# Patient Record
Sex: MALE | Race: WHITE | Employment: OTHER | URBAN - METROPOLITAN AREA
[De-identification: names, ages, dates, MRNs, and addresses within clinical notes are randomized per-mention and may not be internally consistent; named-entity substitution may affect disease eponyms.]

---

## 2017-03-20 RX ORDER — METFORMIN HYDROCHLORIDE 500 MG/1
TABLET, EXTENDED RELEASE ORAL
Qty: 360 TAB | Refills: 0 | Status: SHIPPED | OUTPATIENT
Start: 2017-03-20 | End: 2017-03-24 | Stop reason: SDUPTHER

## 2017-03-22 RX ORDER — ATORVASTATIN CALCIUM 20 MG/1
TABLET, FILM COATED ORAL
Qty: 90 TAB | Refills: 0 | Status: SHIPPED | OUTPATIENT
Start: 2017-03-22 | End: 2017-06-29 | Stop reason: SDUPTHER

## 2017-03-23 ENCOUNTER — TELEPHONE (OUTPATIENT)
Dept: CARDIOLOGY CLINIC | Age: 77
End: 2017-03-23

## 2017-03-23 NOTE — TELEPHONE ENCOUNTER
Pt called this morning and stated he has not been feeling well and easily fatigued. He requested a call back to [phone]. Thanks!

## 2017-03-24 ENCOUNTER — HOSPITAL ENCOUNTER (OUTPATIENT)
Dept: LAB | Age: 77
Discharge: HOME | End: 2017-03-24
Payer: MEDICARE

## 2017-03-24 ENCOUNTER — OFFICE VISIT (OUTPATIENT)
Dept: CARDIOLOGY CLINIC | Age: 77
End: 2017-03-24

## 2017-03-24 VITALS
OXYGEN SATURATION: 97 % | SYSTOLIC BLOOD PRESSURE: 148 MMHG | HEIGHT: 76 IN | BODY MASS INDEX: 29.01 KG/M2 | HEART RATE: 66 BPM | RESPIRATION RATE: 16 BRPM | DIASTOLIC BLOOD PRESSURE: 76 MMHG | WEIGHT: 238.2 LBS

## 2017-03-24 DIAGNOSIS — R07.89 CHEST TIGHTNESS: ICD-10-CM

## 2017-03-24 DIAGNOSIS — R06.09 DOE (DYSPNEA ON EXERTION): Primary | ICD-10-CM

## 2017-03-24 DIAGNOSIS — G47.33 OSA (OBSTRUCTIVE SLEEP APNEA): ICD-10-CM

## 2017-03-24 DIAGNOSIS — R53.83 FATIGUE, UNSPECIFIED TYPE: ICD-10-CM

## 2017-03-24 DIAGNOSIS — I48.91 ATRIAL FIBRILLATION, UNSPECIFIED TYPE (HCC): ICD-10-CM

## 2017-03-24 DIAGNOSIS — I25.10 CORONARY ARTERY DISEASE INVOLVING NATIVE CORONARY ARTERY OF NATIVE HEART WITHOUT ANGINA PECTORIS: ICD-10-CM

## 2017-03-24 DIAGNOSIS — I10 ESSENTIAL HYPERTENSION, BENIGN: ICD-10-CM

## 2017-03-24 DIAGNOSIS — E11.9 TYPE 2 DIABETES MELLITUS WITHOUT COMPLICATION, WITHOUT LONG-TERM CURRENT USE OF INSULIN (HCC): ICD-10-CM

## 2017-03-24 PROCEDURE — 80048 BASIC METABOLIC PNL TOTAL CA: CPT

## 2017-03-24 PROCEDURE — 80299 QUANTITATIVE ASSAY DRUG: CPT

## 2017-03-24 PROCEDURE — 36415 COLL VENOUS BLD VENIPUNCTURE: CPT

## 2017-03-24 PROCEDURE — 84443 ASSAY THYROID STIM HORMONE: CPT

## 2017-03-24 PROCEDURE — 85025 COMPLETE CBC W/AUTO DIFF WBC: CPT

## 2017-03-24 NOTE — PROGRESS NOTES
Subjective:     Problem List  Date Reviewed: 3/24/2017          Codes Class Noted    ACP (advance care planning) ICD-10-CM: Z71.89  ICD-9-CM: V65.49  9/30/2016    Overview Signed 9/30/2016  6:42 AM by Vielka Gordon MD     I reviewed advanced directive information and written information given to patient; patient to make follow up appointment with a NN to review choices for health care, agent, and anatomical gifts. 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  DCCV (10/7/13). Romy Kitchen (12/18/14): On amio. Rosacea ICD-10-CM: L71.9  ICD-9-CM: 695.3  11/13/2012        Dyslipidemia ICD-10-CM: E78.5  ICD-9-CM: 272.4  12/20/2010    Overview Addendum 6/22/2016  9:41 AM by MD MARII Alva. FLP (12/14/10): Tot 168, , HDL 49, LDL 97 (Zocor 20mg qd). B.  FLP (1/6/14): Tot 152, TG 81, HDL 57, LDL 79 (Zocor 40). C.  FLP (5/11/15): Tot 144, TG 66, HDL 57, LDL 74 (Lipitor 20). D.  FLP (12/1/15): Tot 143, TG 63, HDL 55, LDL 75 (Lipitor 20). E.  FLP (4/12/16): Tot 146, TG 84, HDL 51, LDL 58 (Lipitor 20).              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 Cath (8/27/8): LAD p90, m70. OM1 70, OM2 90 (small). RCA m40. EF 55%. No AVG/MR. Patent L SC.  b. PCI (11/13/8): mLAD 2.75x18 Xience, pLAD 2.75x23 Xience. OM1 3.0x15 Xience. c. Tono Yaness Cardiolite (8/11/9): Mild fixed inf defect. No reversible defects. EF 51% with mild inf HK. D.  Cath (8/29/13):  LAD patent prox and mid stents. LCx m40; patent OM1 stent. RCA p80. EF 55%. No AVG/MR. E.  PCI ost/proxRCA (8/29/13):  3.0x18 Xience. F.  Echo (8/22/13):  EF 55%. Mod dil LA. Mild dil RA. Mild MR/TR/GA. PASP 24. Sxs if CAD: Fatigue, chest tightness    Mr. Saray Ray is a 77 y.o. man with the above past medical history, last seen 6/16 by Dr. Cheo Gardner, who presents today reporting significant fatigue and HURD. He is doing well from a cardiac standpoint. His main complaint is that of easy fatigability. His legs get very tired doing his activities. He denies any chest pain, chest discomfort, shortness of breath, dyspnea on exertion, orthopnea, paroxysmal nocturnal dyspnea, lower extremity swelling, palpitations, syncope or near syncope. He is still coaching girls softball and does this without too much in the way of cardiac symptoms. Hx of GI bleed on Xarelto. History   Smoking Status    Never Smoker   Smokeless Tobacco    Former User    Types: Leeroy Sandhu Quit date: 10/30/1993     Comment: patient reports quitting over 20 yeaers ago.         Current Outpatient Prescriptions   Medication Sig Dispense Refill    atorvastatin (LIPITOR) 20 mg tablet TAKE ONE TABLET BY MOUTH ONCE DAILY 90 Tab 0    lisinopril (PRINIVIL, ZESTRIL) 40 mg tablet TAKE ONE TABLET BY MOUTH ONCE DAILY 90 Tab 0    amiodarone (CORDARONE) 200 mg tablet TAKE ONE TABLET BY MOUTH ONCE DAILY 90 Tab 2    metFORMIN ER (GLUCOPHAGE XR) 500 mg tablet TAKE FOUR TABLETS BY MOUTH ONCE DAILY WITH BREAKFAST 360 tablet 0    amLODIPine (NORVASC) 5 mg tablet Take 1 tablet by mouth daily. 90 tablet 1    ferrous sulfate 134 mg (27 mg iron) tab Take 3 Tabs by mouth.  aspirin delayed-release 81 mg tablet Take 81 mg by mouth daily. Indications: MYOCARDIAL REINFARCTION PREVENTION         Objective:     Visit Vitals    /76 (BP 1 Location: Left arm, BP Patient Position: Sitting)    Pulse 66    Resp 16    Ht 6' 4\" (1.93 m)    Wt 238 lb 3.2 oz (108 kg)    SpO2 97%    BMI 28.99 kg/m2       HEENT Exam:     Normocephalic, atraumatic. EOMI. Oropharynx negative. Neck supple. No lymphadenopathy. Lung Exam:     The patient is not dyspneic. There is no cough. The lungs are clear to percussion. Breath sounds are heard equally in all lung fields. There are no wheezes, rales, rhonchi, or rubs heard on auscultation. Heart Exam:     The rhythm is regular. The PMI is in the 5th intercostal space of the MCL. Apical impulse is normal. S1 is regular. S2 is physiologic. There is no S3, S4 gallop, murmur, click, or rub. Abdomen Exam:     Bowel sounds are normoactive. Abdomen benign. Extremities Exam:     The extremities are atraumatic appearing. There is no clubbing, cyanosis, edema, ulcers, varicose veins, rash, swelling, erythemia noted in the extremities. Right-sided weakness. Vascular Exam:     The radial, brachial, dorsalis pedis, posterior tibial, are equal and strong bilaterally The carotids are equal bilaterally without bruits.           EKG:  Sinus at 67 bpm, normal    Assessment/Plan:     Encounter Diagnoses   Name Primary?  Chest tightness     Fatigue, unspecified type     HURD (dyspnea on exertion) Yes    Coronary artery disease involving native coronary artery of native heart without angina pectoris     Essential hypertension, benign     Atrial fibrillation, unspecified type ([facility] [address]     Type 2 diabetes mellitus without complication, without long-term current use of insulin (HCC)     TOPHER (obstructive sleep apnea)        Mr. Genie Franklin is experiencing exertional chest tightness, fatigue and HURD. These sxs are consistent with his previous experiences prior to his PCI. He has CAD, afib controlled with amiodarone, and DM. I believe it is prudent to obtain a Lexiscan cardiolite to evaluate for progression of ischemia. However, as he has previously had negative stress results and then found to have obstructive disease with cath, will review results with Dr. Vicente Dennis. In the meantime, since he remains on amiodarone and I see he is overdue for amiodarone surveillance labs, will obtain thyroid panel and amiodarone and metabolite. Will also obtain BMP and CBC in anticipation of possible cardiac catheterization. The patient was encouraged to continue current medications and call with any new complaints or concerns. Oral and written instructions provided; patient verbalized understanding. Regroup after testing complete.     Bret Dixon, ANP

## 2017-03-24 NOTE — MR AVS SNAPSHOT
Visit Information Date & Time Provider Department Dept. Phone Encounter #  
 3/24/2017  9:00 AM Pito Felipe NP [facility] [phone] [id number] Your Appointments 7/5/2017  8:40 AM  
ESTABLISHED PATIENT with Celso Damian MD  
[facility] (GAIL SCHEDULING) Appt Note: ANNUAL  
 [address] [facility] [address] [address] [facility]  
[address] [place] [address] [address] Upcoming Health Maintenance Date Due  
 EYE EXAM RETINAL OR DILATED Q1 5/12/2016 GLAUCOMA SCREENING Q2Y 1/19/2017 HEMOGLOBIN A1C Q6M 3/29/2017 FOOT EXAM Q1 4/12/2017 MICROALBUMIN Q1 9/28/2017 MEDICARE YEARLY EXAM 9/29/2017 LIPID PANEL Q1 9/29/2017 COLONOSCOPY 4/12/2026 DTaP/Tdap/Td series (2 - Td) 9/28/2026 Allergies as of 3/24/2017  Review Complete On: 3/24/2017 By: Pito Felipe NP Severity Noted Reaction Type Reactions Adhesive Tape  02/25/2010    Rash Baycol  02/25/2010    Nausea and Vomiting Pravachol [Pravastatin]  02/25/2010    Nausea and Vomiting Current Immunizations  Reviewed on 11/1/2013 Name Date Influenza High Dose Vaccine PF 9/28/2016, 12/1/2015 Influenza Vaccine 11/1/2013 Influenza Vaccine Split 10/15/2012 Pneumococcal Vaccine (Unspecified Type) 11/25/2008 dT Vaccine 12/20/1999 Not reviewed this visit You Were Diagnosed With   
  
 Codes Comments HURD (dyspnea on exertion)    -  Primary ICD-10-CM: R06.09 
ICD-9-CM: 786.09 Chest tightness     ICD-10-CM: R07.89 ICD-9-CM: 786.59 Fatigue, unspecified type     ICD-10-CM: R53.83 ICD-9-CM: 780.79 Coronary artery disease involving native coronary artery of native heart without angina pectoris     ICD-10-CM: I25.10 ICD-9-CM: 414.01 Essential hypertension, benign     ICD-10-CM: I10 
ICD-9-CM: 401.1 [facility], unspecified [facility]     ICD-10-CM: I48.91 
ICD-9-CM: 427.31 Type 2 diabetes mellitus without complication, without long-term current use of insulin (HCC)     ICD-10-CM: E11.9 ICD-9-CM: 250.00 Vitals BP Pulse Resp Height(growth percentile) Weight(growth percentile) SpO2  
 148/76 (BP 1 Location: Left arm, BP Patient Position: Sitting) 66 16 6' 4\" (1.93 m) 238 lb 3.2 oz (108 kg) 97% BMI Smoking Status 28.99 kg/m2 Never Smoker Vitals History BMI and BSA Data Body Mass Index Body Surface Area  
 28.99 kg/m 2 2.41 m 2 Preferred Pharmacy Pharmacy Name [address] Your Updated Medication List  
  
   
This list is accurate as of: 3/24/17 10:24 AM.  Always use your most recent med list.  
  
  
  
  
 amiodarone 200 mg tablet Commonly known as:  CORDARONE  
TAKE ONE TABLET BY MOUTH ONCE DAILY  
  
 amLODIPine 5 mg tablet Commonly known as:  Elyn Coffer Take 1 tablet by mouth daily. aspirin delayed-release 81 mg tablet Take 81 mg by mouth daily. Indications: MYOCARDIAL REINFARCTION PREVENTION  
  
 atorvastatin 20 mg tablet Commonly known as:  LIPITOR  
TAKE ONE TABLET BY MOUTH ONCE DAILY ferrous sulfate 134 mg (27 mg iron) Tab Take 3 Tabs by mouth.  
  
 lisinopril 40 mg tablet Commonly known as:  PRINIVIL, ZESTRIL  
TAKE ONE TABLET BY MOUTH ONCE DAILY  
  
 metFORMIN  mg tablet Commonly known as:  GLUCOPHAGE XR  
TAKE FOUR TABLETS BY MOUTH ONCE DAILY WITH BREAKFAST We Performed the Following AMB POC EKG ROUTINE W/ 12 LEADS, INTER & REP [91247 CPT(R)] AMIODARONE & METABOLITE [id number] CPT(R)] CBC WITH AUTOMATED DIFF [65648 CPT(R)] METABOLIC PANEL, BASIC [11327 CPT(R)] THYROID PANEL W/TSH [54371 CPT(R)] To-Do List   
 03/27/2017   ECG:  STRESS TEST LEXISCAN/CARDIOLITE   
  
  
 Please provide this summary of care documentation to your next provider. Your primary care clinician is listed as [address] [address], [facility]/s . If you have any questions after today's visit, please call [phone].

## 2017-03-28 LAB
AMIODARONE SERPL-MCNC: 1.2 UG/ML (ref 1–2.5)
BASOPHILS # BLD AUTO: 0 X10E3/UL (ref 0–0.2)
BASOPHILS NFR BLD AUTO: 1 %
BUN SERPL-MCNC: 30 MG/DL (ref 8–27)
BUN/CREAT SERPL: 22 (ref 10–22)
CALCIUM SERPL-MCNC: 9.9 MG/DL (ref 8.6–10.2)
CHLORIDE SERPL-SCNC: 103 MMOL/L (ref 96–106)
CO2 SERPL-SCNC: 27 MMOL/L (ref 18–29)
CREAT SERPL-MCNC: 1.35 MG/DL (ref 0.76–1.27)
DESETHYLAMIODARONE SERPL-MCNC: 0.9 UG/ML (ref 1–2.5)
EOSINOPHIL # BLD AUTO: 0.1 X10E3/UL (ref 0–0.4)
EOSINOPHIL NFR BLD AUTO: 1 %
ERYTHROCYTE [DISTWIDTH] IN BLOOD BY AUTOMATED COUNT: 15.4 % (ref 12.3–15.4)
FT4I SERPL CALC-MCNC: 3.5 (ref 1.2–4.9)
GLUCOSE SERPL-MCNC: 127 MG/DL (ref 65–99)
HCT VFR BLD AUTO: 39.6 % (ref 37.5–51)
HGB BLD-MCNC: 13 G/DL (ref 12.6–17.7)
IMM GRANULOCYTES # BLD: 0.1 X10E3/UL (ref 0–0.1)
IMM GRANULOCYTES NFR BLD: 1 %
INTERPRETATION: NORMAL
LYMPHOCYTES # BLD AUTO: 1.6 X10E3/UL (ref 0.7–3.1)
LYMPHOCYTES NFR BLD AUTO: 19 %
MCH RBC QN AUTO: 30.5 PG (ref 26.6–33)
MCHC RBC AUTO-ENTMCNC: 32.8 G/DL (ref 31.5–35.7)
MCV RBC AUTO: 93 FL (ref 79–97)
MONOCYTES # BLD AUTO: 0.6 X10E3/UL (ref 0.1–0.9)
MONOCYTES NFR BLD AUTO: 7 %
NEUTROPHILS # BLD AUTO: 6.2 X10E3/UL (ref 1.4–7)
NEUTROPHILS NFR BLD AUTO: 71 %
PLATELET # BLD AUTO: 230 X10E3/UL (ref 150–379)
POTASSIUM SERPL-SCNC: 4.5 MMOL/L (ref 3.5–5.2)
RBC # BLD AUTO: 4.26 X10E6/UL (ref 4.14–5.8)
SODIUM SERPL-SCNC: 145 MMOL/L (ref 134–144)
T3RU NFR SERPL: 31 % (ref 24–39)
T4 SERPL-MCNC: 11.3 UG/DL (ref 4.5–12)
TSH SERPL DL<=0.005 MIU/L-ACNC: 1.86 UIU/ML (ref 0.45–4.5)
WBC # BLD AUTO: 8.5 X10E3/UL (ref 3.4–10.8)

## 2017-03-28 NOTE — PROGRESS NOTES
Thyroid and amiodarone labs show good control. CBC and BMP done in anticipation of possible cardiac cath.

## 2017-04-02 RX ORDER — LISINOPRIL 40 MG/1
TABLET ORAL
Qty: 90 TAB | Refills: 0 | Status: SHIPPED | OUTPATIENT
Start: 2017-04-02 | End: 2017-08-16 | Stop reason: SDUPTHER

## 2017-04-04 ENCOUNTER — CLINICAL SUPPORT (OUTPATIENT)
Dept: CARDIOLOGY CLINIC | Age: 77
End: 2017-04-04

## 2017-04-04 DIAGNOSIS — Z98.61 HISTORY OF PTCA: ICD-10-CM

## 2017-04-04 DIAGNOSIS — R53.83 FATIGUE, UNSPECIFIED TYPE: ICD-10-CM

## 2017-04-04 DIAGNOSIS — E11.9 TYPE 2 DIABETES MELLITUS WITHOUT COMPLICATION, WITHOUT LONG-TERM CURRENT USE OF INSULIN (HCC): ICD-10-CM

## 2017-04-04 DIAGNOSIS — I25.10 CORONARY ARTERY DISEASE INVOLVING NATIVE CORONARY ARTERY OF NATIVE HEART WITHOUT ANGINA PECTORIS: ICD-10-CM

## 2017-04-04 DIAGNOSIS — R07.9 CHEST PAIN, UNSPECIFIED TYPE: ICD-10-CM

## 2017-04-04 DIAGNOSIS — R06.02 SHORTNESS OF BREATH: ICD-10-CM

## 2017-04-04 DIAGNOSIS — I48.91 ATRIAL FIBRILLATION, UNSPECIFIED TYPE (HCC): ICD-10-CM

## 2017-04-04 DIAGNOSIS — R07.89 CHEST TIGHTNESS: ICD-10-CM

## 2017-04-04 DIAGNOSIS — R06.09 DOE (DYSPNEA ON EXERTION): ICD-10-CM

## 2017-04-11 DIAGNOSIS — I10 ESSENTIAL HYPERTENSION, BENIGN: Primary | ICD-10-CM

## 2017-04-11 DIAGNOSIS — Z01.810 PRE-OPERATIVE CARDIOVASCULAR EXAMINATION: ICD-10-CM

## 2017-04-11 DIAGNOSIS — I25.10 CORONARY ARTERY DISEASE INVOLVING NATIVE CORONARY ARTERY OF NATIVE HEART WITHOUT ANGINA PECTORIS: ICD-10-CM

## 2017-04-14 ENCOUNTER — DOCUMENTATION ONLY (OUTPATIENT)
Dept: CARDIOLOGY CLINIC | Age: 77
End: 2017-04-14

## 2017-04-14 NOTE — PROGRESS NOTES
Cardiac cath Friday April 28 10:00am Arrive at 8am 2nd floor registration Npo after midnight,hold metformin 24 hr prior to and 48 hr after. Labs pending.

## 2017-04-27 DIAGNOSIS — I25.10 CORONARY ARTERY DISEASE DUE TO LIPID RICH PLAQUE: Primary | ICD-10-CM

## 2017-04-27 DIAGNOSIS — I25.83 CORONARY ARTERY DISEASE DUE TO LIPID RICH PLAQUE: Primary | ICD-10-CM

## 2017-04-27 RX ORDER — SODIUM CHLORIDE 0.9 % (FLUSH) 0.9 %
5-10 SYRINGE (ML) INJECTION EVERY 8 HOURS
Status: CANCELLED | OUTPATIENT
Start: 2017-04-28

## 2017-04-27 RX ORDER — SODIUM CHLORIDE 0.9 % (FLUSH) 0.9 %
5-10 SYRINGE (ML) INJECTION AS NEEDED
Status: CANCELLED | OUTPATIENT
Start: 2017-04-28

## 2017-04-28 ENCOUNTER — HOSPITAL ENCOUNTER (OUTPATIENT)
Dept: CARDIAC CATH/INVASIVE PROCEDURES | Age: 77
Discharge: HOME | End: 2017-04-28
Attending: SPECIALIST | Admitting: SPECIALIST
Payer: MEDICARE

## 2017-04-28 VITALS
OXYGEN SATURATION: 95 % | WEIGHT: 236.55 LBS | RESPIRATION RATE: 20 BRPM | TEMPERATURE: 97.9 F | HEIGHT: 75 IN | BODY MASS INDEX: 29.41 KG/M2 | DIASTOLIC BLOOD PRESSURE: 70 MMHG | HEART RATE: 64 BPM | SYSTOLIC BLOOD PRESSURE: 141 MMHG

## 2017-04-28 DIAGNOSIS — I25.10 CORONARY ARTERY DISEASE DUE TO LIPID RICH PLAQUE: ICD-10-CM

## 2017-04-28 DIAGNOSIS — I25.83 CORONARY ARTERY DISEASE DUE TO LIPID RICH PLAQUE: ICD-10-CM

## 2017-04-28 LAB
ANION GAP BLD CALC-SCNC: 7 MMOL/L (ref 5–15)
ATRIAL RATE: 57 BPM
BUN SERPL-MCNC: 23 MG/DL (ref 6–20)
BUN/CREAT SERPL: 18 (ref 12–20)
CALCIUM SERPL-MCNC: 8.9 MG/DL (ref 8.5–10.1)
CALCULATED P AXIS, ECG09: 51 DEGREES
CALCULATED R AXIS, ECG10: -26 DEGREES
CALCULATED T AXIS, ECG11: -2 DEGREES
CHLORIDE SERPL-SCNC: 105 MMOL/L (ref 97–108)
CO2 SERPL-SCNC: 31 MMOL/L (ref 21–32)
CREAT SERPL-MCNC: 1.28 MG/DL (ref 0.7–1.3)
DIAGNOSIS, 93000: NORMAL
ERYTHROCYTE [DISTWIDTH] IN BLOOD BY AUTOMATED COUNT: 14.8 % (ref 11.5–14.5)
GLUCOSE SERPL-MCNC: 125 MG/DL (ref 65–100)
HCT VFR BLD AUTO: 42.4 % (ref 36.6–50.3)
HGB BLD-MCNC: 13.6 G/DL (ref 12.1–17)
MCH RBC QN AUTO: 30.4 PG (ref 26–34)
MCHC RBC AUTO-ENTMCNC: 32.1 G/DL (ref 30–36.5)
MCV RBC AUTO: 94.9 FL (ref 80–99)
P-R INTERVAL, ECG05: 192 MS
PLATELET # BLD AUTO: 209 K/UL (ref 150–400)
POTASSIUM SERPL-SCNC: 3.6 MMOL/L (ref 3.5–5.1)
Q-T INTERVAL, ECG07: 500 MS
QRS DURATION, ECG06: 110 MS
QTC CALCULATION (BEZET), ECG08: 486 MS
RBC # BLD AUTO: 4.47 M/UL (ref 4.1–5.7)
SODIUM SERPL-SCNC: 143 MMOL/L (ref 136–145)
VENTRICULAR RATE, ECG03: 57 BPM
WBC # BLD AUTO: 6.7 K/UL (ref 4.1–11.1)

## 2017-04-28 PROCEDURE — 74011250637 HC RX REV CODE- 250/637: Performed by: SPECIALIST

## 2017-04-28 PROCEDURE — 74011636320 HC RX REV CODE- 636/320: Performed by: SPECIALIST

## 2017-04-28 PROCEDURE — 77030004532 HC CATH ANGI DX IMP BSC -A

## 2017-04-28 PROCEDURE — 77030008543 HC TBNG MON PRSS MRTM -A

## 2017-04-28 PROCEDURE — C1894 INTRO/SHEATH, NON-LASER: HCPCS

## 2017-04-28 PROCEDURE — 77030013521 HC DEV INFL BLN BSC -B

## 2017-04-28 PROCEDURE — 77030018836 HC SOL IRR NACL ICUM -A

## 2017-04-28 PROCEDURE — 85027 COMPLETE CBC AUTOMATED: CPT | Performed by: SPECIALIST

## 2017-04-28 PROCEDURE — C1769 GUIDE WIRE: HCPCS

## 2017-04-28 PROCEDURE — C1725 CATH, TRANSLUMIN NON-LASER: HCPCS

## 2017-04-28 PROCEDURE — 74011000250 HC RX REV CODE- 250: Performed by: SPECIALIST

## 2017-04-28 PROCEDURE — 99153 MOD SED SAME PHYS/QHP EA: CPT

## 2017-04-28 PROCEDURE — 93458 L HRT ARTERY/VENTRICLE ANGIO: CPT

## 2017-04-28 PROCEDURE — 77030029065 HC DRSG HEMO QCLOT ZMED -B

## 2017-04-28 PROCEDURE — 80048 BASIC METABOLIC PNL TOTAL CA: CPT | Performed by: SPECIALIST

## 2017-04-28 PROCEDURE — 93005 ELECTROCARDIOGRAM TRACING: CPT

## 2017-04-28 PROCEDURE — C1887 CATHETER, GUIDING: HCPCS

## 2017-04-28 PROCEDURE — C1874 STENT, COATED/COV W/DEL SYS: HCPCS

## 2017-04-28 PROCEDURE — 36415 COLL VENOUS BLD VENIPUNCTURE: CPT | Performed by: SPECIALIST

## 2017-04-28 PROCEDURE — 74011250636 HC RX REV CODE- 250/636

## 2017-04-28 PROCEDURE — 74011000258 HC RX REV CODE- 258: Performed by: SPECIALIST

## 2017-04-28 PROCEDURE — 74011250636 HC RX REV CODE- 250/636: Performed by: SPECIALIST

## 2017-04-28 PROCEDURE — 77030028837 HC SYR ANGI PWR INJ COEU -A

## 2017-04-28 PROCEDURE — 99152 MOD SED SAME PHYS/QHP 5/>YRS: CPT

## 2017-04-28 RX ORDER — SODIUM CHLORIDE 0.9 % (FLUSH) 0.9 %
5-10 SYRINGE (ML) INJECTION AS NEEDED
Status: DISCONTINUED | OUTPATIENT
Start: 2017-04-28 | End: 2017-04-28 | Stop reason: HOSPADM

## 2017-04-28 RX ORDER — NITROGLYCERIN 0.4 MG/1
0.4 TABLET SUBLINGUAL
Status: DISCONTINUED | OUTPATIENT
Start: 2017-04-28 | End: 2017-04-28 | Stop reason: HOSPADM

## 2017-04-28 RX ORDER — MIDAZOLAM HYDROCHLORIDE 1 MG/ML
.5-1 INJECTION, SOLUTION INTRAMUSCULAR; INTRAVENOUS
Status: DISCONTINUED | OUTPATIENT
Start: 2017-04-28 | End: 2017-04-28 | Stop reason: HOSPADM

## 2017-04-28 RX ORDER — HYDRALAZINE HYDROCHLORIDE 20 MG/ML
20 INJECTION INTRAMUSCULAR; INTRAVENOUS ONCE
Status: COMPLETED | OUTPATIENT
Start: 2017-04-28 | End: 2017-04-28

## 2017-04-28 RX ORDER — SODIUM CHLORIDE 0.9 % (FLUSH) 0.9 %
5-10 SYRINGE (ML) INJECTION EVERY 8 HOURS
Status: DISCONTINUED | OUTPATIENT
Start: 2017-04-28 | End: 2017-04-28 | Stop reason: HOSPADM

## 2017-04-28 RX ORDER — HYDRALAZINE HYDROCHLORIDE 20 MG/ML
INJECTION INTRAMUSCULAR; INTRAVENOUS
Status: COMPLETED
Start: 2017-04-28 | End: 2017-04-28

## 2017-04-28 RX ORDER — CLOPIDOGREL BISULFATE 75 MG/1
600 TABLET ORAL
Status: COMPLETED | OUTPATIENT
Start: 2017-04-28 | End: 2017-04-28

## 2017-04-28 RX ORDER — ACETAMINOPHEN 325 MG/1
650 TABLET ORAL
Status: DISCONTINUED | OUTPATIENT
Start: 2017-04-28 | End: 2017-04-28 | Stop reason: HOSPADM

## 2017-04-28 RX ORDER — FENTANYL CITRATE 50 UG/ML
25-200 INJECTION, SOLUTION INTRAMUSCULAR; INTRAVENOUS
Status: DISCONTINUED | OUTPATIENT
Start: 2017-04-28 | End: 2017-04-28 | Stop reason: HOSPADM

## 2017-04-28 RX ORDER — HYDROCORTISONE SODIUM SUCCINATE 100 MG/2ML
100 INJECTION, POWDER, FOR SOLUTION INTRAMUSCULAR; INTRAVENOUS
Status: DISCONTINUED | OUTPATIENT
Start: 2017-04-28 | End: 2017-04-28 | Stop reason: HOSPADM

## 2017-04-28 RX ORDER — HEPARIN SODIUM 200 [USP'U]/100ML
500 INJECTION, SOLUTION INTRAVENOUS ONCE
Status: COMPLETED | OUTPATIENT
Start: 2017-04-28 | End: 2017-04-28

## 2017-04-28 RX ORDER — LIDOCAINE HYDROCHLORIDE 10 MG/ML
20 INJECTION INFILTRATION; PERINEURAL
Status: DISCONTINUED | OUTPATIENT
Start: 2017-04-28 | End: 2017-04-28 | Stop reason: HOSPADM

## 2017-04-28 RX ORDER — SODIUM CHLORIDE 9 MG/ML
100 INJECTION, SOLUTION INTRAVENOUS CONTINUOUS
Status: DISPENSED | OUTPATIENT
Start: 2017-04-28 | End: 2017-04-28

## 2017-04-28 RX ORDER — HYDRALAZINE HYDROCHLORIDE 20 MG/ML
20 INJECTION INTRAMUSCULAR; INTRAVENOUS ONCE
Status: DISCONTINUED | OUTPATIENT
Start: 2017-04-28 | End: 2017-04-28

## 2017-04-28 RX ORDER — CLOPIDOGREL BISULFATE 75 MG/1
75 TABLET ORAL DAILY
Qty: 30 TAB | Refills: 5 | Status: SHIPPED | OUTPATIENT
Start: 2017-04-28 | End: 2017-09-26 | Stop reason: SDUPTHER

## 2017-04-28 RX ORDER — HYDRALAZINE HYDROCHLORIDE 20 MG/ML
20 INJECTION INTRAMUSCULAR; INTRAVENOUS
Status: COMPLETED | OUTPATIENT
Start: 2017-04-28 | End: 2017-04-28

## 2017-04-28 RX ORDER — HEPARIN SODIUM 200 [USP'U]/100ML
500 INJECTION, SOLUTION INTRAVENOUS
Status: COMPLETED | OUTPATIENT
Start: 2017-04-28 | End: 2017-04-28

## 2017-04-28 RX ADMIN — HYDRALAZINE HYDROCHLORIDE 20 MG: 20 INJECTION INTRAMUSCULAR; INTRAVENOUS at 15:00

## 2017-04-28 RX ADMIN — MIDAZOLAM HYDROCHLORIDE 2 MG: 1 INJECTION, SOLUTION INTRAMUSCULAR; INTRAVENOUS at 10:15

## 2017-04-28 RX ADMIN — FENTANYL CITRATE 50 MCG: 50 INJECTION, SOLUTION INTRAMUSCULAR; INTRAVENOUS at 13:50

## 2017-04-28 RX ADMIN — HEPARIN SODIUM IN SODIUM CHLORIDE 1000 UNITS: 200 INJECTION INTRAVENOUS at 10:52

## 2017-04-28 RX ADMIN — BIVALIRUDIN 1.75 MG/KG/HR: 250 INJECTION, POWDER, LYOPHILIZED, FOR SOLUTION INTRAVENOUS at 10:34

## 2017-04-28 RX ADMIN — Medication 10 ML: at 12:56

## 2017-04-28 RX ADMIN — IOPAMIDOL 325 ML: 755 INJECTION, SOLUTION INTRAVENOUS at 11:24

## 2017-04-28 RX ADMIN — BIVALIRUDIN 1.75 MG/KG/HR: 250 INJECTION, POWDER, LYOPHILIZED, FOR SOLUTION INTRAVENOUS at 11:02

## 2017-04-28 RX ADMIN — FENTANYL CITRATE 25 MCG: 50 INJECTION, SOLUTION INTRAMUSCULAR; INTRAVENOUS at 10:39

## 2017-04-28 RX ADMIN — CLOPIDOGREL 600 MG: 75 TABLET, FILM COATED ORAL at 11:16

## 2017-04-28 RX ADMIN — ACETAMINOPHEN 650 MG: 325 TABLET ORAL at 16:49

## 2017-04-28 RX ADMIN — LIDOCAINE HYDROCHLORIDE 20 ML: 10 INJECTION, SOLUTION INFILTRATION; PERINEURAL at 10:15

## 2017-04-28 RX ADMIN — FENTANYL CITRATE 25 MCG: 50 INJECTION, SOLUTION INTRAMUSCULAR; INTRAVENOUS at 10:18

## 2017-04-28 RX ADMIN — HEPARIN SODIUM IN SODIUM CHLORIDE 1000 UNITS: 200 INJECTION INTRAVENOUS at 10:05

## 2017-04-28 RX ADMIN — Medication 10 ML: at 08:45

## 2017-04-28 RX ADMIN — IOPAMIDOL 50 ML: 755 INJECTION, SOLUTION INTRAVENOUS at 10:37

## 2017-04-28 RX ADMIN — FENTANYL CITRATE 50 MCG: 50 INJECTION, SOLUTION INTRAMUSCULAR; INTRAVENOUS at 10:15

## 2017-04-28 RX ADMIN — MIDAZOLAM HYDROCHLORIDE 1 MG: 1 INJECTION, SOLUTION INTRAMUSCULAR; INTRAVENOUS at 10:45

## 2017-04-28 RX ADMIN — FENTANYL CITRATE 50 MCG: 50 INJECTION, SOLUTION INTRAMUSCULAR; INTRAVENOUS at 14:48

## 2017-04-28 RX ADMIN — FENTANYL CITRATE 50 MCG: 50 INJECTION, SOLUTION INTRAMUSCULAR; INTRAVENOUS at 11:16

## 2017-04-28 RX ADMIN — Medication 10 ML: at 15:01

## 2017-04-28 RX ADMIN — MIDAZOLAM HYDROCHLORIDE 1 MG: 1 INJECTION, SOLUTION INTRAMUSCULAR; INTRAVENOUS at 11:07

## 2017-04-28 RX ADMIN — HYDRALAZINE HYDROCHLORIDE 20 MG: 20 INJECTION INTRAMUSCULAR; INTRAVENOUS at 10:19

## 2017-04-28 RX ADMIN — SODIUM CHLORIDE 100 ML/HR: 900 INJECTION, SOLUTION INTRAVENOUS at 12:57

## 2017-04-28 NOTE — IP AVS SNAPSHOT
Juliann Huang 
 
 
 [address] [facility] [address] [address] [facility] 
[phone] Patient: Yaa Negron MRN: [id number] NDW:[id number] You are allergic to the following Allergen Reactions Adhesive Tape Rash Baycol Nausea and Vomiting Pravachol (Pravastatin) Nausea and Vomiting Recent Documentation Height Weight BMI Smoking Status 1.905 m 107.3 kg 29.57 kg/m2 Never Smoker Emergency Contacts Name Discharge Info Relation Home Work Mobile Martita Polanco DISCHARGE CAREGIVER [3] Spouse [3] [phone] About your hospitalization You were admitted on:  April 28, 2017 You last received care in the:  [facility] PACU You were discharged on:  April 28, 2017 Unit phone number:  [phone] Why you were hospitalized Your primary diagnosis was:  Not on File Providers Seen During Your Hospitalizations Provider Role Specialty Primary office phone Madisyn Canas MD Attending Provider Cardiology [phone] Your Primary Care Physician (PCP) Primary Care Physician Office Phone Office Fax CAIN Tiwari [phone] ** None ** Follow-up Information Follow up With Details Comments Contact Info Noble Barrera MD   [address] [place] [address] 
[phone] Madisyn Canas MD On 5/30/2017 1:20pm [address] [place] [phone] 100[address] [facility] 
[phone] Your Appointments Tuesday May 30, 2017  1:20 PM EDT HOSPITAL DISCHARGE with Madisyn Canas MD  
[facility] ([facility] SCH[facility]UL[facility]) [address] [facility] [place] [address] [facility]  
[phone] Current Discharge Medication List  
  
START taking these medications Dose & Instructions Dispensing Information Comments Morning Noon Evening Bedtime  
 clopidogrel 75 mg Tab Commonly known as:  PLAVIX Your last dose was: Your next dose is:    
   
   
 Dose:  75 mg Take 1 Tab by mouth daily. Quantity:  30 Tab Refills:  5 CONTINUE these medications which have NOT CHANGED Dose & Instructions Dispensing Information Comments Morning Noon Evening Bedtime  
 amiodarone 200 mg tablet Commonly known as:  CORDARONE Your last dose was: Your next dose is: TAKE ONE TABLET BY MOUTH ONCE DAILY Quantity:  90 Tab Refills:  2  
     
   
   
   
  
 amLODIPine 5 mg tablet Commonly known as:  Jason Thurman Your last dose was: Your next dose is:    
   
   
 Dose:  5 mg Take 1 tablet by mouth daily. Quantity:  90 tablet Refills:  1  
     
   
   
   
  
 aspirin delayed-release 81 mg tablet Your last dose was: Your next dose is:    
   
   
 Dose:  81 mg Take 81 mg by mouth daily. Indications: MYOCARDIAL REINFARCTION PREVENTION Refills:  0  
     
   
   
   
  
 atorvastatin 20 mg tablet Commonly known as:  LIPITOR Your last dose was: Your next dose is: TAKE ONE TABLET BY MOUTH ONCE DAILY Quantity:  90 Tab Refills:  0 Due fasting office visit  
    
   
   
   
  
 ferrous sulfate 134 mg (27 mg iron) Tab Your last dose was: Your next dose is:    
   
   
 Dose:  3 Tab Take 3 Tabs by mouth. Refills:  0 Taking unknown Iron OTC w/ goal of BID( diarrhea with too much)  
    
   
   
   
  
 lisinopril 40 mg tablet Commonly known as:  Barbvanessa Plunk Your last dose was: Your next dose is: TAKE ONE TABLET BY MOUTH ONCE DAILY Quantity:  90 Tab Refills:  0 Due fasting office visit  
    
   
   
   
  
 metFORMIN  mg tablet Commonly known as:  GLUCOPHAGE XR Your last dose was: Your next dose is: TAKE FOUR TABLETS BY MOUTH ONCE DAILY WITH BREAKFAST Quantity:  360 tablet Refills:  0 Where to Get Your Medications Information on where to get these meds will be given to you by the nurse or doctor. ! Ask your nurse or doctor about these medications  
  clopidogrel 75 mg Tab Discharge Instructions Discharge Instructions:  
 
Medications:  
 
Do NOT restart metformin (glucophage) until Sunday, April 30th, 2017. Activity: As tolerated except do not lift over 10 pounds for 5 days. Diet:  
 
American Heart Association. Follow-up:  
 
Follow up with Merlin Alba, MD on May 30th, 2017 at 1:20pm. 
[address] [place] [address] [address] 
[phone] Reminder: 
Do NOT restart metformin (glucophage) until Sunday, April 30th, 2017. If you smoke, STOP! Cardiac Catheterization/Angiography Discharge Instructions *Check the puncture site frequently for swelling or bleeding. If you see any bleeding, lie down and apply pressure over the area with a clean town or washcloth. Notify your doctor for any redness, swelling, drainage or oozing from the puncture site. Notify your doctor for any fever or chills. *If the leg or arm with the puncture becomes cold, numb or painful, call Dr. Amish Head at [phone]. *Activity should be limited for the next 48 hours. Climb stairs as little as possible and avoid any stooping, bending or strenuous activity for 5 days. No heavy lifting (anything over 10 pounds) for 5 days, this includes gabage bags, grocery bags, and laundry hampers/baskets. No vacuuming either. *Do not drive for 48 hours. *You may resume your usual diet. Drink more fluids than usual to assist with flushing the contrast out of your system. *Have a responsible person drive you home and stay with you for at least 24 hours after your heart catheterization/angiography. *You may remove the bandage from your GROIN in 24 hours.  You may shower in 24 hours after the bandage is removed, until then sponge bathe only. No tub baths, hot tubs or swimming for one week. Do not place any lotions, creams, powders, ointments over the puncture site for one week. You may place a clean band-aid over the puncture site each day for 5 days. Change this daily, and remember NO lotions, creams, powders, ointments, or creams for one week. Percutaneous Coronary Intervention: What to Expect at [facility] Your Recovery Percutaneous coronary intervention (PCI) is the name for procedures that are used to open a narrowed or blocked coronary artery. The two most common PCI procedures are coronary angioplasty and coronary stent placement. Your groin may have a bruise and feel sore for a day or two after a percutaneous coronary intervention (PCI). You can do light activities around the house, but nothing strenuous for several days. This care sheet gives you a general idea about how long it will take for you to recover. But each person recovers at a different pace. Follow the steps below to get better as quickly as possible. How can you care for yourself at home? Activity · Do not do strenuous exercise and do not lift, pull, or push anything heavy as directed above. You can walk around the house and do light activity, such as cooking. · You may shower in 24 hours after the procedure and remove your bandage. Pat the incision dry. Do not take a bath, swim, or hot tub for 1 week as directed above. · The catheter was placed in your groin, in the Femoral Artery. Do not try not to walk up stairs for the first couple of days unless it is necessary. Go up and down using your left leg to step up or down first, then bring your right leg next. This allows the left leg to bare the majority of the weight/work load. · If your doctor recommends it, get more exercise. Walking is a good choice. Bit by bit, increase the amount you walk every day.  Try for at least 30 minutes on most days of the week. Diet · Drink plenty of fluids to help your body flush out the dye. · Keep eating a heart-healthy diet that has lots of fruits, vegetables, and whole grains. If you have not been eating this way, talk to your doctor. You also may want to talk to a dietitian. This expert can help you to learn about healthy foods and plan meals. Medicines · Your doctor will tell you if and when you can restart your medicines. He or she will also give you instructions about taking any new medicines. · Dr. Shira Lei has reviewed your medications, and as noted above do not resume the Metformin until Sunday April 30th. · Your doctor has prescribed a blood-thinning medicine. You will be taking aspirin plus an antiplatelet, Clopidogrel (Plavix). It is very important that you take these medicines exactly as directed. · These medicines help keep the coronary artery open and reduce your risk of a heart attack. Should you go out of town and forget to take your Plavix with you, call Dr. Arthur Sharif office and they will call a prescription into a pharmacy close to you. · Call your doctor if you think you are having a problem with your medicine. Care of the catheter site · For 24 hours, keep a bandage over the spot where the catheter was inserted. · Put ice or a cold pack on the area for 10 to 20 minutes at a time to help with soreness or swelling. Put a thin cloth between the ice and your skin. Follow-up care is a key part of your treatment and safety. Be sure to make and go to all appointments, and call your doctor, Dr. Shira Lei at [phone] if you are having problems. It's also a good idea to know your test results and keep a list of the medicines you take. When should you call for help? Call [phone] anytime you think you may need emergency care. For example, call if: 
· You passed out (lost consciousness). · You have severe trouble breathing. · You have sudden chest pain and shortness of breath, or you cough up blood. · You have symptoms of a heart attack, such as: ¨ Chest pain or pressure. ¨ Sweating. ¨ Shortness of breath. ¨ Nausea or vomiting. ¨ Pain that spreads from the chest to the neck, jaw, or one or both shoulders or arms. ¨ Dizziness or lightheadedness. ¨ A fast or uneven pulse. After calling 911, chew 1 adult-strength aspirin. Wait for an ambulance. Do not try to drive yourself. · You have been diagnosed with angina, and you have angina symptoms that do not go away with rest or are not getting better within 5 minutes after you take one dose of nitroglycerin. · You are bleeding from the area where the catheter was put in your artery. · You have a fast-growing, painful lump at the catheter site. Call your doctor now or seek immediate medical care if: 
· You have signs of infection, such as: 
¨ Increased pain, swelling, warmth, or redness. ¨ Red streaks leading from the catheter site. ¨ Pus draining from the catheter site. ¨ A fever. · Your leg or arm looks blue or feels cold, numb, or tingly. Watch closely for changes in your health, and be sure to contact your doctor if you have any problems. Where can you learn more? Go to http://ruth-ellyn.info/. Enter S985 in the search box to learn more about \"Percutaneous Coronary Intervention: What to Expect at Home. \" Current as of: January 27, 2016 Content Version: 11.2 © 2247-7686 [employer]. Care instructions adapted under license by [employer] (which disclaims liability or warranty for this information). If you have questions about a medical condition or this instruction, always ask your healthcare professional. [place] [address] any warranty or liability for your use of this information. Cardiac Rehabilitation: Care Instructions Your Care Instructions Cardiac rehabilitation is a program for people who have a heart problem, such as a heart attack, heart failure, or a heart valve disease. The program includes exercise, lifestyle changes, education, and emotional support. Cardiac rehab can help you improve the quality of your life through better overall health. It can help you lose weight and feel better about yourself. On your cardiac rehab team, you may have your doctor, a nurse specialist, a dietitian, and a physical therapist. They will design your cardiac rehab program specifically for you. You will learn how to reduce your risk for heart problems, how to manage stress, and how to eat a heart-healthy diet. By the end of the program, you will be ready to maintain a healthier lifestyle on your own. Follow-up care is a key part of your treatment and safety. Be sure to make and go to all appointments, and call your doctor if you are having problems. It's also a good idea to know your test results and keep a list of the medicines you take. How can you care for yourself at home? · Take your medicines exactly as prescribed. Call your doctor if you think you are having a problem with your medicine. You will get more details on the specific medicines your doctor prescribes. · Weigh yourself every day if your doctor tells you to. Watch for sudden weight gain. Weigh yourself on the same scale with the same amount of clothing at the same time of day. · Plan your meals so that you are eating heart-healthy foods. ¨ Eat a variety of foods daily. Fresh fruits and vegetables and whole-grains are good choices. ¨ Limit your fat intake, especially saturated and trans fat. ¨ Limit salt (sodium). ¨ Increase fiber in your diet. ¨ Limit alcohol. · Learn how to take your pulse so that you can track your heart rate during exercise.  
· Always check with your doctor before you begin a new exercise program. 
· Warm up before you exercise and cool down afterward for at least 15 minutes each. This will help your heart gradually prepare for and recover from exercise and avoid pushing your heart too hard. · Stop exercising if you have any unusual discomfort, such as chest pain. · Do not smoke. Smoking can make heart problems worse. If you need help quitting, talk to your doctor about stop-smoking programs and medicines. These can increase your chances of quitting for good. When should you call for help? Call 911 anytime you think you may need emergency care. For example, call if: 
· You have severe trouble breathing. · You cough up pink, foamy mucus and you have trouble breathing. · You have symptoms of a heart attack. These may include: ¨ Chest pain or pressure, or a strange feeling in the chest. 
¨ Sweating. ¨ Shortness of breath. ¨ Nausea or vomiting. ¨ Pain, pressure, or a strange feeling in the back, neck, jaw, or upper belly or in one or both shoulders or arms. ¨ Lightheadedness or sudden weakness. ¨ A fast or irregular heartbeat. After you call 911, the  may tell you to chew 1 adult-strength or 2 to 4 low-dose aspirin. Wait for an ambulance. Do not try to drive yourself. · You have angina symptoms (such as chest pain or pressure) that do not go away with rest or are not getting better within 5 minutes after you take a dose of nitroglycerin. · You have symptoms of a stroke. These may include: 
¨ Sudden numbness, tingling, weakness, or loss of movement in your face, arm, or leg, especially on only one side of your body. ¨ Sudden vision changes. ¨ Sudden trouble speaking. ¨ Sudden confusion or trouble understanding simple statements. ¨ Sudden problems with walking or balance. ¨ A sudden, severe headache that is different from past headaches. · You passed out (lost consciousness). Call your doctor now or seek immediate medical care if: 
· You have new or increased shortness of breath. · You are dizzy or lightheaded, or you feel like you may faint. · You gain weight suddenly, such as 3 pounds or more in 2 to 3 days. (Your doctor may suggest a different range of weight gain.) · You have increased swelling in your legs, ankles, or feet. Watch closely for changes in your health, and be sure to contact your doctor if you have any problems. Where can you learn more? Go to http://ruth-ellyn.info/. Enter S157 in the search box to learn more about \"Cardiac Rehabilitation: Care Instructions. \" Current as of: May 5, 2016 Content Version: 11.2 © 5818-8656 [employer]. Care instructions adapted under license by [facility] (which disclaims liability or warranty for this information). If you have questions about a medical condition or this instruction, always ask your healthcare professional. [place] [address] any warranty or liability for your use of this information. Discharge Orders None ACO Transitions of Care Introducing [employer] [address] offers a voluntary care coordination program to provide high quality service and care to [facility] fee-for-service beneficiaries. Joel Ivan was designed to help you enhance your health and well-being through the following services: ? Transitions of Care  support for individuals who are transitioning from one care setting to another (example: Hospital to home). ? Chronic and Complex Care Coordination  support for individuals and caregivers of those with serious or chronic illnesses or with more than one chronic (ongoing) condition and those who take a number of different medications. If you meet specific medical criteria, a [facility] [address] may call you directly to coordinate your care with your primary care physician and your other care providers. For questions about the [facility] programs, please, contact your physicians office. For general questions or additional information about Accountable Care Organizations: 
Please visit www.medicare.gov/acos. html or call 1-800-MEDICARE (0-874.834.4861) TTY users should call 1-228.960.7004. General Information Please provide this summary of care documentation to your next provider. Patient Signature:  ____________________________________________________________ Date:  ____________________________________________________________  
  
Harpreet Search Provider Signature:  ____________________________________________________________ Date:  ____________________________________________________________

## 2017-04-28 NOTE — DISCHARGE INSTRUCTIONS
Discharge Instructions:     Medications:     Do NOT restart metformin (glucophage) until Sunday, April 30th, 2017. Activity:     As tolerated except do not lift over 10 pounds for 5 days. Diet:     American Heart Association. Follow-up:     Follow up with Clare Vang MD on May 30th, 2017 at 1:20pm.  [address] [facility]  [place] [address]  [phone]    Reminder:  Do NOT restart metformin (glucophage) until Sunday, April 30th, 2017. If you smoke, STOP! Cardiac Catheterization/Angiography Discharge Instructions    *Check the puncture site frequently for swelling or bleeding. If you see any bleeding, lie down and apply pressure over the area with a clean town or washcloth. Notify your doctor for any redness, swelling, drainage or oozing from the puncture site. Notify your doctor for any fever or chills. *If the leg or arm with the puncture becomes cold, numb or painful, call Dr. Martha Nicholson at [phone]. *Activity should be limited for the next 48 hours. Climb stairs as little as possible and avoid any stooping, bending or strenuous activity for 5 days. No heavy lifting (anything over 10 pounds) for 5 days, this includes gabage bags, grocery bags, and laundry hampers/baskets. No vacuuming either. *Do not drive for 48 hours. *You may resume your usual diet. Drink more fluids than usual to assist with flushing the contrast out of your system. *Have a responsible person drive you home and stay with you for at least 24 hours after your heart catheterization/angiography. *You may remove the bandage from your GROIN in 24 hours. You may shower in 24 hours after the bandage is removed, until then sponge bathe only. No tub baths, hot tubs or swimming for one week. Do not place any lotions, creams, powders, ointments over the puncture site for one week. You may place a clean band-aid over the puncture site each day for 5 days.  Change this daily, and remember NO lotions, creams, powders, ointments, or creams for one week. Percutaneous Coronary Intervention: What to Expect at [facility]    Percutaneous coronary intervention (PCI) is the name for procedures that are used to open a narrowed or blocked coronary artery. The two most common PCI procedures are coronary angioplasty and coronary stent placement. Your groin may have a bruise and feel sore for a day or two after a percutaneous coronary intervention (PCI). You can do light activities around the house, but nothing strenuous for several days. This care sheet gives you a general idea about how long it will take for you to recover. But each person recovers at a different pace. Follow the steps below to get better as quickly as possible. How can you care for yourself at home? Activity  · Do not do strenuous exercise and do not lift, pull, or push anything heavy as directed above. You can walk around the house and do light activity, such as cooking. · You may shower in 24 hours after the procedure and remove your bandage. Pat the incision dry. Do not take a bath, swim, or hot tub for 1 week as directed above. · The catheter was placed in your groin, in the Femoral Artery. Do not try not to walk up stairs for the first couple of days unless it is necessary. Go up and down using your left leg to step up or down first, then bring your right leg next. This allows the left leg to bare the majority of the weight/work load. · If your doctor recommends it, get more exercise. Walking is a good choice. Bit by bit, increase the amount you walk every day. Try for at least 30 minutes on most days of the week. Diet  · Drink plenty of fluids to help your body flush out the dye. · Keep eating a heart-healthy diet that has lots of fruits, vegetables, and whole grains. If you have not been eating this way, talk to your doctor. You also may want to talk to a dietitian.  This expert can help you to learn about healthy foods and plan meals. Medicines  · Your doctor will tell you if and when you can restart your medicines. He or she will also give you instructions about taking any new medicines. · Dr. Maru Diaz has reviewed your medications, and as noted above do not resume the Metformin until Sunday April 30th. · Your doctor has prescribed a blood-thinning medicine. You will be taking aspirin plus an antiplatelet, Clopidogrel (Plavix). It is very important that you take these medicines exactly as directed. · These medicines help keep the coronary artery open and reduce your risk of a heart attack. Should you go out of town and forget to take your Plavix with you, call Dr. Brenda Smiley office and they will call a prescription into a pharmacy close to you. · Call your doctor if you think you are having a problem with your medicine. Care of the catheter site  · For 24 hours, keep a bandage over the spot where the catheter was inserted. · Put ice or a cold pack on the area for 10 to 20 minutes at a time to help with soreness or swelling. Put a thin cloth between the ice and your skin. Follow-up care is a key part of your treatment and safety. Be sure to make and go to all appointments, and call your doctor, Dr. Maru Diaz at [phone] if you are having problems. It's also a good idea to know your test results and keep a list of the medicines you take. When should you call for help? Call 911 anytime you think you may need emergency care. For example, call if:  · You passed out (lost consciousness). · You have severe trouble breathing. · You have sudden chest pain and shortness of breath, or you cough up blood. · You have symptoms of a heart attack, such as:  ¨ Chest pain or pressure. ¨ Sweating. ¨ Shortness of breath. ¨ Nausea or vomiting. ¨ Pain that spreads from the chest to the neck, jaw, or one or both shoulders or arms. ¨ Dizziness or lightheadedness. ¨ A fast or uneven pulse.   After calling 911, chew 1 adult-strength aspirin. Wait for an ambulance. Do not try to drive yourself. · You have been diagnosed with angina, and you have angina symptoms that do not go away with rest or are not getting better within 5 minutes after you take one dose of nitroglycerin. · You are bleeding from the area where the catheter was put in your artery. · You have a fast-growing, painful lump at the catheter site. Call your doctor now or seek immediate medical care if:  · You have signs of infection, such as:  ¨ Increased pain, swelling, warmth, or redness. ¨ Red streaks leading from the catheter site. ¨ Pus draining from the catheter site. ¨ A fever. · Your leg or arm looks blue or feels cold, numb, or tingly. Watch closely for changes in your health, and be sure to contact your doctor if you have any problems. Where can you learn more? Go to http://ruth-ellyn.info/. Enter Q843 in the search box to learn more about \"Percutaneous Coronary Intervention: What to Expect at Home. \"  Current as of: January 27, 2016  Content Version: 11.2  © 0750-5942 [employer]. Care instructions adapted under license by [employer] (which disclaims liability or warranty for this information). If you have questions about a medical condition or this instruction, always ask your healthcare professional. [address] any warranty or liability for your use of this information. Cardiac Rehabilitation: Care Instructions  Your Care Instructions    Cardiac rehabilitation is a program for people who have a heart problem, such as a heart attack, heart failure, or a heart valve disease. The program includes exercise, lifestyle changes, education, and emotional support. Cardiac rehab can help you improve the quality of your life through better overall health. It can help you lose weight and feel better about yourself.   On your cardiac rehab team, you may have your doctor, a nurse specialist, a dietitian, and a physical therapist. They will design your cardiac rehab program specifically for you. You will learn how to reduce your risk for heart problems, how to manage stress, and how to eat a heart-healthy diet. By the end of the program, you will be ready to maintain a healthier lifestyle on your own. Follow-up care is a key part of your treatment and safety. Be sure to make and go to all appointments, and call your doctor if you are having problems. It's also a good idea to know your test results and keep a list of the medicines you take. How can you care for yourself at home? · Take your medicines exactly as prescribed. Call your doctor if you think you are having a problem with your medicine. You will get more details on the specific medicines your doctor prescribes. · Weigh yourself every day if your doctor tells you to. Watch for sudden weight gain. Weigh yourself on the same scale with the same amount of clothing at the same time of day. · Plan your meals so that you are eating heart-healthy foods. ¨ Eat a variety of foods daily. Fresh fruits and vegetables and whole-grains are good choices. ¨ Limit your fat intake, especially saturated and trans fat. ¨ Limit salt (sodium). ¨ Increase fiber in your diet. ¨ Limit alcohol. · Learn how to take your pulse so that you can track your heart rate during exercise. · Always check with your doctor before you begin a new exercise program.  · Warm up before you exercise and cool down afterward for at least 15 minutes each. This will help your heart gradually prepare for and recover from exercise and avoid pushing your heart too hard. · Stop exercising if you have any unusual discomfort, such as chest pain. · Do not smoke. Smoking can make heart problems worse. If you need help quitting, talk to your doctor about stop-smoking programs and medicines. These can increase your chances of quitting for good.   When should you call for help? Call 911 anytime you think you may need emergency care. For example, call if:  · You have severe trouble breathing. · You cough up pink, foamy mucus and you have trouble breathing. · You have symptoms of a heart attack. These may include:  ¨ Chest pain or pressure, or a strange feeling in the chest.  ¨ Sweating. ¨ Shortness of breath. ¨ Nausea or vomiting. ¨ Pain, pressure, or a strange feeling in the back, neck, jaw, or upper belly or in one or both shoulders or arms. ¨ Lightheadedness or sudden weakness. ¨ A fast or irregular heartbeat. After you call 911, the  may tell you to chew 1 adult-strength or 2 to 4 low-dose aspirin. Wait for an ambulance. Do not try to drive yourself. · You have angina symptoms (such as chest pain or pressure) that do not go away with rest or are not getting better within 5 minutes after you take a dose of nitroglycerin. · You have symptoms of a stroke. These may include:  ¨ Sudden numbness, tingling, weakness, or loss of movement in your face, arm, or leg, especially on only one side of your body. ¨ Sudden vision changes. ¨ Sudden trouble speaking. ¨ Sudden confusion or trouble understanding simple statements. ¨ Sudden problems with walking or balance. ¨ A sudden, severe headache that is different from past headaches. · You passed out (lost consciousness). Call your doctor now or seek immediate medical care if:  · You have new or increased shortness of breath. · You are dizzy or lightheaded, or you feel like you may faint. · You gain weight suddenly, such as 3 pounds or more in 2 to 3 days. (Your doctor may suggest a different range of weight gain.)  · You have increased swelling in your legs, ankles, or feet. Watch closely for changes in your health, and be sure to contact your doctor if you have any problems. Where can you learn more? Go to http://lidia.info/.   Enter K441 in the search box to learn more about \"Cardiac Rehabilitation: Care Instructions. \"  Current as of: May 5, 2016  Content Version: 11.2  © 2227-6381 [employer]. Care instructions adapted under license by [employer] (which disclaims liability or warranty for this information). If you have questions about a medical condition or this instruction, always ask your healthcare professional. [address] any warranty or liability for your use of this information.

## 2017-04-28 NOTE — ROUTINE PROCESS
TRANSFER - IN REPORT:    Verbal report received from topher paulino on [facility]  [facility]      Report consisted of patients Situation, Background, Assessment and   Recommendations(SBAR). Information from the following report(s) sbar, kardex, i &o, meds was reviewed with the receiving nurse. Opportunity for questions and clarification was provided. Assessment completed upon patients arrival to unit and care assumed.

## 2017-04-28 NOTE — H&P
Date of Surgery Update:  Linda Moran was seen and examined. History and physical has been reviewed. The patient has been examined. There have been no significant clinical changes since the completion of the originally dated History and Physical.    Signed By: Amelia Kong MD     April 28, 2017 8:01 AM       Subjective:      Problem List  Date Reviewed: 3/24/2017    Obey Wilson    Codes Class Noted     ACP (advance care planning) ICD-10-CM: Z71.89  ICD-9-CM: V65.49   9/30/2016     Overview Signed 9/30/2016 6:42 AM by Cooper Leonardo MD       I reviewed advanced directive information and written information given to patient; patient to make follow up appointment with a NN to review choices for health care, agent, and anatomical gifts.                  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 9   8/21/2014           Atrial fibrillation ([facility] ICD-10-CM: I48.91  ICD-9-CM: 427.31   8/16/2013     Overview Addendum 12/18/2014 12:50 PM by MD SAUD Downing [facility] (10/7/13). Kathleen Mcburney (12/18/14): On amio.               Rosacea ICD-10-CM: L71.9  ICD-9-CM: 515. 3   11/13/2012           Dyslipidemia ICD-10-CM: A01.5  ICD-9-CM: 272.4   12/20/2010     Overview Addendum 6/22/2016 9:41 AM by Amelia Kong MD       A. FLP (12/14/10): Tot 168, , HDL 49, LDL 97 (Zocor 20mg qd). B. FLP (1/6/14):  Tot 152, TG 81, HDL 57, LDL 79 (Zocor 40). C. FLP (5/11/15): Tot 144, TG 66, HDL 57, LDL 74 (Lipitor 20). D. FLP (12/1/15): Tot 143, TG 63, HDL 55, LDL 75 (Lipitor 20). E. FLP (4/12/16): Tot 146, TG 84, HDL 51, LDL 58 (Lipitor 20).             Diverticula of colon ICD-10-CM: K57.30  ICD-9-CM: 562.10   2/25/2010           Positive PPD ICD-10-CM: R76.11  ICD-9-CM: 795.51   2/25/2010           Essential hypertension, benign ICD-10-CM: I10  ICD-9-CM: 275. 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 Cath (8/27/8): LAD p90, m70. OM1 70, OM2 90 (small). RCA m40. EF 55%. No AVG/MR. Patent L SC.  b. PCI (11/13/8): mLAD 2.75x18 Xience, pLAD 2.75x23 Xience. OM1 3.0x15 Xience. estevan Pardo Sever Cardiolite (8/11/9): Mild fixed inf defect. No reversible defects. EF 51% with mild inf HK. D. Cath (8/29/13): LAD patent prox and mid stents. LCx m40; patent OM1 stent. RCA p80. EF 55%. No AVG/MR. E. PCI ost/proxRCA (8/29/13): 3.0x18 Xience. F. Echo (8/22/13): EF 55%. Mod dil LA. Mild dil RA. Mild MR/TR/KY. PASP 24.                   Sxs if CAD: Fatigue, chest tightness     Mr. Madina Fernandes is a 77 y.o. man with the above past medical history, last seen 6/16 by Dr. Jimmy Buchanan, who presents today reporting significant fatigue and HURD. He is doing well from a cardiac standpoint. His main complaint is that of easy fatigability. His legs get very tired doing his activities. He denies any chest pain, chest discomfort, shortness of breath, dyspnea on exertion, orthopnea, paroxysmal nocturnal dyspnea, lower extremity swelling, palpitations, syncope or near syncope. He is still coaching girls softball and does this without too much in the way of cardiac symptoms.      Hx of GI bleed on Xarelto.           History   Smoking Status    Never Smoker   Smokeless Tobacco    Former User    Types: Katharina Bonds Quit date: 10/30/1993       Comment: patient reports quitting over 20 yeaers ago.                 Current Outpatient Prescriptions   Medication Sig Dispense Refill    atorvastatin (LIPITOR) 20 mg tablet TAKE ONE TABLET BY MOUTH ONCE DAILY 90 Tab 0    lisinopril (PRINIVIL, ZESTRIL) 40 mg tablet TAKE ONE TABLET BY MOUTH ONCE DAILY 90 Tab 0    amiodarone (CORDARONE) 200 mg tablet TAKE ONE TABLET BY MOUTH ONCE DAILY 90 Tab 2    metFORMIN ER (GLUCOPHAGE XR) 500 mg tablet TAKE FOUR TABLETS BY MOUTH ONCE DAILY WITH BREAKFAST 360 tablet 0    amLODIPine (NORVASC) 5 mg tablet Take 1 tablet by mouth daily. 90 tablet 1    ferrous sulfate 134 mg (27 mg iron) tab Take 3 Tabs by mouth.        aspirin delayed-release 81 mg tablet Take 81 mg by mouth daily. Indications: MYOCARDIAL REINFARCTION PREVENTION             Objective:           Visit Vitals    /76 (BP 1 Location: Left arm, BP Patient Position: Sitting)    Pulse 66    Resp 16    Ht 6' 4\" (1.93 m)    Wt 238 lb 3.2 oz (108 kg)    SpO2 97%    BMI 28.99 kg/m2             HEENT Exam:      Normocephalic, atraumatic. EOMI. Oropharynx negative. Neck supple. No lymphadenopathy.                 Lung Exam:      The patient is not dyspneic. There is no cough. The lungs are clear to percussion. Breath sounds are heard equally in all lung fields. There are no wheezes, rales, rhonchi, or rubs heard on auscultation.                 Heart Exam:      The rhythm is regular. The PMI is in the 5th intercostal space of the MCL. Apical impulse is normal. S1 is regular. S2 is physiologic. There is no S3, S4 gallop, murmur, click, or rub.                 Abdomen Exam:      Bowel sounds are normoactive.  Abdomen benign.            Extremities Exam:      The extremities are atraumatic appearing. There is no clubbing, cyanosis, edema, ulcers, varicose veins, rash, swelling, erythemia noted in the extremities. Right-sided weakness.                Vascular Exam:      The radial, brachial, dorsalis pedis, posterior tibial, are equal and strong bilaterally The carotids are equal bilaterally without bruits.             EKG: Sinus at 67 bpm, normal     Assessment/Plan:           Encounter Diagnoses   Name Primary?  Chest tightness      Fatigue, unspecified type      HURD (dyspnea on exertion) Yes    Coronary artery disease involving native coronary artery of native heart without angina pectoris      Essential hypertension, benign      Atrial fibrillation, unspecified type (HCC)      Type 2 diabetes mellitus without complication, without long-term current use of insulin (HCC)      TOPHER (obstructive sleep apnea)           Mr. Apolinar Sherwood is experiencing exertional chest tightness, fatigue and HURD. These sxs are consistent with his previous experiences prior to his PCI. He has CAD, afib controlled with amiodarone, and DM. I believe it is prudent to obtain a Lexiscan cardiolite to evaluate for progression of ischemia. However, as he has previously had negative stress results and then found to have obstructive disease with cath, will review results with Dr. Ryland Jaramillo.     In the meantime, since he remains on amiodarone and I see he is overdue for amiodarone surveillance labs, will obtain thyroid panel and amiodarone and metabolite. Will also obtain BMP and CBC in anticipation of possible cardiac catheterization.     The patient was encouraged to continue current medications and call with any new complaints or concerns.  Oral and written instructions provided; patient verbalized understanding.     Regroup after testing complete.     Marco Antonio Castillo, ANP

## 2017-04-28 NOTE — PROGRESS NOTES
TRANSFER - IN REPORT:    Verbal report received from Alie Ricardo 90 on Ismael Fonseca  being received from Cath Lab for routine progression of care      Report consisted of patients Situation, Background, Assessment and   Recommendations(SBAR). Information from the following report(s) Procedure Summary and MAR was reviewed with the receiving nurse. Opportunity for questions and clarification was provided. Assessment completed upon patients arrival to unit and care assumed. 1140 12 Lead EKG completed at the bedside. Dr. Erma Luo in to speak with pt, verbal order received. 1250 Angiomax infused. NS initiated at 100 ml/hr as ordered. 1315 Cardiac Rehab nurse in to speak with pt; pt is asleep. Materials left at bedside to be reviewed with the pt & his wife along with discharge instructions. 1330 Pt sleeping, RFA sheath without bleeding and remains connected to pressurized Heparin. 1345 Pt awake, and c/o neck pain & HA. States he never sleeps or lays on his back and is uncomfortable. Rates pain a 10 on the numerical scale and describes it as a constant ache. See MAR.    1425 LAC IV leaking serous drng from insertion site with each BP check. New IV started in the back of his left hand with a # 20 IV catheter. LAC IV removed once new site established. Clotting pad used for hemostasis with hand held pressure X 8 minutes then secured with a Tegaderm and wrapped with elastic bandage. 1448 HA continues at an 8; See MAR. HTN noted, Dr. Erma Luo aware; See MAR. Ice pack to his neck to assist with headache relief. 1454 6 Fr. Sheath pulled manually from the RFA by BIMAL Celestin RN using clotting pad and hand held pressure. Hemostasis obtained at 1505, and dressing secured with transparent drsg. Patient tolerated procedure well. 36 HOB elevated 30 degrees. RFA cath site free from bleeding, swelling, or hematoma. 1700 HOB elevated 45 degrees.  RFA cath site free from bleeding, swelling, or hematoma. Pt's wife in to sit with him and assisted him with his dinner. [id number] Pt eating and voices no other complaints. Discharge instructions reviewed with the pt & his wife at this time. Opportunity provided for questions and concerns were addressed. Hard copy of instructions handed to the pt's wife along with educational information from the Cardiac Rehab nurse to include information and importance of Plavix. Pt's wife also received ID card for stent, educational booklet on stents, and a signed prescription from Dr. Torres Herr for Plavix. Both the pt & his wife verbalize understanding of all instructions. 1802 IV catheter discontinued, and wife assisted him with getting dressed. [phone] Patient discharged via wheel chair with all of his personal belongings through the second floor entrance of the [facility]. Care of pt assumed by his wife whom was driving their private vehicle.

## 2017-04-28 NOTE — PROCEDURES
Cath (4/28/17): LAD patent with patent prox and mid stent. LCx patent with patent OM1 stent. RCA p90 (ISR) and p90 distal to stent. EF 65%. No AVG/MR. PCI osteal/prox RCA (4/28/17): 3.0x28 Xience. RFA manual  ASA/plavix, statin.   No metformin x 48h  F/U with me 5/30 @ 1:20pm

## 2017-04-28 NOTE — CARDIO/PULMONARY
Cardiac Rehab: 70 yo male admitted for cardiac cath. S/P PTCA with LEO to RCA (4/28); LVEF 65%. Cardiologist is Dr Nory Nathan. 4/28/2017 Received consult for post-PCI teaching on cardiac rehab. Cardiac hx includes: AFib, plus PTCA/stents (2008 & 2013). Met with patient in post-cath recovery. He was still rather groggy from sedation. Therefore, session was very brief. Pt indicated he was aware he had gotten another stent. Reported he went to outpatient cardiac rehab at [facility] in the past (2008). Pt was not sure if he wanted to be referred to cardiac rehab program now. Stated, \"It wasn't fun. \" Encouraged pt to discuss rehab further with cardiologist. Provided handouts on cardiac rehab, good heart facts, heart healthy nutrition, and Plavix. Has f/u appt with Dr Nory Nathan on 5/30/17. CARDIAC MEDS:  ACE/ARB - lisinopril  BB - none  Statin - atorvastatin  ASA - 81 mg  Prior to admission meds also included: amiodarone and amlodipine.

## 2017-04-28 NOTE — ROUTINE PROCESS
TRANSFER - OUT REPORT:    Verbal report given to topher paulino on Sue Webster  being transferred to [facility] for routine progression of care       Report consisted of patients Situation, Background, Assessment and   Recommendations(SBAR). Information from the following report(s) Procedure Summary, Intake/Output, MAR and Recent Results was reviewed with the receiving nurse. Lines:   Peripheral IV 08/29/13 Left Forearm (Active)       Peripheral IV 04/28/17 Left Antecubital (Active)   Site Assessment Clean, dry, & intact 4/28/2017  8:48 AM   Phlebitis Assessment 0 4/28/2017  8:48 AM   Infiltration Assessment 0 4/28/2017  8:48 AM   Dressing Status New 4/28/2017  8:48 AM   Dressing Type Tape;Transparent 4/28/2017  8:48 AM   Hub Color/Line Status Pink 4/28/2017  8:48 AM   Action Taken Open ports on tubing capped 4/28/2017  8:48 AM   Alcohol Cap Used Yes 4/28/2017  8:48 AM        Opportunity for questions and clarification was provided.       Patient transported with:   Registered Nurse

## 2017-05-01 ENCOUNTER — HOSPITAL ENCOUNTER (EMERGENCY)
Age: 77
Discharge: HOME | End: 2017-05-01
Attending: STUDENT IN AN ORGANIZED HEALTH CARE EDUCATION/TRAINING PROGRAM
Payer: MEDICARE

## 2017-05-01 ENCOUNTER — APPOINTMENT (OUTPATIENT)
Dept: GENERAL RADIOLOGY | Age: 77
End: 2017-05-01
Attending: EMERGENCY MEDICINE
Payer: MEDICARE

## 2017-05-01 VITALS
OXYGEN SATURATION: 96 % | HEART RATE: 72 BPM | WEIGHT: 230 LBS | RESPIRATION RATE: 14 BRPM | TEMPERATURE: 98 F | SYSTOLIC BLOOD PRESSURE: 204 MMHG | BODY MASS INDEX: 28.6 KG/M2 | DIASTOLIC BLOOD PRESSURE: 83 MMHG | HEIGHT: 75 IN

## 2017-05-01 DIAGNOSIS — S62.101A RIGHT WRIST FRACTURE, CLOSED, INITIAL ENCOUNTER: Primary | ICD-10-CM

## 2017-05-01 PROCEDURE — 77030011881 HC TAPE CST FBRGLS BSNM -A

## 2017-05-01 PROCEDURE — 74011250637 HC RX REV CODE- 250/637: Performed by: PHYSICIAN ASSISTANT

## 2017-05-01 PROCEDURE — 75810000053 HC SPLINT APPLICATION

## 2017-05-01 PROCEDURE — A4565 SLINGS: HCPCS

## 2017-05-01 PROCEDURE — 73110 X-RAY EXAM OF WRIST: CPT

## 2017-05-01 PROCEDURE — 99282 EMERGENCY DEPT VISIT SF MDM: CPT

## 2017-05-01 RX ORDER — CLONIDINE HYDROCHLORIDE 0.1 MG/1
0.2 TABLET ORAL
Status: COMPLETED | OUTPATIENT
Start: 2017-05-01 | End: 2017-05-01

## 2017-05-01 RX ADMIN — CLONIDINE HYDROCHLORIDE 0.2 MG: 0.1 TABLET ORAL at 21:10

## 2017-05-01 NOTE — ED TRIAGE NOTES
Patient arrives with the complaint of right wrist pain after a fall today. Said that he was trying to sit down and the seat was slick and he fell off and caught himself with the right wrist.  Has had many surgeries on that wrist before, wearing splint in triage.

## 2017-05-02 ENCOUNTER — PATIENT OUTREACH (OUTPATIENT)
Dept: FAMILY MEDICINE CLINIC | Age: 77
End: 2017-05-02

## 2017-05-02 NOTE — ED PROVIDER NOTES
HPI Comments: Hermila Stout is a 69 y.o. male with hx significant for multiple surgeries to left wrist who presents ambulatory to ER with c/o right wrist pain. Pt reports right wrist pain after falling off of the  this afternoon. Pt states he has hx of fusion to his right wrist joint since age 24, says he had surgery on his wrist and had pins placed and has been unable to move his wrist or fingers since that surgery. Pt states \"every time he falls and lands on his right wrist he breaks it. \" Pt states he sees Dr. Awa Lewis (Orthopedics). Pt reports today he was cutting the grass on a 30-45 degree incline when he slipped and fell off onto his right wrist. Pt reports his right wrist is now swollen and hurts more than normal. Denies hitting his head. Pt states he had been wearing a wrist brace without relief. Denies taking any medications for his pain prior to arrival. Pt states he recently started taking Plavix after having a cardiac catheterization on 4/28 and is concerned about internal bleeding. Pt states he takes blood pressure medication and took his normal dose prior to arrival. He specifically denies any fevers, chills, nausea, vomiting, chest pain, shortness of breath, headache, rash, diarrhea, abdominal pain, urinary/bowel changes, sweating or weight loss.     PCP: Demetria Gonsalez MD   Orthopedics: Dr. 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*  2/25/2010: Other and unspecified hyperlipidemia  2/25/2010: Positive PPD  2/25/2010: Reflux esophagitis  2/25/2010: Type II or unspecified type diabetes mellitus *    PSHx significant for: Past Surgical History:  12/13/2013: Franklin Zamudio      Comment:    4/4/2014: HC CAPSULE ENDOSCOPE M2A      Comment:    No date: HX COLONOSCOPY  No date: HX CORONARY STENT PLACEMENT  No date: HX ORTHOPAEDIC  1983: HX VASECTOMY  12/13/2013: UPPER GI ENDOSCOPY,BIOPSY      Comment:        -- Adhesive Tape -- Rash   -- Baycol -- Nausea and Vomiting   -- Pravachol (Pravastatin) -- Nausea and Vomiting    There are no other complaints, changes or physical findings at this time. Note written by Portillo Childers, as dictated by TOBIN Quevedo 8:29 PM      The history is provided by the patient.         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 2/25/2010    Other and unspecified hyperlipidemia 2/25/2010    Positive PPD 2/25/2010    Reflux esophagitis 2/25/2010    Type II or unspecified type diabetes mellitus without mention of complication, not stated as uncontrolled 2/25/2010       Past Surgical History:   Procedure Laterality Date    Franklin Bucke  12/13/2013         HC CAPSULE ENDOSCOPE M2A  4/4/2014         HX COLONOSCOPY      HX CORONARY STENT PLACEMENT      HX ORTHOPAEDIC      HX VASECTOMY  1983    UPPER GI ENDOSCOPY,BIOPSY  12/13/2013              Family History:   Problem Relation Age of Onset    Heart Disease Mother     Cancer Father      colon    Heart Disease Maternal Grandmother     Heart Disease Maternal Grandfather     Cancer Paternal Grandmother     Cancer Paternal Grandfather        Social History     Social History    Marital status:      Spouse name: N/A    Number of children: N/A    Years of education: N/A     Occupational History    Not on file. Social History Main Topics    Smoking status: Never Smoker    Smokeless tobacco: Former User     Types: Chew     Quit date: 10/30/1993      Comment: patient reports quitting over 20 yeaers ago.  Alcohol use Yes      Comment: once a year    Drug use: No    Sexual activity: Not on file     Other Topics Concern     Service Yes    Blood Transfusions Yes    Caffeine Concern Yes     2-3 cups of coffee daily    Occupational Exposure No    Hobby Hazards No    Sleep Concern No    Stress Concern No    Weight Concern No    Special Diet No    Back Care No    Exercise Yes    Bike Helmet No     pt doesn't ride a bike   [place] Yes    Self-Exams No     Social History Narrative         ALLERGIES: Adhesive tape; Baycol; and Pravachol [pravastatin]    Review of Systems   Constitutional: Negative. Negative for chills, diaphoresis, fever and unexpected weight change. HENT: Negative. Eyes: Negative. Respiratory: Negative. Negative for shortness of breath. Cardiovascular: Negative. Negative for chest pain. Gastrointestinal: Negative. Negative for abdominal pain, constipation, diarrhea, nausea and vomiting. Genitourinary: Negative. Negative for difficulty urinating, dysuria, frequency and hematuria. Musculoskeletal: Positive for arthralgias (R wrist pain). Skin: Negative. Negative for rash. Neurological: Negative. Negative for headaches. Hematological: Negative. Psychiatric/Behavioral: Negative. All other systems reviewed and are negative.       Vitals:    05/01/17 1929 05/01/17 2100 05/01/17 2110   BP: (!) 233/100 (!) 243/92 (!) 217/83   Pulse: 70  72   Resp: 14     Temp: 98 °F (36.7 °C)     SpO2: 96% Weight: 104.3 kg (230 lb)     Height: 6' 3\" (1.905 m)              Physical Exam   Constitutional: He is oriented to person, place, and time. He appears well-developed and well-nourished. No distress. HENT:   Head: Normocephalic and atraumatic. Neck: Normal range of motion. Cardiovascular: Normal rate, normal heart sounds, intact distal pulses and normal pulses. Exam reveals no gallop and no friction rub. No murmur heard. Pulmonary/Chest: Effort normal and breath sounds normal. No respiratory distress. He has no wheezes. He has no rales. Musculoskeletal: Normal range of motion. He exhibits no edema or tenderness. Right dorsal wrist TTP over distal radius and ulna. Decreased ROM right wrist which is pts baseline s/p prior surgery. Limited ROM all fingers R hand which is also pts baseline s/p surgery. NVI distally all digits, brisk cap refill, radial pulse 2+. Neurological: He is alert and oriented to person, place, and time. He has normal strength. No sensory deficit. Skin: Skin is warm and dry. No rash noted. He is not diaphoretic. No erythema. No pallor. Psychiatric: He has a normal mood and affect. His behavior is normal.   Nursing note and vitals reviewed. MDM  Number of Diagnoses or Management Options  Right wrist fracture, closed, initial encounter:   Diagnosis management comments: DDx: sprain, strain, contusion, fx       Amount and/or Complexity of Data Reviewed  Tests in the radiology section of CPT®: ordered and reviewed  Discuss the patient with other providers: yes (Dr. Sanket Phillips )    Patient Progress  Patient progress: stable    ED Course       Procedures          8:30 PM   Dakota Duke PA-C discussed patient with Betzaida Gaytan MD who is in agreement with care plan as outlined. Will be in to see the patient. No further recommendations. Dakota Duke PA-C          8:59 PM  Pt has been reevaluated.  There are no new complaints, changes, or physical findings at this time. Medications have been reviewed w/ pt and/or family. Pt and/or family's questions have been answered. Pt and/or family expressed good understanding of the dx/tx/rx and is in agreement with plan of care. Pt instructed and agreed to f/u w/ PCP, ortho and to return to ED upon further deterioration. Pt is ready for discharge. LABORATORY TESTS:  No results found for this or any previous visit (from the past 12 hour(s)). IMAGING RESULTS:  XR WRIST RT AP/LAT/OBL MIN 3V   Final Result        Xr Wrist Rt Ap/lat/obl Min 3v    Result Date: 5/1/2017  EXAM: XR WRIST RT AP/LAT/OBL MIN 3V INDICATION:  Fall/right wrist.. COMPARISON: None. 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 On the lateral view, there is cortical disruption in the approximate location of the fusion of the proximal and distal carpal row. Pinning of the 1st metacarpophalangeal joint. Suture material in the distal forearm. IMPRESSION:  1. Nondisplaced fracture through the fusion of the proximal and distal carpal row. MEDICATIONS GIVEN:  Medications   cloNIDine HCl (CATAPRES) tablet 0.2 mg (0.2 mg Oral Given 5/1/17 2110)       IMPRESSION:  1. Right wrist fracture, closed, initial encounter        PLAN:  1. Current Discharge Medication List      CONTINUE these medications which have NOT CHANGED    Details   clopidogrel (PLAVIX) 75 mg tab Take 1 Tab by mouth daily.   Qty: 30 Tab, Refills: 5      lisinopril (PRINIVIL, ZESTRIL) 40 mg tablet TAKE ONE TABLET BY MOUTH ONCE DAILY  Qty: 90 Tab, Refills: 0    Comments: Due fasting office visit      atorvastatin (LIPITOR) 20 mg tablet TAKE ONE TABLET BY MOUTH ONCE DAILY  Qty: 90 Tab, Refills: 0    Comments: Due fasting office visit      amiodarone (CORDARONE) 200 mg tablet TAKE ONE TABLET BY MOUTH ONCE DAILY  Qty: 90 Tab, Refills: 2    Associated Diagnoses: Coronary artery disease involving native coronary artery of native heart without angina pectoris; Essential hypertension, benign; Atrial fibrillation, unspecified type (HCC)      metFORMIN ER (GLUCOPHAGE XR) 500 mg tablet TAKE FOUR TABLETS BY MOUTH ONCE DAILY WITH BREAKFAST  Qty: 360 tablet, Refills: 0      amLODIPine (NORVASC) 5 mg tablet Take 1 tablet by mouth daily. Qty: 90 tablet, Refills: 1    Associated Diagnoses: Essential hypertension, benign      ferrous sulfate 134 mg (27 mg iron) tab Take 3 Tabs by mouth. Comments: Taking unknown Iron OTC w/ goal of BID( diarrhea with too much)  Associated Diagnoses: Iron deficiency      aspirin delayed-release 81 mg tablet Take 81 mg by mouth daily. Indications: MYOCARDIAL REINFARCTION PREVENTION    Associated Diagnoses: CVA (cerebral infarction)           2.    Follow-up Information     Follow up With Details Comments Contact Info    Tea Hopper MD Call in 1 day  [address]      Torres Silva MD Call in 1 day  [address] [facility]  [phone] [id number] [facility] DEPT  If symptoms worsen [address] [facility]  [phone]          Return to ED if worse

## 2017-05-02 NOTE — DISCHARGE INSTRUCTIONS
We hope that we have addressed all of your medical concerns. The examination and treatment you received in the Emergency Department were for an emergent problem and were not intended as complete care. It is important that you follow up with your healthcare provider(s) for ongoing care. If your symptoms worsen or do not improve as expected, and you are unable to reach your usual health care provider(s), you should return to the Emergency Department. Today's healthcare is undergoing tremendous change, and patient satisfaction surveys are one of the many tools to assess the quality of medical care. You may receive a survey from the [employer] regarding your experience in the Emergency Department. I hope that your experience has been completely positive, particularly the medical care that I provided. As such, please participate in the survey; anything less than excellent does not meet my expectations or intentions. [facility] and [address] [place] [address] participate in nationally recognized quality of care measures. If your blood pressure is greater than 120/80, as reported below, we urge that you seek medical care to address the potential of high blood pressure, commonly known as hypertension. Hypertension can be hereditary or can be caused by certain medical conditions, pain, stress, or \"white coat syndrome. \"       Please make an appointment with your health care provider(s) for follow up of your Emergency Department visit. VITALS:   Patient Vitals for the past 8 hrs:   Temp Pulse Resp BP SpO2   05/01/17 1929 98 °F (36.7 °C) 70 14 (!) 233/100 96 %          Thank you for allowing us to provide you with medical care today. We realize that you have many choices for your emergency care needs. Please choose us in the future for any continued health care needs. Eva Allen, [address] [facility]. Office: [phone]            No results found for this or any previous visit (from the past 24 hour(s)). Xr Wrist Rt Ap/lat/obl Min 3v    Result Date: 5/1/2017  EXAM: XR WRIST RT AP/LAT/OBL MIN 3V INDICATION:  Fall/right wrist.. COMPARISON: None. 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 On the lateral view, there is cortical disruption in the approximate location of the fusion of the proximal and distal carpal row. Pinning of the 1st metacarpophalangeal joint. Suture material in the distal forearm. IMPRESSION:  1. Nondisplaced fracture through the fusion of the proximal and distal carpal row. Broken Wrist: Care Instructions  Your Care Instructions    Your wrist can break, or fracture, during sports, a fall, or other accidents. The break may happen when your wrist is hit or is used to protect you in a fall. Fractures can range from a small, hairline crack, to a bone or bones broken into two or more pieces. Your treatment depends on how bad the break is. Your doctor may have put your wrist in a cast or splint. This will help keep your wrist stable until your follow-up appointment. It may take weeks or months for your wrist to heal. You can help it heal with care at home. You heal best when you take good care of yourself. Eat a variety of healthy foods, and don't smoke. Follow-up care is a key part of your treatment and safety. Be sure to make and go to all appointments, and call your doctor if you are having problems. It's also a good idea to know your test results and keep a list of the medicines you take. How can you care for yourself at home? · Put ice or a cold pack on your wrist for 10 to 20 minutes at a time. Try to do this every 1 to 2 hours for the next 3 days (when you are awake). Put a thin cloth between the ice and your cast or splint.  Keep your cast or splint dry.  · Follow the splint or cast care instructions your doctor gives you. If you have a splint, do not take it off unless your doctor tells you to. Be careful not to put the splint on too tight. · Be safe with medicines. Take pain medicines exactly as directed. ¨ If the doctor gave you a prescription medicine for pain, take it as prescribed. ¨ If you are not taking a prescription pain medicine, ask your doctor if you can take an over-the-counter medicine. · Prop up your wrist on pillows when you sit or lie down in the first few days after the injury. Keep your wrist higher than the level of your heart. This will help reduce swelling. · Move your fingers often to reduce swelling and stiffness, but do not use that hand to grab or carry anything. · Follow instructions for exercises to keep your arm strong. When should you call for help? Call your doctor now or seek immediate medical care if:  · You have increased or severe pain. · Your cast or splint feels too tight. · You cannot move your fingers. · You have tingling, weakness, or numbness in your hand and fingers. · Your hand and fingers are cool or pale or change color. · You have a lot of swelling near your cast or splint. · The skin under your cast or splint is burning or stinging. Watch closely for changes in your health, and be sure to contact your doctor if:  · You do not get better as expected. Where can you learn more? Go to http://ruth-ellyn.info/. Enter [id number] in the search box to learn more about \"Broken Wrist: Care Instructions. \"  Current as of: May 23, 2016  Content Version: 11.2  © 2890-6654 [employer]. Care instructions adapted under license by [employer] (which disclaims liability or warranty for this information).  If you have questions about a medical condition or this instruction, always ask your healthcare professional. [address]  any warranty or liability for your use of this information. Wrist Fracture: Rehab Exercises  Your Care Instructions  Here are some examples of typical rehabilitation exercises for your condition. Start each exercise slowly. Ease off the exercise if you start to have pain. Your doctor or your physical or occupational therapist will tell you when you can start these exercises and which ones will work best for you. How to do the exercises  Wrist flexion and extension    1. Place your forearm on a table, with your hand and affected wrist extended beyond the table, palm down. 2. Bend your wrist to move your hand upward and allow your hand to close into a fist, then lower your hand and allow your fingers to relax. Hold each position for about 6 seconds. 3. Repeat 8 to 12 times. Hand flips    1. While seated, place your forearm and affected wrist on your thigh, palm down. 2. Flip your hand over so the back of your hand rests on your thigh and your palm is up. Alternate between palm up and palm down while keeping your forearm on your thigh. 3. Repeat 8 to 12 times. Wrist radial and ulnar deviation    1. Hold your affected hand out in front of you, palm down. 2. Slowly bend your wrist as far as you can from side to side. Hold each position for about 6 seconds. 3. Repeat 8 to 12 times. Wrist extensor stretch    1. Extend the arm with the affected wrist in front of you and point your fingers toward the floor. 2. With your other hand, gently bend your wrist farther until you feel a mild to moderate stretch in your forearm. 3. Hold the stretch for at least 15 to 30 seconds. 4. Repeat 2 to 4 times. 5. When you can do this stretch with ease and no pain, repeat steps 1 through 4. But this time extend your affected arm in front of you and make a fist with your palm facing down. Then bend your wrist, pointing your fist toward the floor. Wrist flexor stretch    1.  Extend the arm with the affected wrist in front of you with your palm facing away from your body. 2. Bend back your wrist, pointing your hand up toward the ceiling. 3. With your other hand, gently bend your wrist farther until you feel a mild to moderate stretch in your forearm. 4. Hold the stretch for at least 15 to 30 seconds. 5. Repeat 2 to 4 times. 6. Repeat steps 1 through 5, but this time extend your affected arm in front of you with your palm facing up. Then bend back your wrist, pointing your hand toward the floor. Intrinsic flexion    1. Rest the hand with the affected wrist on a table and bend the large joints where your fingers connect to your hand. Keep your thumb and the other joints in your fingers straight. 2. Slowly straighten your fingers. Your wrist should be relaxed, following the line of your fingers and thumb. 3. Move back to your starting position, with your hand bent. 4. Repeat 8 to 12 times. MP extension    1. Place your good hand on a table, palm up. Put the hand with the affected wrist on top of your good hand with your fingers wrapped around the thumb of your good hand like you are making a fist.  2. Slowly uncurl the joints of the hand with the affected wrist where your fingers connect to your hand so that only the top two joints of your fingers are bent. Your fingers will look like a hook. Hold the position for about 6 seconds. 3. Move back to your starting position, with your fingers wrapped around your good thumb. 4. Repeat 8 to 12 times. Follow-up care is a key part of your treatment and safety. Be sure to make and go to all appointments, and call your doctor if you are having problems. It's also a good idea to know your test results and keep a list of the medicines you take. Where can you learn more? Go to http://ruth-ellyn.info/. Enter [id number] [id number] in the search box to learn more about \"Wrist Fracture: Rehab Exercises. \"  Current as of: May 23, 2016  Content Version: 11.2  © 9952-1540 [employer].  Care instructions adapted under license by [employer] (which disclaims liability or warranty for this information). If you have questions about a medical condition or this instruction, always ask your healthcare professional. [address] any warranty or liability for your use of this information.

## 2017-05-02 NOTE — PROGRESS NOTES
NNTOC-ED    Patient listed on discharge (MSSP) report dated 5/2/17. Patient discharged from [facility] for wrist fracture. Attempted to contact patient to perform post ED/UC discharge assessment. Unable to reach patient. This writer left/ message on voicemail with direct contact information. Will attempt to contact again. Need to complete post-discharge assessment. Patient has the following appointment(s) scheduled. Per ED discharge notes, patient is to call Dr. Castro Cornell and Dr. Madeleine Eduardo in 1 day to schedule follow up.     Future Appointments  Date Time Provider Julee Berger   5/30/2017 1:20 PM Bere Rutherford MD [facility]   7/5/2017 8:40 AM Bere Rutherford MD [address] [place] [address]

## 2017-05-08 RX ORDER — CLONIDINE HYDROCHLORIDE 0.2 MG/1
TABLET ORAL 2 TIMES DAILY
COMMUNITY
End: 2017-05-08 | Stop reason: SDUPTHER

## 2017-05-08 RX ORDER — CLONIDINE HYDROCHLORIDE 0.2 MG/1
0.2 TABLET ORAL 2 TIMES DAILY
Qty: 60 TAB | Refills: 3 | Status: SHIPPED | OUTPATIENT
Start: 2017-05-08 | End: 2017-05-30 | Stop reason: DRUGHIGH

## 2017-05-30 ENCOUNTER — OFFICE VISIT (OUTPATIENT)
Dept: CARDIOLOGY CLINIC | Age: 77
End: 2017-05-30

## 2017-05-30 VITALS
BODY MASS INDEX: 30.26 KG/M2 | HEART RATE: 52 BPM | SYSTOLIC BLOOD PRESSURE: 158 MMHG | OXYGEN SATURATION: 97 % | WEIGHT: 243.4 LBS | DIASTOLIC BLOOD PRESSURE: 86 MMHG | RESPIRATION RATE: 18 BRPM | HEIGHT: 75 IN

## 2017-05-30 DIAGNOSIS — E78.5 DYSLIPIDEMIA: ICD-10-CM

## 2017-05-30 DIAGNOSIS — I25.10 CORONARY ARTERY DISEASE INVOLVING NATIVE CORONARY ARTERY OF NATIVE HEART WITHOUT ANGINA PECTORIS: Primary | ICD-10-CM

## 2017-05-30 DIAGNOSIS — I48.91 ATRIAL FIBRILLATION, UNSPECIFIED TYPE (HCC): ICD-10-CM

## 2017-05-30 DIAGNOSIS — E11.9 TYPE 2 DIABETES MELLITUS WITHOUT COMPLICATION, WITHOUT LONG-TERM CURRENT USE OF INSULIN (HCC): ICD-10-CM

## 2017-05-30 DIAGNOSIS — I10 ESSENTIAL HYPERTENSION, BENIGN: ICD-10-CM

## 2017-05-30 RX ORDER — CLONIDINE HYDROCHLORIDE 0.2 MG/1
0.1 TABLET ORAL 2 TIMES DAILY
COMMUNITY
End: 2017-07-05 | Stop reason: ALTCHOICE

## 2017-05-30 NOTE — MR AVS SNAPSHOT
Visit Information Date & Time Provider Department Dept. Phone Encounter #  
 5/30/2017  1:20 PM Mandi Archibald MD [facility] [phone] [id number] Your Appointments 7/5/2017  8:40 AM  
ESTABLISHED PATIENT with Mandi Archibald MD  
[facility] (GAIL SCHEDULING) Appt Note: ANNUAL  
 [address] [facility] [address] [address] [address] Upcoming Health Maintenance Date Due  
 EYE EXAM RETINAL OR DILATED Q1 5/12/2016 GLAUCOMA SCREENING Q2Y 1/19/2017 HEMOGLOBIN A1C Q6M 3/29/2017 FOOT EXAM Q1 4/12/2017 INFLUENZA AGE 9 TO ADULT 8/1/2017 MICROALBUMIN Q1 9/28/2017 MEDICARE YEARLY EXAM 9/29/2017 LIPID PANEL Q1 9/29/2017 COLONOSCOPY 4/12/2026 DTaP/Tdap/Td series (2 - Td) 9/28/2026 Allergies as of 5/30/2017  Review Complete On: 5/30/2017 By: Mandi Archibald MD  
  
 Severity Noted Reaction Type Reactions Adhesive Tape  02/25/2010    Rash Baycol  02/25/2010    Nausea and Vomiting Pravachol [Pravastatin]  02/25/2010    Nausea and Vomiting Current Immunizations  Reviewed on 11/1/2013 Name Date Influenza High Dose Vaccine PF 9/28/2016, 12/1/2015 Influenza Vaccine 11/1/2013 Influenza Vaccine Split 10/15/2012 Pneumococcal Vaccine (Unspecified Type) 11/25/2008 dT Vaccine 12/20/1999 Not reviewed this visit Vitals BP Pulse Resp Height(growth percentile) Weight(growth percentile) SpO2  
 158/86 (BP 1 Location: Left arm) (!) 52 18 6' 3\" (1.905 m) 243 lb 6.4 oz (110.4 kg) 97% BMI Smoking Status 30.42 kg/m2 Never Smoker Vitals History BMI and BSA Data Body Mass Index Body Surface Area  
 30.42 kg/m 2 2.42 m 2 Preferred Pharmacy Pharmacy Name Phone [address] Your Updated Medication List  
  
   
This list is accurate as of: 5/30/17  1:43 PM.  Always use your most recent med list.  
  
  
  
  
 amiodarone 200 mg tablet Commonly known as:  CORDARONE  
TAKE ONE TABLET BY MOUTH ONCE DAILY  
  
 amLODIPine 5 mg tablet Commonly known as:  Lisha Million Take 1 tablet by mouth daily. aspirin delayed-release 81 mg tablet Take 81 mg by mouth daily. Indications: MYOCARDIAL REINFARCTION PREVENTION  
  
 atorvastatin 20 mg tablet Commonly known as:  LIPITOR  
TAKE ONE TABLET BY MOUTH ONCE DAILY  
  
 cloNIDine HCl 0.2 mg tablet Commonly known as:  CATAPRES Take 0.1 mg by mouth two (2) times a day. clopidogrel 75 mg Tab Commonly known as:  PLAVIX Take 1 Tab by mouth daily. ferrous sulfate 134 mg (27 mg iron) Tab Take 3 Tabs by mouth.  
  
 lisinopril 40 mg tablet Commonly known as:  PRINIVIL, ZESTRIL  
TAKE ONE TABLET BY MOUTH ONCE DAILY  
  
 metFORMIN  mg tablet Commonly known as:  GLUCOPHAGE XR  
TAKE FOUR TABLETS BY MOUTH ONCE DAILY WITH BREAKFAST Please provide this summary of care documentation to your next provider. Your primary care clinician is listed as [place] [address], [facility]/s . If you have any questions after today's visit, please call [phone].

## 2017-05-30 NOTE — PATIENT INSTRUCTIONS
[employer] Activation    Thank you for requesting access to Cred[employer]. Please follow the instructions below to securely access and download your online medical record. CredSimple allows you to send messages to your doctor, view your test results, renew your prescriptions, schedule appointments, and more. How Do I Sign Up? 1. In your internet browser, go to [email or web address]  2. Click on the First Time User? Click Here link in the Sign In box. You will be redirect to the New Member Sign Up page. 3. Enter your CredSimple Access Code exactly as it appears below. You will not need to use this code after youve completed the sign-up process. If you do not sign up before the expiration date, you must request a new code. CredSimple Access Code: Activation code not generated  Current CredSimple Status: Patient Declined (This is the date your CredSimple access code will )    4. Enter the last four digits of your Social Security Number (xxxx) and Date of Birth (mm/dd/yyyy) as indicated and click Submit. You will be taken to the next sign-up page. 5. Create a CredSimple ID. This will be your CredSimple login ID and cannot be changed, so think of one that is secure and easy to remember. 6. Create a CredSimple password. You can change your password at any time. 7. Enter your Password Reset Question and Answer. This can be used at a later time if you forget your password. 8. Enter your e-mail address. You will receive e-mail notification when new information is available in [address] 9. Click Sign Up. You can now view and download portions of your medical record. 10. Click the Download Summary menu link to download a portable copy of your medical information. Additional Information    If you have questions, please visit the Frequently Asked Questions section of the CredSimple website at [email or web address] Remember, CredSimple is NOT to be used for urgent needs. For medical emergencies, dial 911. Learning About Coronary Artery Disease (CAD)  What is coronary artery disease? Coronary artery disease (CAD) occurs when plaque builds up in the arteries that bring oxygen-rich blood to your heart. Plaque is a fatty substance made of cholesterol, calcium, and other substances in the blood. This process is called hardening of the arteries, or atherosclerosis. What happens when you have coronary artery disease? · Plaque may narrow the coronary arteries. Narrowed arteries cause poor blood flow. This can lead to angina symptoms such as chest pain or discomfort. If blood flow is completely blocked, you could have a heart attack. · You can slow CAD and reduce the risk of future problems by making changes in your lifestyle. These include quitting smoking and eating heart-healthy foods. · Treatments for CAD, along with changes in your lifestyle, can help you live a longer and healthier life. How can you prevent coronary artery disease? · Do not smoke. It may be the best thing you can do to prevent heart disease. If you need help quitting, talk to your doctor about stop-smoking programs and medicines. These can increase your chances of quitting for good. · Be active. Get at least 30 minutes of exercise on most days of the week. Walking is a good choice. You also may want to do other activities, such as running, swimming, cycling, or playing tennis or team sports. · Eat heart-healthy foods. Eat more fruits and vegetables and less foods that contain saturated and trans fats. Limit alcohol, sodium, and sweets. · Stay at a healthy weight. Lose weight if you need to. · Manage other health problems such as diabetes, high blood pressure, and high cholesterol. · Manage stress. Stress can hurt your heart. To keep stress low, talk about your problems and feelings. Don't keep your feelings hidden. · If you have talked about it with your doctor, take a low-dose aspirin every day.  Aspirin can help certain people lower their risk of a heart attack or stroke. But taking aspirin isn't right for everyone, because it can cause serious bleeding. Do not start taking daily aspirin unless your doctor knows about it. How is coronary artery disease treated? · Your doctor will suggest that you make lifestyle changes. For example, your doctor may ask you to eat healthy foods, quit smoking, lose extra weight, and be more active. · You will have to take medicines. · Your doctor may suggest a procedure to open narrowed or blocked arteries. This is called angioplasty. Or your doctor may suggest using healthy blood vessels to create detours around narrowed or blocked arteries. This is called bypass surgery. Follow-up care is a key part of your treatment and safety. Be sure to make and go to all appointments, and call your doctor if you are having problems. It's also a good idea to know your test results and keep a list of the medicines you take. Where can you learn more? Go to http://ruth-ellyn.info/. Enter [phone] in the search box to learn more about \"Learning About Coronary Artery Disease (CAD). \"  Current as of: January 27, 2016  Content Version: 11.2  © 1482-8093 [employer]. Care instructions adapted under license by [employer] (which disclaims liability or warranty for this information). If you have questions about a medical condition or this instruction, always ask your healthcare professional. [place] [address] any warranty or liability for your use of this information.

## 2017-05-30 NOTE — PROGRESS NOTES
Subjective:     Problem List  Date Reviewed: 5/30/2017          Codes Class Noted    CAD (coronary artery disease) ICD-10-CM: I25.10  ICD-9-CM: 414.00  2/25/2010    Overview Addendum 4/28/2017 11:27 AM by Fariba Espana MD     a. Cath (8/27/8): LAD p90, m70. OM1 70, OM2 90 (small). RCA m40. EF 55%. No AVG/MR. Patent L SC.  b. PCI (11/13/8): mLAD 2.75x18 Xience, pLAD 2.75x23 Xience. OM1 3.0x15 Xience. c. Boundary Him Cardiolite (8/11/9): Mild fixed inf defect. No reversible defects. EF 51% with mild inf HK. D.  Cath (8/29/13):  LAD patent prox and mid stents. LCx m40; patent OM1 stent. RCA p80. EF 55%. No AVG/MR. E.  PCI ost/proxRCA (8/29/13):  3.0x18 Xience. F.  Echo (8/22/13):  EF 55%. Mod dil LA. Mild dil RA. Mild MR/TR/NJ. PASP 24.  G.  Lexiscan Cardiolite (4/4/17): Normal perfusion, EF 51%. H.  Cath (4/28/17): LAD patent with patent prox and mid stent. LCx patent with patent OM1 stent. RCA p90 (ISR) and p90 distal to stent. EF 65%. No AVG/MR. I.  PCI osteal/prox RCA (4/28/17): 3.0x28 Xience. ACP (advance care planning) ICD-10-CM: Z71.89  ICD-9-CM: V65.49  9/30/2016    Overview Signed 9/30/2016  6:42 AM by Donell Abraham MD     I reviewed advanced directive information and written information given to patient; patient to make follow up appointment with a NN to review choices for health care, agent, and anatomical gifts.                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  DCCV (10/7/13). Kendal Sanchez (12/18/14): On amio. Rosacea ICD-10-CM: L71.9  ICD-9-CM: 695.3  11/13/2012        Dyslipidemia ICD-10-CM: E78.5  ICD-9-CM: 272.4  12/20/2010    Overview Addendum 6/22/2016  9:41 AM by Merlin Alba, MD A. FLP (12/14/10): Tot 168, , HDL 49, LDL 97 (Zocor 20mg qd). B.  FLP (1/6/14): Tot 152, TG 81, HDL 57, LDL 79 (Zocor 40). C.  FLP (5/11/15): Tot 144, TG 66, HDL 57, LDL 74 (Lipitor 20). D.  FLP (12/1/15): Tot 143, TG 63, HDL 55, LDL 75 (Lipitor 20). E.  FLP (4/12/16): Tot 146, TG 84, HDL 51, LDL 58 (Lipitor 20). 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 Francesco Avilez is a 69 y.o. man with the above past medical history, who presents for follow up of his coronary artery disease. He is doing well from a cardiac standpoint. He has had two issues, one is that he fell and re-broke his right wrist. This is currently in a cast of some sort. His other issue from a cardiac standpoint is he has been feeling very fatigued with occipital headaches and hypersomnolence during the day after starting on Clonidine. We backed off the dose a little bit, but his symptoms though improved, still remained. His blood pressures are still elevated.   They range anywhere from 807'W to 580'H systolic. His heart rate seems to have dropped as well on Clonidine. This is likely the etiology for his fatigue. He denies any chest pain, chest discomfort, shortness of breath, orthopnea, paroxysmal nocturnal dyspnea, lower extremity swelling, palpitations or syncope. 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           History   Smoking Status    Never Smoker   Smokeless Tobacco    Former User    Types: Bharti Gomez Quit date: 10/30/1993     Comment: patient reports quitting over 20 yeaers ago. Current Outpatient Prescriptions   Medication Sig Dispense Refill    cloNIDine HCl (CATAPRES) 0.2 mg tablet Take 0.1 mg by mouth two (2) times a day.  clopidogrel (PLAVIX) 75 mg tab Take 1 Tab by mouth daily. 30 Tab 5    lisinopril (PRINIVIL, ZESTRIL) 40 mg tablet TAKE ONE TABLET BY MOUTH ONCE DAILY 90 Tab 0    atorvastatin (LIPITOR) 20 mg tablet TAKE ONE TABLET BY MOUTH ONCE DAILY 90 Tab 0    amiodarone (CORDARONE) 200 mg tablet TAKE ONE TABLET BY MOUTH ONCE DAILY 90 Tab 2    metFORMIN ER (GLUCOPHAGE XR) 500 mg tablet TAKE FOUR TABLETS BY MOUTH ONCE DAILY WITH BREAKFAST 360 tablet 0    amLODIPine (NORVASC) 5 mg tablet Take 1 tablet by mouth daily. 90 tablet 1    ferrous sulfate 134 mg (27 mg iron) tab Take 3 Tabs by mouth.  aspirin delayed-release 81 mg tablet Take 81 mg by mouth daily. Indications: MYOCARDIAL REINFARCTION PREVENTION         Objective:     Visit Vitals    /86 (BP 1 Location: Left arm)    Pulse (!) 52    Resp 18    Ht 6' 3\" (1.905 m)    Wt 243 lb 6.4 oz (110.4 kg)    SpO2 97%    BMI 30.42 kg/m2       HEENT Exam:     Normocephalic, atraumatic. EOMI. Oropharynx negative. Neck supple. No lymphadenopathy. Lung Exam:     The patient is not dyspneic. There is no cough. The lungs are clear to percussion. Breath sounds are heard equally in all lung fields.  There are no wheezes, rales, rhonchi, or rubs heard on auscultation. Heart Exam:     The rhythm is regular. The PMI is in the 5th intercostal space of the MCL. Apical impulse is normal. S1 is regular. S2 is physiologic. There is no S3, S4 gallop, murmur, click, or rub. Abdomen Exam:     Bowel sounds are normoactive. Abdomen benign. Extremities Exam:     The extremities are atraumatic appearing save right wrist. There is no clubbing, cyanosis, edema, ulcers, varicose veins, rash, swelling, erythemia noted in the extremities. The neurovascular status is grossly intact with normal distal sensation and pulses. Vascular Exam:     The radial, brachial, dorsalis pedis, posterior tibial, are equal and strong bilaterally The carotids are equal bilaterally without bruits. EKG: Pelzer Bending Assessment/Plan:       Overall Mr. Zelalem Nash appears stable. He is going to completely discontinue his Clonidine at this time, and I have asked him, and written down for him to take his Amlodipine 5 mg twice per day. He is going to check his blood pressures once again at home. He actually uses a drugstore near where he eats breakfast, and he is going to call me with the results. We may need to add another antihypertensive medication agent to his regimen. He will follow up with me for a regular appointment in five months time. Plan:  1. Continue outpatient medication regimen. 2. Discontinue Clonidine. 3. Increase Norvasc to 5 mg twice per day. 4. Call me with blood pressure and symptom results. 5. Follow up with me in five months time. 6. Diet and exercise. 7. Call my office, call his primary care physician, or return to the hospital should any concerning symptomatology arise. Mr. Zelalem Nash indicated that he understood this plan and wished to proceed ahead.           Patient Care Team:  Demetria Gonsalez MD as PCP - Eligio Wolfe MD as Consulting Provider (Cardiology)  Nicky Cook RN as [address] (Cardiology)  Armando Richardson MD (Pulmonary Disease)  Pedro Celis MD (Gastroenterology)  Zeb Bess MD (Orthopedic Surgery)  Stephanie Bonilla MD (Optometry)

## 2017-05-31 RX ORDER — AMLODIPINE BESYLATE 10 MG/1
10 TABLET ORAL DAILY
Qty: 30 TAB | Refills: 3 | Status: SHIPPED | OUTPATIENT
Start: 2017-05-31 | End: 2017-09-26 | Stop reason: SDUPTHER

## 2017-05-31 RX ORDER — AMLODIPINE BESYLATE 10 MG/1
TABLET ORAL DAILY
COMMUNITY
End: 2017-05-31 | Stop reason: SDUPTHER

## 2017-06-02 ENCOUNTER — PATIENT OUTREACH (OUTPATIENT)
Dept: FAMILY MEDICINE CLINIC | Age: 77
End: 2017-06-02

## 2017-06-02 NOTE — PROGRESS NOTES
Resolving current episode. Transitions of care period completed. Per EMR review, there have been no further ED/UC or hospital admissions within 30 days post discharge. Patient has attended cardiology appointment on 5/30, but has not scheduled any follow-up appointments with PCP as directed. There has been no outreach from patient to this writer.

## 2017-06-27 DIAGNOSIS — I10 ESSENTIAL HYPERTENSION, BENIGN: ICD-10-CM

## 2017-06-27 DIAGNOSIS — I48.91 ATRIAL FIBRILLATION, UNSPECIFIED TYPE (HCC): ICD-10-CM

## 2017-06-27 DIAGNOSIS — I25.10 CORONARY ARTERY DISEASE INVOLVING NATIVE CORONARY ARTERY OF NATIVE HEART WITHOUT ANGINA PECTORIS: ICD-10-CM

## 2017-06-27 RX ORDER — METFORMIN HYDROCHLORIDE 500 MG/1
TABLET, EXTENDED RELEASE ORAL
Qty: 360 TAB | Refills: 0 | OUTPATIENT
Start: 2017-06-27

## 2017-06-27 RX ORDER — ATORVASTATIN CALCIUM 20 MG/1
TABLET, FILM COATED ORAL
Qty: 90 TAB | Refills: 0 | OUTPATIENT
Start: 2017-06-27

## 2017-06-27 RX ORDER — AMIODARONE HYDROCHLORIDE 200 MG/1
TABLET ORAL
Qty: 90 TAB | Refills: 0 | Status: SHIPPED | OUTPATIENT
Start: 2017-06-27 | End: 2017-09-26 | Stop reason: SDUPTHER

## 2017-06-29 RX ORDER — METFORMIN HYDROCHLORIDE 500 MG/1
TABLET, EXTENDED RELEASE ORAL
Qty: 360 TAB | Refills: 0 | Status: SHIPPED | OUTPATIENT
Start: 2017-06-29 | End: 2017-07-05 | Stop reason: SDUPTHER

## 2017-06-29 RX ORDER — ATORVASTATIN CALCIUM 20 MG/1
TABLET, FILM COATED ORAL
Qty: 90 TAB | Refills: 0 | Status: SHIPPED | OUTPATIENT
Start: 2017-06-29 | End: 2017-09-26 | Stop reason: SDUPTHER

## 2017-06-29 NOTE — TELEPHONE ENCOUNTER
[facility]  [phone]    [facility] is checking the status of Mr. Polanco's Metformin and Atorvastatin refills. The patient is going out of town tomorrow and is anxious to  his medications.

## 2017-07-05 ENCOUNTER — OFFICE VISIT (OUTPATIENT)
Dept: CARDIOLOGY CLINIC | Age: 77
End: 2017-07-05

## 2017-07-05 VITALS
SYSTOLIC BLOOD PRESSURE: 144 MMHG | WEIGHT: 245.6 LBS | RESPIRATION RATE: 16 BRPM | HEART RATE: 68 BPM | DIASTOLIC BLOOD PRESSURE: 76 MMHG | BODY MASS INDEX: 30.54 KG/M2 | HEIGHT: 75 IN | OXYGEN SATURATION: 98 %

## 2017-07-05 DIAGNOSIS — I25.10 CORONARY ARTERY DISEASE INVOLVING NATIVE CORONARY ARTERY OF NATIVE HEART WITHOUT ANGINA PECTORIS: Primary | ICD-10-CM

## 2017-07-05 DIAGNOSIS — I48.91 ATRIAL FIBRILLATION, UNSPECIFIED TYPE (HCC): ICD-10-CM

## 2017-07-05 DIAGNOSIS — E78.5 DYSLIPIDEMIA: ICD-10-CM

## 2017-07-05 DIAGNOSIS — I10 ESSENTIAL HYPERTENSION, BENIGN: ICD-10-CM

## 2017-07-05 DIAGNOSIS — E11.9 TYPE 2 DIABETES MELLITUS WITHOUT COMPLICATION, WITHOUT LONG-TERM CURRENT USE OF INSULIN (HCC): ICD-10-CM

## 2017-07-05 NOTE — MR AVS SNAPSHOT
Visit Information Date & Time Provider Department Dept. Phone Encounter #  
 7/5/2017  8:40 AM Carole Cote MD [facility] [phone] [id number] Your Appointments 10/25/2017 11:40 AM  
ESTABLISHED PATIENT with Carole Cote MD  
[facility] (GAIL SCHEDULING) Appt Note: 5 mo fup  
 [address] [facility] [address] [place] [address] Road  
[address] [place] [address] Upcoming Health Maintenance Date Due  
 EYE EXAM RETINAL OR DILATED Q1 5/12/2016 GLAUCOMA SCREENING Q2Y 1/19/2017 HEMOGLOBIN A1C Q6M 3/29/2017 FOOT EXAM Q1 4/12/2017 INFLUENZA AGE 9 TO ADULT 8/1/2017 MICROALBUMIN Q1 9/28/2017 MEDICARE YEARLY EXAM 9/29/2017 LIPID PANEL Q1 9/29/2017 COLONOSCOPY 4/12/2026 DTaP/Tdap/Td series (2 - Td) 9/28/2026 Allergies as of 7/5/2017  Review Complete On: 7/5/2017 By: Carole Cote MD  
  
 Severity Noted Reaction Type Reactions Adhesive Tape  02/25/2010    Rash Baycol  02/25/2010    Nausea and Vomiting Pravachol [Pravastatin]  02/25/2010    Nausea and Vomiting Current Immunizations  Reviewed on 11/1/2013 Name Date Influenza High Dose Vaccine PF 9/28/2016, 12/1/2015 Influenza Vaccine 11/1/2013 Influenza Vaccine Split 10/15/2012 Pneumococcal Vaccine (Unspecified Type) 11/25/2008 dT Vaccine 12/20/1999 Not reviewed this visit Vitals BP Pulse Resp Height(growth percentile) Weight(growth percentile) SpO2  
 144/76 (BP 1 Location: Left arm) 68 16 6' 3\" (1.905 m) 245 lb 9.6 oz (111.4 kg) 98% BMI Smoking Status 30.7 kg/m2 Never Smoker Vitals History BMI and BSA Data Body Mass Index Body Surface Area 30.7 kg/m 2 2.43 m 2 Preferred Pharmacy Pharmacy Name Phone [address] Your Updated Medication List  
  
   
This list is accurate as of: 7/5/17  9:06 AM.  Always use your most recent med list.  
  
  
  
  
 amiodarone 200 mg tablet Commonly known as:  CORDARONE  
TAKE ONE TABLET BY MOUTH ONCE DAILY  
  
 amLODIPine 10 mg tablet Commonly known as:  [place] Take 1 Tab by mouth daily. aspirin delayed-release 81 mg tablet Take 81 mg by mouth daily. Indications: MYOCARDIAL REINFARCTION PREVENTION  
  
 atorvastatin 20 mg tablet Commonly known as:  LIPITOR  
TAKE ONE TABLET BY MOUTH ONCE DAILY *PLEASE  SCHEDULE  OFFICE  VISIT*  
  
 clopidogrel 75 mg Tab Commonly known as:  PLAVIX Take 1 Tab by mouth daily. ferrous sulfate 134 mg (27 mg iron) Tab Take 3 Tabs by mouth daily. lisinopril 40 mg tablet Commonly known as:  PRINIVIL, ZESTRIL  
TAKE ONE TABLET BY MOUTH ONCE DAILY  
  
 metFORMIN  mg tablet Commonly known as:  GLUCOPHAGE XR  
TAKE FOUR TABLETS BY MOUTH ONCE DAILY WITH BREAKFAST Please provide this summary of care documentation to your next provider. Your primary care clinician is listed as [address] [place] [address], [facility]/s . If you have any questions after today's visit, please call [phone].

## 2017-07-05 NOTE — PROGRESS NOTES
Subjective:     Problem List  Date Reviewed: 7/5/2017          Codes Class Noted    CAD (coronary artery disease) ICD-10-CM: I25.10  ICD-9-CM: 414.00  2/25/2010    Overview Addendum 4/28/2017 11:27 AM by Souleymane Foster MD     a. Cath (8/27/8): LAD p90, m70. OM1 70, OM2 90 (small). RCA m40. EF 55%. No AVG/MR. Patent L SC.  b. PCI (11/13/8): mLAD 2.75x18 Xience, pLAD 2.75x23 Xience. OM1 3.0x15 Xience. c. Harjinder Hint Cardiolite (8/11/9): Mild fixed inf defect. No reversible defects. EF 51% with mild inf HK. D.  Cath (8/29/13):  LAD patent prox and mid stents. LCx m40; patent OM1 stent. RCA p80. EF 55%. No AVG/MR. E.  PCI ost/proxRCA (8/29/13):  3.0x18 Xience. F.  Echo (8/22/13):  EF 55%. Mod dil LA. Mild dil RA. Mild MR/TR/GA. PASP 24.  G.  Lexiscan Cardiolite (4/4/17): Normal perfusion, EF 51%. H.  Cath (4/28/17): LAD patent with patent prox and mid stent. LCx patent with patent OM1 stent. RCA p90 (ISR) and p90 distal to stent. EF 65%. No AVG/MR. I.  PCI osteal/prox RCA (4/28/17): 3.0x28 Xience. ACP (advance care planning) ICD-10-CM: Z71.89  ICD-9-CM: V65.49  9/30/2016    Overview Signed 9/30/2016  6:42 AM by Janis Dougherty MD     I reviewed advanced directive information and written information given to patient; patient to make follow up appointment with a NN to review choices for health care, agent, and anatomical gifts.                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  DCCV (10/7/13). Fermín Brandon (12/18/14): On amio. Rosacea ICD-10-CM: L71.9  ICD-9-CM: 695.3  11/13/2012        Dyslipidemia ICD-10-CM: E78.5  ICD-9-CM: 272.4  12/20/2010    Overview Addendum 6/22/2016  9:41 AM by Mesha iAken MD     A. FLP (12/14/10): Tot 168, , HDL 49, LDL 97 (Zocor 20mg qd). B.  FLP (1/6/14): Tot 152, TG 81, HDL 57, LDL 79 (Zocor 40). C.  FLP (5/11/15): Tot 144, TG 66, HDL 57, LDL 74 (Lipitor 20). D.  FLP (12/1/15): Tot 143, TG 63, HDL 55, LDL 75 (Lipitor 20). E.  FLP (4/12/16): Tot 146, TG 84, HDL 51, LDL 58 (Lipitor 20). 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 Brayden Rashid is a 69 y.o. man with the above past medical history, who presents for follow up of his coronary artery disease. He is doing reasonably well. He still feels somewhat fatigued, though not as fatigued as when he was on Clonidine. His blood pressure is better controlled on Norvasc, though he does have some mild ankle edema, which is likely due to the Norvasc, as well as dietary indiscretions of sodium. He denies any chest pain, chest discomfort, shortness of breath, dyspnea on exertion, orthopnea, paroxysmal nocturnal dyspnea, palpitations, syncope or near syncope.            History   Smoking Status    Never Smoker   Smokeless Tobacco    Former User    Types: Aziza Pradhan Quit date: 10/30/1993     Comment: patient reports quitting over 20 yeaers ago. Current Outpatient Prescriptions   Medication Sig Dispense Refill    atorvastatin (LIPITOR) 20 mg tablet TAKE ONE TABLET BY MOUTH ONCE DAILY *PLEASE  SCHEDULE  OFFICE  VISIT* 90 Tab 0    amiodarone (CORDARONE) 200 mg tablet TAKE ONE TABLET BY MOUTH ONCE DAILY 90 Tab 0    amLODIPine (NORVASC) 10 mg tablet Take 1 Tab by mouth daily. 30 Tab 3    clopidogrel (PLAVIX) 75 mg tab Take 1 Tab by mouth daily. 30 Tab 5    lisinopril (PRINIVIL, ZESTRIL) 40 mg tablet TAKE ONE TABLET BY MOUTH ONCE DAILY 90 Tab 0    metFORMIN ER (GLUCOPHAGE XR) 500 mg tablet TAKE FOUR TABLETS BY MOUTH ONCE DAILY WITH BREAKFAST 360 tablet 0    ferrous sulfate 134 mg (27 mg iron) tab Take 3 Tabs by mouth daily.  aspirin delayed-release 81 mg tablet Take 81 mg by mouth daily. Indications: MYOCARDIAL REINFARCTION PREVENTION         Objective:     Visit Vitals    /76 (BP 1 Location: Left arm)    Pulse 68    Resp 16    Ht 6' 3\" (1.905 m)    Wt 245 lb 9.6 oz (111.4 kg)    SpO2 98%    BMI 30.7 kg/m2       HEENT Exam:     Normocephalic, atraumatic. EOMI. Oropharynx negative. Neck supple. No lymphadenopathy. Lung Exam:     The patient is not dyspneic. There is no cough. The lungs are clear to percussion. Breath sounds are heard equally in all lung fields. There are no wheezes, rales, rhonchi, or rubs heard on auscultation. Heart Exam:     The rhythm is regular. The PMI is in the 5th intercostal space of the MCL. Apical impulse is normal. S1 is regular. S2 is physiologic. There is no S3, S4 gallop, murmur, click, or rub. Abdomen Exam:     Bowel sounds are normoactive. Abdomen benign. Extremities Exam:     The extremities are atraumatic appearing.  There is no clubbing, cyanosis, ulcers, varicose veins, rash, erythemia noted in the extremities. Mild ankle edema. Vascular Exam:     The radial, brachial, dorsalis pedis, posterior tibial, are equal and strong bilaterally The carotids are equal bilaterally without bruits. EKG: Cristina Daily Assessment/Plan:     Mr. Liam Zapata appears stable from a cardiac standpoint. We discussed either dropping his dose of Norvasc or adding a diuretic to his regimen, however, for now we are just going to stay the course, as his blood pressure is fairly decent, and he does not have much in the way of lower extremity swelling. He is going to follow up with me in November, which places him at approximately six months status post drug-eluting stent placement. We discussed diet and exercise. Plan:  1. Continue outpatient medication regimen. 2. Follow up with me in November. 3. Diet and exercise. Including sodium restricted diet. 4. Call my office, call his primary care physician, or return to the hospital should any concerning symptomatology arise. Mr. Liam Zapata indicated that he understood this plan and wished to proceed ahead.             Patient Care Team:  Cleo Ramírez MD as PCP - Kiarra Orourke MD as Consulting Provider (Cardiology)  Amada Rosales RN as [address] (Cardiology)  Tami Bashir MD (Pulmonary Disease)  Josselyn Zhou MD (Gastroenterology)  Binu Ramos MD (Orthopedic Surgery)  Torey Thomas MD (Optometry)

## 2017-07-05 NOTE — PATIENT INSTRUCTIONS
[employer] [facility]    Thank you for requesting access to [facility]. Please follow the instructions below to securely access and download your online medical record. [facility] allows you to send messages to your doctor, view your test results, renew your prescriptions, schedule appointments, and more. How Do I Sign Up? 1. In your internet browser, go to [email or web address]  2. Click on the First Time User? Click Here link in the Sign In box. You will be redirect to the New Member Sign Up page. 3. Enter your [employer] [facility] Access Code exactly as it appears below. You will not need to use this code after youve completed the sign-up process. If you do not sign up before the expiration date, you must request a new code. [facility] Access Code: Activation code not generated  Current [facility] Status: Patient Declined (This is the date your [employer] [facility] access code will )    4. Enter the last four digits of your Social Security Number (xxxx) and Date of Birth (mm/dd/yyyy) as indicated and click Submit. You will be taken to the next sign-up page. 5. Create a [facility] ID. This will be your [facility] login ID and cannot be changed, so think of one that is secure and easy to remember. 6. Create a [facility] password. You can change your password at any time. 7. Enter your Password Reset Question and Answer. This can be used at a later time if you forget your password. 8. Enter your e-mail address. You will receive e-mail notification when new information is available in [address] 9. Click Sign Up. You can now view and download portions of your medical record. 10. Click the Download Summary menu link to download a portable copy of your medical information. Additional Information    If you have questions, please visit the Frequently Asked Questions section of the [employer] website at [email or web address] Remember, [employer] [facility] is NOT to be used for urgent needs. For medical emergencies, dial 911. Learning About Coronary Artery Disease (CAD)  What is coronary artery disease? Coronary artery disease (CAD) occurs when plaque builds up in the arteries that bring oxygen-rich blood to your heart. Plaque is a fatty substance made of cholesterol, calcium, and other substances in the blood. This process is called hardening of the arteries, or atherosclerosis. What happens when you have coronary artery disease? · Plaque may narrow the coronary arteries. Narrowed arteries cause poor blood flow. This can lead to angina symptoms such as chest pain or discomfort. If blood flow is completely blocked, you could have a heart attack. · You can slow CAD and reduce the risk of future problems by making changes in your lifestyle. These include quitting smoking and eating heart-healthy foods. · Treatments for CAD, along with changes in your lifestyle, can help you live a longer and healthier life. How can you prevent coronary artery disease? · Do not smoke. It may be the best thing you can do to prevent heart disease. If you need help quitting, talk to your doctor about stop-smoking programs and medicines. These can increase your chances of quitting for good. · Be active. Get at least 30 minutes of exercise on most days of the week. Walking is a good choice. You also may want to do other activities, such as running, swimming, cycling, or playing tennis or team sports. · Eat heart-healthy foods. Eat more fruits and vegetables and less foods that contain saturated and trans fats. Limit alcohol, sodium, and sweets. · Stay at a healthy weight. Lose weight if you need to. · Manage other health problems such as diabetes, high blood pressure, and high cholesterol. · Manage stress. Stress can hurt your heart. To keep stress low, talk about your problems and feelings. Don't keep your feelings hidden. · If you have talked about it with your doctor, take a low-dose aspirin every day.  Aspirin can help certain people lower their risk of a heart attack or stroke. But taking aspirin isn't right for everyone, because it can cause serious bleeding. Do not start taking daily aspirin unless your doctor knows about it. How is coronary artery disease treated? · Your doctor will suggest that you make lifestyle changes. For example, your doctor may ask you to eat healthy foods, quit smoking, lose extra weight, and be more active. · You will have to take medicines. · Your doctor may suggest a procedure to open narrowed or blocked arteries. This is called angioplasty. Or your doctor may suggest using healthy blood vessels to create detours around narrowed or blocked arteries. This is called bypass surgery. Follow-up care is a key part of your treatment and safety. Be sure to make and go to all appointments, and call your doctor if you are having problems. It's also a good idea to know your test results and keep a list of the medicines you take. Where can you learn more? Go to http://ruth-ellyn.info/. Enter [phone] in the search box to learn more about \"Learning About Coronary Artery Disease (CAD). \"  Current as of: December 28, 2016  Content Version: 11.3  © 0097-3153 [employer]. Care instructions adapted under license by [employer] (which disclaims liability or warranty for this information). If you have questions about a medical condition or this instruction, always ask your healthcare professional. [place] [address] any warranty or liability for your use of this information.

## 2017-08-16 RX ORDER — LISINOPRIL 40 MG/1
40 TABLET ORAL DAILY
Qty: 90 TAB | Refills: 0 | Status: SHIPPED | OUTPATIENT
Start: 2017-08-16 | End: 2017-09-26 | Stop reason: SDUPTHER

## 2017-09-26 ENCOUNTER — OFFICE VISIT (OUTPATIENT)
Dept: FAMILY MEDICINE CLINIC | Age: 77
End: 2017-09-26

## 2017-09-26 ENCOUNTER — HOSPITAL ENCOUNTER (OUTPATIENT)
Dept: LAB | Age: 77
Discharge: HOME | End: 2017-09-26
Payer: MEDICARE

## 2017-09-26 DIAGNOSIS — I48.91 ATRIAL FIBRILLATION, UNSPECIFIED TYPE (HCC): ICD-10-CM

## 2017-09-26 DIAGNOSIS — D50.8 IRON DEFICIENCY ANEMIA SECONDARY TO INADEQUATE DIETARY IRON INTAKE: ICD-10-CM

## 2017-09-26 DIAGNOSIS — Z71.89 ACP (ADVANCE CARE PLANNING): ICD-10-CM

## 2017-09-26 DIAGNOSIS — E11.9 TYPE 2 DIABETES MELLITUS WITHOUT COMPLICATION, UNSPECIFIED LONG TERM INSULIN USE STATUS: Primary | ICD-10-CM

## 2017-09-26 DIAGNOSIS — E78.5 DYSLIPIDEMIA: ICD-10-CM

## 2017-09-26 DIAGNOSIS — I10 ESSENTIAL HYPERTENSION, BENIGN: ICD-10-CM

## 2017-09-26 DIAGNOSIS — Z00.00 MEDICARE ANNUAL WELLNESS VISIT, SUBSEQUENT: ICD-10-CM

## 2017-09-26 DIAGNOSIS — I25.10 CORONARY ARTERY DISEASE INVOLVING NATIVE CORONARY ARTERY OF NATIVE HEART WITHOUT ANGINA PECTORIS: ICD-10-CM

## 2017-09-26 DIAGNOSIS — Z23 ENCOUNTER FOR IMMUNIZATION: ICD-10-CM

## 2017-09-26 PROCEDURE — 80061 LIPID PANEL: CPT

## 2017-09-26 PROCEDURE — 80048 BASIC METABOLIC PNL TOTAL CA: CPT

## 2017-09-26 PROCEDURE — 83036 HEMOGLOBIN GLYCOSYLATED A1C: CPT

## 2017-09-26 PROCEDURE — 82043 UR ALBUMIN QUANTITATIVE: CPT

## 2017-09-26 PROCEDURE — 85027 COMPLETE CBC AUTOMATED: CPT

## 2017-09-26 RX ORDER — ATORVASTATIN CALCIUM 20 MG/1
TABLET, FILM COATED ORAL
Qty: 90 TAB | Refills: 1 | Status: SHIPPED | OUTPATIENT
Start: 2017-09-26 | End: 2018-04-09 | Stop reason: SDUPTHER

## 2017-09-26 RX ORDER — AMLODIPINE BESYLATE 10 MG/1
10 TABLET ORAL DAILY
Qty: 90 TAB | Refills: 1 | Status: SHIPPED | OUTPATIENT
Start: 2017-09-26 | End: 2018-04-17 | Stop reason: SDUPTHER

## 2017-09-26 RX ORDER — CLOPIDOGREL BISULFATE 75 MG/1
75 TABLET ORAL DAILY
Qty: 90 TAB | Refills: 1 | Status: SHIPPED | OUTPATIENT
Start: 2017-09-26 | End: 2018-04-18 | Stop reason: SDUPTHER

## 2017-09-26 RX ORDER — LISINOPRIL 40 MG/1
40 TABLET ORAL DAILY
Qty: 90 TAB | Refills: 1 | Status: SHIPPED | OUTPATIENT
Start: 2017-09-26 | End: 2018-03-14 | Stop reason: SDUPTHER

## 2017-09-26 RX ORDER — METFORMIN HYDROCHLORIDE 500 MG/1
TABLET, EXTENDED RELEASE ORAL
Qty: 360 TAB | Refills: 1 | Status: SHIPPED | OUTPATIENT
Start: 2017-09-26 | End: 2018-04-05 | Stop reason: SDUPTHER

## 2017-09-26 RX ORDER — AMIODARONE HYDROCHLORIDE 200 MG/1
TABLET ORAL
Qty: 90 TAB | Refills: 1 | Status: SHIPPED | OUTPATIENT
Start: 2017-09-26 | End: 2018-05-03 | Stop reason: SDUPTHER

## 2017-09-26 NOTE — PROGRESS NOTES
HISTORY OF PRESENT ILLNESS  HPI  Daren Sorensen is a 69 y.o. Male with history of HTN, A-fib, CAD, DM-II, anemia, and hyperlipidemia who presents to office today for follow-up. Pt has not been checking his BP out of office recently. He notes that he was switched from clonidine to Norvasc 10mg daily sometime in Spring 2017 by his cardiologist, Dr. Yuridia Kaplan. Pt had a stent placed on 4/28/17 with Dr. Yuridia Kaplan. He states that he presented with fatigue and SOB; a stress test was negative, and the blockage was discovered via cardiac catheterization. Pt went to the ER on 5/1/17 after falling; he had broken his right wrist. He notes a history of falls. He denies any other recent ER visits or hospitalizations. Pt notes right knee discomfort since falling recently. Pt complains of runny nose, fatigue, and infrequent cough since 9/23/17. He has been taking Nyquil intermittently.       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 2/25/2010    Other and unspecified hyperlipidemia 2/25/2010    Positive PPD 2/25/2010    Reflux esophagitis 2/25/2010    Type II or unspecified type diabetes mellitus without mention of complication, not stated as uncontrolled 2/25/2010     Past Surgical History:   Procedure Laterality Date    Dot Hickman  12/13/2013         HC CAPSULE ENDOSCOPE M2A  4/4/2014         HX COLONOSCOPY      HX CORONARY STENT PLACEMENT      HX ORTHOPAEDIC      HX VASECTOMY  1983    UPPER GI ENDOSCOPY,BIOPSY  12/13/2013          Current Outpatient Prescriptions on File Prior to Visit   Medication Sig Dispense Refill    ferrous sulfate 134 mg (27 mg iron) tab Take 3 Tabs by mouth daily.  aspirin delayed-release 81 mg tablet Take 81 mg by mouth daily. Indications: MYOCARDIAL REINFARCTION PREVENTION       No current facility-administered medications on file prior to visit. Allergies   Allergen Reactions    Adhesive Tape Rash    Baycol Nausea and Vomiting    Clonidine Other (comments)     Headache and sleepiness      Pravachol [Pravastatin] Nausea and Vomiting     Family History   Problem Relation Age of Onset    Heart Disease Mother     Cancer Father      colon    Heart Disease Maternal Grandmother     Heart Disease Maternal Grandfather     Cancer Paternal Grandmother     Cancer Paternal Grandfather      Social History     Social History    Marital status:      Spouse name: N/A    Number of children: N/A    Years of education: N/A     Social History Main Topics    Smoking status: Never Smoker    Smokeless tobacco: Former User     Types: Chew     Quit date: 10/30/1993      Comment: patient reports quitting over 20 yeaers ago.  Alcohol use Yes      Comment: once a year    Drug use: No    Sexual activity: Not Asked     Other Topics Concern     Service Yes    Blood Transfusions Yes    Caffeine Concern Yes     2-3 cups of coffee daily    Occupational Exposure No    Hobby Hazards No    Sleep Concern No    Stress Concern No    Weight Concern No    Special Diet No    Back Care No    Exercise Yes    Bike Helmet No     pt doesn't ride a bike   [place] Yes    Self-Exams No     Social History Narrative               Review of Systems   Constitutional: Positive for malaise/fatigue. Negative for chills, diaphoresis, fever and weight loss. Eyes: Negative for blurred vision, double vision and pain. Respiratory: Positive for cough. Negative for shortness of breath and wheezing.     Cardiovascular: Negative for chest pain, palpitations, orthopnea, claudication, leg swelling and PND. Genitourinary: Negative for frequency. Musculoskeletal: Positive for falls and joint pain (right knee). Skin: Negative for itching and rash. Neurological: Negative for dizziness, tingling, tremors, sensory change, speech change, focal weakness, seizures, loss of consciousness, weakness and headaches. Endo/Heme/Allergies: Negative for environmental allergies and polydipsia. Does not bruise/bleed easily.      Results for orders placed or performed in visit on 09/26/17   HEMOGLOBIN A1C WITH EAG   Result Value Ref Range    Hemoglobin A1c 6.4 (H) 4.8 - 5.6 %    Estimated average glucose 137 mg/dL   LIPID PANEL   Result Value Ref Range    Cholesterol, total 157 100 - 199 mg/dL    Triglyceride 91 0 - 149 mg/dL    HDL Cholesterol 63 >39 mg/dL    VLDL, calculated 18 5 - 40 mg/dL    LDL, calculated 76 0 - 99 mg/dL   METABOLIC PANEL, BASIC   Result Value Ref Range    Glucose 127 (H) 65 - 99 mg/dL    BUN 16 8 - 27 mg/dL    Creatinine 1.14 0.76 - 1.27 mg/dL    GFR est non-AA 62 >59 mL/min/1.73    GFR est AA 72 >59 mL/min/1.73    BUN/Creatinine ratio 14 10 - 24    Sodium 142 134 - 144 mmol/L    Potassium 5.1 3.5 - 5.2 mmol/L    Chloride 100 96 - 106 mmol/L    CO2 27 18 - 29 mmol/L    Calcium 9.7 8.6 - 10.2 mg/dL   MICROALBUMIN, UR, RAND W/ MICROALBUMIN/CREA RATIO   Result Value Ref Range    Creatinine, urine 95.1 Not Estab. mg/dL    Microalbumin, urine 76.6 Not Estab. ug/mL    Microalb/Creat ratio (ug/mg creat.) 80.5 (H) 0.0 - 30.0 mg/g creat   CBC W/O DIFF   Result Value Ref Range    WBC 6.5 3.4 - 10.8 x10E3/uL    RBC 4.08 (L) 4.14 - 5.80 x10E6/uL    HGB 12.9 12.6 - 17.7 g/dL    HCT 39.3 37.5 - 51.0 %    MCV 96 79 - 97 fL    MCH 31.6 26.6 - 33.0 pg    MCHC 32.8 31.5 - 35.7 g/dL    RDW 15.0 12.3 - 15.4 %    PLATELET 658 027 - 732 x10E3/uL   CVD REPORT   Result Value Ref Range    INTERPRETATION Note              Physical Exam  Visit Vitals    BP 137/84    Temp 98.5 °F (36.9 °C) (Oral)    Resp 14    Ht 6' 3\" (1.905 m)    Wt 249 lb 12.8 oz (113.3 kg)    SpO2 95%    BMI 31.22 kg/m2     Head: Normocephalic, without obvious abnormality, atraumatic  Eyes: conjunctivae/corneas clear. PERRL, EOM's intact. Neck: supple, symmetrical, trachea midline, no adenopathy, thyroid: not enlarged, symmetric, no tenderness/mass/nodules, no carotid bruit and no JVD  Lungs: clear to auscultation bilaterally  Heart: regular rate and rhythm, S1, S2 normal, no murmur, click, rub or gallop  Extremities: extremities normal, atraumatic, no cyanosis or edema. Discomfort in the right knee medially with standing and putting stress on the knee laterally, palpation and ROM of the knee when he's sitting is painless, no erythema, no warmth  Pulses: 2+ and symmetric  Lymph nodes: Cervical, supraclavicular, and axillary nodes normal.  Neurologic: Grossly normal, he has baseline weakness in his right arm more than his right leg, no change in that          ASSESSMENT and PLAN    ICD-10-CM ICD-9-CM    1. Type 2 diabetes mellitus without complication, unspecified long term insulin use status ([facility]) E11.9 250.00 HEMOGLOBIN A1C WITH EAG      metFORMIN ER (GLUCOPHAGE XR) 500 mg tablet      MICROALBUMIN, UR, RAND W/ MICROALBUMIN/CREA RATIO       DIABETES FOOT EXAM   2. Essential hypertension, benign G62 818.1 METABOLIC PANEL, BASIC      lisinopril (PRINIVIL, ZESTRIL) 40 mg tablet      amiodarone (CORDARONE) 200 mg tablet   3. Dyslipidemia E78.5 272.4 LIPID PANEL      atorvastatin (LIPITOR) 20 mg tablet   4. Coronary artery disease involving native coronary artery of native heart without angina pectoris I25.10 414.01 amiodarone (CORDARONE) 200 mg tablet      amLODIPine (NORVASC) 10 mg tablet      clopidogrel (PLAVIX) 75 mg tab   5. Atrial fibrillation, unspecified type ([facility]) I48.91 427.31 amiodarone (CORDARONE) 200 mg tablet      clopidogrel (PLAVIX) 75 mg tab   6.  Iron deficiency anemia secondary to inadequate dietary iron intake D50.8 280.1 CBC W/O DIFF   7. Encounter for immunization Z23 V03.89 INFLUENZA VIRUS VACCINE, HIGH DOSE SEASONAL, PRESERVATIVE FREE      CANCELED: INFLUENZA VIRUS VAC QUAD,SPLIT,PRESV FREE SYRINGE IM   8. Medicare annual wellness visit, subsequent Z00.00 V70.0      Diagnoses and all orders for this visit:    1. Type 2 diabetes mellitus without complication, unspecified long term insulin use status (HCC)  -     HEMOGLOBIN A1C WITH EAG  -     metFORMIN ER (GLUCOPHAGE XR) 500 mg tablet; TAKE FOUR TABLETS BY MOUTH ONCE DAILY WITH BREAKFAST  -     MICROALBUMIN, UR, RAND W/ MICROALBUMIN/CREA RATIO  -      DIABETES FOOT EXAM    2. Essential hypertension, benign  -     METABOLIC PANEL, BASIC  -     lisinopril (PRINIVIL, ZESTRIL) 40 mg tablet; Take 1 Tab by mouth daily. -     amiodarone (CORDARONE) 200 mg tablet; TAKE ONE TABLET BY MOUTH ONCE DAILY    3. Dyslipidemia  -     LIPID PANEL  -     atorvastatin (LIPITOR) 20 mg tablet; TAKE ONE TABLET BY MOUTH ONCE DAILY *PLEASE  SCHEDULE  OFFICE  VISIT*    4. Coronary artery disease involving native coronary artery of native heart without angina pectoris  -     amiodarone (CORDARONE) 200 mg tablet; TAKE ONE TABLET BY MOUTH ONCE DAILY  -     amLODIPine (NORVASC) 10 mg tablet; Take 1 Tab by mouth daily. -     clopidogrel (PLAVIX) 75 mg tab; Take 1 Tab by mouth daily. 5. Atrial fibrillation, unspecified type (HCC)  -     amiodarone (CORDARONE) 200 mg tablet; TAKE ONE TABLET BY MOUTH ONCE DAILY  -     clopidogrel (PLAVIX) 75 mg tab; Take 1 Tab by mouth daily. 6. Iron deficiency anemia secondary to inadequate dietary iron intake  -     CBC W/O DIFF    7. Encounter for immunization  -     Influenza virus vaccine ([address]) 72 years and older    8.  Medicare annual wellness visit, subsequent    Other orders  -     CVD REPORT      Follow-up Disposition:  Return in about 3 months (around 12/26/2017), or BP or glucose OOC, for F/U HTN,CHOL,DM, f/u CAD. lab results and schedule of future lab studies reviewed with patient  reviewed diet, exercise and weight control  cardiovascular risk and specific lipid/LDL goals reviewed  reviewed medications and side effects in detail  Please call my office if there are any questions- [phone]. I will arrange for follow up after the lab tests done from today return  Recommended a weekly \"heart check. \" I went into detail how to do this. Call for refills on medications as needed. Discussed expected course/resolution/complications of diagnosis in detail with patient. Medication risks/benefits/costs/interactions/alternatives discussed with patient. Pt was given an after visit summary which includes diagnoses, current medications & vitals. Pt expressed understanding with the diagnosis and plan. Total 55 minutes,60 % counseling re:   Reviewed symptoms, or lack thereof, of hypertension and elevated cholesterol. Reviewed in detail the proper technique of checking the blood pressure- check it on an average day only, not on a stressful day, sitting, no exercise for at least 1 hour and not experiencing any new pain( chronic pain is OK). Patient encouraged to check BP sitting and standing at least once a month and to report these readings to me if > 140/ 90 on average , or if the standing BP is >  15 points lower than the sitting. Reviewed BP, cholesterol and diabetic goals. LDL goal<100,HDL goal>45, Triglyceride goal<150, A1C< 7.0, BP<140/90. Because pt is complaining of balance issues, I told him we'd get him into some PT. With the weakness he's had in his body since the age of 24, he will have to have some type of specialized training. In the meantime, I recommended he walk in safe situations where the ground is flat whenever possible. I suggested he try walking regularly at the grocery store with the grocery cart in front of him, which will act as a stabilizer.   Also, discussed symptoms of concern that were noted today in the note above, treatment options( including doing nothing), when to follow up before recommended time frame. Also, answered all questions. Of note, pt was on clonidine in the spring and this caused a lot of side effects, so I placed that on his allergy drug list even though it's a side effect. This document was written by Ariana Lambert, as dictated by Jesus Joy MD.  I have reviewed and agree with the above note and have made corrections where appropriate Shimon Sweeney M.D.

## 2017-09-26 NOTE — LETTER
1/23/2018 10:07 AM 
 
Mr. Marcela [address] [facility] [id number] Dear Marcela Shetty: Please find your most recent results below. Resulted Orders HEMOGLOBIN A1C WITH EAG Result Value Ref Range Hemoglobin A1c 6.4 (H) 4.8 - 5.6 % Comment:  
            Pre-diabetes: 5.7 - 6.4 Diabetes: >6.4 Glycemic control for adults with diabetes: <7.0 Estimated average glucose 137 mg/dL Narrative Performed at:  [address]  [id number] : Cristi Verde MD, Phone:  [phone] LIPID PANEL Result Value Ref Range Cholesterol, total 157 100 - 199 mg/dL Triglyceride 91 0 - 149 mg/dL HDL Cholesterol 63 >39 mg/dL VLDL, calculated 18 5 - 40 mg/dL LDL, calculated 76 0 - 99 mg/dL Narrative Performed at:  [address]  [id number] : Cristi Verde MD, Phone:  [phone] METABOLIC PANEL, BASIC Result Value Ref Range Glucose 127 (H) 65 - 99 mg/dL BUN 16 8 - 27 mg/dL Creatinine 1.14 0.76 - 1.27 mg/dL GFR est non-AA 62 >59 mL/min/1.73 GFR est AA 72 >59 mL/min/1.73  
 BUN/Creatinine ratio 14 10 - 24 Sodium 142 134 - 144 mmol/L Potassium 5.1 3.5 - 5.2 mmol/L Chloride 100 96 - 106 mmol/L  
 CO2 27 18 - 29 mmol/L Calcium 9.7 8.6 - 10.2 mg/dL Narrative Performed at:  [address]  [id number] : Cristi Verde MD, Phone:  [phone] MICROALBUMIN, UR, RAND W/ MICROALBUMIN/CREA RATIO Result Value Ref Range Creatinine, urine 95.1 Not Estab. mg/dL Microalbumin, urine 76.6 Not Estab. ug/mL Microalb/Creat ratio (ug/mg creat.) 80.5 (H) 0.0 - 30.0 mg/g creat Narrative Performed at:  [address], [address] [place] [address]  [id number] : Cristi Verde MD, Phone:  [phone] [facility] W/O DIFF  
 Result Value Ref Range WBC 6.5 3.4 - 10.8 x10E3/uL  
 RBC 4.08 (L) 4.14 - 5.80 x10E6/uL HGB 12.9 12.6 - 17.7 g/dL HCT 39.3 37.5 - 51.0 % MCV 96 79 - 97 fL  
 MCH 31.6 26.6 - 33.0 pg  
 MCHC 32.8 31.5 - 35.7 g/dL  
 RDW 15.0 12.3 - 15.4 % PLATELET 364 641 - 383 x10E3/uL Narrative Performed at:  [address]  [id number] : Umu Cast MD, Phone:  [phone] CVD REPORT Result Value Ref Range INTERPRETATION Note Comment:  
   Supplement report is available. Narrative Performed at:  [facility] [place] [address] [place] [address]  [id number] : Annie Drew PhD, Phone:  [phone] RECOMMENDATIONS: 
GREAT NEWS!! All of your September lab tests are normal or at goal. A copy is enclosed. I would continue everything the same and schedule your next fasting office visit in March. In addition, a wellness visit is recommended every year after the age of 72. Please call me if you have any questions: [phone] Sincerely, 
 
 
Elizabeth Altman MD

## 2017-09-26 NOTE — PROGRESS NOTES
Pt presents to office today for a Cholesterol problem, Diabetes, Thyroid and Fasting labs . Pt states that he has been having sinus congestion for a a couple of days and took some some Nyquil for this . Still not feeling better. Chief Complaint   Patient presents with    Cholesterol Problem    Diabetes    Labs     1. Have you been to the ER, urgent care clinic since your last visit? Hospitalized since your last visit? No    2. Have you seen or consulted any other health care providers outside of the [address] [place] [address] since your last visit? Include any pap smears or colon screening.  No

## 2017-09-26 NOTE — MR AVS SNAPSHOT
Visit Information Date & Time Provider Department Dept. Phone Encounter #  
 9/26/2017 10:00 AM Vivian Obando  [facility] [address] [phone] [id number] Your Appointments 10/25/2017 11:40 AM  
ESTABLISHED PATIENT with Ceferino Walton MD  
[facility] ([facility] SCHEDULING) Appt Note: 5 mo fup  
 [address] [facility] [phone] [facility]  
[address] [facility] [place] [address] [address] Upcoming Health Maintenance Date Due  
 EYE EXAM RETINAL OR DILATED Q1 5/12/2016 GLAUCOMA SCREENING Q2Y 1/19/2017 HEMOGLOBIN A1C Q6M 3/29/2017 FOOT EXAM Q1 4/12/2017 INFLUENZA AGE 9 TO ADULT 8/1/2017 MICROALBUMIN Q1 9/28/2017 LIPID PANEL Q1 9/29/2017 MEDICARE YEARLY EXAM 9/29/2017 COLONOSCOPY 4/12/2026 DTaP/Tdap/Td series (2 - Td) 9/28/2026 Allergies as of 9/26/2017  Review Complete On: 9/26/2017 By: Reggie Jimenez LPN Severity Noted Reaction Type Reactions Adhesive Tape  02/25/2010    Rash Baycol  02/25/2010    Nausea and Vomiting Clonidine  09/26/2017    Other (comments) Headache and sleepiness Pravachol [Pravastatin]  02/25/2010    Nausea and Vomiting Current Immunizations  Reviewed on 11/1/2013 Name Date Influenza High Dose Vaccine PF 9/28/2016, 12/1/2015 Influenza Vaccine 11/1/2013 Influenza Vaccine Split 10/15/2012 ZZZ-RETIRED (DO NOT USE) Pneumococcal Vaccine (Unspecified Type) 11/25/2008 dT Vaccine 12/20/1999 Not reviewed this visit You Were Diagnosed With   
  
 Codes Comments Type 2 diabetes mellitus without complication, unspecified long term insulin use status (HCC)    -  Primary ICD-10-CM: E11.9 ICD-9-CM: 250.00 Essential hypertension, benign     ICD-10-CM: I10 
ICD-9-CM: 401.1 Dyslipidemia     ICD-10-CM: E78.5 ICD-9-CM: 272.4 Coronary artery disease involving native coronary artery of native heart without angina pectoris     ICD-10-CM: I25.10 ICD-9-CM: 414.01 [facility]rial fibrillation, unspecified [facility]     ICD-10-CM: I48.91 
ICD-9-CM: 427.31 Iron deficiency anemia secondary to inadequate dietary iron intake     ICD-10-CM: D50.8 ICD-9-CM: 280.1 Vitals BP Temp Resp Height(growth percentile) Weight(growth percentile) SpO2  
 156/88 98.5 °F (36.9 °C) (Oral) 14 6' 3\" (1.905 m) 249 lb 12.8 oz (113.3 kg) 95% BMI Smoking Status 31.22 kg/m2 Never Smoker Vitals History BMI and BSA Data Body Mass Index Body Surface Area  
 31.22 kg/m 2 2.45 m 2 Preferred Pharmacy Pharmacy Name [address] Your Updated Medication List  
  
   
This list is accurate as of: 9/26/17 11:30 AM.  Always use your most recent med list.  
  
  
  
  
 amiodarone 200 mg tablet Commonly known as:  CORDARONE  
TAKE ONE TABLET BY MOUTH ONCE DAILY  
  
 amLODIPine 10 mg tablet Commonly known as:  Mil Ankur Take 1 Tab by mouth daily. aspirin delayed-release 81 mg tablet Take 81 mg by mouth daily. Indications: MYOCARDIAL REINFARCTION PREVENTION  
  
 atorvastatin 20 mg tablet Commonly known as:  LIPITOR  
TAKE ONE TABLET BY MOUTH ONCE DAILY *PLEASE  SCHEDULE  OFFICE  VISIT*  
  
 clopidogrel 75 mg Tab Commonly known as:  PLAVIX Take 1 Tab by mouth daily. ferrous sulfate 134 mg (27 mg iron) Tab Take 3 Tabs by mouth daily. lisinopril 40 mg tablet Commonly known as:  Rondarryl Carmichaeling Take 1 Tab by mouth daily. metFORMIN  mg tablet Commonly known as:  GLUCOPHAGE XR  
TAKE FOUR TABLETS BY MOUTH ONCE DAILY WITH BREAKFAST Prescriptions Sent to Pharmacy  Refills  
 atorvastatin (LIPITOR) 20 mg tablet 1  
 Sig: TAKE ONE TABLET BY MOUTH ONCE DAILY *PLEASE  SCHEDULE  OFFICE  VISIT* Class: Normal  
 Pharmacy: [address] Ph #: [phone]  
 lisinopril (PRINIVIL, ZESTRIL) 40 mg tablet 1 Sig: Take 1 Tab by mouth daily. Class: Normal  
 Pharmacy: [address] Ph #: [phone] Route: Oral  
 amiodarone (CORDARONE) 200 mg tablet 1 Sig: TAKE ONE TABLET BY MOUTH ONCE DAILY Class: Normal  
 Pharmacy: [address] Ph #: [phone]  
 amLODIPine (NORVASC) 10 mg tablet 1 Sig: Take 1 Tab by mouth daily. Class: Normal  
 Pharmacy: [address] Ph #: [phone] Route: Oral  
 metFORMIN ER (GLUCOPHAGE XR) 500 mg tablet 1 Sig: TAKE FOUR TABLETS BY MOUTH ONCE DAILY WITH BREAKFAST Class: Normal  
 Pharmacy: [address] Ph #: [phone]  
 clopidogrel (PLAVIX) 75 mg tab 1 Sig: Take 1 Tab by mouth daily. Class: Normal  
 Pharmacy: [address] Ph #: [phone] Route: Oral  
  
We Performed the Following CBC W/O DIFF [77632 CPT(R)] HEMOGLOBIN A1C WITH EAG [81891 CPT(R)] LIPID PANEL [28788 CPT(R)] METABOLIC PANEL, BASIC [29715 CPT(R)] MICROALBUMIN, UR, RAND W/ MICROALBUMIN/CREA RATIO [id number] CPT(R)] Please provide this summary of care documentation to your next provider. Your primary care clinician is listed as [address] [address], [facility] . If you have any questions after today's visit, please call [phone].

## 2017-09-27 LAB
ALBUMIN/CREAT UR: 80.5 MG/G CREAT (ref 0–30)
BUN SERPL-MCNC: 16 MG/DL (ref 8–27)
BUN/CREAT SERPL: 14 (ref 10–24)
CALCIUM SERPL-MCNC: 9.7 MG/DL (ref 8.6–10.2)
CHLORIDE SERPL-SCNC: 100 MMOL/L (ref 96–106)
CHOLEST SERPL-MCNC: 157 MG/DL (ref 100–199)
CO2 SERPL-SCNC: 27 MMOL/L (ref 18–29)
CREAT SERPL-MCNC: 1.14 MG/DL (ref 0.76–1.27)
CREAT UR-MCNC: 95.1 MG/DL
ERYTHROCYTE [DISTWIDTH] IN BLOOD BY AUTOMATED COUNT: 15 % (ref 12.3–15.4)
EST. AVERAGE GLUCOSE BLD GHB EST-MCNC: 137 MG/DL
GLUCOSE SERPL-MCNC: 127 MG/DL (ref 65–99)
HBA1C MFR BLD: 6.4 % (ref 4.8–5.6)
HCT VFR BLD AUTO: 39.3 % (ref 37.5–51)
HDLC SERPL-MCNC: 63 MG/DL
HGB BLD-MCNC: 12.9 G/DL (ref 12.6–17.7)
INTERPRETATION, 910389: NORMAL
LDLC SERPL CALC-MCNC: 76 MG/DL (ref 0–99)
MCH RBC QN AUTO: 31.6 PG (ref 26.6–33)
MCHC RBC AUTO-ENTMCNC: 32.8 G/DL (ref 31.5–35.7)
MCV RBC AUTO: 96 FL (ref 79–97)
MICROALBUMIN UR-MCNC: 76.6 UG/ML
PLATELET # BLD AUTO: 228 X10E3/UL (ref 150–379)
POTASSIUM SERPL-SCNC: 5.1 MMOL/L (ref 3.5–5.2)
RBC # BLD AUTO: 4.08 X10E6/UL (ref 4.14–5.8)
SODIUM SERPL-SCNC: 142 MMOL/L (ref 134–144)
TRIGL SERPL-MCNC: 91 MG/DL (ref 0–149)
VLDLC SERPL CALC-MCNC: 18 MG/DL (ref 5–40)
WBC # BLD AUTO: 6.5 X10E3/UL (ref 3.4–10.8)

## 2017-10-01 VITALS
OXYGEN SATURATION: 95 % | RESPIRATION RATE: 14 BRPM | TEMPERATURE: 98.5 F | BODY MASS INDEX: 31.06 KG/M2 | WEIGHT: 249.8 LBS | HEIGHT: 75 IN | SYSTOLIC BLOOD PRESSURE: 137 MMHG | DIASTOLIC BLOOD PRESSURE: 84 MMHG

## 2017-10-01 NOTE — PROGRESS NOTES
Tatyana Palomo is a 69 y.o. male and presents for annual Medicare Wellness Visit. Problem List: Reviewed with patient and discussed risk factors. 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    Basal cell carcinoma of anterior chest C44.519    Reflux esophagitis K21.0    CAD (coronary artery disease) I25.10    Dyslipidemia E78.5    Rosacea L71.9    Atrial fibrillation (HCC) I48.91    Iron deficiency anemia- GI blood loss- no source identified-2009 D50.9    TOPHER (obstructive sleep apnea) G47.33    Type 2 diabetes mellitus without complication (HCC) I01.8    Closed fracture of shaft of left fibula with routine healing S82.402D    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 CAPSULE ENDOSCOPE M2A  4/4/2014         HX COLONOSCOPY      HX CORONARY STENT PLACEMENT      HX ORTHOPAEDIC      HX VASECTOMY  1983    UPPER GI ENDOSCOPY,BIOPSY  12/13/2013             SH: Reviewed with patient  Social History   Substance Use Topics    Smoking status: Never Smoker    Smokeless tobacco: Former User     Types: Chew     Quit date: 10/30/1993      Comment: patient reports quitting over 20 yeaers ago.      Alcohol use Yes      Comment: once a year       FH: Reviewed with patient  Family History   Problem Relation Age of Onset    Heart Disease Mother     Cancer Father      colon    Heart Disease Maternal Grandmother     Heart Disease Maternal Grandfather     Cancer Paternal Grandmother     Cancer Paternal Grandfather        Medications/Allergies: Reviewed with patient  Current Outpatient Prescriptions on File Prior to Visit   Medication Sig Dispense Refill    ferrous sulfate 134 mg (27 mg iron) tab Take 3 Tabs by mouth daily.  aspirin delayed-release 81 mg tablet Take 81 mg by mouth daily. Indications: MYOCARDIAL REINFARCTION PREVENTION       No current facility-administered medications on file prior to visit. Allergies   Allergen Reactions    Adhesive Tape Rash    Baycol Nausea and Vomiting    Clonidine Other (comments)     Headache and sleepiness      Pravachol [Pravastatin] Nausea and Vomiting       Objective:  Visit Vitals    /84    Temp 98.5 °F (36.9 °C) (Oral)    Resp 14    Ht 6' 3\" (1.905 m)    Wt 249 lb 12.8 oz (113.3 kg)    SpO2 95%    BMI 31.22 kg/m2    Body mass index is 31.22 kg/(m^2). Assessment of cognitive impairment: Alert and oriented x 3    Depression Screen:   PHQ over the last two weeks 9/26/2017   Little interest or pleasure in doing things Not at all   Feeling down, depressed or hopeless Not at all   Total Score PHQ 2 0       Fall Risk Assessment:    Fall Risk Assessment, last 12 mths 9/26/2017   Able to walk? Yes   Fall in past 12 months? No   Fall with injury? -   Number of falls in past 12 months -   Fall Risk Score -       Functional Ability:   Does the patient exhibit a steady gait? yes   How long did it take the patient to get up and walk from a sitting position? 9-10 seconds( lifelong right paraplegia   Is the patient self reliant?  (ie can do own laundry, meals, household chores)  yes     Does the patient handle his/her own medications? yes     Does the patient handle his/her own money? yes     Is the patients home safe (ie good lighting, handrails on stairs and bath, etc.)? yes     Did you notice or did patient express any hearing difficulties? no     Did you notice or did patient express any vision difficulties?   no     Were distance and reading eye charts used? Yes. Patient's  full eye exam by an ophthalmologist every 2 years is unremarkable: no glaucoma or macular degeneration. Advance Care Planning:   Patient was offered the opportunity to discuss advance care planning:  yes     Does patient have an Advance Directive:  Yes. Advanced directive already completed and in the chart. No changes desired. If no, did you provide information on Caring Connections? yes       Plan:    Advance Care Planning (ACP) Provider Note - Comprehensive     Date of ACP Conversation: 10/01/17  Persons included in Conversation:  patient  Length of ACP Conversation in minutes:  <16 minutes (Non-Billable)    Authorized Decision Maker (if patient is incapable of making informed decisions):    This person is:  Healthcare Agent/Medical Power of  under Advance Directive          General ACP for ALL Patients with Decision Making Capacity:   Importance of advance care planning, including choosing a healthcare agent to communicate patient's healthcare decisions if patient lost the ability to make decisions, such as after a sudden illness or accident    Review of Existing Advance Directive:  desires no changes    For Serious or Chronic Illness:  No known illness    Interventions Provided:  Recommended communicating the plan and making copies for the healthcare agent, personal physician, and others as appropriate (e.g., health system)  Recommended review of completed ACP document annually or upon change in health status    Orders Placed This Encounter    Influenza virus vaccine ([address]) 72 years and older    HEMOGLOBIN A1C WITH EAG    LIPID PANEL    METABOLIC PANEL, BASIC    MICROALBUMIN, UR, RAND W/ MICROALBUMIN/CREA RATIO    CBC W/O DIFF    CVD REPORT    HM DIABETES FOOT EXAM    atorvastatin (LIPITOR) 20 mg tablet    lisinopril (PRINIVIL, ZESTRIL) 40 mg tablet    amiodarone (CORDARONE) 200 mg tablet    amLODIPine (NORVASC) 10 mg tablet    metFORMIN ER (GLUCOPHAGE XR) 500 mg tablet    clopidogrel (PLAVIX) 75 mg tab       Health Maintenance   Topic Date Due    GLAUCOMA SCREENING Q2Y  01/19/2017    MEDICARE YEARLY EXAM  09/29/2017    EYE EXAM RETINAL OR DILATED Q1  11/01/2017 (Originally 5/12/2016)    HEMOGLOBIN A1C Q6M  03/26/2018    MICROALBUMIN Q1  09/26/2018    LIPID PANEL Q1  09/26/2018    FOOT EXAM Q1  10/01/2018    COLONOSCOPY  04/12/2026    DTaP/Tdap/Td series (2 - Td) 09/28/2026    ZOSTER VACCINE AGE 60>  Completed    Pneumococcal 65+ Low/Medium Risk  Addressed    INFLUENZA AGE 9 TO ADULT  Completed       *Patient verbalized understanding and agreement with the plan. A copy of the After Visit Summary with personalized health plan was given to the patient today.

## 2017-10-25 ENCOUNTER — OFFICE VISIT (OUTPATIENT)
Dept: CARDIOLOGY CLINIC | Age: 77
End: 2017-10-25

## 2017-10-25 VITALS
RESPIRATION RATE: 20 BRPM | SYSTOLIC BLOOD PRESSURE: 130 MMHG | DIASTOLIC BLOOD PRESSURE: 80 MMHG | WEIGHT: 252 LBS | HEART RATE: 84 BPM | HEIGHT: 75 IN | BODY MASS INDEX: 31.33 KG/M2 | OXYGEN SATURATION: 98 %

## 2017-10-25 DIAGNOSIS — I48.91 ATRIAL FIBRILLATION, UNSPECIFIED TYPE (HCC): ICD-10-CM

## 2017-10-25 DIAGNOSIS — E11.9 TYPE 2 DIABETES MELLITUS WITHOUT COMPLICATION, UNSPECIFIED LONG TERM INSULIN USE STATUS: ICD-10-CM

## 2017-10-25 DIAGNOSIS — E78.5 DYSLIPIDEMIA: ICD-10-CM

## 2017-10-25 DIAGNOSIS — I10 ESSENTIAL HYPERTENSION, BENIGN: ICD-10-CM

## 2017-10-25 DIAGNOSIS — I25.10 CORONARY ARTERY DISEASE INVOLVING NATIVE CORONARY ARTERY OF NATIVE HEART WITHOUT ANGINA PECTORIS: Primary | ICD-10-CM

## 2017-10-25 NOTE — PROGRESS NOTES
Subjective:     Problem List  Date Reviewed: 10/25/2017          Codes Class Noted    CAD (coronary artery disease) ICD-10-CM: I25.10  ICD-9-CM: 414.00  2/25/2010    Overview Addendum 4/28/2017 11:27 AM by Peng Rodriguez MD     a. Cath (8/27/8): LAD p90, m70. OM1 70, OM2 90 (small). RCA m40. EF 55%. No AVG/MR. Patent L SC.  b. PCI (11/13/8): mLAD 2.75x18 Xience, pLAD 2.75x23 Xience. OM1 3.0x15 Xience. c. Marolyn Cluster Cardiolite (8/11/9): Mild fixed inf defect. No reversible defects. EF 51% with mild inf HK. D.  Cath (8/29/13):  LAD patent prox and mid stents. LCx m40; patent OM1 stent. RCA p80. EF 55%. No AVG/MR. E.  PCI ost/proxRCA (8/29/13):  3.0x18 Xience. F.  Echo (8/22/13):  EF 55%. Mod dil LA. Mild dil RA. Mild MR/TR/DE. PASP 24.  G.  Lexiscan Cardiolite (4/4/17): Normal perfusion, EF 51%. H.  Cath (4/28/17): LAD patent with patent prox and mid stent. LCx patent with patent OM1 stent. RCA p90 (ISR) and p90 distal to stent. EF 65%. No AVG/MR. I.  PCI osteal/prox RCA (4/28/17): 3.0x28 Xience. ACP (advance care planning) ICD-10-CM: Z71.89  ICD-9-CM: V65.49  9/30/2016    Overview Addendum 10/1/2017  4:21 AM by Aaliyah Pacheco MD     I reviewed advanced directive information and written information given to patient; patient to make follow up appointment with a NN to review choices for health care, agent, and anatomical gifts. Advanced directive already completed and in the chart. No changes desired.                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 ([facility]- @ 25 yo-weak on right arm>leg (Chronic) ICD-10-CM: I63.40  ICD-9-CM: 434.11  4/12/2016        TOPHER (obstructive sleep apnea) ICD-10-CM: Y78.12  ICD-9-CM: 327.23  1/23/2015    Overview Signed 1/23/2015  2:59 PM by Emily Edouard NP     Refuses CPAP             Iron deficiency anemia- GI blood loss- no source identified-2009 (Chronic) ICD-10-CM: D50.9  ICD-9-CM: 280.9  8/21/2014        Atrial fibrillation ([facility] [address] ICD-10-CM: I48.91  ICD-9-CM: 427.31  8/16/2013    Overview Addendum 12/18/2014 12:50 PM by Yolis Vega MD     A.  DCCV (10/7/13). Patrice Olszewski (12/18/14): On amio. Rosacea ICD-10-CM: L71.9  ICD-9-CM: 695.3  11/13/2012        Dyslipidemia ICD-10-CM: E78.5  ICD-9-CM: 272.4  12/20/2010    Overview Addendum 6/22/2016  9:41 AM by Yolis Vega MD     A. FLP (12/14/10): Tot 168, , HDL 49, LDL 97 (Zocor 20mg qd). B.  FLP (1/6/14): Tot 152, TG 81, HDL 57, LDL 79 (Zocor 40). C.  FLP (5/11/15): Tot 144, TG 66, HDL 57, LDL 74 (Lipitor 20). D.  FLP (12/1/15): Tot 143, TG 63, HDL 55, LDL 75 (Lipitor 20). E.  FLP (4/12/16): Tot 146, TG 84, HDL 51, LDL 58 (Lipitor 20). 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 Tee Christianson is a 69 y.o. man with the above past medical history, who presents for follow up of his coronary artery disease. He is doing well from a cardiac standpoint. He continues to report easy fatigability and some mild lower extremity swelling. This does not appear to have changed too much since the last time I saw him. He denies any chest pain, chest discomfort, shortness of breath, orthopnea, paroxysmal nocturnal dyspnea, palpitations, syncope or near syncope.            History   Smoking Status    Never Smoker   Smokeless Tobacco    Former User    Types: Harjeet American Quit date: 10/30/1993     Comment: patient reports quitting over 20 yeaers ago. Current Outpatient Prescriptions   Medication Sig Dispense Refill    atorvastatin (LIPITOR) 20 mg tablet TAKE ONE TABLET BY MOUTH ONCE DAILY *PLEASE  SCHEDULE  OFFICE  VISIT* 90 Tab 1    lisinopril (PRINIVIL, ZESTRIL) 40 mg tablet Take 1 Tab by mouth daily. 90 Tab 1    amiodarone (CORDARONE) 200 mg tablet TAKE ONE TABLET BY MOUTH ONCE DAILY 90 Tab 1    amLODIPine (NORVASC) 10 mg tablet Take 1 Tab by mouth daily. 90 Tab 1    metFORMIN ER (GLUCOPHAGE XR) 500 mg tablet TAKE FOUR TABLETS BY MOUTH ONCE DAILY WITH BREAKFAST 360 Tab 1    clopidogrel (PLAVIX) 75 mg tab Take 1 Tab by mouth daily. 90 Tab 1    ferrous sulfate 134 mg (27 mg iron) tab Take 3 Tabs by mouth daily.  aspirin delayed-release 81 mg tablet Take 81 mg by mouth daily. Indications: MYOCARDIAL REINFARCTION PREVENTION         Objective:     Visit Vitals    /80 (BP 1 Location: Left arm, BP Patient Position: Sitting)    Pulse 84    Resp 20    Ht 6' 3\" (1.905 m)    Wt 252 lb (114.3 kg)    SpO2 98%    BMI 31.5 kg/m2       HEENT Exam:     Normocephalic, atraumatic. EOMI. Oropharynx negative. Neck supple. No lymphadenopathy. Lung Exam:     The patient is not dyspneic. There is no cough. The lungs are clear to percussion. Breath sounds are heard equally in all lung fields. There are no wheezes, rales, rhonchi, or rubs heard on auscultation. Heart Exam:     The rhythm is regular. The PMI is in the 5th intercostal space of the MCL. Apical impulse is normal. S1 is regular. S2 is physiologic. There is no S3, S4 gallop, murmur, click, or rub. Abdomen Exam:     Bowel sounds are normoactive. Abdomen benign. Extremities Exam:     The extremities are atraumatic appearing.  There is no clubbing, cyanosis, ulcers, varicose veins, rash, erythemia noted in the extremities. The neurovascular status is grossly intact with normal distal sensation and pulses. Mild ankle edema. Vascular Exam:     The radial, brachial, dorsalis pedis, posterior tibial, are equal and strong bilaterally The carotids are equal bilaterally without bruits. EKG: Emily Asencio Assessment/Plan:     Mr. Amos Taylor appears stable from a cardiac standpoint. We are going to stay the course with his current medication regimen, and he is going to follow up in six months time. We discussed diet and exercise including a sodium restricted diet, although he is not fond of following this. Plan:  1. Continue outpatient medication regimen. 2. Follow up in six months time. 3. Diet and exercise. 4. Call my office, call his primary care physician, or return to the hospital should any concerning symptomatology arise. Mr. Amos Taylor indicated that he understood this plan and wished to proceed ahead.             Patient Care Team:  Abilio Pugh MD as PCP - Fady Branham MD as Consulting Provider (Cardiology)  John Garg MD (Pulmonary Disease)  Tracey Rodriguez MD (Gastroenterology)  Khushboo Gutierrez MD (Orthopedic Surgery)  Davey Mas MD (Optometry)

## 2017-10-25 NOTE — PATIENT INSTRUCTIONS
Imsys Activation    Thank you for requesting access to Imsys. Please follow the instructions below to securely access and download your online medical record. Imsys allows you to send messages to your doctor, view your test results, renew your prescriptions, schedule appointments, and more. How Do I Sign Up? 1. In your internet browser, go to [email or web address]  2. Click on the First Time User? Click Here link in the Sign In box. You will be redirect to the New Member Sign Up page. 3. Enter your Imsys Access Code exactly as it appears below. You will not need to use this code after youve completed the sign-up process. If you do not sign up before the expiration date, you must request a new code. Imsys Access Code: [email or web address]  Expires: 2018 12:07 PM (This is the date your Imsys access code will )    4. Enter the last four digits of your Social Security Number (xxxx) and Date of Birth (mm/dd/yyyy) as indicated and click Submit. You will be taken to the next sign-up page. 5. Create a Imsys ID. This will be your Imsys login ID and cannot be changed, so think of one that is secure and easy to remember. 6. Create a Imsys password. You can change your password at any time. 7. Enter your Password Reset Question and Answer. This can be used at a later time if you forget your password. 8. Enter your e-mail address. You will receive e-mail notification when new information is available in [address]. 9. Click Sign Up. You can now view and download portions of your medical record. 10. Click the Download Summary menu link to download a portable copy of your medical information. Additional Information    If you have questions, please visit the Frequently Asked Questions section of the Imsys website at [email or web address] [email or web address] Remember, Imsys is NOT to be used for urgent needs. For medical emergencies, dial 911.            Learning About Coronary Artery Disease (CAD)  What is coronary artery disease? Coronary artery disease (CAD) occurs when plaque builds up in the arteries that bring oxygen-rich blood to your heart. Plaque is a fatty substance made of cholesterol, calcium, and other substances in the blood. This process is called hardening of the arteries, or atherosclerosis. What happens when you have coronary artery disease? · Plaque may narrow the coronary arteries. Narrowed arteries cause poor blood flow. This can lead to angina symptoms such as chest pain or discomfort. If blood flow is completely blocked, you could have a heart attack. · You can slow CAD and reduce the risk of future problems by making changes in your lifestyle. These include quitting smoking and eating heart-healthy foods. · Treatments for CAD, along with changes in your lifestyle, can help you live a longer and healthier life. How can you prevent coronary artery disease? · Do not smoke. It may be the best thing you can do to prevent heart disease. If you need help quitting, talk to your doctor about stop-smoking programs and medicines. These can increase your chances of quitting for good. · Be active. Get at least 30 minutes of exercise on most days of the week. Walking is a good choice. You also may want to do other activities, such as running, swimming, cycling, or playing tennis or team sports. · Eat heart-healthy foods. Eat more fruits and vegetables and less foods that contain saturated and trans fats. Limit alcohol, sodium, and sweets. · Stay at a healthy weight. Lose weight if you need to. · Manage other health problems such as diabetes, high blood pressure, and high cholesterol. · Manage stress. Stress can hurt your heart. To keep stress low, talk about your problems and feelings. Don't keep your feelings hidden. · If you have talked about it with your doctor, take a low-dose aspirin every day.  Aspirin can help certain people lower their risk of a heart attack or stroke. But taking aspirin isn't right for everyone, because it can cause serious bleeding. Do not start taking daily aspirin unless your doctor knows about it. How is coronary artery disease treated? · Your doctor will suggest that you make lifestyle changes. For example, your doctor may ask you to eat healthy foods, quit smoking, lose extra weight, and be more active. · You will have to take medicines. · Your doctor may suggest a procedure to open narrowed or blocked arteries. This is called angioplasty. Or your doctor may suggest using healthy blood vessels to create detours around narrowed or blocked arteries. This is called bypass surgery. Follow-up care is a key part of your treatment and safety. Be sure to make and go to all appointments, and call your doctor if you are having problems. It's also a good idea to know your test results and keep a list of the medicines you take. Where can you learn more? Go to http://ruth-ellyn.info/. Enter ([phone] 7156 8302 in the search box to learn more about \"Learning About Coronary Artery Disease (CAD). \"  Current as of: December 28, 2016  Content Version: 11.3  © 1013-1844 [employer]. Care instructions adapted under license by [employer] (which disclaims liability or warranty for this information). If you have questions about a medical condition or this instruction, always ask your healthcare professional. [address] any warranty or liability for your use of this information.

## 2017-10-25 NOTE — MR AVS SNAPSHOT
Visit Information Date & Time Provider Department Dept. Phone Encounter #  
 10/25/2017 11:40 AM Gabrielle Archibald MD [facility] Yanci Garcia [phone] [id number] Upcoming Health Maintenance Date Due  
 GLAUCOMA SCREENING Q2Y 1/19/2017 EYE EXAM RETINAL OR DILATED Q1 11/1/2017* HEMOGLOBIN A1C Q6M 3/26/2018 MICROALBUMIN Q1 9/26/2018 LIPID PANEL Q1 9/26/2018 MEDICARE YEARLY EXAM 9/27/2018 FOOT EXAM Q1 10/1/2018 COLONOSCOPY 4/12/2026 DTaP/Tdap/Td series (2 - Td) 9/28/2026 *Topic was postponed. The date shown is not the original due date. Allergies as of 10/25/2017  Review Complete On: 10/25/2017 By: Gabrielle Archibald MD  
  
 Severity Noted Reaction Type Reactions Adhesive Tape  02/25/2010    Rash Baycol  02/25/2010    Nausea and Vomiting Clonidine  09/26/2017    Other (comments) Headache and sleepiness Pravachol [Pravastatin]  02/25/2010    Nausea and Vomiting Current Immunizations  Reviewed on 9/26/2017 Name Date Influenza High Dose Vaccine PF 9/26/2017, 9/28/2016, 12/1/2015 Influenza Vaccine 11/1/2013 Influenza Vaccine Split 10/15/2012 ZZZ-RETIRED (DO NOT USE) Pneumococcal Vaccine (Unspecified Type) 11/25/2008 dT Vaccine 12/20/1999 Not reviewed this visit Vitals BP Pulse Resp Height(growth percentile) Weight(growth percentile) SpO2  
 130/80 (BP 1 Location: Left arm, BP Patient Position: Sitting) 84 20 6' 3\" (1.905 m) 252 lb (114.3 kg) 98% BMI Smoking Status 31.5 kg/m2 Never Smoker Vitals History BMI and BSA Data Body Mass Index Body Surface Area 31.5 kg/m 2 2.46 m 2 Preferred Pharmacy Pharmacy Name Phone [address] Your Updated Medication List  
  
   
This list is accurate as of: 10/25/17 12:06 PM.  Always use your most recent med list.  
  
  
  
  
 amiodarone 200 mg tablet Commonly known as:  CORDARONE  
TAKE ONE TABLET BY MOUTH ONCE DAILY  
  
 amLODIPine 10 mg tablet Commonly known as:  Waqar Ellis Take 1 Tab by mouth daily. aspirin delayed-release 81 mg tablet Take 81 mg by mouth daily. Indications: MYOCARDIAL REINFARCTION PREVENTION  
  
 atorvastatin 20 mg tablet Commonly known as:  LIPITOR  
TAKE ONE TABLET BY MOUTH ONCE DAILY *PLEASE  SCHEDULE  OFFICE  VISIT*  
  
 clopidogrel 75 mg Tab Commonly known as:  PLAVIX Take 1 Tab by mouth daily. ferrous sulfate 134 mg (27 mg iron) Tab Take 3 Tabs by mouth daily. lisinopril 40 mg tablet Commonly known as:  René Hu Take 1 Tab by mouth daily. metFORMIN  mg tablet Commonly known as:  GLUCOPHAGE XR  
TAKE FOUR TABLETS BY MOUTH ONCE DAILY WITH BREAKFAST Introducing [facility] SERVICES! Dear Bethanie Farley: Thank you for requesting a [employer] account. Our records indicate that you have previously registered for a [employer] account but its currently inactive. Please call our [employer] support line at [phone]. Additional Information If you have questions, please visit the Frequently Asked Questions section of the [employer] website at [email or web address] Remember, [employer] is NOT to be used for urgent needs. For medical emergencies, dial 911. Now available from your iPhone and Android! Please provide this summary of care documentation to your next provider. Your primary care clinician is listed as [address] [address], [facility]/s . If you have any questions after today's visit, please call [phone].

## 2018-01-23 NOTE — PROGRESS NOTES
GREAT NEWS!! All of your September lab tests are normal or at goal. A copy is enclosed. I would continue everything the same and schedule your next fasting office visit in March. In addition, a physical is recommended every year after the age of 61.

## 2018-03-14 DIAGNOSIS — I10 ESSENTIAL HYPERTENSION, BENIGN: ICD-10-CM

## 2018-03-14 RX ORDER — LISINOPRIL 40 MG/1
40 TABLET ORAL DAILY
Qty: 90 TAB | Refills: 0 | Status: SHIPPED | OUTPATIENT
Start: 2018-03-14 | End: 2018-09-26 | Stop reason: SDUPTHER

## 2018-03-14 NOTE — TELEPHONE ENCOUNTER
Pharmacy requesting refill    Requested Prescriptions     Pending Prescriptions Disp Refills    lisinopril (PRINIVIL, ZESTRIL) 40 mg tablet 90 Tab 1     Sig: Take 1 Tab by mouth daily.      Pharmacy on file verified

## 2018-04-05 DIAGNOSIS — E11.9 TYPE 2 DIABETES MELLITUS WITHOUT COMPLICATION (HCC): ICD-10-CM

## 2018-04-06 RX ORDER — METFORMIN HYDROCHLORIDE 500 MG/1
TABLET, EXTENDED RELEASE ORAL
Qty: 360 TAB | Refills: 1 | Status: SHIPPED | OUTPATIENT
Start: 2018-04-06 | End: 2018-10-16 | Stop reason: SDUPTHER

## 2018-04-17 DIAGNOSIS — I25.10 CORONARY ARTERY DISEASE INVOLVING NATIVE CORONARY ARTERY OF NATIVE HEART WITHOUT ANGINA PECTORIS: ICD-10-CM

## 2018-04-18 ENCOUNTER — OFFICE VISIT (OUTPATIENT)
Dept: CARDIOLOGY CLINIC | Age: 78
End: 2018-04-18

## 2018-04-18 VITALS
BODY MASS INDEX: 33 KG/M2 | WEIGHT: 265.4 LBS | RESPIRATION RATE: 18 BRPM | HEIGHT: 75 IN | DIASTOLIC BLOOD PRESSURE: 80 MMHG | SYSTOLIC BLOOD PRESSURE: 150 MMHG | HEART RATE: 76 BPM | OXYGEN SATURATION: 95 %

## 2018-04-18 DIAGNOSIS — G47.33 OSA (OBSTRUCTIVE SLEEP APNEA): ICD-10-CM

## 2018-04-18 DIAGNOSIS — I48.20 CHRONIC ATRIAL FIBRILLATION (HCC): ICD-10-CM

## 2018-04-18 DIAGNOSIS — D50.9 IRON DEFICIENCY ANEMIA, UNSPECIFIED IRON DEFICIENCY ANEMIA TYPE: Chronic | ICD-10-CM

## 2018-04-18 DIAGNOSIS — R53.82 CHRONIC FATIGUE: ICD-10-CM

## 2018-04-18 DIAGNOSIS — E11.21 TYPE 2 DIABETES WITH NEPHROPATHY (HCC): ICD-10-CM

## 2018-04-18 DIAGNOSIS — I25.10 CORONARY ARTERY DISEASE INVOLVING NATIVE CORONARY ARTERY OF NATIVE HEART WITHOUT ANGINA PECTORIS: Primary | ICD-10-CM

## 2018-04-18 DIAGNOSIS — K57.30 DIVERTICULA OF COLON: ICD-10-CM

## 2018-04-18 DIAGNOSIS — I10 ESSENTIAL HYPERTENSION, BENIGN: ICD-10-CM

## 2018-04-18 DIAGNOSIS — K21.00 REFLUX ESOPHAGITIS: ICD-10-CM

## 2018-04-18 DIAGNOSIS — E78.5 DYSLIPIDEMIA: ICD-10-CM

## 2018-04-18 RX ORDER — CLOPIDOGREL BISULFATE 75 MG/1
75 TABLET ORAL DAILY
Qty: 90 TAB | Refills: 1 | Status: SHIPPED | OUTPATIENT
Start: 2018-04-18 | End: 2018-06-18

## 2018-04-18 RX ORDER — AMLODIPINE BESYLATE 10 MG/1
TABLET ORAL
Qty: 90 TAB | Refills: 0 | Status: SHIPPED | OUTPATIENT
Start: 2018-04-18 | End: 2018-07-16 | Stop reason: SDUPTHER

## 2018-04-18 NOTE — PROGRESS NOTES
[id number] Raj Bennett [facility], [address] [facility]    Office [phone]  Fax [phone]       Steven Sheth is a 69 y.o. male last seen by Dr. Bienvenido Harrington 6 months ago. Here today for routine follow up. Assessment  Encounter Diagnoses   Name Primary?  Essential hypertension, benign     Coronary artery disease involving native coronary artery of native heart without angina pectoris Yes    Type 2 diabetes with nephropathy (HCC)     TOPHER (obstructive sleep apnea)     Dyslipidemia     Diverticula of colon     Reflux esophagitis     Iron deficiency anemia, unspecified iron deficiency anemia type     Chronic atrial fibrillation (HCC)     Chronic fatigue        Recommendations:  Mr. Migel Walker has a history of CAD s/p multiple PCI's, most recently 1 year ago, in the setting of DM, HTN, Dyslipidemia and a sedentary lifestyle. He now reports progressive fatigue, similar to his previous anginal equivalent, which is concerning for CAD progression. Note, that all perfusion imaging prior to subsequent cath/PCI's have returned as normal.  Will move forward with repeat LHC +/- PCI in the near future. Continue ASA, statin and Plavix. Repeat BMP and CBC pre-procedure. Hx of AF s/p DccV x 2. Again, in AF by exam and EKG today. HR 70's, currently on Amiodarone 200 mg daily. Not currently anticoagulated due to hx of GI bleeding. Update TSH and liver function in the near future. Will further investigate his hx of GI blood loss/anemia. 30 day loop monitor - if persistent AF, then would replace amiodarone with beta blocker. He may benefit from [facility] if chronic anticoagulation not an option    Lower extremity edema is likely due to venous insufficiency, which may be exacerbated by AF and untreated TOPHER. Discussed the importance of a low sodium diet. Advised leg elevation and compression stockings.       The patient was encouraged to continue current medications, and call with any new complaints or concerns. Cath, near future  Right radial approach  ASA 2  Airway 2      Subjective:  Mr. Cherly Felty continues to lead a mostly sedentary lifestyle. Routine activities around his home are his highest level of exertion. He reports progression of fatigue. \"I can't even do the little amount I was able to do a few months ago\". Reports that he wakes feeling queasy and this increases with exertion. He reports that anginal equivalent prior to previous PCI\"s (2008, 2013 and most recently 2017) was fatigue. He reports mild to moderate HURD. Denies any exertional chest pain. His appetite is intact. He follows a high salt, high fat diet. Weight has increased by ~ 10-15 pounds in the past 6 months. He reports easy bruising on ASA and Plavix. No bleeding concerns. Reports hx of GI bleeding on anticoagulation in the past.  Negative GI work up but no longer on AC. He has been diagnosed with TOPHER, but refuses to wear CPAP. He experiences lower extremity edema, which develops towards the end of the day. Improves with leg elevation. No orthopnea or PND. DM and Lipids followed by Dr. Marianna Mayfield. Scheduled for follow up early next week. Cardiac risk factors   HTN yes  DM  yes  Smoking no    Cardiac testing  Cath (8/27/8): LAD p90, m70. OM1 70, OM2 90 (small). RCA m40. EF 55%. No AVG/MR. Patent L SC.  PCI (11/13/8): mLAD 2.75x18 Xience, pLAD 2.75x23 Xience. OM1 3.0x15 Xience. Cath 4/28/17:Cath (4/28/17): LAD patent with patent prox and mid stent. LCx patent with patent OM1 stent. RCA p90 (ISR) and p90 distal to stent. EF 65%. No AVG/MR. PCI osteal/prox RCA (4/28/17): 3.0x28 Xience. Lexiscan Cardiolite (8/11/9): Mild fixed inf defect. No reversible defects. EF 51% with mild inf HK. Cath (8/29/13): LAD patent prox and mid stents. LCx m40; patent OM1 stent. RCA p80. EF 55%. No AVG/MR. PCI ost/proxRCA (8/29/13): 3.0x18 Xience. Echo (8/22/13): EF 55%. Mod dil LA. Mild dil RA.  Mild MR/TR/DC. PASP 24. 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 90 Tab 0    metFORMIN ER (GLUCOPHAGE XR) 500 mg tablet TAKE FOUR TABLETS BY MOUTH ONCE DAILY WITH BREAKFAST 360 Tab 1    lisinopril (PRINIVIL, ZESTRIL) 40 mg tablet Take 1 Tab by mouth daily. 90 Tab 0    amiodarone (CORDARONE) 200 mg tablet TAKE ONE TABLET BY MOUTH ONCE DAILY 90 Tab 1    clopidogrel (PLAVIX) 75 mg tab Take 1 Tab by mouth daily. 90 Tab 1    ferrous sulfate 134 mg (27 mg iron) tab Take 2 Tabs by mouth daily.  aspirin delayed-release 81 mg tablet Take 81 mg by mouth daily. Indications: MYOCARDIAL REINFARCTION PREVENTION      amLODIPine (NORVASC) 10 mg tablet TAKE ONE TABLET BY MOUTH ONCE DAILY 90 Tab 0       Allergies   Allergen Reactions    Adhesive Tape Rash    Baycol Nausea and Vomiting    Clonidine Other (comments)     Headache and sleepiness      Pravachol [Pravastatin] Nausea and Vomiting      Review of Systems  Constitutional: Negative for fever, chills, and diaphoresis. Positive for fatigue.    Respiratory: Negative for cough, hemoptysis, sputum production, and wheezing. Positive for shortness of breath/ HURD. Cardiovascular: Negative for chest pain, palpitations, orthopnea, claudication, and PND. Positive for leg swelling; end of the day. Gastrointestinal: Negative for heartburn, nausea, vomiting, blood in stool and melena. Genitourinary: Negative for dysuria and flank pain. Musculoskeletal: Negative for joint pain and back pain. Skin: Negative for rash. Neurological: Negative for focal weakness, seizures, loss of consciousness, weakness and headaches. Endo/Heme/Allergies: Does not bruise/bleed easily. Psychiatric/Behavioral: Negative for memory loss. The patient does not have insomnia. Physical Exam  Visit Vitals    /80 (BP 1 Location: Left arm)    Pulse 76    Resp 18    Ht 6' 3\" (1.905 m)    Wt 265 lb 6.4 oz (120.4 kg)    SpO2 95%    BMI 33.17 kg/m2     Wt Readings from Last 3 Encounters:   04/18/18 265 lb 6.4 oz (120.4 kg)   10/25/17 252 lb (114.3 kg)   09/26/17 249 lb 12.8 oz (113.3 kg)      General - well developed well nourished  Neck - JVP normal, thyroid nl  Cardiac - Irregular, no murmurs, rubs or gallops. No clicks  Vascular - carotids without bruits, radials, femorals and pedal pulses equal bilateral  Lungs - clear to auscultation bilaterals, no rales ,wheezing or rhonchi  Abd - rounded/obese, soft nontender  Extremities - trace ankle edema.    Skin - no rash  Neuro - nonfocal. Right sided weakness  Psych - normal mood and affect    Cardiographics:  EKG 4/17/18 - Afib    BRIAN Watkins MD

## 2018-04-18 NOTE — MR AVS SNAPSHOT
[address] [facility] [address] [address]7 [facility] 
[phone] Patient: Dusty Alonzo MRN: [id number] SUX:4/8/1927 Visit Information Date & Time Provider Department Dept. Phone Encounter #  
 4/18/2018 10:40 AM Debbi Almendarez MD [facility] Leanna Sood [phone] [id number] Your Appointments 4/23/2018  8:15 AM  
ROUTINE CARE with Jayesh Jarvis MD  
[facility]) Appt Note: BP CHECK  
 [address] [address]  
[phone]  
  
   
 [address] [address] Upcoming Health Maintenance Date Due  
 EYE EXAM RETINAL OR DILATED Q1 5/12/2016 GLAUCOMA SCREENING Q2Y 1/19/2017 HEMOGLOBIN A1C Q6M 3/26/2018 MICROALBUMIN Q1 9/26/2018 LIPID PANEL Q1 9/26/2018 MEDICARE YEARLY EXAM 9/27/2018 FOOT EXAM Q1 10/1/2018 COLONOSCOPY 4/12/2026 DTaP/Tdap/Td series (2 - Td) 9/28/2026 Allergies as of 4/18/2018  Review Complete On: 4/18/2018 By: Chrystal Traore NP Severity Noted Reaction Type Reactions Adhesive Tape  02/25/2010    Rash Baycol  02/25/2010    Nausea and Vomiting Clonidine  09/26/2017    Other (comments) Headache and sleepiness Pravachol [Pravastatin]  02/25/2010    Nausea and Vomiting Current Immunizations  Reviewed on 9/26/2017 Name Date Influenza High Dose Vaccine PF 9/26/2017, 9/28/2016, 12/1/2015 Influenza Vaccine 11/1/2013 Influenza Vaccine Split 10/15/2012 ZZZ-RETIRED (DO NOT USE) Pneumococcal Vaccine (Unspecified Type) 11/25/2008 dT Vaccine 12/20/1999 Not reviewed this visit You Were Diagnosed With   
  
 Codes Comments Atrial fibrillation, unspecified type ([facility]    -  Primary ICD-10-CM: I48.91 
ICD-9-CM: 427.31 Essential hypertension, benign     ICD-10-CM: I10 
ICD-9-CM: 401.1 Coronary artery disease involving native coronary artery of native heart without angina pectoris     ICD-10-CM: I25.10 ICD-9-CM: 414.01 Type 2 diabetes with nephropathy (HCC)     ICD-10-CM: E11.21 
ICD-9-CM: 250.40, 583.81   
 TOPHER (obstructive sleep apnea)     ICD-10-CM: G47.33 
ICD-9-CM: 327.23 Dyslipidemia     ICD-10-CM: E78.5 ICD-9-CM: 272.4 Vitals BP Pulse Resp Height(growth percentile) Weight(growth percentile) SpO2  
 150/80 (BP 1 Location: Left arm) 76 18 6' 3\" (1.905 m) 265 lb 6.4 oz (120.4 kg) 95% BMI Smoking Status 33.17 kg/m2 Never Smoker Vitals History BMI and BSA Data Body Mass Index Body Surface Area  
 33.17 kg/m 2 2.52 m 2 Preferred Pharmacy Pharmacy Name Phone [address] [place] [address], [address] [place] [address] [address] [place] [address] [phone] Your Updated Medication List  
  
   
This list is accurate as of 4/18/18 12:07 PM.  Always use your most recent med list.  
  
  
  
  
 amiodarone 200 mg tablet Commonly known as:  CORDARONE  
TAKE ONE TABLET BY MOUTH ONCE DAILY  
  
 amLODIPine 10 mg tablet Commonly known as:  [place] TAKE ONE TABLET BY MOUTH ONCE DAILY  
  
 aspirin delayed-release 81 mg tablet Take 81 mg by mouth daily. Indications: MYOCARDIAL REINFARCTION PREVENTION  
  
 atorvastatin 20 mg tablet Commonly known as:  LIPITOR Take 1 Tab by mouth daily. clopidogrel 75 mg Tab Commonly known as:  PLAVIX Take 1 Tab by mouth daily. ferrous sulfate 134 mg (27 mg iron) Tab Take 2 Tabs by mouth daily. lisinopril 40 mg tablet Commonly known as:  Dorathy Massed Take 1 Tab by mouth daily. metFORMIN  mg tablet Commonly known as:  GLUCOPHAGE XR  
TAKE FOUR TABLETS BY MOUTH ONCE DAILY WITH BREAKFAST Prescriptions Sent to Pharmacy Refills  
 clopidogrel (PLAVIX) 75 mg tab 1 Sig: Take 1 Tab by mouth daily.   
 Class: Normal  
 Pharmacy: [facility] [address], [address] #: [phone] Route: Oral  
  
We Performed the Following 2D ECHO COMPLETE ADULT (TTE) W OR WO CONTR [93668 CPT(R)] AMB POC EKG ROUTINE W/ 12 LEADS, INTER & REP [26198 CPT(R)] CBC W/O DIFF [26765 CPT(R)] HEPATIC FUNCTION PANEL [51821 CPT(R)] METABOLIC PANEL, COMPREHENSIVE [89909 CPT(R)] TSH 3RD GENERATION [15853 CPT(R)] To-Do List   
 Around 04/18/2018 Cardiac Services:  LOOP MONITOR Patient Instructions We will move forward with a repeat heart catheterization to assess your progressive fatigue. We are concerned that your symptoms may indicate a new blockage or narrowing of an old stent. Continue your ASA and Plavix. I am going to send in a new Rx for your 90 day supply today. Regarding your irregular heart rhythm afib, we will have you wear a 30 day monitor. We will also do an Echocardiogram in the near future. At this time, we will continue your Amiodarone. There are other options for treatement of sleep apnea there than a CPAP mask. We will submit Introducing hospitals & HEALTH SERVICES! [facility] introduces [email or web address] patient portal. Now you can access parts of your medical record, email your doctor's office, and request medication refills online. 1. In your internet browser, go to [email or web address] 2. Click on the First Time User? Click Here link in the Sign In box. You will see the New Member Sign Up page. 3. Enter your [email or web address] Access Code exactly as it appears below. You will not need to use this code after youve completed the sign-up process. If you do not sign up before the expiration date, you must request a new code. · [email or web address] Access Code: [email or web address] Expires: 7/17/2018 12:02 PM 
 
4.  Enter the last four digits of your Social Security Number (xxxx) and Date of Birth (mm/dd/yyyy) as indicated and click Submit. You will be taken to the next sign-up page. 5. Create a [employer] ID. This will be your [employer] login ID and cannot be changed, so think of one that is secure and easy to remember. 6. Create a [employer] password. You can change your password at any time. 7. Enter your Password Reset Question and Answer. This can be used at a later time if you forget your password. 8. Enter your e-mail address. You will receive e-mail notification when new information is available in [address] 9. Click Sign Up. You can now view and download portions of your medical record. 10. Click the Download Summary menu link to download a portable copy of your medical information. If you have questions, please visit the Frequently Asked Questions section of the [employer] website. Remember, [employer] is NOT to be used for urgent needs. For medical emergencies, dial 911. Now available from your iPhone and Android! Please provide this summary of care documentation to your next provider. Your primary care clinician is listed as [address] [place] [address], [facility]/s . If you have any questions after today's visit, please call [phone].

## 2018-04-20 ENCOUNTER — TELEPHONE (OUTPATIENT)
Dept: CARDIOLOGY CLINIC | Age: 78
End: 2018-04-20

## 2018-04-20 NOTE — TELEPHONE ENCOUNTER
Pt called wanting to talk to you about his upcoming procedure. Pt wants to know if he can eat and he did say that the appointments were okay. Please give him a call back @ [phone]. Thanks!

## 2018-04-23 ENCOUNTER — HOSPITAL ENCOUNTER (OUTPATIENT)
Dept: LAB | Age: 78
Discharge: HOME | End: 2018-04-23
Payer: MEDICARE

## 2018-04-23 ENCOUNTER — OFFICE VISIT (OUTPATIENT)
Dept: FAMILY MEDICINE CLINIC | Age: 78
End: 2018-04-23

## 2018-04-23 VITALS
OXYGEN SATURATION: 96 % | WEIGHT: 265.6 LBS | DIASTOLIC BLOOD PRESSURE: 62 MMHG | BODY MASS INDEX: 33.02 KG/M2 | SYSTOLIC BLOOD PRESSURE: 137 MMHG | RESPIRATION RATE: 16 BRPM | HEART RATE: 74 BPM | HEIGHT: 75 IN | TEMPERATURE: 97.5 F

## 2018-04-23 DIAGNOSIS — I63.40 CEREBRAL INFARCTION DUE TO EMBOLISM OF CEREBRAL ARTERY (HCC): Chronic | ICD-10-CM

## 2018-04-23 DIAGNOSIS — I25.751 CORONARY ARTERY DISEASE INVOLVING NATIVE ARTERY OF TRANSPLANTED HEART WITH ANGINA PECTORIS WITH DOCUMENTED SPASM (HCC): Primary | ICD-10-CM

## 2018-04-23 DIAGNOSIS — G47.33 OSA (OBSTRUCTIVE SLEEP APNEA): ICD-10-CM

## 2018-04-23 DIAGNOSIS — R06.09 DOE (DYSPNEA ON EXERTION): ICD-10-CM

## 2018-04-23 DIAGNOSIS — E11.21 TYPE 2 DIABETES WITH NEPHROPATHY (HCC): Primary | ICD-10-CM

## 2018-04-23 DIAGNOSIS — E78.5 DYSLIPIDEMIA: ICD-10-CM

## 2018-04-23 DIAGNOSIS — I10 ESSENTIAL HYPERTENSION, BENIGN: ICD-10-CM

## 2018-04-23 DIAGNOSIS — I48.19 PERSISTENT ATRIAL FIBRILLATION (HCC): ICD-10-CM

## 2018-04-23 DIAGNOSIS — I25.10 CORONARY ARTERY DISEASE INVOLVING NATIVE CORONARY ARTERY OF NATIVE HEART WITHOUT ANGINA PECTORIS: ICD-10-CM

## 2018-04-23 PROBLEM — R53.82 CHRONIC FATIGUE: Status: ACTIVE | Noted: 2018-04-23

## 2018-04-23 PROCEDURE — 85027 COMPLETE CBC AUTOMATED: CPT

## 2018-04-23 PROCEDURE — 83036 HEMOGLOBIN GLYCOSYLATED A1C: CPT

## 2018-04-23 PROCEDURE — 80053 COMPREHEN METABOLIC PANEL: CPT

## 2018-04-23 PROCEDURE — 80076 HEPATIC FUNCTION PANEL: CPT

## 2018-04-23 PROCEDURE — 80061 LIPID PANEL: CPT

## 2018-04-23 PROCEDURE — 84443 ASSAY THYROID STIM HORMONE: CPT

## 2018-04-23 RX ORDER — SODIUM CHLORIDE 0.9 % (FLUSH) 0.9 %
5-10 SYRINGE (ML) INJECTION AS NEEDED
Status: CANCELLED | OUTPATIENT
Start: 2018-04-24

## 2018-04-23 RX ORDER — SODIUM CHLORIDE 0.9 % (FLUSH) 0.9 %
5-10 SYRINGE (ML) INJECTION EVERY 8 HOURS
Status: CANCELLED | OUTPATIENT
Start: 2018-04-24

## 2018-04-23 NOTE — TELEPHONE ENCOUNTER
Patient would like a return call regarding his procedure tomorrow, he can be reached at [phone].  Thank you

## 2018-04-23 NOTE — PROGRESS NOTES
HISTORY OF PRESENT ILLNESS  HPI  Toula Eileen is a 69 y.o. Male with a history of HTN, CAD, A-fib, DM-II, basal cell carcinoma, TOPHER, anemia and hyperlipidemia, who presents at the office today for a follow up for an operation. Pt states that he is going to have a catheterization, performed by his cardiologist, due to symptoms of SOB since several months ago. Pt notes that he had difficulty walking into the office, and gets SOB walking from the bathroom to bed. 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 2/25/2010    Other and unspecified hyperlipidemia 2/25/2010    Positive PPD 2/25/2010    Reflux esophagitis 2/25/2010    Type II or unspecified type diabetes mellitus without mention of complication, not stated as uncontrolled 2/25/2010     Past Surgical History:   Procedure Laterality Date    Angeliquemolina Guevara  12/13/2013         HC CAPSULE ENDOSCOPE M2A  4/4/2014         HX COLONOSCOPY      HX CORONARY STENT PLACEMENT      HX ORTHOPAEDIC      HX VASECTOMY  1983    UPPER GI ENDOSCOPY,BIOPSY  12/13/2013          Current Outpatient Prescriptions on File Prior to Visit   Medication Sig Dispense Refill    amLODIPine (NORVASC) 10 mg tablet TAKE ONE TABLET BY MOUTH ONCE DAILY 90 Tab 0    clopidogrel (PLAVIX) 75 mg tab Take 1 Tab by mouth daily. 90 Tab 1    atorvastatin (LIPITOR) 20 mg tablet Take 1 Tab by mouth daily.  90 Tab 0    metFORMIN ER (GLUCOPHAGE XR) 500 mg tablet TAKE FOUR TABLETS BY MOUTH ONCE DAILY WITH BREAKFAST 360 Tab 1    lisinopril (PRINIVIL, ZESTRIL) 40 mg tablet Take 1 Tab by mouth daily. 90 Tab 0    amiodarone (CORDARONE) 200 mg tablet TAKE ONE TABLET BY MOUTH ONCE DAILY 90 Tab 1    ferrous sulfate 134 mg (27 mg iron) tab Take 2 Tabs by mouth daily.  aspirin delayed-release 81 mg tablet Take 81 mg by mouth daily. Indications: MYOCARDIAL REINFARCTION PREVENTION       No current facility-administered medications on file prior to visit. Allergies   Allergen Reactions    Adhesive Tape Rash    Baycol Nausea and Vomiting    Clonidine Other (comments)     Headache and sleepiness      Pravachol [Pravastatin] Nausea and Vomiting     Family History   Problem Relation Age of Onset    Heart Disease Mother     Cancer Father      colon    Heart Disease Maternal Grandmother     Heart Disease Maternal Grandfather     Cancer Paternal Grandmother     Cancer Paternal Grandfather      Social History     Social History    Marital status:      Spouse name: N/A    Number of children: N/A    Years of education: N/A     Social History Main Topics    Smoking status: Never Smoker    Smokeless tobacco: Former User     Types: Chew     Quit date: 10/30/1993      Comment: patient reports quitting over 20 yeaers ago.  Alcohol use Yes      Comment: once a year    Drug use: No    Sexual activity: Not Asked     Other Topics Concern     Service Yes    Blood Transfusions Yes    Caffeine Concern Yes     2-3 cups of coffee daily    Occupational Exposure No    Hobby Hazards No    Sleep Concern No    Stress Concern No    Weight Concern No    Special Diet No    Back Care No    Exercise Yes    Bike Helmet No     pt doesn't ride a bike   [place] Yes    Self-Exams No     Social History Narrative             Review of Systems   Constitutional: Negative for chills, diaphoresis, fever, malaise/fatigue and weight loss. Eyes: Negative for blurred vision, double vision and pain.    Respiratory: Positive for shortness of breath. Negative for cough and wheezing. Cardiovascular: Negative for chest pain, palpitations, orthopnea, claudication, leg swelling and PND. Genitourinary: Negative for frequency. Skin: Negative for itching and rash. Neurological: Negative for dizziness, tingling, tremors, sensory change, speech change, focal weakness, seizures, loss of consciousness, weakness and headaches. Endo/Heme/Allergies: Negative for environmental allergies and polydipsia. Does not bruise/bleed easily.      Results for orders placed or performed in visit on 04/23/18   LIPID PANEL   Result Value Ref Range    Cholesterol, total 155 100 - 199 mg/dL    Triglyceride 90 0 - 149 mg/dL    HDL Cholesterol 51 >39 mg/dL    VLDL, calculated 18 5 - 40 mg/dL    LDL, calculated 86 0 - 99 mg/dL   HEMOGLOBIN A1C WITH EAG   Result Value Ref Range    Hemoglobin A1c 7.3 (H) 4.8 - 5.6 %    Estimated average glucose 163 mg/dL   CBC W/O DIFF   Result Value Ref Range    WBC 7.6 3.4 - 10.8 x10E3/uL    RBC 4.42 4.14 - 5.80 x10E6/uL    HGB 13.7 13.0 - 17.7 g/dL    HCT 41.4 37.5 - 51.0 %    MCV 94 79 - 97 fL    MCH 31.0 26.6 - 33.0 pg    MCHC 33.1 31.5 - 35.7 g/dL    RDW 15.3 12.3 - 15.4 %    PLATELET 980 245 - 086 x10E3/uL   TSH 3RD GENERATION   Result Value Ref Range    TSH 1.330 0.450 - 4.500 uIU/mL   HEPATIC FUNCTION PANEL   Result Value Ref Range    Bilirubin, direct 0.14 0.00 - 5.65 mg/dL   METABOLIC PANEL, COMPREHENSIVE   Result Value Ref Range    Glucose 137 (H) 65 - 99 mg/dL    BUN 20 8 - 27 mg/dL    Creatinine 1.21 0.76 - 1.27 mg/dL    GFR est non-AA 57 (L) >59 mL/min/1.73    GFR est AA 66 >59 mL/min/1.73    BUN/Creatinine ratio 17 10 - 24    Sodium 143 134 - 144 mmol/L    Potassium 4.6 3.5 - 5.2 mmol/L    Chloride 102 96 - 106 mmol/L    CO2 21 18 - 29 mmol/L    Calcium 9.6 8.6 - 10.2 mg/dL    Protein, total 6.9 6.0 - 8.5 g/dL    Albumin 4.3 3.5 - 4.8 g/dL    GLOBULIN, TOTAL 2.6 1.5 - 4.5 g/dL    A-G Ratio 1.7 1.2 - 2.2    Bilirubin, total 0.4 0.0 - 1.2 mg/dL    Alk. phosphatase 125 (H) 39 - 117 IU/L    AST (SGOT) 12 0 - 40 IU/L    ALT (SGPT) 13 0 - 44 IU/L   CVD REPORT   Result Value Ref Range    INTERPRETATION Note     PDF IMAGE Not applicable    CKD REPORT   Result Value Ref Range    Interpretation Note            Physical Exam  Visit Vitals    /62 (BP 1 Location: Left arm, BP Patient Position: Sitting)    Pulse 74    Temp 97.5 °F (36.4 °C) (Oral)    Resp 16    Ht 6' 3\" (1.905 m)    Wt 265 lb 9.6 oz (120.5 kg)    SpO2 96%    BMI 33.2 kg/m2     Head: Normocephalic, without obvious abnormality, atraumatic  Eyes: conjunctivae/corneas clear. PERRL, EOM's intact. Neck: supple, symmetrical, trachea midline, no adenopathy, thyroid: not enlarged, symmetric, no tenderness/mass/nodules, no carotid bruit and no JVD  Lungs: clear to auscultation bilaterally  Heart: Irregularly irregular rate and rhythm, S1, S2 normal, no murmur, click, rub or gallop  Extremities: extremities normal, atraumatic, no cyanosis. 1+ edema bilaterally. Pulses: 2+ and symmetric  Lymph nodes: Cervical, supraclavicular, and axillary nodes normal.  Neurologic: Grossly normal      ASSESSMENT and PLAN    ICD-10-CM ICD-9-CM    1. Type 2 diabetes with nephropathy (HCC) E11.21 250.40 HEMOGLOBIN A1C WITH EAG     583.81 CBC W/O DIFF      HEPATIC FUNCTION PANEL      METABOLIC PANEL, COMPREHENSIVE   2. Dyslipidemia E78.5 272.4 LIPID PANEL   3. Persistent atrial fibrillation (HCC) I48.1 427.31 TSH 3RD GENERATION   4. Essential hypertension, benign I10 401.1    5. Coronary artery disease involving native coronary artery of native heart without angina pectoris I25.10 414.01    6. TOPHER (obstructive sleep apnea) G47.33 327.23    7. 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  HURD (dyspnea on exertion) R06.09 786.09     recent worsening- has cardiology following this     Diagnoses and all orders for this visit: 1. Type 2 diabetes with nephropathy (HCC)  -     HEMOGLOBIN A1C WITH EAG  -     CBC W/O DIFF  -     HEPATIC FUNCTION PANEL  -     METABOLIC PANEL, COMPREHENSIVE    2. Dyslipidemia  -     LIPID PANEL    3. Persistent atrial fibrillation (HCC)  -     TSH 3RD GENERATION    4. Essential hypertension, benign    5. Coronary artery disease involving native coronary artery of native heart without angina pectoris    6. TOPHER (obstructive sleep apnea)    7. 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 HURD (dyspnea on exertion)  Comments:  recent worsening- has cardiology following this    Other orders  -     CVD REPORT  -     CKD REPORT      Follow-up Disposition:  Return in about 3 months (around 7/23/2018), or BP or glucose OOC, for F/U HTN,CHOL,DM.       lab results and schedule of future lab studies reviewed with patient  reviewed diet, exercise and weight control  cardiovascular risk and specific lipid/LDL goals reviewed  reviewed medications and side effects in detail  Please call my office if there are any questions- [phone]. I will arrange for follow up after the lab tests done from today return  Recommended a weekly \"heart check. \" I went into detail how to do this. Call for refills on medications as needed. Discussed expected course/resolution/complications of diagnosis in detail with patient. Medication risks/benefits/costs/interactions/alternatives discussed with patient. Pt was given an after visit summary which includes diagnoses, current medications & vitals. Pt expressed understanding with the diagnosis and plan. Total 25 minutes,60 % counseling re: Reviewed BP, cholesterol and diabetic goals. LDL goal<100,HDL goal>45, Triglyceride goal<150, A1C< 7.0, BP<140/90.      Reviewed in detail the proper technique of checking the blood pressure- check it on an average day only, not on a stressful day, sitting, no exercise for at least 1 hour and not experiencing any new pain( chronic pain is OK). Patient encouraged to check BP sitting and standing at least once a month and to report these readings to me if > 140/ 90 on average , or if the standing BP is >  15 points lower than the sitting. Reviewed symptoms, or lack thereof, of hypertension and elevated cholesterol. BMI is significantly elevated- in the obese range. I reviewed diet, exercise and weight control. Discussed weight control in detail, the importance of mainly decreased carbs, and for weight maintenance, exercise; discussed different diets and that it isn't as important to watch the type of foods as it is to decrease calorie intake no matter what type of diet you do, etc.      Pt is having SOB, likely from a cardiac etiology. Cardiac catheterization will determine the exact cause. It is likely not due to A-fib alone as there has been no change in VR. Also, discussed symptoms of concern that were noted today in the note above, treatment options( including doing nothing), when to follow up before recommended time frame. Also, answered all questions. This document was written by Saw Hummel, as dictated by Ale Gallardo MD.  I have reviewed and agree with the above note and have made corrections where appropriate Shimon Vizcarra M.D.

## 2018-04-23 NOTE — TELEPHONE ENCOUNTER
lft msg for pt to be NPO after MN- arrival at 6 am 2nd floor of [place]. May take pills with water except Metformin.

## 2018-04-23 NOTE — MR AVS SNAPSHOT
[address] [facility] 
 
 
 [address] [place] [address] 
[phone] Patient: Prince Bustillos MRN: [id number] WHP:[id number]32 Visit Information Date & Time Provider Department Dept. Phone Encounter #  
 4/23/2018  8:15 AM Roberto Carlos Denson  [facility] [phone] [id number] Your Appointments 4/24/2018  8:00 AM  
ECHO CARDIOGRAMS 2D with ECHOJUAN PABLO  
[facility] (GAIL SCHEDULING) Appt Note: echo per dr Angely Morton [address] [facility] [address] [place] [address]  
[phone]  
  
   
 [address] [facility] [address] [place] [address]  
  
    
 10/24/2018  8:15 AM  
ROUTINE CARE with Roberto Carlos Denson MD  
[address] [facility] [address] [place] [address]) Appt Note: 6 month follow up  
 [address] [address]  
[phone]  
  
   
 [address] [address] Upcoming Health Maintenance Date Due  
 EYE EXAM RETINAL OR DILATED Q1 5/12/2016 GLAUCOMA SCREENING Q2Y 1/19/2017 HEMOGLOBIN A1C Q6M 3/26/2018 MICROALBUMIN Q1 9/26/2018 LIPID PANEL Q1 9/26/2018 MEDICARE YEARLY EXAM 9/27/2018 FOOT EXAM Q1 10/1/2018 COLONOSCOPY 4/12/2026 DTaP/Tdap/Td series (2 - Td) 9/28/2026 Allergies as of 4/23/2018  Review Complete On: 4/23/2018 By: Roberto Carlos Denson MD  
  
 Severity Noted Reaction Type Reactions Adhesive Tape  02/25/2010    Rash Baycol  02/25/2010    Nausea and Vomiting Clonidine  09/26/2017    Other (comments) Headache and sleepiness Pravachol [Pravastatin]  02/25/2010    Nausea and Vomiting Current Immunizations  Reviewed on 9/26/2017 Name Date Influenza High Dose Vaccine PF 9/26/2017, 9/28/2016, 12/1/2015 Influenza Vaccine 11/1/2013 Influenza Vaccine Split 10/15/2012 ZZZ-RETIRED (DO NOT USE) Pneumococcal Vaccine (Unspecified Type) 11/25/2008 dT Vaccine 12/20/1999 Not reviewed this visit You Were Diagnosed With   
  
 Codes Comments Type 2 diabetes with nephropathy (HCC)    -  Primary ICD-10-CM: E11.21 
ICD-9-CM: 250.40, 583.81 Dyslipidemia     ICD-10-CM: E78.5 ICD-9-CM: 272.4 Persistent atrial fibrillation (HCC)     ICD-10-CM: I48.1 ICD-9-CM: 427.31 Essential hypertension, benign     ICD-10-CM: I10 
ICD-9-CM: 401.1 Coronary artery disease involving native coronary artery of native heart without angina pectoris     ICD-10-CM: I25.10 ICD-9-CM: 414.01   
 TOPHER (obstructive sleep apnea)     ICD-10-CM: G47.33 
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  
 137/62 (BP 1 Location: Left arm, BP Patient Position: Sitting) 74 97.5 °F (36.4 °C) (Oral) 16 6' 3\" (1.905 m) 265 lb 9.6 oz (120.5 kg) SpO2 BMI Smoking Status 96% 33.2 kg/m2 Never Smoker Vitals History BMI and BSA Data Body Mass Index Body Surface Area  
 33.2 kg/m 2 2.53 m 2 Preferred Pharmacy Pharmacy Name Phone [facility], [address] [place] [address] [address] [place] [address] [phone] Your Updated Medication List  
  
   
This list is accurate as of 4/23/18  9:27 AM.  Always use your most recent med list.  
  
  
  
  
 amiodarone 200 mg tablet Commonly known as:  CORDARONE  
TAKE ONE TABLET BY MOUTH ONCE DAILY  
  
 amLODIPine 10 mg tablet Commonly known as:  Anat Caitlin TAKE ONE TABLET BY MOUTH ONCE DAILY  
  
 aspirin delayed-release 81 mg tablet Take 81 mg by mouth daily. Indications: MYOCARDIAL REINFARCTION PREVENTION  
  
 atorvastatin 20 mg tablet Commonly known as:  LIPITOR Take 1 Tab by mouth daily. clopidogrel 75 mg Tab Commonly known as:  PLAVIX Take 1 Tab by mouth daily. ferrous sulfate 134 mg (27 mg iron) Tab Take 2 Tabs by mouth daily. lisinopril 40 mg tablet Commonly known as:  Jenaro Richmond Take 1 Tab by mouth daily. metFORMIN  mg tablet Commonly known as:  GLUCOPHAGE XR  
TAKE FOUR TABLETS BY MOUTH ONCE DAILY WITH BREAKFAST We Performed the Following CBC W/O DIFF [97064 CPT(R)] HEMOGLOBIN A1C WITH EAG [43237 CPT(R)] HEPATIC FUNCTION PANEL [11901 CPT(R)] LIPID PANEL [09884 CPT(R)] METABOLIC PANEL, COMPREHENSIVE [82969 CPT(R)] TSH 3RD GENERATION [06038 CPT(R)] To-Do List   
 04/24/2018 6:45 AM  
  Appointment with CATH LAB [facility] VIRTUAL; CATH LAB [facility] at [address] [facility] CATH LAB ([phone]) NPO AFTER MIDNIGHT! ROUTINE CASES:  Please arrive 2 hours prior to your scheduled appointment time. If your scheduled appointment is for 7:30am, 8:00am or 8:15am, please arrive by 6:45am.  NON ROUTINE CASES:  1. If you require labs, x-ray, EKG or meds-please arrive 3 hours prior to your appointment time. 2.  If you require hydration prior to your procedure-please arrive 4 hours prior to your appointment time. ** It is the [employer] responsibility to notify the Cath Lab  of any prep required outside of the normal routine case**   Patient needs to arrive 2 hours before their appointment time, unless otherwise instructed. Introducing [facility]! [employer] introduces [email or web address] patient portal. Now you can access parts of your medical record, email your doctor's office, and request medication refills online. 1. In your internet browser, go to [email or web address] 2. Click on the First Time User? Click Here link in the Sign In box. You will see the New Member Sign Up page. 3. Enter your [email or web address] Access Code exactly as it appears below. You will not need to use this code after youve completed the sign-up process. If you do not sign up before the expiration date, you must request a new code. · [employer] Access Code: [email or web address] Expires: 7/17/2018 12:02 PM 
 
4. Enter the last four digits of your Social Security Number (xxxx) and Date of Birth (mm/dd/yyyy) as indicated and click Submit. You will be taken to the next sign-up page. 5. Create a [employer] ID. This will be your [employer] login ID and cannot be changed, so think of one that is secure and easy to remember. 6. Create a [employer] password. You can change your password at any time. 7. Enter your Password Reset Question and Answer. This can be used at a later time if you forget your password. 8. Enter your e-mail address. You will receive e-mail notification when new information is available in [address] 9. Click Sign Up. You can now view and download portions of your medical record. 10. Click the Download Summary menu link to download a portable copy of your medical information. If you have questions, please visit the Frequently Asked Questions section of the [employer] website. Remember, [employer] is NOT to be used for urgent needs. For medical emergencies, dial 911. Now available from your iPhone and Android! Please provide this summary of care documentation to your next provider. Your primary care clinician is listed as [address] [place] [address], [facility]/s . If you have any questions after today's visit, please call [phone].

## 2018-04-23 NOTE — H&P
Date of Surgery Update:  Milagro Lai was seen and examined. History and physical has been reviewed. The patient has been examined. There have been no significant clinical changes since the completion of the originally dated History and Physical.    Signed By: Chloe Springer MD     April 23, 2018 5:27 PM           Intact dual circulation by Jesse robles  Proceed with right radial artery approach      Progress Notes  Encounter Date: 4/18/2018  Chloe Springer MD   Cardiology      []Hide copied text  []Hover for attribution information               [id number], [facility], [address] [place] [address]     Office [phone]  Fax [phone]         Milagro Lai is a 69 y.o. male last seen by Dr. Mauricio Saucedo 6 months ago. Here today for routine follow up.       Assessment       Encounter Diagnoses   Name Primary?  Essential hypertension, benign      Coronary artery disease involving native coronary artery of native heart without angina pectoris Yes    Type 2 diabetes with nephropathy (HCC)      TOPHER (obstructive sleep apnea)      Dyslipidemia      Diverticula of colon      Reflux esophagitis      Iron deficiency anemia, unspecified iron deficiency anemia type      Chronic atrial fibrillation (HCC)      Chronic fatigue           Recommendations:  Mr. nAgel Aguayo has a history of CAD s/p multiple PCI's, most recently 1 year ago, in the setting of DM, HTN, Dyslipidemia and a sedentary lifestyle. He now reports progressive fatigue, similar to his previous anginal equivalent, which is concerning for CAD progression. Note, that all perfusion imaging prior to subsequent cath/PCI's have returned as normal.  Will move forward with repeat LHC +/- PCI in the near future. Continue ASA, statin and Plavix. Repeat BMP and CBC pre-procedure.        Hx of AF s/p DccV x 2. Again, in AF by exam and EKG today. HR 70's, currently on Amiodarone 200 mg daily.   Not currently anticoagulated due to hx of GI bleeding. Update TSH and liver function in the near future. Will further investigate his hx of GI blood loss/anemia. 30 day loop monitor - if persistent AF, then would replace amiodarone with beta blocker. He may benefit from [facility] if chronic anticoagulation not an option     Lower extremity edema is likely due to venous insufficiency, which may be exacerbated by AF and untreated TOPHER. Discussed the importance of a low sodium diet. Advised leg elevation and compression stockings.       The patient was encouraged to continue current medications, and call with any new complaints or concerns.      Cath, near future  Right radial approach  ASA 2  Airway 2        Subjective:  Mr. Jeyson Baxter continues to lead a mostly sedentary lifestyle. Routine activities around his home are his highest level of exertion. He reports progression of fatigue. \"I can't even do the little amount I was able to do a few months ago\". Reports that he wakes feeling queasy and this increases with exertion. He reports that anginal equivalent prior to previous PCI\"s (2008, 2013 and most recently 2017) was fatigue. He reports mild to moderate HURD. Denies any exertional chest pain.       His appetite is intact. He follows a high salt, high fat diet. Weight has increased by ~ 10-15 pounds in the past 6 months.       He reports easy bruising on ASA and Plavix. No bleeding concerns. Reports hx of GI bleeding on anticoagulation in the past.  Negative GI work up but no longer on AC.       He has been diagnosed with TOPHER, but refuses to wear CPAP.       He experiences lower extremity edema, which develops towards the end of the day. Improves with leg elevation. No orthopnea or PND.       DM and Lipids followed by Dr. Tai Monahan. Scheduled for follow up early next week.       Cardiac risk factors   HTN yes  DM  yes  Smoking no     Cardiac testing  Cath (8/27/8): LAD p90, m70. OM1 70, OM2 90 (small). RCA m40. EF 55%. No AVG/MR.  Patent L SC.  PCI (11/13/8): mLAD 2.75x18 Xience, pLAD 2.75x23 Xience. OM1 3.0x15 Xience.     Cath 4/28/17:Cath (4/28/17): LAD patent with patent prox and mid stent. LCx patent with patent OM1 stent. RCA p90 (ISR) and p90 distal to stent. EF 65%. No AVG/MR. PCI osteal/prox RCA (4/28/17): 3.0x28 Xience.        Lexiscan Cardiolite (8/11/9): Mild fixed inf defect. No reversible defects. EF 51% with mild inf HK. Cath (8/29/13): LAD patent prox and mid stents. LCx m40; patent OM1 stent. RCA p80. EF 55%. No AVG/MR. PCI ost/proxRCA (8/29/13): 3.0x18 Xience. Echo (8/22/13): EF 55%. Mod dil LA. Mild dil RA. Mild MR/TR/MA. PASP 24. 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 90 Tab 0    metFORMIN ER (GLUCOPHAGE XR) 500 mg tablet TAKE FOUR TABLETS BY MOUTH ONCE DAILY WITH BREAKFAST 360 Tab 1    lisinopril (PRINIVIL, ZESTRIL) 40 mg tablet Take 1 Tab by mouth daily. 90 Tab 0    amiodarone (CORDARONE) 200 mg tablet TAKE ONE TABLET BY MOUTH ONCE DAILY 90 Tab 1    clopidogrel (PLAVIX) 75 mg tab Take 1 Tab by mouth daily.  90 Tab 1  ferrous sulfate 134 mg (27 mg iron) tab Take 2 Tabs by mouth daily.        aspirin delayed-release 81 mg tablet Take 81 mg by mouth daily. Indications: MYOCARDIAL REINFARCTION PREVENTION        amLODIPine (NORVASC) 10 mg tablet TAKE ONE TABLET BY MOUTH ONCE DAILY 90 Tab 0               Allergies   Allergen Reactions    Adhesive Tape Rash    Baycol Nausea and Vomiting    Clonidine Other (comments)       Headache and sleepiness    Pravachol [Pravastatin] Nausea and Vomiting      Review of Systems  Constitutional: Negative for fever, chills, and diaphoresis. Positive for fatigue. Respiratory: Negative for cough, hemoptysis, sputum production, and wheezing. Positive for shortness of breath/ HURD. Cardiovascular: Negative for chest pain, palpitations, orthopnea, claudication, and PND. Positive for leg swelling; end of the day. Gastrointestinal: Negative for heartburn, nausea, vomiting, blood in stool and melena. Genitourinary: Negative for dysuria and flank pain. Musculoskeletal: Negative for joint pain and back pain. Skin: Negative for rash. Neurological: Negative for focal weakness, seizures, loss of consciousness, weakness and headaches. Endo/Heme/Allergies: Does not bruise/bleed easily. Psychiatric/Behavioral: Negative for memory loss. The patient does not have insomnia.       Physical Exam       Visit Vitals    /80 (BP 1 Location: Left arm)    Pulse 76    Resp 18    Ht 6' 3\" (1.905 m)    Wt 265 lb 6.4 oz (120.4 kg)    SpO2 95%    BMI 33.17 kg/m2          Wt Readings from Last 3 Encounters:   04/18/18 265 lb 6.4 oz (120.4 kg)   10/25/17 252 lb (114.3 kg)   09/26/17 249 lb 12.8 oz (113.3 kg)      General - well developed well nourished  Neck - JVP normal, thyroid nl  Cardiac - Irregular, no murmurs, rubs or gallops.  No clicks  Vascular - carotids without bruits, radials, femorals and pedal pulses equal bilateral  Lungs - clear to auscultation bilaterals, no rales ,wheezing or rhonchi  Abd - rounded/obese, soft nontender  Extremities - trace ankle edema.    Skin - no rash  Neuro - nonfocal. Right sided weakness  Psych - normal mood and affect     Cardiographics:  EKG 4/17/18 - Afib     BRIAN Bowen MD               Electronically signed by Saul Shipman MD at 04/23/18 1722        Office Visit on 4/18/2018              Revision History              Detailed Report             Note shared with patient   Note Details   Author Saul Shipman MD File Time 04/23/18 1722   Author Type Physician Status Signed   Last  Saul Shipman MD Specialty Cardiology

## 2018-04-23 NOTE — PROGRESS NOTES
Chief Complaint   Patient presents with    Diabetes    Hypertension    Cholesterol Problem    Pre-op Exam     1. Have you been to the ER, urgent care clinic since your last visit? Hospitalized since your last visit? No    2. Have you seen or consulted any other health care providers outside of the [address] [place] [address] since your last visit? Include any pap smears or colon screening.  No    Glaucoma/Eye exam - not completed

## 2018-04-24 ENCOUNTER — HOSPITAL ENCOUNTER (OUTPATIENT)
Dept: CARDIAC CATH/INVASIVE PROCEDURES | Age: 78
Discharge: HOME | End: 2018-04-24
Attending: SPECIALIST | Admitting: SPECIALIST
Payer: MEDICARE

## 2018-04-24 VITALS
HEART RATE: 67 BPM | TEMPERATURE: 97.6 F | SYSTOLIC BLOOD PRESSURE: 158 MMHG | HEIGHT: 76 IN | OXYGEN SATURATION: 98 % | RESPIRATION RATE: 13 BRPM | BODY MASS INDEX: 32.33 KG/M2 | DIASTOLIC BLOOD PRESSURE: 65 MMHG

## 2018-04-24 DIAGNOSIS — I25.751 CORONARY ARTERY DISEASE INVOLVING NATIVE ARTERY OF TRANSPLANTED HEART WITH ANGINA PECTORIS WITH DOCUMENTED SPASM (HCC): ICD-10-CM

## 2018-04-24 LAB
ALBUMIN SERPL-MCNC: 4.3 G/DL (ref 3.5–4.8)
ALBUMIN/GLOB SERPL: 1.7 {RATIO} (ref 1.2–2.2)
ALP SERPL-CCNC: 125 IU/L (ref 39–117)
ALT SERPL-CCNC: 13 IU/L (ref 0–44)
AST SERPL-CCNC: 12 IU/L (ref 0–40)
BILIRUB DIRECT SERPL-MCNC: 0.14 MG/DL (ref 0–0.4)
BILIRUB SERPL-MCNC: 0.4 MG/DL (ref 0–1.2)
BUN SERPL-MCNC: 20 MG/DL (ref 8–27)
BUN/CREAT SERPL: 17 (ref 10–24)
CALCIUM SERPL-MCNC: 9.6 MG/DL (ref 8.6–10.2)
CHLORIDE SERPL-SCNC: 102 MMOL/L (ref 96–106)
CHOLEST SERPL-MCNC: 155 MG/DL (ref 100–199)
CO2 SERPL-SCNC: 21 MMOL/L (ref 18–29)
CREAT SERPL-MCNC: 1.21 MG/DL (ref 0.76–1.27)
ERYTHROCYTE [DISTWIDTH] IN BLOOD BY AUTOMATED COUNT: 15.3 % (ref 12.3–15.4)
EST. AVERAGE GLUCOSE BLD GHB EST-MCNC: 163 MG/DL
GFR SERPLBLD CREATININE-BSD FMLA CKD-EPI: 57 ML/MIN/1.73
GFR SERPLBLD CREATININE-BSD FMLA CKD-EPI: 66 ML/MIN/1.73
GLOBULIN SER CALC-MCNC: 2.6 G/DL (ref 1.5–4.5)
GLUCOSE BLD STRIP.AUTO-MCNC: 131 MG/DL (ref 65–100)
GLUCOSE BLD STRIP.AUTO-MCNC: 140 MG/DL (ref 65–100)
GLUCOSE SERPL-MCNC: 137 MG/DL (ref 65–99)
HBA1C MFR BLD: 7.3 % (ref 4.8–5.6)
HCT VFR BLD AUTO: 41.4 % (ref 37.5–51)
HDLC SERPL-MCNC: 51 MG/DL
HGB BLD-MCNC: 13.7 G/DL (ref 13–17.7)
INTERPRETATION, 910389: NORMAL
INTERPRETATION: NORMAL
LDLC SERPL CALC-MCNC: 86 MG/DL (ref 0–99)
MCH RBC QN AUTO: 31 PG (ref 26.6–33)
MCHC RBC AUTO-ENTMCNC: 33.1 G/DL (ref 31.5–35.7)
MCV RBC AUTO: 94 FL (ref 79–97)
PDF IMAGE, 910387: NORMAL
PLATELET # BLD AUTO: 241 X10E3/UL (ref 150–379)
POTASSIUM SERPL-SCNC: 4.6 MMOL/L (ref 3.5–5.2)
PROT SERPL-MCNC: 6.9 G/DL (ref 6–8.5)
RBC # BLD AUTO: 4.42 X10E6/UL (ref 4.14–5.8)
SERVICE CMNT-IMP: ABNORMAL
SERVICE CMNT-IMP: ABNORMAL
SODIUM SERPL-SCNC: 143 MMOL/L (ref 134–144)
TRIGL SERPL-MCNC: 90 MG/DL (ref 0–149)
TSH SERPL DL<=0.005 MIU/L-ACNC: 1.33 UIU/ML (ref 0.45–4.5)
VLDLC SERPL CALC-MCNC: 18 MG/DL (ref 5–40)
WBC # BLD AUTO: 7.6 X10E3/UL (ref 3.4–10.8)

## 2018-04-24 PROCEDURE — C1769 GUIDE WIRE: HCPCS

## 2018-04-24 PROCEDURE — 74011636320 HC RX REV CODE- 636/320: Performed by: SPECIALIST

## 2018-04-24 PROCEDURE — C1760 CLOSURE DEV, VASC: HCPCS

## 2018-04-24 PROCEDURE — 77030004532 HC CATH ANGI DX IMP BSC -A

## 2018-04-24 PROCEDURE — 74011250636 HC RX REV CODE- 250/636: Performed by: SPECIALIST

## 2018-04-24 PROCEDURE — 82962 GLUCOSE BLOOD TEST: CPT

## 2018-04-24 PROCEDURE — 74011000258 HC RX REV CODE- 258: Performed by: INTERNAL MEDICINE

## 2018-04-24 PROCEDURE — 77030029065 HC DRSG HEMO QCLOT ZMED -B

## 2018-04-24 PROCEDURE — 77030008543 HC TBNG MON PRSS MRTM -A

## 2018-04-24 PROCEDURE — C1887 CATHETER, GUIDING: HCPCS

## 2018-04-24 PROCEDURE — 77030018836 HC SOL IRR NACL ICUM -A

## 2018-04-24 PROCEDURE — 77030019569 HC BND COMPR RAD TERU -B

## 2018-04-24 PROCEDURE — 74011000250 HC RX REV CODE- 250: Performed by: SPECIALIST

## 2018-04-24 PROCEDURE — C1894 INTRO/SHEATH, NON-LASER: HCPCS

## 2018-04-24 PROCEDURE — 99153 MOD SED SAME PHYS/QHP EA: CPT

## 2018-04-24 PROCEDURE — 77030013521 HC DEV INFL BLN BSC -B

## 2018-04-24 PROCEDURE — 74011250636 HC RX REV CODE- 250/636: Performed by: INTERNAL MEDICINE

## 2018-04-24 RX ORDER — HEPARIN SODIUM 200 [USP'U]/100ML
1000 INJECTION, SOLUTION INTRAVENOUS
Status: DISCONTINUED | OUTPATIENT
Start: 2018-04-24 | End: 2018-04-24 | Stop reason: HOSPADM

## 2018-04-24 RX ORDER — VERAPAMIL HYDROCHLORIDE 2.5 MG/ML
2.5 INJECTION, SOLUTION INTRAVENOUS
Status: DISCONTINUED | OUTPATIENT
Start: 2018-04-24 | End: 2018-04-24 | Stop reason: HOSPADM

## 2018-04-24 RX ORDER — ISOSORBIDE MONONITRATE 30 MG/1
30 TABLET, EXTENDED RELEASE ORAL DAILY
Qty: 30 TAB | Refills: 3 | Status: SHIPPED | OUTPATIENT
Start: 2018-04-24 | End: 2018-06-18

## 2018-04-24 RX ORDER — LIDOCAINE HYDROCHLORIDE 10 MG/ML
10-30 INJECTION INFILTRATION; PERINEURAL
Status: DISCONTINUED | OUTPATIENT
Start: 2018-04-24 | End: 2018-04-24 | Stop reason: HOSPADM

## 2018-04-24 RX ORDER — SODIUM CHLORIDE 0.9 % (FLUSH) 0.9 %
5-10 SYRINGE (ML) INJECTION AS NEEDED
Status: DISCONTINUED | OUTPATIENT
Start: 2018-04-24 | End: 2018-04-24 | Stop reason: HOSPADM

## 2018-04-24 RX ORDER — MIDAZOLAM HYDROCHLORIDE 1 MG/ML
.5-1 INJECTION, SOLUTION INTRAMUSCULAR; INTRAVENOUS
Status: DISCONTINUED | OUTPATIENT
Start: 2018-04-24 | End: 2018-04-24 | Stop reason: HOSPADM

## 2018-04-24 RX ORDER — SODIUM CHLORIDE 0.9 % (FLUSH) 0.9 %
5-10 SYRINGE (ML) INJECTION EVERY 8 HOURS
Status: DISCONTINUED | OUTPATIENT
Start: 2018-04-24 | End: 2018-04-24 | Stop reason: HOSPADM

## 2018-04-24 RX ORDER — FENTANYL CITRATE 50 UG/ML
25-100 INJECTION, SOLUTION INTRAMUSCULAR; INTRAVENOUS
Status: DISCONTINUED | OUTPATIENT
Start: 2018-04-24 | End: 2018-04-24 | Stop reason: HOSPADM

## 2018-04-24 RX ORDER — SODIUM CHLORIDE 9 MG/ML
150 INJECTION, SOLUTION INTRAVENOUS CONTINUOUS
Status: DISCONTINUED | OUTPATIENT
Start: 2018-04-24 | End: 2018-04-24 | Stop reason: HOSPADM

## 2018-04-24 RX ORDER — HEPARIN SODIUM 1000 [USP'U]/ML
3000 INJECTION, SOLUTION INTRAVENOUS; SUBCUTANEOUS
Status: DISCONTINUED | OUTPATIENT
Start: 2018-04-24 | End: 2018-04-24 | Stop reason: HOSPADM

## 2018-04-24 RX ADMIN — MIDAZOLAM 1 MG: 1 INJECTION INTRAMUSCULAR; INTRAVENOUS at 08:12

## 2018-04-24 RX ADMIN — FENTANYL CITRATE 25 MCG: 50 INJECTION, SOLUTION INTRAMUSCULAR; INTRAVENOUS at 08:12

## 2018-04-24 RX ADMIN — LIDOCAINE HYDROCHLORIDE 18 ML: 10 INJECTION, SOLUTION INFILTRATION; PERINEURAL at 08:16

## 2018-04-24 RX ADMIN — MIDAZOLAM 1 MG: 1 INJECTION INTRAMUSCULAR; INTRAVENOUS at 08:42

## 2018-04-24 RX ADMIN — FENTANYL CITRATE 25 MCG: 50 INJECTION, SOLUTION INTRAMUSCULAR; INTRAVENOUS at 08:17

## 2018-04-24 RX ADMIN — HEPARIN SODIUM IN SODIUM CHLORIDE 1000 UNITS: 200 INJECTION INTRAVENOUS at 08:00

## 2018-04-24 RX ADMIN — MIDAZOLAM 1 MG: 1 INJECTION INTRAMUSCULAR; INTRAVENOUS at 08:17

## 2018-04-24 RX ADMIN — IOPAMIDOL 180 ML: 755 INJECTION, SOLUTION INTRAVENOUS at 08:01

## 2018-04-24 RX ADMIN — BIVALIRUDIN 1.75 MG/KG/HR: 250 INJECTION, POWDER, LYOPHILIZED, FOR SOLUTION INTRAVENOUS at 08:56

## 2018-04-24 RX ADMIN — ADENOSINE 140 MCG/KG/MIN: 3 INJECTION, SOLUTION INTRAVENOUS at 09:01

## 2018-04-24 NOTE — PROCEDURES
BRIEF PROCEDURE NOTE    Date of Procedure: 4/24/2018   Preoperative Diagnosis: Intermediate lesion RCA  Postoperative Diagnosis: FFR RCA - not significant  Procedure: Left heart cath, LV angiography, coronary angiography  Interventional Cardiologist: Yaya Woodruff MD  Anesthesia: local + IV sedation  Estimated Blood Loss: Minimal  Findings:     Diagnostic cath per Dr Ana Mast - prior stents present; Prox ISR 60%; Mid to Distal - 60%  Difficulty engaging guide secondary to stent at ostium  JR4/AR1 guide would not engage ; THE [facility] guide used to engage RCA  FFR - not siginificant 0.89    Specimens Removed : None    Closure Device: Mynx        See full cath note.     Complications: none    Yaya Woodruff MD

## 2018-04-24 NOTE — PROGRESS NOTES
Patient arrived. ID and allergies verified verbally with patient. Pt voices understanding of procedure to be performed. Consent obtained. Pt prepped for procedure. Cardiac cath poss PCI    7:55 AM  TRANSFER - OUT REPORT:    Verbal report given to George Macedo RN(name) on Maddy Lezama  being transferred to [facility] LAB(unit) for ordered procedure   CATH  POSS PCI    Report consisted of patients Situation, Background, Assessment and   Recommendations(SBAR). Information from the following report(s) SBAR was reviewed with the receiving nurse. Lines:   Peripheral IV 04/24/18 Right Forearm (Active)        Opportunity for questions and clarification was provided. Patient transported with:   Registered Nurse    9:15 AM  TRANSFER - IN REPORT:    Verbal report received from Nadja Duke  RN(name) on Maddy Lezama  being received from CATH LAB(unit) for routine progression of care  POST CATH    Report consisted of patients Situation, Background, Assessment and   Recommendations(SBAR). Information from the following report(s) Procedure Summary was reviewed with the receiving nurse. Opportunity for questions and clarification was provided. Assessment completed upon patients arrival to unit and care assumed. 11:05 AM  Patient ate breakfast. Tolerated well. Wife at bedside  Discharge instructions reviewed with patient and family. Voiced understanding. Patient given copy of discharge instructions to take home. 11:12 AM  Patient sitting and dangling on side of bed. Tolerated well. RT groin cath site dry and intact NO hematoma. [facility] MEDICATION AND   SIDE EFFECT GUIDE    The [facility] MEDICATION AND SIDE EFFECT GUIDE was provided to the PATIENT AND CARE PROVIDER. Information provided includes instruction about drug purpose and common side effects for the following medications:   · Isosorbide Mononitrate      11:35  Pt discharged via wheelchair with family. NO complaints .  Personal belongings with patient upon discharge.

## 2018-04-24 NOTE — DISCHARGE INSTRUCTIONS
Patient Discharge Instructions    Etelvina York / [id number] : 1940    Admitted 2018 Discharged: 2018     Take Home Medications     Resume metformin from Thursday  Start isosorbide mononitrate 30 mg daily to improve coronary blood flow  Continue other medications       · It is important that you take the medication exactly as they are prescribed. · Keep your medication in the bottles provided by the pharmacist and keep a list of the medication names, dosages, and times to be taken in your wallet. · Do not take other medications without consulting your doctor. BRING ALL OF YOUR MEDICINES TO YOUR OFFICE VISIT with Drs. Follow-up with Jonah Tejada MD in 2 weeks. Office to arrange your appointment. Cardiac Catheterization  Discharge Instructions     Do not drive, operate any machinery, or sign any legal documents for 24 hours after your procedure. You must have someone to drive you home.  You may take a shower 24 hours after your cardiac catheterization. Be sure to get the dressing wet and then remove it; gently wash the area with warm soapy water. Pat dry and leave open to air. To help prevent infections, be sure to keep the cath site clean and dry. No lotions, creams, powders, ointments, etc. in the cath site for approximately 1 week.  Do not take a tub bath, get in a hot tub or swimming pool for approximately 5 days or until the cath site is completely healed.  No strenuous activity or heavy lifting over 10 lbs. for 7 days.  Drink plenty of fluids for 24-48 hours after your cath to flush the contrast dye from your kidneys. No alcoholic beverages for 24 hours. You may resume your previous diet (low fat, low cholesterol) after your cath.  After your cath, some bruising or discomfort is common during the healing process. Tylenol, 1-2 tablets every 6 hours as needed, is recommended if you experience any discomfort.   If you experience any signs or symptoms of infection such as fever, chills, or poorly healing incision, persistent tenderness or swelling in the groin, redness and/or warmth to the touch, numbness, significant tingling or pain at the groin site or affected extremity, rash, drainage from the cath site, or if the leg feels tight or swollen, call your physician right away.  If bleeding at the cath site occurs, take a clean gauze pad and apply direct pressure to the groin just above the puncture site. Call 911 immediately, and continue to apply direct pressure until an ambulance gets to your location.  You may return to work  2  days after your cardiac cath if no groin bleeding. Information obtained by :  I understand that if any problems occur once I am at home I am to contact my physician. I understand and acknowledge receipt of the instructions indicated above.                                                                                                                                            R.N.'s Signature                                                                  Date/Time                                                                                                                                              Patient or Representative Montana Simmons MD

## 2018-04-24 NOTE — IP AVS SNAPSHOT
Yuan Mao 
 
 
 [address] [facility] 1007 [facility] 
[phone] Patient: Irais Cabral MRN: [id number] [facility]:0/5/7917 About your hospitalization You were admitted on:  April 24, 2018 You last received care in the:  [facility] PACU You were discharged on:  April 24, 2018 Why you were hospitalized Your primary diagnosis was:  Not on File Follow-up Information Follow up With Details Comments Contact Info Carlton George MD In 2 weeks office to coordinate 6 [facility] Enrique [phone] 1007 [facility] 
[phone] Sylvia Pepe MD   [address] [address] 
[phone] Discharge Orders None A check brittany indicates which time of day the medication should be taken. My Medications START taking these medications Instructions Each Dose to Equal  
 Morning Noon Evening Bedtime  
 isosorbide mononitrate ER 30 mg tablet Commonly known as:  IMDUR Your last dose was: Your next dose is: Take 1 Tab by mouth daily. 30 mg CONTINUE taking these medications Instructions Each Dose to Equal  
 Morning Noon Evening Bedtime  
 amiodarone 200 mg tablet Commonly known as:  CORDARONE Your last dose was: Your next dose is: TAKE ONE TABLET BY MOUTH ONCE DAILY  
     
   
   
   
  
 amLODIPine 10 mg tablet Commonly known as:  Anat Tucker Your last dose was: Your next dose is: TAKE ONE TABLET BY MOUTH ONCE DAILY  
     
   
   
   
  
 aspirin delayed-release 81 mg tablet Your last dose was: Your next dose is: Take 81 mg by mouth daily. Indications: MYOCARDIAL REINFARCTION PREVENTION 81 mg  
    
   
   
   
  
 atorvastatin 20 mg tablet Commonly known as:  LIPITOR Your last dose was: Your next dose is: Take 1 Tab by mouth daily. 20 mg  
    
   
   
   
  
 clopidogrel 75 mg Tab Commonly known as:  PLAVIX Your last dose was: Your next dose is: Take 1 Tab by mouth daily. 75 mg  
    
   
   
   
  
 ferrous sulfate 134 mg (27 mg iron) Tab Your last dose was: Your next dose is: Take 2 Tabs by mouth daily. 2 Tab  
    
   
   
   
  
 lisinopril 40 mg tablet Commonly known as:  Leilani Husbands Your last dose was: Your next dose is: Take 1 Tab by mouth daily. 40 mg  
    
   
   
   
  
 metFORMIN  mg tablet Commonly known as:  GLUCOPHAGE XR Your last dose was: Your next dose is: TAKE FOUR TABLETS BY MOUTH ONCE DAILY WITH BREAKFAST Where to Get Your Medications These medications were sent to [facility] [address] [place] [address] Phone:  [phone]  
  isosorbide mononitrate ER 30 mg tablet Discharge Instructions Patient Discharge Instructions Steven Sheth / [id number] : 1940 Admitted 2018 Discharged: 2018 Take Home Medications Resume metformin from Thursday Start isosorbide mononitrate 30 mg daily to improve coronary blood flow Continue other medications · It is important that you take the medication exactly as they are prescribed. · Keep your medication in the bottles provided by the pharmacist and keep a list of the medication names, dosages, and times to be taken in your wallet. · Do not take other medications without consulting your doctor. BRING ALL OF YOUR MEDICINES TO YOUR OFFICE VISIT with Claude. Follow-up with Daniella Baltazar MD in 2 weeks. Office to arrange your appointment. Cardiac Catheterization  Discharge Instructions ?  Do not drive, operate any machinery, or sign any legal documents for  hours after your procedure. You must have someone to drive you home. ? You may take a shower 24 hours after your cardiac catheterization. Be sure to get the dressing wet and then remove it; gently wash the area with warm soapy water. Pat dry and leave open to air. To help prevent infections, be sure to keep the cath site clean and dry. No lotions, creams, powders, ointments, etc. in the cath site for approximately 1 week. ? Do not take a tub bath, get in a hot tub or swimming pool for approximately 5 days or until the cath site is completely healed. ? No strenuous activity or heavy lifting over 10 lbs. for 7 days. ? Drink plenty of fluids for 24-48 hours after your cath to flush the contrast dye from your kidneys. No alcoholic beverages for 24 hours. You may resume your previous diet (low fat, low cholesterol) after your cath. ? After your cath, some bruising or discomfort is common during the healing process. Tylenol, 1-2 tablets every 6 hours as needed, is recommended if you experience any discomfort. If you experience any signs or symptoms of infection such as fever, chills, or poorly healing incision, persistent tenderness or swelling in the groin, redness and/or warmth to the touch, numbness, significant tingling or pain at the groin site or affected extremity, rash, drainage from the cath site, or if the leg feels tight or swollen, call your physician right away. ? If bleeding at the cath site occurs, take a clean gauze pad and apply direct pressure to the groin just above the puncture site. Call 911 immediately, and continue to apply direct pressure until an ambulance gets to your location. ? You may return to work  2  days after your cardiac cath if no groin bleeding. Information obtained by : 
I understand that if any problems occur once I am at home I am to contact my physician. I understand and acknowledge receipt of the instructions indicated above. R.N.'s Signature                                                                  Date/Time Patient or Representative Signature                                                          Date/Time Carlton George MD 
 
ACO Transitions of Care Introducing [employer] [address] offers a voluntary care coordination program to provide high quality service and care to [facility] fee-for-service beneficiaries. Suzette Bradford was designed to help you enhance your health and well-being through the following services: ? Transitions of Care  support for individuals who are transitioning from one care setting to another (example: Hospital to home). ? Chronic and Complex Care Coordination  support for individuals and caregivers of those with serious or chronic illnesses or with more than one chronic (ongoing) condition and those who take a number of different medications. If you meet specific medical criteria, a [address] [place] [address] Rd may call you directly to coordinate your care with your primary care physician and your other care providers. For questions about the [facility] programs, please, contact your physicians office. For general questions or additional information about Accountable Care Organizations: 
Please visit www.medicare.gov/acos. html or call 1-800-MEDICARE (5-389.258.7836) TTY users should call 0-253.497.5022. Introducing [facility] & HEALTH SERVICES! Velia Dillon introduces [employer] patient portal. Now you can access parts of your medical record, email your doctor's office, and request medication refills online. 1. In your internet browser, go to [email or web address] [email or web address]/M[employer] Healt[employer] 2. Click on the First Time User? Click Here link in the Sign In box. You will see the New Member Sign Up page. 3. Enter your [employer] Access Code exactly as it appears below. You will not need to use this code after youve completed the sign-up process. If you do not sign up before the expiration date, you must request a new code. · [employer] Access Code: [email or web address] Expires: 7/17/2018 12:02 PM 
 
4. Enter the last four digits of your Social Security Number (xxxx) and Date of Birth (mm/dd/yyyy) as indicated and click Submit. You will be taken to the next sign-up page. 5. Create a Posmetricst ID. This will be your [employer] login ID and cannot be changed, so think of one that is secure and easy to remember. 6. Create a [employer] password. You can change your password at any time. 7. Enter your Password Reset Question and Answer. This can be used at a later time if you forget your password. 8. Enter your e-mail address. You will receive e-mail notification when new information is available in [address] 9. Click Sign Up. You can now view and download portions of your medical record. 10. Click the Download Summary menu link to download a portable copy of your medical information. If you have questions, please visit the Frequently Asked Questions section of the [employer] website. Remember, [employer] is NOT to be used for urgent needs. For medical emergencies, dial 911. Now available from your iPhone and Android! Introducing Roe Rojas As a Itzel Martin patient, I wanted to make you aware of our electronic visit tool called Roe Rojas. Itzel Martin 24/7 allows you to connect within minutes with a medical provider 24 hours a day, seven days a week via a mobile device or tablet or logging into a secure website from your computer. You can access Roe Rojas from anywhere in the [place]. A virtual visit might be right for you when you have a simple condition and feel like you just dont want to get out of bed, or cant get away from work for an appointment, when your regular [address] [place] [address] provider is not available (evenings, weekends or holidays), or when youre out of town and need minor care. Electronic visits cost only $49 and if the [address] [place] [address] 24/7 provider determines a prescription is needed to treat your condition, one can be electronically transmitted to a nearby pharmacy*. Please take a moment to enroll today if you have not already done so. The enrollment process is free and takes just a few minutes. To enroll, please download the [employer]\[facility]\"" 24/7 kaye to your tablet or phone, or visit [email or web address] to enroll on your computer. And, as an [address] [place] [address] patient with a [employer] account, the results of your visits will be scanned into your electronic medical record and your primary care provider will be able to view the scanned results. We urge you to continue to see your regular [address] [place] [address] provider for your ongoing medical care. And while your primary care provider may not be the one available when you seek a Dine perfectsheltonfin virtual visit, the peace of mind you get from getting a real diagnosis real time can be priceless. For more information on Roe FertilityAuthorityvitaly, view our Frequently Asked Questions (FAQs) at [email or web address]. Sincerely, 
 
Hamida Bates MD 
Chief Medical Officer Junior Lucia *:  certain medications cannot be prescribed via Roe FertilityAuthorityvitaly Providers Seen During Your Hospitalization Provider Specialty Primary office phone Misty Thrasher MD Cardiology [phone] Your Primary Care Physician (PCP) Primary Care Physician Office Phone Office Fax [facility] [phone] You are allergic to the following Allergen Reactions Adhesive Tape Rash Baycol Nausea and Vomiting Clonidine Other (comments) Headache and sleepiness Pravachol (Pravastatin) Nausea and Vomiting Recent Documentation Height BMI Smoking Status 1.93 m 32.33 kg/m2 Never Smoker Emergency Contacts Name Discharge Info Relation Home Work Mobile Martita Polanco DISCHARGE CAREGIVER [3] Spouse [3] [phone] Patient Belongings The following personal items are in your possession at time of discharge: 
     Visual Aid: Glasses Please provide this summary of care documentation to your next provider. Signatures-by signing, you are acknowledging that this After Visit Summary has been reviewed with you and you have received a copy. Patient Signature:  ____________________________________________________________ Date:  ____________________________________________________________  
  
[facility] Provider Signature:  ____________________________________________________________ Date:  ____________________________________________________________

## 2018-04-24 NOTE — PROCEDURES
Cardiac Catheterization    Date of Procedure: 4/24/2018   Preoperative Diagnosis: angina, CAD, multiple PCIs  Postoperative Diagnosis: see below    Procedure: Left heart cath, LV angiography, coronary angiography via right femoral approach  Cardiologist: Rena Fernandez MD  Anesthesia: local + IV sedation  Estimated Blood Loss: Minimal  Specimens removed: None  Findings: EDP 12, no AV gradient, LVEF 65%, LM normal, LAD patent prox/mid stent, distal 80%, LCX OM1 medium-large mild plaque, ostial AV groove LCX eccentric 60%, OM2 distal 70% (small vessel), RCA prox instent restenosis, eccentric 60%, mid 80% focal.  See full cath note.   Complications: none    MV CAD  Multiple previous PCIs  - distal LAD 80%  - prox and mid RCA stenoses  Normal LVEF    FFR, poss PCI RCA  - FFR negative    Medical Rx  - add imdur 30/d

## 2018-04-25 ENCOUNTER — PATIENT OUTREACH (OUTPATIENT)
Dept: FAMILY MEDICINE CLINIC | Age: 78
End: 2018-04-25

## 2018-04-25 NOTE — PROGRESS NOTES
Outbound call to patient who had a scheduled cardiac cath on 4/24/18. Patient verified by 3 identifiers. Stated he is doing well. Dressing remain intact and dry today. Patient stated he started his Imdur 30 mg as was ordered and all other medications remain the same. He will follow up with his cardiologist in 2 weeks. Patient saw his PCP allan 4/23/18 prior to having the procedure and declines a follow-up at this time. Discharge instructions reviewed. NN instructed patient on importance of wetting dressing prior to removing and observing closely for bleeding or swelling at site. .  Current Outpatient Prescriptions   Medication Sig    isosorbide mononitrate ER (IMDUR) 30 mg tablet Take 1 Tab by mouth daily.  amLODIPine (NORVASC) 10 mg tablet TAKE ONE TABLET BY MOUTH ONCE DAILY    clopidogrel (PLAVIX) 75 mg tab Take 1 Tab by mouth daily.  atorvastatin (LIPITOR) 20 mg tablet Take 1 Tab by mouth daily.  metFORMIN ER (GLUCOPHAGE XR) 500 mg tablet TAKE FOUR TABLETS BY MOUTH ONCE DAILY WITH BREAKFAST    lisinopril (PRINIVIL, ZESTRIL) 40 mg tablet Take 1 Tab by mouth daily.  amiodarone (CORDARONE) 200 mg tablet TAKE ONE TABLET BY MOUTH ONCE DAILY    ferrous sulfate 134 mg (27 mg iron) tab Take 2 Tabs by mouth daily.  aspirin delayed-release 81 mg tablet Take 81 mg by mouth daily. Indications: MYOCARDIAL REINFARCTION PREVENTION     No current facility-administered medications for this visit. NN contact information given for questions or concerns.

## 2018-05-01 DIAGNOSIS — I10 ESSENTIAL HYPERTENSION, BENIGN: ICD-10-CM

## 2018-05-01 DIAGNOSIS — I48.91 ATRIAL FIBRILLATION, UNSPECIFIED TYPE (HCC): ICD-10-CM

## 2018-05-01 DIAGNOSIS — I25.10 CORONARY ARTERY DISEASE INVOLVING NATIVE CORONARY ARTERY OF NATIVE HEART WITHOUT ANGINA PECTORIS: ICD-10-CM

## 2018-05-02 DIAGNOSIS — I10 ESSENTIAL HYPERTENSION, BENIGN: ICD-10-CM

## 2018-05-02 DIAGNOSIS — E78.5 DYSLIPIDEMIA: ICD-10-CM

## 2018-05-02 DIAGNOSIS — I25.10 CORONARY ARTERY DISEASE INVOLVING NATIVE CORONARY ARTERY OF NATIVE HEART WITHOUT ANGINA PECTORIS: ICD-10-CM

## 2018-05-02 DIAGNOSIS — I48.91 ATRIAL FIBRILLATION, UNSPECIFIED TYPE (HCC): ICD-10-CM

## 2018-05-02 RX ORDER — AMIODARONE HYDROCHLORIDE 200 MG/1
TABLET ORAL
Qty: 90 TAB | Refills: 1 | OUTPATIENT
Start: 2018-05-02

## 2018-05-02 NOTE — TELEPHONE ENCOUNTER
Called and spoke with patient. Advised patient to contact his cardiologist for a refill of amiodarone. Patient verbalized understanding.  RX refused per verbal order from Dr. Shantanu Horner

## 2018-05-03 RX ORDER — LISINOPRIL 40 MG/1
40 TABLET ORAL DAILY
Qty: 90 TAB | Refills: 0 | OUTPATIENT
Start: 2018-05-03

## 2018-05-03 RX ORDER — ATORVASTATIN CALCIUM 20 MG/1
20 TABLET, FILM COATED ORAL DAILY
Qty: 90 TAB | Refills: 0 | OUTPATIENT
Start: 2018-05-03

## 2018-05-03 RX ORDER — AMIODARONE HYDROCHLORIDE 200 MG/1
TABLET ORAL
Qty: 90 TAB | Refills: 1 | OUTPATIENT
Start: 2018-05-03

## 2018-05-03 RX ORDER — AMLODIPINE BESYLATE 10 MG/1
TABLET ORAL
Qty: 90 TAB | Refills: 0 | OUTPATIENT
Start: 2018-05-03

## 2018-05-03 RX ORDER — AMIODARONE HYDROCHLORIDE 200 MG/1
TABLET ORAL
Qty: 90 TAB | Refills: 1 | Status: SHIPPED | OUTPATIENT
Start: 2018-05-03 | End: 2018-11-09 | Stop reason: SDUPTHER

## 2018-05-03 NOTE — TELEPHONE ENCOUNTER
Requested Prescriptions     Signed Prescriptions Disp Refills    amiodarone (CORDARONE) 200 mg tablet 90 Tab 1     Sig: TAKE ONE TABLET BY MOUTH ONCE DAILY     Authorizing Provider: Maria Alejandra Farah User: Gustavo Arciniega     Refused Prescriptions Disp Refills    amLODIPine (NORVASC) 10 mg tablet 90 Tab 0     Refused By: Gustavo Arciniega     Reason for Refusal: Prescriber not at this practice    atorvastatin (LIPITOR) 20 mg tablet 90 Tab 0     Sig: Take 1 Tab by mouth daily. Refused By: Gustavo Arciniega     Reason for Refusal: Prescriber not at this practice    lisinopril (PRINIVIL, ZESTRIL) 40 mg tablet 90 Tab 0     Sig: Take 1 Tab by mouth daily. Refused By: Gustavo Arciniega     Reason for Refusal: Prescriber not at this practice    amiodarone (CORDARONE) 200 mg tablet 90 Tab 1     Sig: TAKE ONE TABLET BY MOUTH ONCE DAILY     Refused By: Gustavo Arciniega     Reason for Refusal: Prescriber not at this practice     Amiodarone refilled per request of patient's PCP, who states Dr. Mary Green should be refilling it.

## 2018-05-15 ENCOUNTER — OFFICE VISIT (OUTPATIENT)
Dept: CARDIOLOGY CLINIC | Age: 78
End: 2018-05-15

## 2018-05-15 DIAGNOSIS — I10 ESSENTIAL HYPERTENSION, BENIGN: ICD-10-CM

## 2018-05-15 DIAGNOSIS — E78.5 DYSLIPIDEMIA: ICD-10-CM

## 2018-05-15 DIAGNOSIS — G47.33 OSA (OBSTRUCTIVE SLEEP APNEA): ICD-10-CM

## 2018-05-15 DIAGNOSIS — E11.21 TYPE 2 DIABETES WITH NEPHROPATHY (HCC): ICD-10-CM

## 2018-05-15 DIAGNOSIS — I25.10 CORONARY ARTERY DISEASE INVOLVING NATIVE CORONARY ARTERY OF NATIVE HEART WITHOUT ANGINA PECTORIS: ICD-10-CM

## 2018-05-15 DIAGNOSIS — I48.20 CHRONIC ATRIAL FIBRILLATION (HCC): ICD-10-CM

## 2018-05-21 ENCOUNTER — TELEPHONE (OUTPATIENT)
Dept: CARDIOLOGY CLINIC | Age: 78
End: 2018-05-21

## 2018-05-21 NOTE — TELEPHONE ENCOUNTER
Spoke to pt & he wants help using loop monitor. He hasnt attempted to apply by himself just looked at book. Informed him he can come tomorrow at 9:30 STF and make sure to charge monitor tonight & bring all supplies with him tomorrow.

## 2018-05-21 NOTE — TELEPHONE ENCOUNTER
Pt calling because he needs help hooking up his monitor. He said he wanted to come to the office so you can show him how to hook it up. He can be reached @ [phone]. Thanks!

## 2018-05-22 ENCOUNTER — OFFICE VISIT (OUTPATIENT)
Dept: CARDIOLOGY CLINIC | Age: 78
End: 2018-05-22

## 2018-06-18 ENCOUNTER — OFFICE VISIT (OUTPATIENT)
Dept: CARDIOLOGY CLINIC | Age: 78
End: 2018-06-18

## 2018-06-18 ENCOUNTER — DOCUMENTATION ONLY (OUTPATIENT)
Dept: CARDIOLOGY CLINIC | Age: 78
End: 2018-06-18

## 2018-06-18 ENCOUNTER — CLINICAL SUPPORT (OUTPATIENT)
Dept: CARDIOLOGY CLINIC | Age: 78
End: 2018-06-18

## 2018-06-18 VITALS
OXYGEN SATURATION: 97 % | SYSTOLIC BLOOD PRESSURE: 148 MMHG | WEIGHT: 264.6 LBS | DIASTOLIC BLOOD PRESSURE: 80 MMHG | HEART RATE: 64 BPM | RESPIRATION RATE: 18 BRPM | BODY MASS INDEX: 32.22 KG/M2 | HEIGHT: 76 IN

## 2018-06-18 DIAGNOSIS — R53.83 FATIGUE, UNSPECIFIED TYPE: Primary | ICD-10-CM

## 2018-06-18 DIAGNOSIS — I25.10 CORONARY ARTERY DISEASE INVOLVING NATIVE CORONARY ARTERY OF NATIVE HEART WITHOUT ANGINA PECTORIS: ICD-10-CM

## 2018-06-18 DIAGNOSIS — I48.20 CHRONIC ATRIAL FIBRILLATION (HCC): ICD-10-CM

## 2018-06-18 DIAGNOSIS — D50.9 IRON DEFICIENCY ANEMIA, UNSPECIFIED IRON DEFICIENCY ANEMIA TYPE: Chronic | ICD-10-CM

## 2018-06-18 DIAGNOSIS — E78.5 DYSLIPIDEMIA: ICD-10-CM

## 2018-06-18 DIAGNOSIS — R53.82 CHRONIC FATIGUE: ICD-10-CM

## 2018-06-18 DIAGNOSIS — I48.20 CHRONIC ATRIAL FIBRILLATION (HCC): Primary | ICD-10-CM

## 2018-06-18 DIAGNOSIS — I63.40 CEREBRAL INFARCTION DUE TO EMBOLISM OF CEREBRAL ARTERY (HCC): Chronic | ICD-10-CM

## 2018-06-18 DIAGNOSIS — I10 ESSENTIAL HYPERTENSION, BENIGN: ICD-10-CM

## 2018-06-18 DIAGNOSIS — G47.33 OSA (OBSTRUCTIVE SLEEP APNEA): ICD-10-CM

## 2018-06-18 DIAGNOSIS — E11.21 TYPE 2 DIABETES WITH NEPHROPATHY (HCC): ICD-10-CM

## 2018-06-18 NOTE — PROGRESS NOTES
Summary of GI notes/procedures for Mr. Teresa Soto (  42)  2008 - Capsule Endoscopy performed to further evaluate iron deficiency anemia with heme + stools and unrevealing upper and lower endoscopy. Findings - No blood seen. 2 small patches of villous erosions, otherwise normal  12/3/13 - Seen by GI Specialist Dr. Hilario Almeida for evaluation anemia. HPI indicates Colonoscopy 5 years ago with 3 small adenomas - removed. He reports was placed on iron therapy by PCP recently and stopped due to diarrhea. He also wa taken off Xarelto due to his anemia, but remains of Plavix due to recent cardiac stents. 2012 - EGD    Findings - possible mild duodenitis, GE junction nodule, otherwise negative EGD.   2014 - Seen by GI Specialist Dr. Sridevi Brambila for M2A Capsule Endoscopy   Findings - no evidence of active or old bleeding noted. Normal mucosa throughout.

## 2018-06-18 NOTE — MR AVS SNAPSHOT
1659 [facility] 600 1007 [facility] 
[phone] Patient: Zion Tomlin MRN: [id number] WCZ:0[birth year] Visit Information Date & Time Provider Department Dept. Phone Encounter #  
 6/18/2018  2:00 PM Kam Frazier MD [facility] Sherly Escobar [phone] [id number] Your Appointments 10/24/2018  8:15 AM  
ROUTINE CARE with Estefani Jon MD  
[facility]) Appt Note: 6 month follow up  
 [address] [address]  
[phone]  
  
   
 [address] [address] Upcoming Health Maintenance Date Due  
 EYE EXAM RETINAL OR DILATED Q1 5/12/2016 GLAUCOMA SCREENING Q2Y 1/19/2017 Influenza Age 5 to Adult 8/1/2018 MICROALBUMIN Q1 9/26/2018 MEDICARE YEARLY EXAM 9/27/2018 FOOT EXAM Q1 10/1/2018 HEMOGLOBIN A1C Q6M 10/23/2018 LIPID PANEL Q1 4/23/2019 COLONOSCOPY 4/12/2026 DTaP/Tdap/Td series (2 - Td) 9/28/2026 Allergies as of 6/18/2018  Review Complete On: 6/18/2018 By: Sidney Coronel Severity Noted Reaction Type Reactions Adhesive Tape  02/25/2010    Rash Baycol  02/25/2010    Nausea and Vomiting Clonidine  09/26/2017    Other (comments) Headache and sleepiness Pravachol [Pravastatin]  02/25/2010    Nausea and Vomiting Current Immunizations  Reviewed on 9/26/2017 Name Date Influenza High Dose Vaccine PF 9/26/2017, 9/28/2016, 12/1/2015 Influenza Vaccine 11/1/2013 Influenza Vaccine Split 10/15/2012 ZZZ-RETIRED (DO NOT USE) Pneumococcal Vaccine (Unspecified Type) 11/25/2008 dT Vaccine 12/20/1999 Not reviewed this visit You Were Diagnosed With   
  
 Codes Comments Essential hypertension, benign    -  Primary ICD-10-CM: I10 
ICD-9-CM: 401.1  Coronary artery disease involving native coronary artery of native heart without angina pectoris     ICD-10-CM: I25.10 ICD-9-CM: 414.01 Type 2 diabetes with nephropathy (HCC)     ICD-10-CM: E11.21 
ICD-9-CM: 250.40, 583.81   
 TOPHER (obstructive sleep apnea)     ICD-10-CM: G47.33 
ICD-9-CM: 327.23 Dyslipidemia     ICD-10-CM: E78.5 ICD-9-CM: 272.4 Chronic atrial fibrillation (HCC)     ICD-10-CM: P67.6 ICD-9-CM: 427.31 History of PTCA     ICD-10-CM: Z98.61 ICD-9-CM: V45.82 Vitals BP Pulse Resp Height(growth percentile) Weight(growth percentile) SpO2  
 148/80 (BP 1 Location: Left arm) 64 18 6' 4\" (1.93 m) 264 lb 9.6 oz (120 kg) 97% BMI Smoking Status 32.21 kg/m2 Never Smoker Vitals History BMI and BSA Data Body Mass Index Body Surface Area  
 32.21 kg/m 2 2.54 m 2 Preferred Pharmacy Pharmacy Name Phone [address] [facility], [address] [place] [address] KYLEE Interiano [facility] [phone] Your Updated Medication List  
  
   
This list is accurate as of 6/18/18  2:44 PM.  Always use your most recent med list.  
  
  
  
  
 amiodarone 200 mg tablet Commonly known as:  CORDARONE  
TAKE ONE TABLET BY MOUTH ONCE DAILY  
  
 amLODIPine 10 mg tablet Commonly known as:  [place] TAKE ONE TABLET BY MOUTH ONCE DAILY  
  
 aspirin delayed-release 81 mg tablet Take 81 mg by mouth daily. Indications: MYOCARDIAL REINFARCTION PREVENTION  
  
 atorvastatin 20 mg tablet Commonly known as:  LIPITOR Take 1 Tab by mouth daily. ferrous sulfate 134 mg (27 mg iron) Tab Take 2 Tabs by mouth daily. lisinopril 40 mg tablet Commonly known as:  Leilani Husbands Take 1 Tab by mouth daily. metFORMIN  mg tablet Commonly known as:  GLUCOPHAGE XR  
TAKE FOUR TABLETS BY MOUTH ONCE DAILY WITH BREAKFAST Patient Instructions When you come back from [place] next week, stop Imdur and start Eliquis 5 mg twice a day. Also, stop Plavix when you start Eliquis. Continue using aspirin daily. We will arrange for Echo guided cardioversion in 3 weeks. Introducing [facility] & HEALTH SERVICES! [facility] introduces [employer] patient portal. Now you can access parts of your medical record, email your doctor's office, and request medication refills online. 1. In your internet browser, go to [email or web address] [email or web address] 2. Click on the First Time User? Click Here link in the Sign In box. You will see the New Member Sign Up page. 3. Enter your [employer] Access Code exactly as it appears below. You will not need to use this code after youve completed the sign-up process. If you do not sign up before the expiration date, you must request a new code. · [employer] Access Code: [email or web address] Expires: 7/17/2018 12:02 PM 
 
4. Enter the last four digits of your Social Security Number (xxxx) and Date of Birth (mm/dd/yyyy) as indicated and click Submit. You will be taken to the next sign-up page. 5. Create a [employer] ID. This will be your [employer] login ID and cannot be changed, so think of one that is secure and easy to remember. 6. Create a [employer] password. You can change your password at any time. 7. Enter your Password Reset Question and Answer. This can be used at a later time if you forget your password. 8. Enter your e-mail address. You will receive e-mail notification when new information is available in [address] 9. Click Sign Up. You can now view and download portions of your medical record. 10. Click the Download Summary menu link to download a portable copy of your medical information. If you have questions, please visit the Frequently Asked Questions section of the [employer] website. Remember, [employer] is NOT to be used for urgent needs. For medical emergencies, dial 911. Now available from your iPhone and Android! Please provide this summary of care documentation to your next provider. Your primary care clinician is listed as [address] [address], [facility]/s . If you have any questions after today's visit, please call [phone].

## 2018-06-18 NOTE — PATIENT INSTRUCTIONS
When you come back from [place] next week, stop Imdur and start Eliquis 5 mg twice a day. Also, stop Plavix when you start Eliquis. Continue using aspirin daily. We will arrange for Echo guided cardioversion in 3 weeks.

## 2018-06-18 NOTE — PROGRESS NOTES
[id number] [facility], [address] [facility]    Office [phone]  Fax [phone]       Jake Price is a 69 y.o. male here today for follow up of cath on 4/24/18. Assessment  Encounter Diagnoses   Name Primary? 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 Anita Garcia has a history of CAD s/p multiple PCI's, most recently 1 year ago,s/p PCI RCA with LEO. Last cath most recently 4/24/18 demonstrated no RCA restenosis, distal LAD 80%. He has not derived any benefit from oral nitrates. I suspect his fatigue may in part be due to AF, albeit rate controlled. Will discontinue Imdur. Persistent AF, rate controlled. Shaheen Show another attempt at rhythm control strategy- continue amiodarone, arrange for YESENIA guided cardioversion after resuming anticoagulation, this time with Eliquis 5 bid, and stop plavix. High risk of recurrent stroke. History of GI bleeding 2008 and 2013 without clear cut abnormalities. M2A capsule endoscopy 2014 was negative. If he has early recurrence of AF, consider referral for AF ablation. Should he have recurrent GI bleeding, we discussed the potential role of WATCHMAN. Lower extremity edema is likely due to venous insufficiency, which may be exacerbated by AF and untreated TOPHER. Discussed the importance of a low sodium diet. Advised leg elevation and compression stockings. YESENIA/cardioversion near future.    ASA 2  Airway 2        Subjective:  Cath on 4/24/18 demonstrated distal LAD 80% with moderate proximal and mid RCA stenoses; however, FFR was negative. He was started on Imdur 30mg/D    Today, he states he is compliant with Imdur, with no improvement in exertional fatigue and muscle weakness. He states he has a history of fatigue with his two previous episodes of A Fib. He notes previous A Fib resolved with cardioversion. Pt wore a heart monitor for 3 weeks, then discontinued due to difficulty sleeping with the monitor. He was told to return it 6/20/18- results pending    Pt has a history of bleeding with Xarelto, trouble with regulating coumadin in the past. He is also taking Plavix. Pt states he is going on vacation to [place], and will return 6/25/18. He will start Eliquis upon his return. Patient denies any exertional chest pain, dyspnea, palpitations, syncope, orthopnea, or paroxysmal nocturnal dyspnea. Cardiac risk factors   HTN yes  DM  yes  Smoking no    Cardiac testing  Cath (8/27/8): LAD p90, m70. OM1 70, OM2 90 (small). RCA m40. EF 55%. No AVG/MR. Patent L SC.  PCI (11/13/8): mLAD 2.75x18 Xience, pLAD 2.75x23 Xience. OM1 3.0x15 Xience. Cath 4/28/17:Cath (4/28/17): LAD patent with patent prox and mid stent. LCx patent with patent OM1 stent. RCA p90 (ISR) and p90 distal to stent. EF 65%. No AVG/MR. PCI osteal/prox RCA (4/28/17): 3.0x28 Xience. Cath 4/24/18: RCA - prior stents present; Prox ISR 60%; Mid to Distal - 60%  Difficulty engaging guide secondary to stent at ostium  JR4/AR1 guide would not engage ; THE [facility] guide used to engage RCA  FFR - not siginificant 0.89      Lexiscan Cardiolite (8/11/9): Mild fixed inf defect. No reversible defects. EF 51% with mild inf HK. Cath (8/29/13): LAD patent prox and mid stents. LCx m40; patent OM1 stent. RCA p80. EF 55%. No AVG/MR. PCI ost/proxRCA (8/29/13): 3.0x18 Xience. Echo (8/22/13): EF 55%. Mod dil LA. Mild dil RA. Mild MR/TR/VT. PASP 24.   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 90 Tab 0    metFORMIN ER (GLUCOPHAGE XR) 500 mg tablet TAKE FOUR TABLETS BY MOUTH ONCE DAILY WITH BREAKFAST 360 Tab 1    lisinopril (PRINIVIL, ZESTRIL) 40 mg tablet Take 1 Tab by mouth daily. 90 Tab 0    ferrous sulfate 134 mg (27 mg iron) tab Take 2 Tabs by mouth daily.  aspirin delayed-release 81 mg tablet Take 81 mg by mouth daily. Indications: MYOCARDIAL REINFARCTION PREVENTION         Allergies   Allergen Reactions    Adhesive Tape Rash    Baycol Nausea and Vomiting    Clonidine Other (comments)     Headache and sleepiness      Pravachol [Pravastatin] Nausea and Vomiting      Review of Systems  Constitutional: Negative for fever, chills, and diaphoresis. Positive for fatigue. Respiratory: Negative for cough, hemoptysis, sputum production, and wheezing. Cardiovascular: Negative for chest pain, palpitations, orthopnea, claudication, and PND.    Gastrointestinal: Negative for heartburn, nausea, vomiting, blood in stool and melena. Genitourinary: Negative for dysuria and flank pain. Musculoskeletal: Negative for joint pain and back pain. Skin: Negative for rash. Neurological: Negative for focal weakness, seizures, loss of consciousness, weakness and headaches. Endo/Heme/Allergies: Does not bruise/bleed easily. Psychiatric/Behavioral: Negative for memory loss. The patient does not have insomnia. Physical Exam  Visit Vitals    /80 (BP 1 Location: Left arm)  Comment: per echo tech    Pulse 64    Resp 18    Ht 6' 4\" (1.93 m)    Wt 264 lb 9.6 oz (120 kg)    SpO2 97%    BMI 32.21 kg/m2     Wt Readings from Last 3 Encounters:   06/18/18 264 lb 9.6 oz (120 kg)   04/23/18 265 lb 9.6 oz (120.5 kg)   04/18/18 265 lb 6.4 oz (120.4 kg)      General - well developed well nourished  Neck - JVP normal, thyroid nl  Cardiac - Irregular, no murmurs, rubs or gallops. No clicks  Vascular - carotids without bruits, radials, femorals and pedal pulses equal bilateral  Lungs - clear to auscultation bilaterals, no rales ,wheezing or rhonchi  Abd - rounded/obese, soft nontender  Extremities - 1+ pretibial and pedal edema  Skin - no rash  Neuro - nonfocal. Right sided weakness  Psych - normal mood and affect    Cardiographics:  EKG 4/17/18 - Afib  ECHO 6/18/18 - LVEF of 60%, moderate to severe LAE    Written by Kamron Rosa, as dictated by Dr. Papa Herman.    Papa Herman MD

## 2018-06-25 RX ORDER — WARFARIN SODIUM 5 MG/1
5 TABLET ORAL DAILY
Qty: 7 TAB | Refills: 0 | Status: SHIPPED | OUTPATIENT
Start: 2018-06-25 | End: 2018-07-03 | Stop reason: SDUPTHER

## 2018-06-25 NOTE — TELEPHONE ENCOUNTER
Eliquis 5mg BID discontinued per VO DR. Thelma Marino. Coumadin 5mg daily x7days ordered VO per Dr. Thelma Marino. Spoke to Yessi Michael in Coumadin clinic and Patient was scheduled for f/o lab.

## 2018-07-02 ENCOUNTER — CLINICAL SUPPORT (OUTPATIENT)
Dept: CARDIOLOGY CLINIC | Age: 78
End: 2018-07-02

## 2018-07-02 DIAGNOSIS — I48.91 ATRIAL FIBRILLATION, UNSPECIFIED TYPE (HCC): ICD-10-CM

## 2018-07-02 DIAGNOSIS — Z79.01 LONG TERM (CURRENT) USE OF ANTICOAGULANTS: Primary | ICD-10-CM

## 2018-07-02 LAB
INR BLD: 1.2
INR, EXTERNAL: 1.2 (ref 2–3)
PT POC: 14.2 SECONDS
VALID INTERNAL CONTROL?: YES

## 2018-07-03 RX ORDER — WARFARIN SODIUM 5 MG/1
TABLET ORAL
Qty: 60 TAB | Refills: 0 | Status: SHIPPED | OUTPATIENT
Start: 2018-07-03 | End: 2018-08-14 | Stop reason: SDUPTHER

## 2018-07-09 ENCOUNTER — CLINICAL SUPPORT (OUTPATIENT)
Dept: CARDIOLOGY CLINIC | Age: 78
End: 2018-07-09

## 2018-07-09 DIAGNOSIS — Z79.01 LONG TERM (CURRENT) USE OF ANTICOAGULANTS: Primary | ICD-10-CM

## 2018-07-09 DIAGNOSIS — I48.91 ATRIAL FIBRILLATION, UNSPECIFIED TYPE (HCC): ICD-10-CM

## 2018-07-09 DIAGNOSIS — I10 ESSENTIAL HYPERTENSION, BENIGN: ICD-10-CM

## 2018-07-09 LAB
INR BLD: 1.7
INR, EXTERNAL: 1.7 (ref 2–3)
PT POC: 20.1 SECONDS
VALID INTERNAL CONTROL?: YES

## 2018-07-10 DIAGNOSIS — E78.5 DYSLIPIDEMIA: ICD-10-CM

## 2018-07-10 RX ORDER — ATORVASTATIN CALCIUM 20 MG/1
TABLET, FILM COATED ORAL
Qty: 90 TAB | Refills: 0 | Status: SHIPPED | OUTPATIENT
Start: 2018-07-10 | End: 2018-10-16 | Stop reason: SDUPTHER

## 2018-07-16 ENCOUNTER — CLINICAL SUPPORT (OUTPATIENT)
Dept: CARDIOLOGY CLINIC | Age: 78
End: 2018-07-16

## 2018-07-16 DIAGNOSIS — I25.10 CORONARY ARTERY DISEASE INVOLVING NATIVE CORONARY ARTERY OF NATIVE HEART WITHOUT ANGINA PECTORIS: ICD-10-CM

## 2018-07-16 DIAGNOSIS — I48.91 ATRIAL FIBRILLATION, UNSPECIFIED TYPE (HCC): ICD-10-CM

## 2018-07-16 DIAGNOSIS — Z79.01 LONG TERM (CURRENT) USE OF ANTICOAGULANTS: Primary | ICD-10-CM

## 2018-07-16 LAB
INR BLD: 3.3
INR, EXTERNAL: 3.3 (ref 2–3)
PT POC: 39.9 SECONDS
VALID INTERNAL CONTROL?: YES

## 2018-07-17 RX ORDER — AMLODIPINE BESYLATE 10 MG/1
TABLET ORAL
Qty: 90 TAB | Refills: 0 | Status: SHIPPED | OUTPATIENT
Start: 2018-07-17 | End: 2018-10-18 | Stop reason: SDUPTHER

## 2018-07-30 ENCOUNTER — CLINICAL SUPPORT (OUTPATIENT)
Dept: CARDIOLOGY CLINIC | Age: 78
End: 2018-07-30

## 2018-07-30 DIAGNOSIS — Z79.01 LONG TERM (CURRENT) USE OF ANTICOAGULANTS: Primary | ICD-10-CM

## 2018-07-30 DIAGNOSIS — I48.91 ATRIAL FIBRILLATION, UNSPECIFIED TYPE (HCC): ICD-10-CM

## 2018-07-30 LAB
INR BLD: 2.7
INR, EXTERNAL: 2.7 (ref 2–3)
PT POC: 32.9 SECONDS
VALID INTERNAL CONTROL?: YES

## 2018-08-07 ENCOUNTER — CLINICAL SUPPORT (OUTPATIENT)
Dept: CARDIOLOGY CLINIC | Age: 78
End: 2018-08-07

## 2018-08-07 DIAGNOSIS — I48.91 ATRIAL FIBRILLATION, UNSPECIFIED TYPE (HCC): ICD-10-CM

## 2018-08-07 DIAGNOSIS — I10 ESSENTIAL HYPERTENSION, BENIGN: ICD-10-CM

## 2018-08-07 DIAGNOSIS — Z79.01 LONG TERM (CURRENT) USE OF ANTICOAGULANTS: Primary | ICD-10-CM

## 2018-08-07 LAB
INR BLD: 2.5
INR, EXTERNAL: 2.5 (ref 2–3)
PT POC: 29.9 SECONDS
VALID INTERNAL CONTROL?: YES

## 2018-08-14 ENCOUNTER — CLINICAL SUPPORT (OUTPATIENT)
Dept: CARDIOLOGY CLINIC | Age: 78
End: 2018-08-14

## 2018-08-14 DIAGNOSIS — I10 ESSENTIAL HYPERTENSION, BENIGN: ICD-10-CM

## 2018-08-14 DIAGNOSIS — I48.91 ATRIAL FIBRILLATION, UNSPECIFIED TYPE (HCC): ICD-10-CM

## 2018-08-14 DIAGNOSIS — R53.82 CHRONIC FATIGUE: ICD-10-CM

## 2018-08-14 DIAGNOSIS — I48.20 CHRONIC A-FIB (HCC): Primary | ICD-10-CM

## 2018-08-14 DIAGNOSIS — Z79.01 LONG TERM (CURRENT) USE OF ANTICOAGULANTS: Primary | ICD-10-CM

## 2018-08-14 LAB
INR BLD: 2
INR BLD: 2
INR, EXTERNAL: 2 (ref 2–3)
PT POC: 22.1 SECONDS
PT POC: 22.1 SECONDS
VALID INTERNAL CONTROL?: YES
VALID INTERNAL CONTROL?: YES

## 2018-08-14 RX ORDER — SODIUM CHLORIDE 0.9 % (FLUSH) 0.9 %
5-10 SYRINGE (ML) INJECTION AS NEEDED
Status: CANCELLED | OUTPATIENT
Start: 2018-08-21

## 2018-08-14 RX ORDER — SODIUM CHLORIDE 0.9 % (FLUSH) 0.9 %
5-10 SYRINGE (ML) INJECTION EVERY 8 HOURS
Status: CANCELLED | OUTPATIENT
Start: 2018-08-21

## 2018-08-14 NOTE — PROGRESS NOTES
Spoke with pt concerning YESENIA/DCCv procedure. (Pt IDx2). Instructions given to pt per Bart. Pt NPO after midnight;  take all other meds with sip of water in AM; have someone available to drive pt to and from procedure; pack a bag in case an overnight stay is warranted. Cardioversion scheduled for 8/21/18 on 10am. Pt advised to arrive 2hrs prior to procedure for prep. Labs to be done STAT morning of procedure. Pt expressed understanding. Opportunities for questions, clarifications, and concerns provided.

## 2018-08-17 ENCOUNTER — TELEPHONE (OUTPATIENT)
Dept: CARDIOLOGY CLINIC | Age: 78
End: 2018-08-17

## 2018-08-17 NOTE — TELEPHONE ENCOUNTER
Pt had a root canal done yesterday 8/16/18 and was given amoxicillin to take for 7 days 4x a day. He wants to make sure it is not a problem with the coumadin he is taking.   Phone: [phone]

## 2018-08-17 NOTE — TELEPHONE ENCOUNTER
PTIDX2 notified to check INR in weekwhile on Amoxicillin per . Pt having surgery on Tuesday 8/21/ 18 and surgeon will check INR then.

## 2018-08-21 ENCOUNTER — HOSPITAL ENCOUNTER (OUTPATIENT)
Dept: CARDIAC CATH/INVASIVE PROCEDURES | Age: 78
Discharge: HOME | End: 2018-08-21
Attending: SPECIALIST | Admitting: SPECIALIST
Payer: MEDICARE

## 2018-08-21 ENCOUNTER — HOSPITAL ENCOUNTER (OUTPATIENT)
Dept: NON INVASIVE DIAGNOSTICS | Age: 78
Discharge: HOME | End: 2018-08-21
Payer: MEDICARE

## 2018-08-21 VITALS
HEART RATE: 64 BPM | RESPIRATION RATE: 8 BRPM | DIASTOLIC BLOOD PRESSURE: 70 MMHG | OXYGEN SATURATION: 99 % | SYSTOLIC BLOOD PRESSURE: 157 MMHG

## 2018-08-21 VITALS
BODY MASS INDEX: 32.08 KG/M2 | HEART RATE: 61 BPM | RESPIRATION RATE: 15 BRPM | WEIGHT: 263.45 LBS | SYSTOLIC BLOOD PRESSURE: 150 MMHG | DIASTOLIC BLOOD PRESSURE: 73 MMHG | OXYGEN SATURATION: 98 % | TEMPERATURE: 98 F | HEIGHT: 76 IN

## 2018-08-21 DIAGNOSIS — I48.20 CHRONIC A-FIB (HCC): ICD-10-CM

## 2018-08-21 LAB
ANION GAP SERPL CALC-SCNC: 5 MMOL/L (ref 5–15)
ATRIAL RATE: 56 BPM
BUN SERPL-MCNC: 26 MG/DL (ref 6–20)
BUN/CREAT SERPL: 21 (ref 12–20)
CALCIUM SERPL-MCNC: 8.9 MG/DL (ref 8.5–10.1)
CALCULATED P AXIS, ECG09: 53 DEGREES
CALCULATED R AXIS, ECG10: -22 DEGREES
CALCULATED T AXIS, ECG11: 16 DEGREES
CHLORIDE SERPL-SCNC: 105 MMOL/L (ref 97–108)
CO2 SERPL-SCNC: 27 MMOL/L (ref 21–32)
CREAT SERPL-MCNC: 1.24 MG/DL (ref 0.7–1.3)
DIAGNOSIS, 93000: NORMAL
GLUCOSE BLD STRIP.AUTO-MCNC: 115 MG/DL (ref 65–100)
GLUCOSE SERPL-MCNC: 148 MG/DL (ref 65–100)
INR PPP: 3.5 (ref 0.9–1.1)
MAGNESIUM SERPL-MCNC: 1.9 MG/DL (ref 1.6–2.4)
P-R INTERVAL, ECG05: 218 MS
POTASSIUM SERPL-SCNC: 4.1 MMOL/L (ref 3.5–5.1)
PROTHROMBIN TIME: 33.5 SEC (ref 9–11.1)
Q-T INTERVAL, ECG07: 488 MS
QRS DURATION, ECG06: 116 MS
QTC CALCULATION (BEZET), ECG08: 470 MS
SERVICE CMNT-IMP: ABNORMAL
SODIUM SERPL-SCNC: 137 MMOL/L (ref 136–145)
VENTRICULAR RATE, ECG03: 56 BPM

## 2018-08-21 PROCEDURE — 83735 ASSAY OF MAGNESIUM: CPT | Performed by: SPECIALIST

## 2018-08-21 PROCEDURE — 99152 MOD SED SAME PHYS/QHP 5/>YRS: CPT | Performed by: SPECIALIST

## 2018-08-21 PROCEDURE — 77030018729 HC ELECTRD DEFIB PAD CARD -B

## 2018-08-21 PROCEDURE — 74011250636 HC RX REV CODE- 250/636: Performed by: SPECIALIST

## 2018-08-21 PROCEDURE — 82962 GLUCOSE BLOOD TEST: CPT

## 2018-08-21 PROCEDURE — 80048 BASIC METABOLIC PNL TOTAL CA: CPT | Performed by: SPECIALIST

## 2018-08-21 PROCEDURE — 93312 ECHO TRANSESOPHAGEAL: CPT

## 2018-08-21 PROCEDURE — 85610 PROTHROMBIN TIME: CPT | Performed by: SPECIALIST

## 2018-08-21 PROCEDURE — 74011000250 HC RX REV CODE- 250: Performed by: SPECIALIST

## 2018-08-21 PROCEDURE — 93005 ELECTROCARDIOGRAM TRACING: CPT

## 2018-08-21 PROCEDURE — 36415 COLL VENOUS BLD VENIPUNCTURE: CPT | Performed by: SPECIALIST

## 2018-08-21 PROCEDURE — 99153 MOD SED SAME PHYS/QHP EA: CPT | Performed by: SPECIALIST

## 2018-08-21 PROCEDURE — 92960 CARDIOVERSION ELECTRIC EXT: CPT

## 2018-08-21 RX ORDER — LIDOCAINE 50 MG/G
OINTMENT TOPICAL
Status: DISCONTINUED | OUTPATIENT
Start: 2018-08-21 | End: 2018-08-25 | Stop reason: HOSPADM

## 2018-08-21 RX ORDER — FENTANYL CITRATE 50 UG/ML
25-100 INJECTION, SOLUTION INTRAMUSCULAR; INTRAVENOUS
Status: DISCONTINUED | OUTPATIENT
Start: 2018-08-21 | End: 2018-08-25 | Stop reason: HOSPADM

## 2018-08-21 RX ORDER — DIPHENHYDRAMINE HYDROCHLORIDE 50 MG/ML
25 INJECTION, SOLUTION INTRAMUSCULAR; INTRAVENOUS
Status: COMPLETED | OUTPATIENT
Start: 2018-08-21 | End: 2018-08-21

## 2018-08-21 RX ORDER — LIDOCAINE HYDROCHLORIDE 20 MG/ML
JELLY TOPICAL
Status: COMPLETED | OUTPATIENT
Start: 2018-08-21 | End: 2018-08-21

## 2018-08-21 RX ORDER — LIDOCAINE 50 MG/G
OINTMENT TOPICAL
Status: DISPENSED
Start: 2018-08-21 | End: 2018-08-21

## 2018-08-21 RX ORDER — SODIUM CHLORIDE 0.9 % (FLUSH) 0.9 %
5-10 SYRINGE (ML) INJECTION AS NEEDED
Status: DISCONTINUED | OUTPATIENT
Start: 2018-08-21 | End: 2018-08-21 | Stop reason: HOSPADM

## 2018-08-21 RX ORDER — MIDAZOLAM HYDROCHLORIDE 1 MG/ML
.5-1 INJECTION, SOLUTION INTRAMUSCULAR; INTRAVENOUS
Status: DISCONTINUED | OUTPATIENT
Start: 2018-08-21 | End: 2018-08-25 | Stop reason: HOSPADM

## 2018-08-21 RX ORDER — SODIUM CHLORIDE 9 MG/ML
50 INJECTION, SOLUTION INTRAVENOUS CONTINUOUS
Status: DISCONTINUED | OUTPATIENT
Start: 2018-08-21 | End: 2018-08-21 | Stop reason: HOSPADM

## 2018-08-21 RX ORDER — SODIUM CHLORIDE 0.9 % (FLUSH) 0.9 %
5-10 SYRINGE (ML) INJECTION EVERY 8 HOURS
Status: DISCONTINUED | OUTPATIENT
Start: 2018-08-21 | End: 2018-08-21 | Stop reason: HOSPADM

## 2018-08-21 RX ADMIN — FENTANYL CITRATE 25 MCG: 50 INJECTION, SOLUTION INTRAMUSCULAR; INTRAVENOUS at 11:14

## 2018-08-21 RX ADMIN — DIPHENHYDRAMINE HYDROCHLORIDE 25 MG: 50 INJECTION, SOLUTION INTRAMUSCULAR; INTRAVENOUS at 11:02

## 2018-08-21 RX ADMIN — LIDOCAINE: 50 OINTMENT TOPICAL at 10:55

## 2018-08-21 RX ADMIN — MIDAZOLAM 1 MG: 1 INJECTION INTRAMUSCULAR; INTRAVENOUS at 11:14

## 2018-08-21 RX ADMIN — MIDAZOLAM 1 MG: 1 INJECTION INTRAMUSCULAR; INTRAVENOUS at 11:17

## 2018-08-21 RX ADMIN — SODIUM CHLORIDE 50 ML/HR: 900 INJECTION, SOLUTION INTRAVENOUS at 09:30

## 2018-08-21 RX ADMIN — FENTANYL CITRATE 25 MCG: 50 INJECTION, SOLUTION INTRAMUSCULAR; INTRAVENOUS at 11:12

## 2018-08-21 RX ADMIN — LIDOCAINE HYDROCHLORIDE: 20 JELLY TOPICAL at 11:14

## 2018-08-21 RX ADMIN — MIDAZOLAM 1 MG: 1 INJECTION INTRAMUSCULAR; INTRAVENOUS at 11:12

## 2018-08-21 RX ADMIN — MIDAZOLAM 1 MG: 1 INJECTION INTRAMUSCULAR; INTRAVENOUS at 11:23

## 2018-08-21 RX ADMIN — LIDOCAINE: 50 OINTMENT TOPICAL at 11:06

## 2018-08-21 NOTE — PROGRESS NOTES
TRANSFER - OUT REPORT:    Verbal report given to Lucie Castorena RN (name) on Daren Sorensen  being transferred to pre/post (unit) for routine progression of care       Report consisted of patients Situation, Background, Assessment and   Recommendations(SBAR). Information from the following report(s) SBAR was reviewed with the receiving nurse. Lines:   Peripheral IV 08/21/18 Left Arm (Active)   Site Assessment Clean, dry, & intact 8/21/2018 11:47 AM   Phlebitis Assessment 0 8/21/2018 11:47 AM   Infiltration Assessment 0 8/21/2018 11:47 AM   Dressing Status Clean, dry, & intact 8/21/2018 11:47 AM        Opportunity for questions and clarification was provided.       Patient transported with:   O2 @ 2 liters  Cardiac monitor

## 2018-08-21 NOTE — PROCEDURES
Transesophageal Echo    Date of Procedure: 8/21/2018   Preoperative Diagnosis: persistent AF, r/o MALINA thrombus  Postoperative Diagnosis: see below    Procedure: YESENIA  Cardiologist: Thamas Dakin, MD  Anesthesia: local + IV sedation  Estimated Blood Loss: None  Findings: no LA/MALINA thrombus, mild LAE, moderate MARCELA, mild MR, EF 55%, mild atheroma desc Ao. See full YESENIA note.   Complications: none

## 2018-08-21 NOTE — PROGRESS NOTES
Patient arrived. ID and allergies verified verbally with patient. Pt voices understanding of procedure to be performed. Consent obtained. Pt prepped for procedure. 36 Wife at bedside. 1050 TRANSFER - OUT REPORT:    Verbal report given to Kacie(name) on Beverley Obando  being transferred to stress/echo(unit) for ordered procedure       Report consisted of patients Situation, Background, Assessment and   Recommendations(SBAR). Information from the following report(s) SBAR was reviewed with the receiving nurse. Lines:   Peripheral IV 08/21/18 Left Arm (Active)        Opportunity for questions and clarification was provided. Patient transported with:   Monitor  Registered Nurse        1147 TRANSFER - IN REPORT:    Verbal report received from Kacie(name) on Beverley Obando  being received from stress/echo(unit) for routine progression of care      Report consisted of patients Situation, Background, Assessment and   Recommendations(SBAR). Information from the following report(s) Procedure Summary was reviewed with the receiving nurse. Opportunity for questions and clarification was provided. Assessment completed upon patients arrival to unit and care assumed. 12 Pt wife at bedside. Pt assisted to sit on side of bed. Voided in urinal.     1250 Discharge instructions reviewed with patient and family. Voiced understanding. Patient given copy of discharge instructions to take home. Discussed need to still wait until atleast 1330 before eating and drinking and reviewed dietary recommendations    1305 Pt discharged via wheelchiar with tech to wifes care. Personal belongings with patient upon discharge.

## 2018-08-21 NOTE — DISCHARGE INSTRUCTIONS
AFTER YOU TRANSESOPHAGEAL ECHOCARDIOGRAM    Be sure someone else drives you home. You may feel drowsy for several hours. Do not eat or drink for at least two hours after your procedure. Your throat will be numb and there is a risk you might have difficulty swallowing for a while. Be careful when you do eat or drink for the first time especially with hot fluids since you could easily burn your throat. Call your doctor if:    · You are bleeding from your throat or mouth. · You have trouble breathing all of a sudden. · You have chest pain or any pain that spreads to your neck, jaw, or arms. · You have questions or concerns. · You have a fever greater than 101°F.        No driving for 24 hours. Electrical Cardioversion: What to Expect at Home  Your Recovery    Electrical cardioversion is a treatment for an abnormal heartbeat, such as atrial fibrillation, supraventricular tachycardia, or ventricular tachycardia (VT). It uses a brief electrical shock to reset your heart's rhythm. After cardioversion, you may have redness, like a sunburn, where the patches were. The medicines you got to make you sleepy may make you feel drowsy for the rest of the day. Your doctor may have you take medicines to help the heart beat normally and to prevent blood clots. This care sheet gives you a general idea about how long it will take for you to recover. But each person recovers at a different pace. Follow the steps below to feel better as quickly as possible. How can you care for yourself at home? Medicines    · Be safe with medicines. Take your medicines exactly as prescribed. Call your doctor if you think you are having a problem with your medicine. You may take one or more of the following medicines:  ¨ Rate-control medicines to slow the heart rate. These include beta-blockers, calcium channel blockers, and digoxin. ¨ Rhythm control medicines that help the heart keep a normal rhythm.   ¨ Blood thinners, also called anticoagulants, which help prevent blood clots. You will get more details on the specific medicines your doctor prescribes. Be sure you know how to take your medicines safely.     · Do not take any vitamins, over-the-counter medicines, or herbal products without talking to your doctor first.   Exercise    · Start light exercise if your doctor says that it is okay. Even a small amount will help you get stronger, have more energy, and manage your stress. Walking is an easy way to get exercise. Start out by walking a little more than you did in the hospital. Bit by bit, increase the amount you walk.     · When you exercise, watch for signs that your heart is working too hard. You are pushing too hard if you cannot talk while you are exercising. If you become short of breath or dizzy or have chest pain, sit down and rest right away.     · Check your pulse regularly. Place two fingers on the artery at the palm side of your wrist in line with your thumb. If your heartbeat seems uneven or fast, talk to your doctor. Other instructions    · Ask your doctor when you can drive again.     · Do not smoke. If you need help quitting, talk to your doctor about stop-smoking programs and medicines. These can increase your chances of quitting for good.     · Limit alcohol. Follow-up care is a key part of your treatment and safety. Be sure to make and go to all appointments, and call your doctor if you are having problems. It's also a good idea to know your test results and keep a list of the medicines you take. When should you call for help? Call 911 anytime you think you may need emergency care. For example, call if:    · You passed out (lost consciousness).     · You have chest pain or pressure. This may occur with:  ¨ Sweating. ¨ Shortness of breath. ¨ Nausea or vomiting. ¨ Pain that spreads from the chest to the neck, jaw, or one or both shoulders or arms. ¨ A fast or uneven pulse.   After calling 911, the  may tell you to chew 1 adult-strength or 2 to 4 low-dose aspirin. Wait for an ambulance. Do not try to drive yourself.     · You have symptoms of a stroke. These may include:  ¨ Sudden numbness, tingling, weakness, or loss of movement in your face, arm, or leg, especially on angel side of your body. ¨ Sudden vision changes. ¨ Sudden trouble speaking. ¨ Sudden confusion or trouble understanding simple statements. ¨ Sudden problems with walking or balance. ¨ A sudden, severe headache that is different from past headaches.    Call your doctor now or seek immediate medical care if:    · You feel dizzy or lightheaded, or you feel like you may faint.     · You have a fast or irregular heartbeat.    Watch closely for any changes in your health, and be sure to contact your doctor if you have any problems. Where can you learn more? Go to http://ruth-ellyn.info/. Enter A617 in the search box to learn more about \"Electrical Cardioversion: What to Expect at Home. \"  Current as of: December 6, 2017  Content Version: 11.7  © 2122-8633 [employer]. Care instructions adapted under license by [employer] (which disclaims liability or warranty for this information). If you have questions about a medical condition or this instruction, always ask your healthcare professional. [place] [address] any warranty or liability for your use of this information.

## 2018-08-21 NOTE — PROGRESS NOTES
TRANSFER - IN REPORT:    Verbal report received from [facility]) on Sharlene Music  being received from pre/post (unit) for routine progression of care      Report consisted of patients Situation, Background, Assessment and   Recommendations(SBAR). Information from the following report(s) SBAR was reviewed with the receiving nurse. Opportunity for questions and clarification was provided. Assessment completed upon patients arrival to unit and care assumed.

## 2018-08-21 NOTE — H&P
Date of Surgery Update:  Bunnie Cabot was seen and examined. History and physical has been reviewed. The patient has been examined. There have been no significant clinical changes since the completion of the originally dated History and Physical.    Signed By: Sanford Mcintosh MD     August 21, 2018 10:32 AM           Persistent AF  Lab Results   Component Value Date/Time    INR 3.5 (H) 08/21/2018 09:24 AM    INR (POC) 2.7 (H) 12/18/2014 11:01 AM    INR, External 2.0 08/14/2018    INR, External 2.5 08/07/2018    INR, External 2.7 07/30/2018    INR POC 2.0 08/14/2018 09:18 AM    INR POC 2.0 08/14/2018 09:14 AM    INR POC 2.5 08/07/2018 03:17 PM    Prothrombin time 33.5 (H) 08/21/2018 09:24 AM     Proceed with YESENIA guided DCCV  ASA 2  Airway 2        Progress Notes  Encounter Date: 6/18/2018  Sanford Mcintosh MD   Cardiology      []Hide copied text               [id number] [facility], [address] [place] [address]     Office [phone]  Fax [phone]         Bunnie Cabot is a 69 y.o. male here today for follow up of cath on 4/24/18.      Assessment       Encounter Diagnoses   Name Primary? 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 Saray Ray has a history of CAD s/p multiple PCI's, most recently 1 year ago,s/p PCI RCA with LEO. Last cath most recently 4/24/18 demonstrated no RCA restenosis, distal LAD 80%. He has not derived any benefit from oral nitrates. I suspect his fatigue may in part be due to AF, albeit rate controlled.  Will discontinue Imdur.      Persistent AF, rate controlled. Heather Waters another attempt at rhythm control strategy- continue amiodarone, arrange for YESENIA guided cardioversion after resuming anticoagulation, this time with Eliquis 5 bid, and stop plavix. High risk of recurrent stroke. History of GI bleeding 2008 and 2013 without clear cut abnormalities. M2A capsule endoscopy 2014 was negative. If he has early recurrence of AF, consider referral for AF ablation.      Should he have recurrent GI bleeding, we discussed the potential role of WATCHMAN.     Lower extremity edema is likely due to venous insufficiency, which may be exacerbated by AF and untreated TOPHER. Discussed the importance of a low sodium diet. Advised leg elevation and compression stockings.       YESENIA/cardioversion near future. ASA 2  Airway 2           Subjective:  Cath on 4/24/18 demonstrated distal LAD 80% with moderate proximal and mid RCA stenoses; however, FFR was negative. He was started on Imdur 30mg/D     Today, he states he is compliant with Imdur, with no improvement in exertional fatigue and muscle weakness. He states he has a history of fatigue with his two previous episodes of A Fib. He notes previous A Fib resolved with cardioversion. Pt wore a heart monitor for 3 weeks, then discontinued due to difficulty sleeping with the monitor. He was told to return it 6/20/18- results pending     Pt has a history of bleeding with Xarelto, trouble with regulating coumadin in the past. He is also taking Plavix. Pt states he is going on vacation to [place], and will return 6/25/18. He will start Eliquis upon his return.      Patient denies any exertional chest pain, dyspnea, palpitations, syncope, orthopnea, or paroxysmal nocturnal dyspnea.     Cardiac risk factors   HTN yes  DM  yes  Smoking no     Cardiac testing  Cath (8/27/8): LAD p90, m70. OM1 70, OM2 90 (small). RCA m40. EF 55%. No AVG/MR.  Patent L SC.  PCI (11/13/8): mLAD 2.75x18 Xience, pLAD 2.75x23 Xience. OM1 3.0x15 Xience. Cath 4/28/17:Cath (4/28/17): LAD patent with patent prox and mid stent. LCx patent with patent OM1 stent. RCA p90 (ISR) and p90 distal to stent. EF 65%. No AVG/MR. PCI osteal/prox RCA (4/28/17): 3.0x28 Xience. Cath 4/24/18: RCA - prior stents present; Prox ISR 60%; Mid to Distal - 60%  Difficulty engaging guide secondary to stent at ostium  JR4/AR1 guide would not engage ; THE [facility] guide used to engage RCA  FFR - not siginificant 0.89        Lexiscan Cardiolite (8/11/9): Mild fixed inf defect. No reversible defects. EF 51% with mild inf HK. Cath (8/29/13): LAD patent prox and mid stents. LCx m40; patent OM1 stent. RCA p80. EF 55%. No AVG/MR. PCI ost/proxRCA (8/29/13): 3.0x18 Xience. Echo (8/22/13): EF 55%. Mod dil LA. Mild dil RA. Mild MR/TR/AL. PASP 24. 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  90 Tab 0    metFORMIN ER (GLUCOPHAGE XR) 500 mg tablet TAKE FOUR TABLETS BY MOUTH ONCE DAILY WITH BREAKFAST 360 Tab 1    lisinopril (PRINIVIL, ZESTRIL) 40 mg tablet Take 1 Tab by mouth daily. 90 Tab 0    ferrous sulfate 134 mg (27 mg iron) tab Take 2 Tabs by mouth daily.        aspirin delayed-release 81 mg tablet Take 81 mg by mouth daily. Indications: MYOCARDIAL REINFARCTION PREVENTION                   Allergies   Allergen Reactions    Adhesive Tape Rash    Baycol Nausea and Vomiting    Clonidine Other (comments)       Headache and sleepiness    Pravachol [Pravastatin] Nausea and Vomiting      Review of Systems  Constitutional: Negative for fever, chills, and diaphoresis. Positive for fatigue. Respiratory: Negative for cough, hemoptysis, sputum production, and wheezing. Cardiovascular: Negative for chest pain, palpitations, orthopnea, claudication, and PND. Gastrointestinal: Negative for heartburn, nausea, vomiting, blood in stool and melena. Genitourinary: Negative for dysuria and flank pain. Musculoskeletal: Negative for joint pain and back pain. Skin: Negative for rash. Neurological: Negative for focal weakness, seizures, loss of consciousness, weakness and headaches. Endo/Heme/Allergies: Does not bruise/bleed easily. Psychiatric/Behavioral: Negative for memory loss. The patient does not have insomnia.       Physical Exam       Visit Vitals    /80 (BP 1 Location: Left arm)  Comment: per echo tech    Pulse 64    Resp 18    Ht 6' 4\" (1.93 m)    Wt 264 lb 9.6 oz (120 kg)    SpO2 97%    BMI 32.21 kg/m2          Wt Readings from Last 3 Encounters:   06/18/18 264 lb 9.6 oz (120 kg)   04/23/18 265 lb 9.6 oz (120.5 kg)   04/18/18 265 lb 6.4 oz (120.4 kg)      General - well developed well nourished  Neck - JVP normal, thyroid nl  Cardiac - Irregular, no murmurs, rubs or gallops.  No clicks  Vascular - carotids without bruits, radials, femorals and pedal pulses equal bilateral  Lungs - clear to auscultation bilaterals, no rales ,wheezing or rhonchi  Abd - rounded/obese, soft nontender  Extremities - 1+ pretibial and pedal edema  Skin - no rash  Neuro - nonfocal. Right sided weakness  Psych - normal mood and affect     Cardiographics:  EKG 4/17/18 - Afib  ECHO 6/18/18 - LVEF of 60%, moderate to severe LAE     Written by Alicia Raya, as dictated by Dr. Aundrea Diaz.    Aundrea Diza MD         Electronically signed by Aundrea Diaz MD at 06/18/18 1754        Office Visit on 6/18/2018              Revision History              Detailed Report             Note shared with patient   Note Details   Author Aundrea Diaz MD File Time 06/18/18 1754   Author Type Physician Status Signed   Last  Aundrea Diaz MD Specialty Cardiology

## 2018-08-21 NOTE — IP AVS SNAPSHOT
[address] [facility] 
 
 
 [address] [facility] [address] [facility] 
[phone] Patient: Bita Andrea MRN: [id number] VAA:[id number] About your hospitalization You were admitted on:  August 21, 2018 You last received care in the:  [facility] PACU You were discharged on:  August 21, 2018 Why you were hospitalized Your primary diagnosis was:  Not on File Follow-up Information Follow up With Details Comments Contact Info Abilio Pugh MD   [address] [place] [address] 
[phone] Jossie Bonilla MD In 1 month office to schedule [address] [facility] [address] [phone] [address] [facility] 
[phone] Your Scheduled Appointments Tuesday August 28, 2018  8:40 AM EDT  
COUMADIN CLINIC with JUAN PABLO LARES  
[facility] ([facility] SCHEDULING) [address] [facility] [address] [facility]  
[phone] Discharge Orders None A check brittany indicates which time of day the medication should be taken. My Medications CONTINUE taking these medications Instructions Each Dose to Equal  
 Morning Noon Evening Bedtime  
 amiodarone 200 mg tablet Commonly known as:  CORDARONE Your last dose was: Your next dose is: TAKE ONE TABLET BY MOUTH ONCE DAILY  
     
   
   
   
  
 amLODIPine 10 mg tablet Commonly known as:  Ingris Spruce Your last dose was: Your next dose is: TAKE 1 TABLET BY MOUTH ONCE DAILY AMOXICILLIN PO Your last dose was: Your next dose is: Take  by mouth. aspirin delayed-release 81 mg tablet Your last dose was: Your next dose is: Take 81 mg by mouth daily. Indications: MYOCARDIAL REINFARCTION PREVENTION 81 mg  
    
   
   
   
  
 atorvastatin 20 mg tablet Commonly known as:  LIPITOR Your last dose was: Your next dose is: TAKE 1 TABLET BY MOUTH ONCE DAILY  
     
   
   
   
  
 lisinopril 40 mg tablet Commonly known as:  Olena November Your last dose was: Your next dose is: Take 1 Tab by mouth daily. 40 mg  
    
   
   
   
  
 metFORMIN  mg tablet Commonly known as:  GLUCOPHAGE XR Your last dose was: Your next dose is: TAKE FOUR TABLETS BY MOUTH ONCE DAILY WITH BREAKFAST  
     
   
   
   
  
 warfarin 5 mg tablet Commonly known as:  COUMADIN Your last dose was: Your next dose is: TAKE 1 & 1/2 (ONE & ONE-HALF) TABLETS (7.5 MG) BY MOUTH ONCE DAILY Discharge Instructions AFTER YOU TRANSESOPHAGEAL ECHOCARDIOGRAM 
 
Be sure someone else drives you home. You may feel drowsy for several hours. Do not eat or drink for at least two hours after your procedure. Your throat will be numb and there is a risk you might have difficulty swallowing for a while. Be careful when you do eat or drink for the first time especially with hot fluids since you could easily burn your throat. Call your doctor if: 
 
· You are bleeding from your throat or mouth. · You have trouble breathing all of a sudden. · You have chest pain or any pain that spreads to your neck, jaw, or arms. · You have questions or concerns. · You have a fever greater than 101°F. 
 
 
 
No driving for 24 hours. Electrical Cardioversion: What to Expect at [facility] Your Recovery Electrical cardioversion is a treatment for an abnormal heartbeat, such as atrial fibrillation, supraventricular tachycardia, or ventricular tachycardia (VT). It uses a brief electrical shock to reset your heart's rhythm. After cardioversion, you may have redness, like a sunburn, where the patches were. The medicines you got to make you sleepy may make you feel drowsy for the rest of the day. Your doctor may have you take medicines to help the heart beat normally and to prevent blood clots. This care sheet gives you a general idea about how long it will take for you to recover. But each person recovers at a different pace. Follow the steps below to feel better as quickly as possible. How can you care for yourself at home? Medicines 
  · Be safe with medicines. Take your medicines exactly as prescribed. Call your doctor if you think you are having a problem with your medicine. You may take one or more of the following medicines: 
¨ Rate-control medicines to slow the heart rate. These include beta-blockers, calcium channel blockers, and digoxin. ¨ Rhythm control medicines that help the heart keep a normal rhythm. ¨ Blood thinners, also called anticoagulants, which help prevent blood clots. You will get more details on the specific medicines your doctor prescribes. Be sure you know how to take your medicines safely.  
  · Do not take any vitamins, over-the-counter medicines, or herbal products without talking to your doctor first.  
Exercise 
  · Start light exercise if your doctor says that it is okay. Even a small amount will help you get stronger, have more energy, and manage your stress. Walking is an easy way to get exercise. Start out by walking a little more than you did in the hospital. Bit by bit, increase the amount you walk.  
  · When you exercise, watch for signs that your heart is working too hard. You are pushing too hard if you cannot talk while you are exercising. If you become short of breath or dizzy or have chest pain, sit down and rest right away.  
  · Check your pulse regularly. Place two fingers on the artery at the palm side of your wrist in line with your thumb. If your heartbeat seems uneven or fast, talk to your doctor. Other instructions 
  · Ask your doctor when you can drive again.  
  · Do not smoke.  If you need help quitting, talk to your doctor about stop-smoking programs and medicines. These can increase your chances of quitting for good.  
  · Limit alcohol. Follow-up care is a key part of your treatment and safety. Be sure to make and go to all appointments, and call your doctor if you are having problems. It's also a good idea to know your test results and keep a list of the medicines you take. When should you call for help? Call 911 anytime you think you may need emergency care. For example, call if: 
  · You passed out (lost consciousness).  
  · You have chest pain or pressure. This may occur with: ¨ Sweating. ¨ Shortness of breath. ¨ Nausea or vomiting. ¨ Pain that spreads from the chest to the neck, jaw, or one or both shoulders or arms. ¨ A fast or uneven pulse. After calling 911, the  may tell you to chew 1 adult-strength or 2 to 4 low-dose aspirin. Wait for an ambulance. Do not try to drive yourself.  
  · You have symptoms of a stroke. These may include: 
¨ Sudden numbness, tingling, weakness, or loss of movement in your face, arm, or leg, especially on nagel side of your body. ¨ Sudden vision changes. ¨ Sudden trouble speaking. ¨ Sudden confusion or trouble understanding simple statements. ¨ Sudden problems with walking or balance. ¨ A sudden, severe headache that is different from past headaches.  
 Call your doctor now or seek immediate medical care if: 
  · You feel dizzy or lightheaded, or you feel like you may faint.  
  · You have a fast or irregular heartbeat.  
 Watch closely for any changes in your health, and be sure to contact your doctor if you have any problems. Where can you learn more? Go to http://ruth-ellyn.info/. Enter A617 in the search box to learn more about \"Electrical Cardioversion: What to Expect at Home. \" Current as of: December 6, 2017 Content Version: 11.7 © 8666-2291 [employer].  Care instructions adapted under license by [address] (which disclaims liability or warranty for this information). If you have questions about a medical condition or this instruction, always ask your healthcare professional. [address] any warranty or liability for your use of this information. ACO Transitions of Care Introducing [employer] [address] [place] offers a voluntary care coordination program to provide high quality service and care to [facility] fee-for-service beneficiaries. Brandie Rodriguez was designed to help you enhance your health and well-being through the following services: ? Transitions of Care  support for individuals who are transitioning from one care setting to another (example: Hospital to home). ? Chronic and Complex Care Coordination  support for individuals and caregivers of those with serious or chronic illnesses or with more than one chronic (ongoing) condition and those who take a number of different medications. If you meet specific medical criteria, a [address] [place] [address] Rd may call you directly to coordinate your care with your primary care physician and your other care providers. For questions about the [facility] programs, please, contact your physicians office. For general questions or additional information about Accountable Care Organizations: 
Please visit www.medicare.gov/acos. html or call 1-800-MEDICARE (1-787.430.3854) TTY users should call 4-585.279.5278. Introducing Roe Rojas As a [employer] [facility] patient, I wanted to make you aware of our electronic visit tool called Roe Lucianovitaly. [facility] [address] 24/7 allows you to connect within minutes with a medical provider 24 hours a day, seven days a week via a mobile device or tablet or logging into a secure website from your computer. You can access Roe Lucianosheltonfin from anywhere in the [place]. A virtual visit might be right for you when you have a simple condition and feel like you just dont want to get out of bed, or cant get away from work for an appointment, when your regular [facility] provider is not available (evenings, weekends or holidays), or when youre out of town and need minor care. Electronic visits cost only $49 and if the [employer] 24/smsPREP provider determines a prescription is needed to treat your condition, one can be electronically transmitted to a nearby pharmacy*. Please take a moment to enroll today if you have not already done so. The enrollment process is free and takes just a few minutes. To enroll, please download the [employer] kaye to your tablet or phone, or visit [email or web address] to enroll on your computer. And, as an [address] [place] [address] patient with a [employer] account, the results of your visits will be scanned into your electronic medical record and your primary care provider will be able to view the scanned results. We urge you to continue to see your regular [facility] provider for your ongoing medical care. And while your primary care provider may not be the one available when you seek a [employer] virtual visit, the peace of mind you get from getting a real diagnosis real time can be priceless. For more information on [employer] [facility], view our Frequently Asked Questions (FAQs) at [email or web address]. Sincerely, 
 
David Escalera MD 
Chief Medical Officer [facility] [place] *:  certain medications cannot be prescribed via [employer] Unresulted Labs-Please follow up with your PCP about these lab tests Order Current Status EKG, 12 LEAD, INITIAL Preliminary result Providers Seen During Your Hospitalization Provider Specialty Primary office phone José Miguel Whaley MD Cardiology [phone] Your Primary Care Physician (PCP) Primary Care Physician Office Phone Office Fax Azeem Chaudhry [phone] You are allergic to the following Allergen Reactions Adhesive Tape Rash Baycol Nausea and Vomiting Clonidine Other (comments) Headache and sleepiness Pravachol (Pravastatin) Nausea and Vomiting Recent Documentation Height Weight BMI Smoking Status 1.93 m 119.5 kg 32.07 kg/m2 Never Smoker Emergency Contacts Name Discharge Info Relation Home Work Mobile Martita Polanco DISCHARGE CAREGIVER [3] Spouse [3] [phone] Patient Belongings The following personal items are in your possession at time of discharge: 
     Visual Aid: Glasses, With patient (just for reading) Please provide this summary of care documentation to your next provider. Signatures-by signing, you are acknowledging that this After Visit Summary has been reviewed with you and you have received a copy. Patient Signature:  ____________________________________________________________ Date:  ____________________________________________________________  
  
Josesito Botello Provider Signature:  ____________________________________________________________ Date:  ____________________________________________________________

## 2018-08-21 NOTE — PROCEDURES
BRIEF PROCEDURE NOTE    Date of Procedure: 8/21/2018   Preoperative Diagnosis: atrial fibrillation  Postoperative Diagnosis: same   Procedure: Synchronized DC cardioversion  Cardiologist: Jorge Luis Meek MD  Anesthesia:  IV sedation  Estimated Blood Loss: None  Findings: Following YESENIA demonstrating no LA/MALINA thrombus, the patient was sedated with additional intravenous midazolam.  200 joule biphasic shock was delivered using anterior and posterior pads converting atrial fibrillation to sinus rhythm. No complications were noted.      Complications: none    Successful YESENIA guided DCCV of AF to sinus  Continue amiodarone/warfarin  If recurrent GI Bleeding => WATCHMAN  If early recurrence of AF => EP evaluation for ablation    RCT 1 month

## 2018-08-28 ENCOUNTER — CLINICAL SUPPORT (OUTPATIENT)
Dept: CARDIOLOGY CLINIC | Age: 78
End: 2018-08-28

## 2018-08-28 DIAGNOSIS — Z79.01 LONG TERM (CURRENT) USE OF ANTICOAGULANTS: Primary | ICD-10-CM

## 2018-08-28 DIAGNOSIS — I10 ESSENTIAL HYPERTENSION, BENIGN: ICD-10-CM

## 2018-08-28 DIAGNOSIS — I48.91 ATRIAL FIBRILLATION, UNSPECIFIED TYPE (HCC): ICD-10-CM

## 2018-08-28 DIAGNOSIS — R53.82 CHRONIC FATIGUE: ICD-10-CM

## 2018-08-28 LAB
INR BLD: 3
INR, EXTERNAL: 3 (ref 2–3)
PT POC: 42 SECONDS
VALID INTERNAL CONTROL?: YES

## 2018-08-29 RX ORDER — ISOSORBIDE MONONITRATE 30 MG/1
TABLET, EXTENDED RELEASE ORAL
Qty: 30 TAB | Refills: 3 | OUTPATIENT
Start: 2018-08-29

## 2018-08-30 NOTE — TELEPHONE ENCOUNTER
Pt notified that Imdur discontinued and he should stop taking. Per 's clinic note  Pt verbalized understanding and will stop taking.

## 2018-09-26 DIAGNOSIS — I10 ESSENTIAL HYPERTENSION, BENIGN: ICD-10-CM

## 2018-09-27 RX ORDER — LISINOPRIL 40 MG/1
TABLET ORAL
Qty: 90 TAB | Refills: 0 | Status: SHIPPED | OUTPATIENT
Start: 2018-09-27 | End: 2019-01-22

## 2018-09-28 ENCOUNTER — OFFICE VISIT (OUTPATIENT)
Dept: CARDIOLOGY CLINIC | Age: 78
End: 2018-09-28

## 2018-09-28 ENCOUNTER — CLINICAL SUPPORT (OUTPATIENT)
Dept: CARDIOLOGY CLINIC | Age: 78
End: 2018-09-28

## 2018-09-28 VITALS
DIASTOLIC BLOOD PRESSURE: 80 MMHG | WEIGHT: 256.3 LBS | HEIGHT: 76 IN | SYSTOLIC BLOOD PRESSURE: 140 MMHG | HEART RATE: 65 BPM | BODY MASS INDEX: 31.21 KG/M2 | OXYGEN SATURATION: 97 %

## 2018-09-28 DIAGNOSIS — D50.9 IRON DEFICIENCY ANEMIA, UNSPECIFIED IRON DEFICIENCY ANEMIA TYPE: ICD-10-CM

## 2018-09-28 DIAGNOSIS — I25.10 CORONARY ARTERY DISEASE INVOLVING NATIVE CORONARY ARTERY OF NATIVE HEART WITHOUT ANGINA PECTORIS: ICD-10-CM

## 2018-09-28 DIAGNOSIS — I48.91 ATRIAL FIBRILLATION, UNSPECIFIED TYPE (HCC): ICD-10-CM

## 2018-09-28 DIAGNOSIS — R53.82 CHRONIC FATIGUE: ICD-10-CM

## 2018-09-28 DIAGNOSIS — I10 ESSENTIAL HYPERTENSION, BENIGN: ICD-10-CM

## 2018-09-28 DIAGNOSIS — I63.40 CEREBRAL INFARCTION DUE TO EMBOLISM OF CEREBRAL ARTERY (HCC): ICD-10-CM

## 2018-09-28 DIAGNOSIS — I48.0 PAROXYSMAL ATRIAL FIBRILLATION (HCC): Primary | ICD-10-CM

## 2018-09-28 DIAGNOSIS — Z79.01 LONG TERM (CURRENT) USE OF ANTICOAGULANTS: Primary | ICD-10-CM

## 2018-09-28 DIAGNOSIS — G47.33 OSA (OBSTRUCTIVE SLEEP APNEA): ICD-10-CM

## 2018-09-28 DIAGNOSIS — E78.5 DYSLIPIDEMIA: ICD-10-CM

## 2018-09-28 LAB
INR BLD: 3.1
INR, EXTERNAL: 3.1 (ref 2–3)
PT POC: 41.5 SECONDS
VALID INTERNAL CONTROL?: YES

## 2018-09-28 NOTE — PROGRESS NOTES
Visit Vitals    /80 (BP 1 Location: Left arm, BP Patient Position: Sitting)    Pulse 65    Ht 6' 4\" (1.93 m)    Wt 256 lb 4.8 oz (116.3 kg)    SpO2 97%    BMI 31.2 kg/m2        Patient has some fatigue   Swelling in right leg at night and down by the morning.

## 2018-09-28 NOTE — PROGRESS NOTES
[id number]  [place], [address] [facility]    Office [phone]  Fax [phone]       Bita Andrea is a 69 y.o. male here for follow up from [facility] guided cardioversion from Afib 8/2018. Assessment  Encounter Diagnoses   Name Primary? 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 S/p repeat cardioversion 1 month ago. Appeared he remained in SR now on amiodarone - would continue. High risk of recurrent stroke. History of GI bleeding 2008 and 2013 without clear cut abnormalities. M2A capsule endoscopy 2014 was negative. If he has early recurrence of AF, consider referral for AF ablation. No recurrent GI bleeding on warfarin.    CAD. s/p multiple PCI's, most recently 1 year ago,s/p PCI RCA with LEO. Last cath most recently 4/24/18 demonstrated no RCA restenosis, distal LAD 80%. He has chronic fatigue but no anginal symptoms. Chronic fatigue - multifactorial - suspect deconditioning. untreated sleep apnea may be playing significant role. SR has not impacted sxs. We discussed exercise training and reevaluation of sleep apnea. Lower extremity edema is likely due to venous insufficiency, which may be exacerbated by AF and untreated TOPHER. Discussed the importance of a low sodium diet. Advised leg elevation and compression stockings. Subjective:  Mr. Amos Taylor reports no significant difference in stamina post cardioversion. He notes he feels horribly fatigued and decline in energy.  He notes he has been looking into rehab and therapy, but he has been deterred due to cost. Patient does not use CPAP machine. Patient denies any exertional chest pain, dyspnea, palpitations, syncope, orthopnea, or paroxysmal nocturnal dyspnea. Cardiac risk factors   HTN yes  DM  yes  Smoking no    Cardiac testing  Cath (8/27/8): LAD p90, m70. OM1 70, OM2 90 (small). RCA m40. EF 55%. No AVG/MR. Patent L SC.  PCI (11/13/8): mLAD 2.75x18 Xience, pLAD 2.75x23 Xience. OM1 3.0x15 Xience. Cath 4/28/17:Cath (4/28/17): LAD patent with patent prox and mid stent. LCx patent with patent OM1 stent. RCA p90 (ISR) and p90 distal to stent. EF 65%. No AVG/MR. PCI osteal/prox RCA (4/28/17): 3.0x28 Xience. Cath 4/24/18: RCA - prior stents present; Prox ISR 60%; Mid to Distal - 60%  Difficulty engaging guide secondary to stent at ostium  JR4/AR1 guide would not engage ; THE [facility] guide used to engage RCA  FFR - not siginificant 0.89      Lexiscan Cardiolite (8/11/9): Mild fixed inf defect. No reversible defects. EF 51% with mild inf HK. Cath (8/29/13): LAD patent prox and mid stents. LCx m40; patent OM1 stent. RCA p80. EF 55%. No AVG/MR. PCI ost/proxRCA (8/29/13): 3.0x18 Xience. Echo (8/22/13): EF 55%. Mod dil LA. Mild dil RA. Mild MR/TR/CA. PASP 24. Lexiscan Cardiolite 4/15/14 - normal perfusion imaging, EF 52%  DCCV 12/18/14  Echo 6/18/18 - EF 55%. No WMA. Mild asymmetric hypertrophy of the septum. MIld to mod LAE. AoV calcification and sclerosis. No significant change c/t study of 22-Aug-2013.     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 2/25/2010    Other and unspecified hyperlipidemia 2/25/2010    Positive PPD 2/25/2010    Reflux esophagitis 2/25/2010    Type II or unspecified type diabetes mellitus without mention of complication, not stated as uncontrolled 2/25/2010        Current Outpatient Prescriptions   Medication Sig Dispense Refill    lisinopril (PRINIVIL, ZESTRIL) 40 mg tablet TAKE 1 TABLET BY MOUTH ONCE DAILY 90 Tab 0    warfarin (COUMADIN) 5 mg tablet TAKE 1 & 1/2 (ONE & ONE-HALF) TABLETS (7.5 MG) BY MOUTH ONCE DAILY 180 Tab 3    amLODIPine (NORVASC) 10 mg tablet TAKE 1 TABLET BY MOUTH ONCE DAILY 90 Tab 0    atorvastatin (LIPITOR) 20 mg tablet TAKE 1 TABLET BY MOUTH ONCE DAILY 90 Tab 0    amiodarone (CORDARONE) 200 mg tablet TAKE ONE TABLET BY MOUTH ONCE DAILY 90 Tab 1    metFORMIN ER (GLUCOPHAGE XR) 500 mg tablet TAKE FOUR TABLETS BY MOUTH ONCE DAILY WITH BREAKFAST 360 Tab 1    aspirin delayed-release 81 mg tablet Take 81 mg by mouth daily. Indications: MYOCARDIAL REINFARCTION PREVENTION      AMOXICILLIN PO Take  by mouth. Allergies   Allergen Reactions    Adhesive Tape Rash    Baycol Nausea and Vomiting    Clonidine Other (comments)     Headache and sleepiness      Pravachol [Pravastatin] Nausea and Vomiting      Review of Systems  Constitutional: Negative for fever, chills, and diaphoresis. Positive for fatigue. Respiratory: Negative for cough, hemoptysis, sputum production, and wheezing. Cardiovascular: Negative for chest pain, palpitations, orthopnea, claudication, and PND. Gastrointestinal: Negative for heartburn, nausea, vomiting, blood in stool and melena. Genitourinary: Negative for dysuria and flank pain. Musculoskeletal: Negative for joint pain and back pain. Skin: Negative for rash. Neurological: Negative for focal weakness, seizures, loss of consciousness, weakness and headaches. Endo/Heme/Allergies: Does not bruise/bleed easily. Psychiatric/Behavioral: Negative for memory loss.  The patient does not have insomnia. Physical Exam  Visit Vitals    /80 (BP 1 Location: Left arm, BP Patient Position: Sitting)    Pulse 65    Ht 6' 4\" (1.93 m)    Wt 256 lb 4.8 oz (116.3 kg)    SpO2 97%    BMI 31.2 kg/m2     Wt Readings from Last 3 Encounters:   09/28/18 256 lb 4.8 oz (116.3 kg)   08/21/18 263 lb 7.2 oz (119.5 kg)   06/18/18 264 lb 9.6 oz (120 kg)      General - well developed well nourished  Neck - JVP normal, thyroid nl  Cardiac - Irregular, no murmurs, rubs or gallops. No clicks  Vascular - carotids without bruits, radials, femorals and pedal pulses equal bilateral  Lungs - clear to auscultation bilaterals, no rales ,wheezing or rhonchi  Abd - rounded/obese, soft nontender  Extremities - 1+ pretibial and pedal edema  Skin - no rash  Neuro - nonfocal. Right sided weakness  Psych - normal mood and affect    Cardiographics:  EKG 4/17/18 - Afib  ECHO 6/18/18 - LVEF of 60%, moderate to severe LAE  EKG 9/28/18 - SR 65, first degree AG block, , left anterior live block    Written by Josie Back, as dictated by Dr. Lucrecia Dai.    uLcrecia Dai MD

## 2018-09-28 NOTE — MR AVS SNAPSHOT
[address] [facility] [address] [facility] 
[phone] Patient: Justyn Haider MRN: [id number] [facility]:[birth year] Visit Information Date & Time Provider Department Dept. Phone Encounter #  
 9/28/2018  1:40 PM Sidney Ritter MD [facility] Doris Members [phone] [id number] Follow-up Instructions Return in about 3 months (around 12/28/2018). Your Appointments 10/24/2018  8:15 AM  
ROUTINE CARE with Jeannette Krabbe, MD  
[facility]) Appt Note: 6 month follow up  
 [address] [phone]  
[phone]  
  
   
 [address] [address] Upcoming Health Maintenance Date Due Shingrix Vaccine Age 50> (1 of 2) 4/7/1991 EYE EXAM RETINAL OR DILATED Q1 5/12/2016 GLAUCOMA SCREENING Q2Y 1/19/2017 Influenza Age 5 to Adult 8/1/2018 MICROALBUMIN Q1 9/26/2018 MEDICARE YEARLY EXAM 9/27/2018 FOOT EXAM Q1 10/1/2018 HEMOGLOBIN A1C Q6M 10/23/2018 LIPID PANEL Q1 4/23/2019 COLONOSCOPY 4/12/2026 DTaP/Tdap/Td series (2 - Td) 9/28/2026 Allergies as of 9/28/2018  Review Complete On: 9/28/2018 By: Matthew Barrett  
  
 Severity Noted Reaction Type Reactions Adhesive Tape  02/25/2010    Rash Baycol  02/25/2010    Nausea and Vomiting Clonidine  09/26/2017    Other (comments) Headache and sleepiness Pravachol [Pravastatin]  02/25/2010    Nausea and Vomiting Current Immunizations  Reviewed on 9/26/2017 Name Date Influenza High Dose Vaccine PF 9/26/2017, 9/28/2016, 12/1/2015 Influenza Vaccine 11/1/2013 Influenza Vaccine Split 10/15/2012 ZZZ-RETIRED (DO NOT USE) Pneumococcal Vaccine (Unspecified Type) 11/25/2008 dT Vaccine 12/20/1999 Not reviewed this visit You Were Diagnosed With   
  
 Codes Comments  Atrial fibrillation, unspecified type ([facility] [address]    -  Primary ICD-10-CM: I48.91 
ICD-9-CM: 427.31 Essential hypertension, benign     ICD-10-CM: I10 
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  
 140/80 (BP 1 Location: Left arm, BP Patient Position: Sitting) 65 6' 4\" (1.93 m) 256 lb 4.8 oz (116.3 kg) 97% 31.2 kg/m2 Smoking Status Never Smoker Vitals History BMI and BSA Data Body Mass Index Body Surface Area  
 31.2 kg/m 2 2.5 m 2 Preferred Pharmacy Pharmacy Name Phone [address] [facility], [address] [place] [address] KYLEE [facility] [phone] Your Updated Medication List  
  
   
This list is accurate as of 9/28/18  2:03 PM.  Always use your most recent med list.  
  
  
  
  
 amiodarone 200 mg tablet Commonly known as:  CORDARONE  
TAKE ONE TABLET BY MOUTH ONCE DAILY  
  
 amLODIPine 10 mg tablet Commonly known as:  [place] TAKE 1 TABLET BY MOUTH ONCE DAILY AMOXICILLIN PO Take  by mouth. aspirin delayed-release 81 mg tablet Take 81 mg by mouth daily. Indications: MYOCARDIAL REINFARCTION PREVENTION  
  
 atorvastatin 20 mg tablet Commonly known as:  LIPITOR  
TAKE 1 TABLET BY MOUTH ONCE DAILY  
  
 lisinopril 40 mg tablet Commonly known as:  PRINIVIL, ZESTRIL  
TAKE 1 TABLET BY MOUTH ONCE DAILY  
  
 metFORMIN  mg tablet Commonly known as:  GLUCOPHAGE XR  
TAKE FOUR TABLETS BY MOUTH ONCE DAILY WITH BREAKFAST  
  
 warfarin 5 mg tablet Commonly known as:  COUMADIN  
 TAKE 1 & 1/2 (ONE & ONE-HALF) TABLETS (7.5 MG) BY MOUTH ONCE DAILY We Performed the Following AMB POC EKG ROUTINE W/ 12 LEADS, INTER & REP [43561 CPT(R)] Follow-up Instructions Return in about 3 months (around 12/28/2018). Please provide this summary of care documentation to your next provider. Your primary care clinician is listed as [address] [address], [facility] . If you have any questions after today's visit, please call [phone].

## 2018-10-16 DIAGNOSIS — I25.10 CORONARY ARTERY DISEASE INVOLVING NATIVE CORONARY ARTERY OF NATIVE HEART WITHOUT ANGINA PECTORIS: ICD-10-CM

## 2018-10-16 DIAGNOSIS — E11.9 TYPE 2 DIABETES MELLITUS WITHOUT COMPLICATION (HCC): ICD-10-CM

## 2018-10-16 DIAGNOSIS — E78.5 DYSLIPIDEMIA: ICD-10-CM

## 2018-10-18 RX ORDER — METFORMIN HYDROCHLORIDE 500 MG/1
TABLET, EXTENDED RELEASE ORAL
Qty: 360 TAB | Refills: 1 | Status: SHIPPED | OUTPATIENT
Start: 2018-10-18 | End: 2019-04-25 | Stop reason: SDUPTHER

## 2018-10-18 RX ORDER — ATORVASTATIN CALCIUM 20 MG/1
TABLET, FILM COATED ORAL
Qty: 90 TAB | Refills: 1 | Status: SHIPPED | OUTPATIENT
Start: 2018-10-18 | End: 2019-04-25 | Stop reason: SDUPTHER

## 2018-10-18 RX ORDER — AMLODIPINE BESYLATE 10 MG/1
TABLET ORAL
Qty: 90 TAB | Refills: 0 | Status: SHIPPED | OUTPATIENT
Start: 2018-10-18 | End: 2018-11-28

## 2018-10-18 NOTE — TELEPHONE ENCOUNTER
Patient is requesting refill, he has been out of the medication for few days now, he also is requesting another medication refill  .   Requested Prescriptions     Pending Prescriptions Disp Refills    atorvastatin (LIPITOR) 20 mg tablet [Pharmacy Med Name: ATORVASTATIN 20MG   TAB] 90 Tab 1     Sig: TAKE 1 TABLET BY MOUTH ONCE DAILY    metFORMIN ER (GLUCOPHAGE XR) 500 mg tablet [Pharmacy Med Name: MetFORMIN ER 500MG  TAB] 360 Tab 1     Sig: TAKE 4 TABLETS BY MOUTH ONCE DAILY WITH BREAKFAST    amLODIPine (NORVASC) 10 mg tablet 90 Tab 0     Upcoming apt : October 24, 2018   Pharmacy verified

## 2018-10-24 ENCOUNTER — HOSPITAL ENCOUNTER (OUTPATIENT)
Dept: LAB | Age: 78
Discharge: HOME | End: 2018-10-24
Payer: MEDICARE

## 2018-10-24 ENCOUNTER — OFFICE VISIT (OUTPATIENT)
Dept: FAMILY MEDICINE CLINIC | Age: 78
End: 2018-10-24

## 2018-10-24 VITALS
OXYGEN SATURATION: 94 % | WEIGHT: 264.4 LBS | RESPIRATION RATE: 18 BRPM | HEIGHT: 76 IN | SYSTOLIC BLOOD PRESSURE: 152 MMHG | HEART RATE: 75 BPM | BODY MASS INDEX: 32.2 KG/M2 | TEMPERATURE: 98.1 F | DIASTOLIC BLOOD PRESSURE: 70 MMHG

## 2018-10-24 DIAGNOSIS — I10 ESSENTIAL HYPERTENSION, BENIGN: ICD-10-CM

## 2018-10-24 DIAGNOSIS — Z23 ENCOUNTER FOR IMMUNIZATION: ICD-10-CM

## 2018-10-24 DIAGNOSIS — G47.33 OSA (OBSTRUCTIVE SLEEP APNEA): ICD-10-CM

## 2018-10-24 DIAGNOSIS — E11.21 TYPE 2 DIABETES WITH NEPHROPATHY (HCC): ICD-10-CM

## 2018-10-24 DIAGNOSIS — R06.09 DOE (DYSPNEA ON EXERTION): ICD-10-CM

## 2018-10-24 DIAGNOSIS — I95.2 HYPOTENSION DUE TO DRUGS: Primary | ICD-10-CM

## 2018-10-24 DIAGNOSIS — Z00.00 MEDICARE ANNUAL WELLNESS VISIT, SUBSEQUENT: ICD-10-CM

## 2018-10-24 DIAGNOSIS — D50.9 IRON DEFICIENCY ANEMIA, UNSPECIFIED IRON DEFICIENCY ANEMIA TYPE: Chronic | ICD-10-CM

## 2018-10-24 DIAGNOSIS — E78.5 DYSLIPIDEMIA, GOAL LDL BELOW 70: ICD-10-CM

## 2018-10-24 DIAGNOSIS — Z71.89 ACP (ADVANCE CARE PLANNING): ICD-10-CM

## 2018-10-24 DIAGNOSIS — R53.82 CHRONIC FATIGUE: ICD-10-CM

## 2018-10-24 DIAGNOSIS — I48.91 ATRIAL FIBRILLATION, UNSPECIFIED TYPE (HCC): ICD-10-CM

## 2018-10-24 PROCEDURE — 36415 COLL VENOUS BLD VENIPUNCTURE: CPT

## 2018-10-24 PROCEDURE — 83036 HEMOGLOBIN GLYCOSYLATED A1C: CPT

## 2018-10-24 PROCEDURE — 82043 UR ALBUMIN QUANTITATIVE: CPT

## 2018-10-24 PROCEDURE — 80061 LIPID PANEL: CPT

## 2018-10-24 PROCEDURE — 80053 COMPREHEN METABOLIC PANEL: CPT

## 2018-10-24 RX ORDER — ACETAMINOPHEN AND CODEINE PHOSPHATE 300; 30 MG/1; MG/1
TABLET ORAL
COMMUNITY
Start: 2018-08-15 | End: 2018-10-24 | Stop reason: ALTCHOICE

## 2018-10-24 NOTE — PROGRESS NOTES
HISTORY OF PRESENT ILLNESS 
HPI Indira West is a 69 y.o. Male with a history of HTN, CAD, A-fib, DM-II with neuropathy, cerebral infarction, TOPHER, anemia, fatigue and hyperlipidemia, who presents at the office today for a follow up. Pt is fasting today. Pt complains of diarrhea, followed by constipation, since 1 year ago. Pt states that he has issues with walking and having to stop regularly, which he notes is due to being tired. Pt does have immediate relief from sitting down or resting. Pt admits that his wife has told him that he is wheezing when sitting down. Pt does have frequent nocturia. Past Medical History:  
Diagnosis Date  Arrhythmia   
 a fib  Atrial fibrillation ([facility] [address] 8/16/2013  Basal cell carcinoma of anterior chest 2/25/2010  CAD (coronary artery disease) 2/25/2010  CVA (cerebral infarction) 2/25/2010  
 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  
Procedure Laterality Date  COLONOSCOPY,REMJUAN LUIS MALDONADO,SNARE  12/13/2013  HC CAPSULE ENDOSCOPE M2A  4/4/2014  HX COLONOSCOPY    
 HX CORONARY STENT PLACEMENT    
 HX ORTHOPAEDIC    
 [facility]  UPPER GI ENDOSCOPY,BIOPSY  12/13/2013 Current Outpatient Medications on File Prior to Visit Medication Sig Dispense Refill  atorvastatin (LIPITOR) 20 mg tablet TAKE 1 TABLET BY MOUTH ONCE DAILY 90 Tab 1  
 metFORMIN ER (GLUCOPHAGE XR) 500 mg tablet TAKE 4 TABLETS BY MOUTH ONCE DAILY WITH BREAKFAST 360 Tab 1  
  lisinopril (PRINIVIL, ZESTRIL) 40 mg tablet TAKE 1 TABLET BY MOUTH ONCE DAILY 90 Tab 0  
 warfarin (COUMADIN) 5 mg tablet TAKE 1 & 1/2 (ONE & ONE-HALF) TABLETS (7.5 MG) BY MOUTH ONCE DAILY 180 Tab 3  
 aspirin delayed-release 81 mg tablet Take 81 mg by mouth daily. Indications: MYOCARDIAL REINFARCTION PREVENTION No current facility-administered medications on file prior to visit. Allergies Allergen Reactions  Adhesive Tape Rash  Baycol Nausea and Vomiting  Clonidine Other (comments) Headache and sleepiness  Pravachol [Pravastatin] Nausea and Vomiting Family History Problem Relation Age of Onset  Heart Disease Mother  Cancer Father   
     colon  Heart Disease Maternal Grandmother  Heart Disease Maternal Grandfather  Cancer Paternal Grandmother  Cancer Paternal Grandfather Social History Socioeconomic History  Marital status:  Spouse name: Not on file  Number of children: Not on file  Years of education: Not on file  Highest education level: Not on file Tobacco Use  Smoking status: Never Smoker  Smokeless tobacco: Former User Types: Chew  Tobacco comment: patient reports quitting over 20 yeaers ago. Substance and Sexual Activity  Alcohol use: Yes Comment: once a year  Drug use: No  
Other Topics Concern [address] Yes  Blood Transfusions Yes  Caffeine Concern Yes Comment: 2-3 cups of coffee daily  Occupational Exposure No  
 Hobby Hazards No  
 Sleep Concern No  
 Stress Concern No  
 Weight Concern No  
 Special Diet No  
 Back Care No  
 Exercise Yes  Bike Helmet No  
  Comment: pt doesn't ride a bike [address] Yes  Self-Exams No  
 
 
 
 
Review of Systems Constitutional: Negative for chills, diaphoresis, fever, malaise/fatigue and weight loss. Eyes: Negative for blurred vision, double vision and pain. Respiratory: Positive for shortness of breath. Negative for cough and wheezing. Cardiovascular: Negative for chest pain, palpitations, orthopnea, claudication, leg swelling and PND. Gastrointestinal: Positive for constipation and diarrhea. Genitourinary: Positive for frequency (nocturia). Skin: Negative for itching and rash. Neurological: Negative for dizziness, tingling, tremors, sensory change, speech change, focal weakness, seizures, loss of consciousness, weakness and headaches. Endo/Heme/Allergies: Negative for environmental allergies and polydipsia. Does not bruise/bleed easily. Results for orders placed or performed in visit on 10/24/18 LIPID PANEL Result Value Ref Range Cholesterol, total 135 100 - 199 mg/dL Triglyceride 86 0 - 149 mg/dL HDL Cholesterol 46 >39 mg/dL VLDL, calculated 17 5 - 40 mg/dL LDL, calculated 72 0 - 99 mg/dL HEMOGLOBIN A1C WITH EAG Result Value Ref Range Hemoglobin A1c 7.0 (H) 4.8 - 5.6 % Estimated average glucose 154 mg/dL MICROALBUMIN, UR, RAND W/ MICROALB/CREAT RATIO Result Value Ref Range Creatinine, urine 145.9 Not Estab. mg/dL Microalbumin, urine 88.8 Not Estab. ug/mL Microalb/Creat ratio (ug/mg creat.) 60.9 (H) 0.0 - 30.0 mg/g creat METABOLIC PANEL, COMPREHENSIVE Result Value Ref Range Glucose 143 (H) 65 - 99 mg/dL BUN 17 8 - 27 mg/dL Creatinine 1.11 0.76 - 1.27 mg/dL GFR est non-AA 64 >59 mL/min/1.73 GFR est AA 74 >59 mL/min/1.73  
 BUN/Creatinine ratio 15 10 - 24 Sodium 143 134 - 144 mmol/L Potassium 4.3 3.5 - 5.2 mmol/L Chloride 104 96 - 106 mmol/L  
 CO2 24 20 - 29 mmol/L Calcium 9.2 8.6 - 10.2 mg/dL Protein, total 6.6 6.0 - 8.5 g/dL Albumin 4.0 3.5 - 4.8 g/dL GLOBULIN, TOTAL 2.6 1.5 - 4.5 g/dL A-G Ratio 1.5 1.2 - 2.2 Bilirubin, total 0.4 0.0 - 1.2 mg/dL Alk.  phosphatase 128 (H) 39 - 117 IU/L  
 AST (SGOT) 19 0 - 40 IU/L  
 ALT (SGPT) 21 0 - 44 IU/L  
CVD REPORT  
 Result Value Ref Range INTERPRETATION Note Physical Exam 
Visit Vitals /70 (BP 1 Location: Left arm, BP Patient Position: Sitting) Pulse 75 Temp 98.1 °F (36.7 °C) (Oral) Resp 18 Ht 6' 4\" (1.93 m) Wt 264 lb 6.4 oz (119.9 kg) SpO2 94% BMI 32.18 kg/m² His BP drops to 624 systolic within 1 minute of standing. Head: Normocephalic, without obvious abnormality, atraumatic Eyes: conjunctivae/corneas clear. PERRL, EOM's intact. Neck: supple, symmetrical, trachea midline, no adenopathy, thyroid: not enlarged, symmetric, no tenderness/mass/nodules, no carotid bruit and no JVD Lungs: clear to auscultation bilaterally Heart: regular rate and rhythm, S1, S2 normal, no murmur, click, rub or gallop Extremities: extremities normal, atraumatic, no cyanosis or edema Pulses: 2+ and symmetric Lymph nodes: Cervical, supraclavicular, and axillary nodes normal. 
Neurologic: Grossly normal. Baseline weakness on his right side is unchanged (result of stroke at 22 y.o). ASSESSMENT and PLAN 
  ICD-10-CM ICD-9-CM 1. Hypotension due to drugs I95.2 458.8 E947.9 2. HURD (dyspnea on exertion) R06.09 786.09   
3. Essential hypertension, benign I10 401.1 LIPID PANEL  
   METABOLIC PANEL, COMPREHENSIVE 4. Type 2 diabetes with nephropathy (HCC) E11.21 250.40 HEMOGLOBIN A1C WITH EAG  
  583.81 MICROALBUMIN, UR, RAND W/ MICROALB/CREAT RATIO  
    DIABETES FOOT EXAM  
5. Encounter for immunization Z23 V03.89 INFLUENZA VACCINE INACTIVATED (IIV), SUBUNIT, ADJUVANTED, IM  
   ADMIN INFLUENZA VIRUS VAC 6. TOPHER (obstructive sleep apnea) G47.33 327.23   
7. Dyslipidemia, goal LDL below 70 E78.5 272.4 8. Iron deficiency anemia, unspecified iron deficiency anemia type D50.9 280.9 9. Chronic fatigue R53.82 780.79   
10. Atrial fibrillation, unspecified type ([facility] [address] I48.91 427.31   
11. Medicare annual wellness visit, subsequent Z00.00 V70.0 12. ACP (advance care planning) Z71.89 V65.49 Diagnoses and all orders for this visit: 
 
1. Hypotension due to drugs 2. HURD (dyspnea on exertion) 3. Essential hypertension, benign -     LIPID PANEL 
-     METABOLIC PANEL, COMPREHENSIVE 4. Type 2 diabetes with nephropathy (HCC) 
-     HEMOGLOBIN A1C WITH EAG 
-     MICROALBUMIN, UR, RAND W/ MICROALB/CREAT RATIO 
-      DIABETES FOOT EXAM 
 
5. Encounter for immunization 
-     INFLUENZA VACCINE INACTIVATED (IIV), SUBUNIT, ADJUVANTED, IM 
-     ADMIN INFLUENZA VIRUS VAC 6. TOPHER (obstructive sleep apnea) 7. Dyslipidemia, goal LDL below 70 
 
8. Iron deficiency anemia, unspecified iron deficiency anemia type 9. Chronic fatigue 10. Atrial fibrillation, unspecified type ([facility] [address] 11. Medicare annual wellness visit, subsequent 12. ACP (advance care planning) Other orders -     CVD REPORT Follow-up Disposition: 
Return in about 3 months (around 1/24/2019), or BP or glucose OOC, for F/U HTN,CHOL,DM, F/U of obesity, but in 1 month if fatigue, . hurd not better. lab results and schedule of future lab studies reviewed with patient 
reviewed diet, exercise and weight control 
cardiovascular risk and specific lipid/LDL goals reviewed 
reviewed medications and side effects in detail Please call my office if there are any questions- [phone]. I will arrange for follow up after the lab tests done from today return Recommended a weekly \"heart check. \" I went into detail how to do this. Call for refills on medications as needed. Discussed expected course/resolution/complications of diagnosis in detail with patient. Medication risks/benefits/costs/interactions/alternatives discussed with patient. Pt was given an after visit summary which includes diagnoses, current medications & vitals. Pt expressed understanding with the diagnosis and plan.  
 
Total 55 minutes,60 % counseling re: Reviewed BP, cholesterol and diabetic goals. LDL goal<100,HDL goal>45, Triglyceride goal<150, A1C< 7.0, BP<140/90. Reviewed in detail the proper technique of checking the blood pressure- check it on an average day only, not on a stressful day, sitting, no exercise for at least 1 hour and not experiencing any new pain( chronic pain is OK). Patient encouraged to check BP sitting and standing at least once a month and to report these readings to me if > 130/ 80 on average , or if the standing BP is >  15 points lower than the sitting. Reviewed symptoms, or lack thereof, of hypertension and elevated cholesterol. Regular exercise is very important to your health; it helps mentally, physically, socially; it prevents injuries if done properly. Exercise, even as simple as walking 20-30 minutes daily has major benefits to your health even though your \"numbers\" are the same in the lab. See if you can add this into your daily regimen and after a few months it will become a regular habit-\"just something you do,\" like brushing your teeth. A combination of aerobic exercise and strengthening and stretching is felt to be the best for you, so this should be your ultimate goal.  
This can be done in the privacy of your home or in a group setting as at the gym  Some prefer having a , others prefer to do exercise in groups or individually. Do what \"works\" for you. You need to make it simple and \"fun,\" or you most likely you will not continue it. Recommended a weekly \"heart check. \" I went into detail how to do this. BMI is significantly elevated- in the obese range. I reviewed diet, exercise and weight control.  Discussed weight control in detail, the importance of mainly decreased carbs, and for weight maintenance, exercise; discussed different diets and that it isn't as important to watch the type of foods as it is to decrease calorie intake no matter what type of diet you do, etc.  
 
 
 We decreased his Norvasc to 5 mg. I encouraged him to obtain a BP cuff, so that he can check his sitting and standing BP. Advanced Directive information is in the chart, pt wishes no changes. Also, discussed symptoms of concern that were noted today in the note above, treatment options( including doing nothing), when to follow up before recommended time frame. Also, answered all questions. This document was written by Nicanor Yap, as dictated by Omar Meek MD. 
I have reviewed and agree with the above note and have made corrections where appropriate Shimon Godinez M.D. 
' This is the Subsequent Medicare Annual Wellness Exam, performed 12 months or more after the Initial AWV or the last Subsequent AWV I have reviewed the patient's medical history in detail and updated the computerized patient record. History Past Medical History:  
Diagnosis Date  Arrhythmia   
 a fib  Atrial fibrillation ([facility] [address] 8/16/2013  Basal cell carcinoma of anterior chest 2/25/2010  CAD (coronary artery disease) 2/25/2010  CVA (cerebral infarction) 2/25/2010  
 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  
Procedure Laterality Date  COLONOSCOPY,REMV ERICA,KAVIN  12/13/2013  HC CAPSULE ENDOSCOPE M2A  4/4/2014  HX COLONOSCOPY    
 HX CORONARY STENT PLACEMENT    
 HX ORTHOPAEDIC    
 [facility]  UPPER GI ENDOSCOPY,BIOPSY  12/13/2013 Current Outpatient Medications Medication Sig Dispense Refill  atorvastatin (LIPITOR) 20 mg tablet TAKE 1 TABLET BY MOUTH ONCE DAILY 90 Tab 1  
 metFORMIN ER (GLUCOPHAGE XR) 500 mg tablet TAKE 4 TABLETS BY MOUTH ONCE DAILY WITH BREAKFAST 360 Tab 1  
 lisinopril (PRINIVIL, ZESTRIL) 40 mg tablet TAKE 1 TABLET BY MOUTH ONCE DAILY 90 Tab 0  
 warfarin (COUMADIN) 5 mg tablet TAKE 1 & 1/2 (ONE & ONE-HALF) TABLETS (7.5 MG) BY MOUTH ONCE DAILY 180 Tab 3  
 aspirin delayed-release 81 mg tablet Take 81 mg by mouth daily. Indications: MYOCARDIAL REINFARCTION PREVENTION  carvedilol (COREG) 3.125 mg tablet Take 1 Tab by mouth two (2) times daily (with meals). 60 Tab 1  
 amLODIPine (NORVASC) 10 mg tablet Take 0.5 Tabs by mouth daily. TAKE 1 TABLET BY MOUTH ONCE DAILY 90 Tab 0  
 amiodarone (CORDARONE) 200 mg tablet TAKE 1 TABLET BY MOUTH ONCE DAILY. *GENERIC FOR CORDARONE 90 Tab 1 Allergies Allergen Reactions  Adhesive Tape Rash  Baycol Nausea and Vomiting  Clonidine Other (comments) Headache and sleepiness  Pravachol [Pravastatin] Nausea and Vomiting Family History Problem Relation Age of Onset  Heart Disease Mother  Cancer Father   
     colon  Heart Disease Maternal Grandmother  Heart Disease Maternal Grandfather  Cancer Paternal Grandmother  Cancer Paternal Grandfather Social History Tobacco Use  Smoking status: Never Smoker  Smokeless tobacco: Former User Types: Chew  Tobacco comment: patient reports quitting over 20 yeaers ago. Substance Use Topics  Alcohol use: Yes Comment: once a year Patient Active Problem List  
Diagnosis Code  Diverticula of colon K57.30  Positive PPD R76.11  
 Essential hypertension, benign I10  
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  
 CAD (coronary artery disease) I25.10  Dyslipidemia, goal LDL below 70 E78.5  Rosacea L71.9  Atrial fibrillation (HCC) I48.91  
 Iron deficiency anemia- GI blood loss- no source identified-2009 D50.9  TOPHER (obstructive sleep apnea) G47.33  
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  
 Chronic fatigue R53.82 Depression Risk Factor Screening: PHQ over the last two weeks 10/24/2018 Little interest or pleasure in doing things Not at all Feeling down, depressed, irritable, or hopeless Not at all Total Score PHQ 2 0 Alcohol Risk Factor Screening: You do not drink alcohol or very rarely. Functional Ability and Level of Safety:  
Hearing Loss Hearing is good. Activities of Daily Living The home contains: no safety equipment. Patient does total self care Fall Risk Fall Risk Assessment, last 12 mths 10/24/2018 Able to walk? Yes Fall in past 12 months? Yes Fall with injury? -  
Number of falls in past 12 months 2 Fall Risk Score -  
 
 
Abuse Screen Patient is not abused Cognitive Screening Evaluation of Cognitive Function: 
Has your family/caregiver stated any concerns about your memory: no 
Normal 
 
Patient Care Team  
Patient Care Team: 
Vinod Rebollar MD as PCP - Torito Doll MD as Consulting Provider (Cardiology) Jian Damian MD (Pulmonary Disease) Kendal Ch MD (Gastroenterology) Ana Powell MD (Orthopedic Surgery) Khushboo Bertrand MD (Optometry) Assessment/Plan Education and counseling provided: 
Are appropriate based on today's review and evaluation Diagnoses and all orders for this visit: 
 
1. Hypotension due to drugs 2. HURD (dyspnea on exertion) 3. Essential hypertension, benign -     LIPID PANEL 
-     METABOLIC PANEL, COMPREHENSIVE 
 
 4. Type 2 diabetes with nephropathy (HCC) 
-     HEMOGLOBIN A1C WITH EAG 
-     MICROALBUMIN, UR, RAND W/ MICROALB/CREAT RATIO 
-      DIABETES FOOT EXAM 
 
5. Encounter for immunization 
-     INFLUENZA VACCINE INACTIVATED (IIV), SUBUNIT, ADJUVANTED, IM 
-     ADMIN INFLUENZA VIRUS VAC 6. TOPHER (obstructive sleep apnea) 7. Dyslipidemia, goal LDL below 70 
 
8. Iron deficiency anemia, unspecified iron deficiency anemia type 9. Chronic fatigue 10. Atrial fibrillation, unspecified type ([facility] [address] 11. Medicare annual wellness visit, subsequent 12. ACP (advance care planning) Other orders -     CVD REPORT Health Maintenance Due Topic Date Due  Shingrix Vaccine Age 50> (1 of 2) 04/07/1991  GLAUCOMA SCREENING Q2Y  01/19/2017  MEDICARE YEARLY EXAM  09/27/2018

## 2018-10-24 NOTE — PROGRESS NOTES
Chief Complaint Patient presents with  Hypertension  Diabetes  Cholesterol Problem 1. Have you been to the ER, urgent care clinic since your last visit? Hospitalized since your last visit? No 
 
2. Have you seen or consulted any other health care providers outside of the [address] [place] [address] since your last visit? Include any pap smears or colon screening. Yes, patient stated that he saw the cardiologist 1-2 months ago.

## 2018-10-25 LAB
ALBUMIN SERPL-MCNC: 4 G/DL (ref 3.5–4.8)
ALBUMIN/CREAT UR: 60.9 MG/G CREAT (ref 0–30)
ALBUMIN/GLOB SERPL: 1.5 {RATIO} (ref 1.2–2.2)
ALP SERPL-CCNC: 128 IU/L (ref 39–117)
ALT SERPL-CCNC: 21 IU/L (ref 0–44)
AST SERPL-CCNC: 19 IU/L (ref 0–40)
BILIRUB SERPL-MCNC: 0.4 MG/DL (ref 0–1.2)
BUN SERPL-MCNC: 17 MG/DL (ref 8–27)
BUN/CREAT SERPL: 15 (ref 10–24)
CALCIUM SERPL-MCNC: 9.2 MG/DL (ref 8.6–10.2)
CHLORIDE SERPL-SCNC: 104 MMOL/L (ref 96–106)
CHOLEST SERPL-MCNC: 135 MG/DL (ref 100–199)
CO2 SERPL-SCNC: 24 MMOL/L (ref 20–29)
CREAT SERPL-MCNC: 1.11 MG/DL (ref 0.76–1.27)
CREAT UR-MCNC: 145.9 MG/DL
EST. AVERAGE GLUCOSE BLD GHB EST-MCNC: 154 MG/DL
GLOBULIN SER CALC-MCNC: 2.6 G/DL (ref 1.5–4.5)
GLUCOSE SERPL-MCNC: 143 MG/DL (ref 65–99)
HBA1C MFR BLD: 7 % (ref 4.8–5.6)
HDLC SERPL-MCNC: 46 MG/DL
INTERPRETATION, 910389: NORMAL
LDLC SERPL CALC-MCNC: 72 MG/DL (ref 0–99)
MICROALBUMIN UR-MCNC: 88.8 UG/ML
POTASSIUM SERPL-SCNC: 4.3 MMOL/L (ref 3.5–5.2)
PROT SERPL-MCNC: 6.6 G/DL (ref 6–8.5)
SODIUM SERPL-SCNC: 143 MMOL/L (ref 134–144)
TRIGL SERPL-MCNC: 86 MG/DL (ref 0–149)
VLDLC SERPL CALC-MCNC: 17 MG/DL (ref 5–40)

## 2018-10-26 ENCOUNTER — CLINICAL SUPPORT (OUTPATIENT)
Dept: CARDIOLOGY CLINIC | Age: 78
End: 2018-10-26

## 2018-10-26 DIAGNOSIS — R53.82 CHRONIC FATIGUE: Primary | ICD-10-CM

## 2018-10-26 DIAGNOSIS — I48.91 ATRIAL FIBRILLATION, UNSPECIFIED TYPE (HCC): ICD-10-CM

## 2018-10-26 DIAGNOSIS — I10 ESSENTIAL HYPERTENSION, BENIGN: ICD-10-CM

## 2018-10-26 LAB
INR BLD: 3
INR, EXTERNAL: 3 (ref 2–3)
PT POC: 42.2 SECONDS
VALID INTERNAL CONTROL?: YES

## 2018-11-09 DIAGNOSIS — I48.91 ATRIAL FIBRILLATION, UNSPECIFIED TYPE (HCC): ICD-10-CM

## 2018-11-09 DIAGNOSIS — I25.10 CORONARY ARTERY DISEASE INVOLVING NATIVE CORONARY ARTERY OF NATIVE HEART WITHOUT ANGINA PECTORIS: ICD-10-CM

## 2018-11-09 DIAGNOSIS — I10 ESSENTIAL HYPERTENSION, BENIGN: ICD-10-CM

## 2018-11-12 RX ORDER — AMIODARONE HYDROCHLORIDE 200 MG/1
TABLET ORAL
Qty: 90 TAB | Refills: 1 | Status: SHIPPED | OUTPATIENT
Start: 2018-11-12 | End: 2019-05-15 | Stop reason: ALTCHOICE

## 2018-11-26 ENCOUNTER — CLINICAL SUPPORT (OUTPATIENT)
Dept: CARDIOLOGY CLINIC | Age: 78
End: 2018-11-26

## 2018-11-26 DIAGNOSIS — I48.91 ATRIAL FIBRILLATION, UNSPECIFIED TYPE (HCC): ICD-10-CM

## 2018-11-26 DIAGNOSIS — I10 ESSENTIAL HYPERTENSION, BENIGN: ICD-10-CM

## 2018-11-26 DIAGNOSIS — R53.82 CHRONIC FATIGUE: Primary | ICD-10-CM

## 2018-11-26 LAB
INR BLD: 2
INR, EXTERNAL: 2 (ref 2–3)
PT POC: 20.5 SECONDS
VALID INTERNAL CONTROL?: YES

## 2018-11-28 ENCOUNTER — OFFICE VISIT (OUTPATIENT)
Dept: FAMILY MEDICINE CLINIC | Age: 78
End: 2018-11-28

## 2018-11-28 VITALS
WEIGHT: 263.8 LBS | HEIGHT: 76 IN | SYSTOLIC BLOOD PRESSURE: 160 MMHG | HEART RATE: 72 BPM | RESPIRATION RATE: 18 BRPM | TEMPERATURE: 97.6 F | DIASTOLIC BLOOD PRESSURE: 68 MMHG | BODY MASS INDEX: 32.12 KG/M2 | OXYGEN SATURATION: 98 %

## 2018-11-28 DIAGNOSIS — G47.33 OSA (OBSTRUCTIVE SLEEP APNEA): ICD-10-CM

## 2018-11-28 DIAGNOSIS — I10 ESSENTIAL HYPERTENSION, BENIGN: Primary | ICD-10-CM

## 2018-11-28 DIAGNOSIS — E11.21 TYPE 2 DIABETES WITH NEPHROPATHY (HCC): ICD-10-CM

## 2018-11-28 DIAGNOSIS — K21.00 REFLUX ESOPHAGITIS: ICD-10-CM

## 2018-11-28 DIAGNOSIS — E78.5 DYSLIPIDEMIA, GOAL LDL BELOW 70: ICD-10-CM

## 2018-11-28 DIAGNOSIS — Z23 NEED FOR SHINGLES VACCINE: ICD-10-CM

## 2018-11-28 DIAGNOSIS — I25.10 CORONARY ARTERY DISEASE INVOLVING NATIVE CORONARY ARTERY OF NATIVE HEART WITHOUT ANGINA PECTORIS: ICD-10-CM

## 2018-11-28 RX ORDER — CARVEDILOL 3.12 MG/1
3.12 TABLET ORAL 2 TIMES DAILY WITH MEALS
Qty: 60 TAB | Refills: 1 | Status: SHIPPED | OUTPATIENT
Start: 2018-11-28 | End: 2019-01-22 | Stop reason: SDUPTHER

## 2018-11-28 RX ORDER — AMLODIPINE BESYLATE 10 MG/1
5 TABLET ORAL DAILY
Qty: 90 TAB | Refills: 0
Start: 2018-11-28 | End: 2019-04-03 | Stop reason: SDUPTHER

## 2018-11-28 NOTE — PATIENT INSTRUCTIONS
Medicare Wellness Visit, Male The best way to live healthy is to have a lifestyle where you eat a well-balanced diet, exercise regularly, limit alcohol use, and quit all forms of tobacco/nicotine, if applicable. Regular preventive services are another way to keep healthy. Preventive services (vaccines, screening tests, monitoring & exams) can help personalize your care plan, which helps you manage your own care. Screening tests can find health problems at the earliest stages, when they are easiest to treat. [facility] follows the current, evidence-based guidelines published by the [facility] ([facility]) when recommending preventive services for our patients. Because we follow these guidelines, sometimes recommendations change over time as research supports it. (For example, a prostate screening blood test is no longer routinely recommended for men with no symptoms.) Of course, you and your doctor may decide to screen more often for some diseases, based on your risk and co-morbidities (chronic disease you are already diagnosed with). Preventive services for you include: - Medicare offers their members a free annual wellness visit, which is time for you and your primary care provider to discuss and plan for your preventive service needs. Take advantage of this benefit every year! 
-All adults over age 72 should receive the recommended pneumonia vaccines. Current USPSTF guidelines recommend a series of two vaccines for the best pneumonia protection.  
-All adults should have a flu vaccine yearly and an ECG.  All adults age 61 and older should receive a shingles vaccine once in their lifetime.   
-All adults age 38-68 who are overweight should have a diabetes screening test once every three years.  
-Other screening tests & preventive services for persons with diabetes include: an eye exam to screen for diabetic retinopathy, a kidney function test, a foot exam, and stricter control over your cholesterol.  
-Cardiovascular screening for adults with routine risk involves an electrocardiogram (ECG) at intervals determined by the provider.  
-Colorectal cancer screening should be done for adults age 54-65 with no increased risk factors for colorectal cancer. There are a number of acceptable methods of screening for this type of cancer. Each test has its own benefits and drawbacks. Discuss with your provider what is most appropriate for you during your annual wellness visit. The different tests include: colonoscopy (considered the best screening method), a fecal occult blood test, a fecal DNA test, and sigmoidoscopy. 
-All adults born between [facility] should be screened once for Hepatitis C. 
-An Abdominal Aortic Aneurysm (AAA) Screening is recommended for men age 73-68 who has ever smoked in their lifetime. Here is a list of your current Health Maintenance items (your personalized list of preventive services) with a due date: 
Health Maintenance Due Topic Date Due  Shingles Vaccine (1 of 2) 04/07/1991  Glaucoma Screening   01/19/2017 Colette Houston Annual Well Visit  09/27/2018

## 2018-11-28 NOTE — PROGRESS NOTES
Chief Complaint   Patient presents with    Medication Evaluation     1. Have you been to the ER, urgent care clinic since your last visit? Hospitalized since your last visit? No    2. Have you seen or consulted any other health care providers outside of the [address] [place] [address] since your last visit? Include any pap smears or colon screening.  No

## 2018-11-28 NOTE — PROGRESS NOTES
HISTORY OF PRESENT ILLNESS  HPI  Ami Rodas is a 69 y.o. 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 Pt is currently taking amlodipine 10 mg and Norvasc 10 mg at the same time, thinking they were 2 different medications, has probably going on for > 3-6 months. He continues to feel poorly mainly when standing and perhaps a little worse with walking, but immediately better with sitting. He did have orthostatic changes when last seen here on 10/24, so recommendation was to decrease the amlodipine to 5 mg; he did that but continues the Norvasc at 10 mg, again, thinking it was a different medication.         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 2/25/2010    Other and unspecified hyperlipidemia 2/25/2010    Positive PPD 2/25/2010    Reflux esophagitis 2/25/2010    Type II or unspecified type diabetes mellitus without mention of complication, not stated as uncontrolled 2/25/2010     Past Surgical History:   Procedure Laterality Date    Orlene Ours  12/13/2013         HC CAPSULE ENDOSCOPE M2A  4/4/2014         HX COLONOSCOPY      HX CORONARY STENT PLACEMENT      HX ORTHOPAEDIC      HX VASECTOMY  1983    UPPER GI ENDOSCOPY,BIOPSY  12/13/2013          Current Outpatient Medications on File Prior to Visit   Medication Sig Dispense Refill    amiodarone (CORDARONE) 200 mg tablet TAKE 1 TABLET BY MOUTH ONCE DAILY. *GENERIC FOR CORDARONE 90 Tab 1    atorvastatin (LIPITOR) 20 mg tablet TAKE 1 TABLET BY MOUTH ONCE DAILY 90 Tab 1    metFORMIN ER (GLUCOPHAGE XR) 500 mg tablet TAKE 4 TABLETS BY MOUTH ONCE DAILY WITH BREAKFAST 360 Tab 1    lisinopril (PRINIVIL, ZESTRIL) 40 mg tablet TAKE 1 TABLET BY MOUTH ONCE DAILY 90 Tab 0    warfarin (COUMADIN) 5 mg tablet TAKE 1 & 1/2 (ONE & ONE-HALF) TABLETS (7.5 MG) BY MOUTH ONCE DAILY 180 Tab 3    aspirin delayed-release 81 mg tablet Take 81 mg by mouth daily. Indications: MYOCARDIAL REINFARCTION PREVENTION       No current facility-administered medications on file prior to visit. Allergies   Allergen Reactions    Adhesive Tape Rash    Baycol Nausea and Vomiting    Clonidine Other (comments)     Headache and sleepiness      Pravachol [Pravastatin] Nausea and Vomiting     Family History   Problem Relation Age of Onset    Heart Disease Mother     Cancer Father         colon    Heart Disease Maternal Grandmother     Heart Disease Maternal Grandfather     Cancer Paternal Grandmother     Cancer Paternal Grandfather      Social History     Socioeconomic History    Marital status:      Spouse name: Not on file    Number of children: Not on file    Years of education: Not on file    Highest education level: Not on file   Tobacco Use    Smoking status: Never Smoker    Smokeless tobacco: Former User     Types: Chew    Tobacco comment: patient reports quitting over 20 yeaers ago.     Substance and Sexual Activity    Alcohol use: Yes     Comment: once a year    Drug use: No   Other Topics Concern     Service Yes    Blood Transfusions Yes    Caffeine Concern Yes     Comment: 2-3 cups of coffee daily    Occupational Exposure No    Hobby Hazards No    Sleep Concern No    Stress Concern No    Weight Concern No    Special Diet No    Back Care No    Exercise Yes    Bike Helmet No     Comment: pt doesn't ride a bike   [place] Yes    Self-Exams No           Review of Systems   Constitutional: Negative for chills, diaphoresis, fever, malaise/fatigue and weight loss. Eyes: Negative for blurred vision, double vision and pain. Respiratory: Negative for cough, shortness of breath and wheezing. Cardiovascular: Negative for chest pain, palpitations, orthopnea, claudication, leg swelling and PND. Genitourinary: Negative for frequency. Skin: Negative for itching and rash. Neurological: Negative for dizziness, tingling, tremors, sensory change, speech change, focal weakness, seizures, loss of consciousness, weakness and headaches. Endo/Heme/Allergies: Negative for environmental allergies and polydipsia. Does not bruise/bleed easily. Results for orders placed or performed in visit on 11/26/18   AMB PT/INR EXTERNAL   Result Value Ref Range    INR, External 2.0 2.0 - 3.0   AMB POC PT/INR   Result Value Ref Range    VALID INTERNAL CONTROL POC Yes     Prothrombin time (POC) 20.5 seconds    INR POC 2.0          Physical Exam  Visit Vitals  /68 (BP 1 Location: Left arm, BP Patient Position: Sitting)   Pulse 72   Temp 97.6 °F (36.4 °C) (Oral)   Resp 18   Ht 6' 4\" (1.93 m)   Wt 263 lb 12.8 oz (119.7 kg)   SpO2 98%   BMI 32.11 kg/m²     Head: Normocephalic, without obvious abnormality, atraumatic  Eyes: conjunctivae/corneas clear. PERRL, EOM's intact. Neck: supple, symmetrical, trachea midline, no adenopathy, thyroid: not enlarged, symmetric, no tenderness/mass/nodules, no carotid bruit and no JVD  Lungs: clear to auscultation bilaterally  Heart: regular rate and rhythm, S1, S2 normal, no murmur, click, rub or gallop  Extremities: extremities normal, atraumatic, no cyanosis or edema  Pulses: 2+ and symmetric  Lymph nodes: Cervical, supraclavicular, and axillary nodes normal.  Neurologic: Grossly normal      ASSESSMENT and PLAN    ICD-10-CM ICD-9-CM    1.  Essential hypertension, benign I10 401.1 carvedilol (COREG) 3.125 mg tablet amLODIPine (NORVASC) 10 mg tablet   2. Type 2 diabetes with nephropathy (HCC) E11.21 250.40      583.81    3. Reflux esophagitis K21.0 530.11    4. TOPHER (obstructive sleep apnea) G47.33 327.23    5. Dyslipidemia, goal LDL below 70 E78.5 272.4    6. Coronary artery disease involving native coronary artery of native heart without angina pectoris I25.10 414.01 amLODIPine (NORVASC) 10 mg tablet   7. Need for shingles vaccine Z23 V04.89 varicella-zoster recombinant, PF, (SHINGRIX, PF,) 50 mcg/0.5 mL susr injection     Diagnoses and all orders for this visit:    1. Essential hypertension, benign  -     carvedilol (COREG) 3.125 mg tablet; Take 1 Tab by mouth two (2) times daily (with meals). -     amLODIPine (NORVASC) 10 mg tablet; Take 0.5 Tabs by mouth daily. TAKE 1 TABLET BY MOUTH ONCE DAILY    2. Type 2 diabetes with nephropathy ([address]    3. Reflux esophagitis    4. TOPHER (obstructive sleep apnea)    5. Dyslipidemia, goal LDL below 70    6. Coronary artery disease involving native coronary artery of native heart without angina pectoris  -     amLODIPine (NORVASC) 10 mg tablet; Take 0.5 Tabs by mouth daily. TAKE 1 TABLET BY MOUTH ONCE DAILY    7. Need for shingles vaccine  -     varicella-zoster recombinant, PF, (SHINGRIX, PF,) 50 mcg/0.5 mL susr injection; 0.5 mL by IntraMUSCular route once for 1 dose. Follow-up Disposition:  Return in about 3 months (around 2/28/2019), or if symptoms worsen or fail to improve, for F/U hypertension, HURD, F/U DM, F/U cholesterol. lab results and schedule of future lab studies reviewed with patient  reviewed diet, exercise and weight control  cardiovascular risk and specific lipid/LDL goals reviewed  reviewed medications and side effects in detail  Please call my office if there are any questions- [phone]. I will arrange for follow up after the lab tests done from today return  Recommended a weekly \"heart check. \" I went into detail how to do this.   Call for refills on medications as needed. Discussed expected course/resolution/complications of diagnosis in detail with patient. Medication risks/benefits/costs/interactions/alternatives discussed with patient. Pt was given an after visit summary which includes diagnoses, current medications & vitals. Pt expressed understanding with the diagnosis and plan. Total 45 minutes,60 % counseling re: Reviewed BP, cholesterol and diabetic goals. LDL goal<100,HDL goal>45, Triglyceride goal<150, A1C< 7.0, BP<140/90. Regular exercise is very important to your health; it helps mentally, physically, socially; it prevents injuries if done properly. Exercise, even as simple as walking 20-30 minutes daily has major benefits to your health even though your \"numbers\" are the same in the lab. See if you can add this into your daily regimen and after a few months it will become a regular habit-\"just something you do,\" like brushing your teeth. A combination of aerobic exercise and strengthening and stretching is felt to be the best for you, so this should be your ultimate goal.   This can be done in the privacy of your home or in a group setting as at the gym  Some prefer having a , others prefer to do exercise in groups or individually. Do what \"works\" for you. You need to make it simple and \"fun,\" or you most likely you will not continue it. Reviewed symptoms, or lack thereof, of hypertension and elevated cholesterol. Reviewed in detail the proper technique of checking the blood pressure- check it on an average day only, not on a stressful day, sitting, no exercise for at least 1 hour and not experiencing any new pain( chronic pain is OK). Patient encouraged to check BP sitting and standing at least once a month and to report these readings to me if > 130/ 80 on average , or if the standing BP is >  15 points lower than the sitting. Recommended a weekly \"heart check. \" I went into detail how to do this.     I reviewed his labs from last visit. His A1C is 7.0 and he has normal liver and kidney function. His LDL was 72. I also looked at his echocardiogram done in August, which showed a normal EF. He continues to have SOB with standing and slightly more with walking. This may be from fluid overload related to taking 20 mg amlodipine. Discovered today that he was taking 20 mg of Norvasc, taking 10 mg each of amlodipine and Norvasc, thinking the amlodipine was a different medication. He is on 15 mg currently, which is still higher than any recommended dose. It is unclear what these increased doses were causing, but we will have him stop taking the amlodipine and return to 10 mg of Norvasc, and if his SOB is not improved by doing that I suggested that he start taking carvedilol. I recommended that he see his cardiologist on 12/15 and follow up here in January, where we can discuss weight loss using the injectable Trulicity-like medication (in the past Byetta did cause significant weight loss). Also, discussed symptoms of concern that were noted today in the note above, treatment options( including doing nothing), when to follow up before recommended time frame. Also, answered all questions. This document was written by Mauricio Baker, as dictated by Marley Ganser, MD.  I have reviewed and agree with the above note and have made corrections where appropriate Shimon Han M.D.

## 2018-11-28 NOTE — PROGRESS NOTES
Advance Care Planning (ACP) Provider Note - Comprehensive Date of ACP Conversation: 11/28/18 Persons included in Conversation:  patient Length of ACP Conversation in minutes:  <16 minutes (Non-Billable) Authorized Decision Maker (if patient is incapable of making informed decisions): This person is: 
Healthcare Agent/Medical Power of  under Advance Directive General ACP for ALL Patients with Decision Making Capacity: 
 Importance of advance care planning, including choosing a healthcare agent to communicate patient's healthcare decisions if patient lost the ability to make decisions, such as after a sudden illness or accident Understanding of the healthcare agent role was assessed and information provided Review of Existing Advance Directive: 
Does this advance directive still reflect your preferences? Yes (Provide new form/Refer for assistance in updating) For Serious or Chronic Illness: 
No known illness Interventions Provided: 
Recommended communicating the plan and making copies for the healthcare agent, personal physician, and others as appropriate (e.g., health system) Recommended review of completed ACP document annually or upon change in health status

## 2018-12-13 ENCOUNTER — TELEPHONE (OUTPATIENT)
Dept: FAMILY MEDICINE CLINIC | Age: 78
End: 2018-12-13

## 2018-12-13 NOTE — PROGRESS NOTES
[id number] [facility], [address] [facility]    Office [phone]  Fax [phone]       Zainab Morgan is a 69 y.o. male. Last seen 3 months ago. Assessment  Encounter Diagnoses   Name Primary?  Essential hypertension, benign Yes    Coronary artery disease involving native coronary artery of native heart without angina pectoris     Dyslipidemia     Paroxysmal atrial fibrillation (HCC)     TOPHER (obstructive sleep apnea)     Type 2 diabetes with nephropathy ([facility] [address]        Recommendations:  PAF. S/p repeat cardioversion 4 months ago. Remains in SR by pulse. Continue Amiodarone. Continue Warfarin. High risk of recurrent stroke. History of GI bleeding 2008 and 2013 without clear cut abnormalities. M2A capsule endoscopy 2014 was negative. If he has early recurrence of AF, consider referral for AF ablation. No recurrent GI bleeding on warfarin.    CAD. s/p multiple PCI's, most recently 1 year ago,s/p PCI RCA with LEO. Last cath most recently 4/24/18 demonstrated no RCA restenosis, distal LAD 80%. He has chronic fatigue but no anginal symptoms. HTN. Sub-optimal systolic BP on combination therapy. There seems to be a fair amount of confusion on his part in regards to the medications he is taking. At one point he was taking Amlodipine and Norvasc, thinking they were different drugs. Unclear if he is actually taking Lisinopril at this time. I have asked him to bring in his pill bottles to his next office visit with Giovanna Tripp MD.     Chronic fatigue - multifactorial - suspect deconditioning. untreated sleep apnea may be playing significant role. SR has not impacted sxs. We discussed exercise training and reevaluation of sleep apnea. Lower extremity edema is likely due to venous insufficiency, which may be exacerbated by untreated TOPHER. Discussed the importance of a low sodium diet. Advised leg elevation and compression stockings. T2DM on Metformin alone.  Recent A1c 7.0%. We discussed the role of Jardiance for CV protection. He will discuss further with Dr. Davion Myers in the near future. Follow-up Disposition:  Return in about 4 months (around 4/14/2019). Subjective:  Mr. Pedro Pablo Trent states he has not returned to baseline after cardioversion. He has been very sedentary due to exertional fatigue. He notes his pulses have been regular at home. He was started on low dose Coreg recently by Rocio Cabral MD. He notes his BPs have been elevated at home, but he is unable to remember values. He notes that he is unsure what medications he is taking. He reports a recent increase in LE edema. Patient denies any exertional chest pain, dyspnea, palpitations, syncope, orthopnea, or paroxysmal nocturnal dyspnea. He does not use his CPAP. He states he wakes up to urinate every hour at night. He is here today with his wife. Cardiac risk factors   HTN yes  DM  yes  Smoking no    Cardiac testing  Cath (8/27/8): LAD p90, m70. OM1 70, OM2 90 (small). RCA m40. EF 55%. No AVG/MR. Patent L SC.  PCI (11/13/8): mLAD 2.75x18 Xience, pLAD 2.75x23 Xience. OM1 3.0x15 Xience. Cath 4/28/17:Cath (4/28/17): LAD patent with patent prox and mid stent. LCx patent with patent OM1 stent. RCA p90 (ISR) and p90 distal to stent. EF 65%. No AVG/MR. PCI osteal/prox RCA (4/28/17): 3.0x28 Xience. Cath 4/24/18: RCA - prior stents present; Prox ISR 60%; Mid to Distal - 60%  Difficulty engaging guide secondary to stent at ostium  JR4/AR1 guide would not engage ; THE [facility] guide used to engage RCA  FFR - not siginificant 0.89      Lexiscan Cardiolite (8/11/9): Mild fixed inf defect. No reversible defects. EF 51% with mild inf HK. Cath (8/29/13): LAD patent prox and mid stents. LCx m40; patent OM1 stent. RCA p80. EF 55%. No AVG/MR. PCI ost/proxRCA (8/29/13): 3.0x18 Xience. Echo (8/22/13): EF 55%. Mod dil LA. Mild dil RA. Mild MR/TR/VA. PASP 24.   Lexiscan Cardiolite 4/15/14 - normal perfusion imaging, EF 52%  DCCV 12/18/14  Echo 6/18/18 - EF 55%. No WMA. Mild asymmetric hypertrophy of the septum. MIld to mod LAE. AoV calcification and sclerosis. No significant change c/t study of 22-Aug-2013. 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 60 Tab 1    amLODIPine (NORVASC) 10 mg tablet Take 0.5 Tabs by mouth daily. TAKE 1 TABLET BY MOUTH ONCE DAILY (Patient taking differently: Take 10 mg by mouth daily. TAKE 1 TABLET BY MOUTH ONCE DAILY) 90 Tab 0    amiodarone (CORDARONE) 200 mg tablet TAKE 1 TABLET BY MOUTH ONCE DAILY. *GENERIC FOR CORDARONE 90 Tab 1    atorvastatin (LIPITOR) 20 mg tablet TAKE 1 TABLET BY MOUTH ONCE DAILY 90 Tab 1    metFORMIN ER (GLUCOPHAGE XR) 500 mg tablet TAKE 4 TABLETS BY MOUTH ONCE DAILY WITH BREAKFAST 360 Tab 1    lisinopril (PRINIVIL, ZESTRIL) 40 mg tablet TAKE 1 TABLET BY MOUTH ONCE DAILY 90 Tab 0    warfarin (COUMADIN) 5 mg tablet TAKE 1 & 1/2 (ONE & ONE-HALF) TABLETS (7.5 MG) BY MOUTH ONCE DAILY 180 Tab 3    aspirin delayed-release 81 mg tablet Take 81 mg by mouth daily.  Indications: MYOCARDIAL REINFARCTION PREVENTION         Allergies   Allergen Reactions    Adhesive Tape Rash    Baycol Nausea and Vomiting    Clonidine Other (comments)     Headache and sleepiness      Pravachol [Pravastatin] Nausea and Vomiting      Review of Systems  Constitutional: Negative for fever, chills, and diaphoresis. Respiratory: Negative for cough, hemoptysis, sputum production, and wheezing. Cardiovascular: Negative for chest pain, palpitations, orthopnea, claudication, and PND. +Lower extremity edema  Gastrointestinal: Negative for heartburn, nausea, vomiting, blood in stool and melena. Genitourinary: Negative for dysuria and flank pain. +polyuria  Musculoskeletal: Negative for joint pain and back pain. Skin: Negative for rash. Neurological: Negative for focal weakness, seizures, loss of consciousness, weakness and headaches. Endo/Heme/Allergies: Does not bruise/bleed easily. Psychiatric/Behavioral: Negative for memory loss. The patient does not have insomnia. +poor sleep quality    Physical Exam  Visit Vitals  /72   Pulse 68   Resp 20   Ht 6' 4\" (1.93 m)   Wt 262 lb (118.8 kg)   SpO2 95%   BMI 31.89 kg/m²     Wt Readings from Last 3 Encounters:   12/14/18 262 lb (118.8 kg)   11/28/18 263 lb 12.8 oz (119.7 kg)   10/24/18 264 lb 6.4 oz (119.9 kg)      General - well developed well nourished  Neck - JVP normal, thyroid nl  Cardiac - Irregular, no murmurs, rubs or gallops. No clicks  Vascular - carotids without bruits, radials, femorals and pedal pulses equal bilateral  Lungs - clear to auscultation bilaterals, no rales ,wheezing or rhonchi  Abd - rounded/obese, soft nontender  Extremities - 1+ pretibial and pedal edema  Skin - no rash  Neuro - nonfocal. Right sided weakness  Psych - normal mood and affect    Cardiographics:  EKG 4/17/18 - Afib  ECHO 6/18/18 - LVEF of 60%, moderate to severe LAE  EKG 9/28/18 - SR 65, first degree AG block, , left anterior live block    Written by Nima Salas, as dictated by Dr. Sharad Baron. Rae Robledo MD

## 2018-12-13 NOTE — PROGRESS NOTES
Please call him- is he feling any better off the double dose of amlodipine/Norvasc( was taking both for a total of 20 mg thinking they were different medications)  Lab work is actually better with A1C down from 7.3 to 7.0 Normal liver function tests and kidney tests, as well a potassium. I want him to continue to recheck every 3 months for now and the best thing he can do is lose 2-3 lbs every time he comes in for follow up.

## 2018-12-13 NOTE — TELEPHONE ENCOUNTER
Patient informed of results. He does not notice any change bit will see heart DR this friday .  Has appt in jan here for follow up

## 2018-12-14 ENCOUNTER — CLINICAL SUPPORT (OUTPATIENT)
Dept: CARDIOLOGY CLINIC | Age: 78
End: 2018-12-14

## 2018-12-14 ENCOUNTER — OFFICE VISIT (OUTPATIENT)
Dept: CARDIOLOGY CLINIC | Age: 78
End: 2018-12-14

## 2018-12-14 VITALS
RESPIRATION RATE: 20 BRPM | DIASTOLIC BLOOD PRESSURE: 72 MMHG | BODY MASS INDEX: 31.9 KG/M2 | SYSTOLIC BLOOD PRESSURE: 170 MMHG | OXYGEN SATURATION: 95 % | HEART RATE: 68 BPM | WEIGHT: 262 LBS | HEIGHT: 76 IN

## 2018-12-14 DIAGNOSIS — R53.82 CHRONIC FATIGUE: Primary | ICD-10-CM

## 2018-12-14 DIAGNOSIS — E11.21 TYPE 2 DIABETES WITH NEPHROPATHY (HCC): ICD-10-CM

## 2018-12-14 DIAGNOSIS — I25.10 CORONARY ARTERY DISEASE INVOLVING NATIVE CORONARY ARTERY OF NATIVE HEART WITHOUT ANGINA PECTORIS: ICD-10-CM

## 2018-12-14 DIAGNOSIS — G47.33 OSA (OBSTRUCTIVE SLEEP APNEA): ICD-10-CM

## 2018-12-14 DIAGNOSIS — I10 ESSENTIAL HYPERTENSION, BENIGN: ICD-10-CM

## 2018-12-14 DIAGNOSIS — I48.0 PAROXYSMAL ATRIAL FIBRILLATION (HCC): ICD-10-CM

## 2018-12-14 DIAGNOSIS — I10 ESSENTIAL HYPERTENSION, BENIGN: Primary | ICD-10-CM

## 2018-12-14 DIAGNOSIS — I48.91 ATRIAL FIBRILLATION, UNSPECIFIED TYPE (HCC): ICD-10-CM

## 2018-12-14 DIAGNOSIS — E78.5 DYSLIPIDEMIA: ICD-10-CM

## 2018-12-14 LAB
INR BLD: 2.7
INR, EXTERNAL: 2.7 (ref 2–3)
PT POC: 32.3 SECONDS
VALID INTERNAL CONTROL?: YES

## 2018-12-14 NOTE — PATIENT INSTRUCTIONS
Bring all your medications to Dr. Joe Pinon in January to review what you need to take. Discuss taking Jardiance with Dr. Joe Pinon.

## 2018-12-14 NOTE — PROGRESS NOTES
Visit Vitals  /72   Pulse 68   Resp 20   Ht 6' 4\" (1.93 m)   Wt 262 lb (118.8 kg)   SpO2 95%   BMI 31.89 kg/m²     VO   EKG  Denies CP

## 2019-01-09 ENCOUNTER — TELEPHONE (OUTPATIENT)
Dept: FAMILY MEDICINE CLINIC | Age: 79
End: 2019-01-09

## 2019-01-09 ENCOUNTER — OFFICE VISIT (OUTPATIENT)
Dept: FAMILY MEDICINE CLINIC | Age: 79
End: 2019-01-09

## 2019-01-09 ENCOUNTER — TELEPHONE (OUTPATIENT)
Dept: CARDIOLOGY CLINIC | Age: 79
End: 2019-01-09

## 2019-01-09 VITALS
TEMPERATURE: 97.8 F | RESPIRATION RATE: 18 BRPM | WEIGHT: 263.2 LBS | HEART RATE: 66 BPM | BODY MASS INDEX: 32.05 KG/M2 | HEIGHT: 76 IN | DIASTOLIC BLOOD PRESSURE: 60 MMHG | SYSTOLIC BLOOD PRESSURE: 140 MMHG | OXYGEN SATURATION: 97 %

## 2019-01-09 DIAGNOSIS — E11.21 TYPE 2 DIABETES WITH NEPHROPATHY (HCC): ICD-10-CM

## 2019-01-09 DIAGNOSIS — K21.00 REFLUX ESOPHAGITIS: ICD-10-CM

## 2019-01-09 DIAGNOSIS — E78.5 DYSLIPIDEMIA, GOAL LDL BELOW 70: ICD-10-CM

## 2019-01-09 DIAGNOSIS — K57.30 DIVERTICULA OF COLON: ICD-10-CM

## 2019-01-09 DIAGNOSIS — I63.40 CEREBRAL INFARCTION DUE TO EMBOLISM OF CEREBRAL ARTERY (HCC): ICD-10-CM

## 2019-01-09 DIAGNOSIS — G47.33 OSA (OBSTRUCTIVE SLEEP APNEA): ICD-10-CM

## 2019-01-09 DIAGNOSIS — D50.0 IRON DEFICIENCY ANEMIA DUE TO CHRONIC BLOOD LOSS: ICD-10-CM

## 2019-01-09 DIAGNOSIS — I25.10 CORONARY ARTERY DISEASE INVOLVING NATIVE CORONARY ARTERY OF NATIVE HEART WITHOUT ANGINA PECTORIS: ICD-10-CM

## 2019-01-09 DIAGNOSIS — L71.9 ROSACEA: ICD-10-CM

## 2019-01-09 DIAGNOSIS — I48.19 PERSISTENT ATRIAL FIBRILLATION (HCC): Primary | ICD-10-CM

## 2019-01-09 NOTE — TELEPHONE ENCOUNTER
Dr. Joi Alves from [address] is calling on behalf of the patient,  is stating that the patients front tooth is cracked and it needs to be extracted.  states that the patient is on sodium warfin, they usually don't remove it when the pt is on that medication.      Best callback: [phone]  (Dr. Jodi Harris)    [facility]:  Wednesday, November 28, 2018

## 2019-01-09 NOTE — PROGRESS NOTES
HISTORY OF PRESENT ILLNESS  HPI  Janeth Reece is a 69 y.o. Male with a history of HTN, CAD, A-fib, DM-II with nephropathy, BCC of anterior chest, cerebral infarction, TOPHER, anemia, fatigue and hyperlipidemia with LDL goal <70, who presents at the office today for a follow up for his amlodipine. Pt is currently taking 10 mg amlodipine. Pt states that he feels he is doing better, but still gets tired when walking for long periods. Pt brought in all his medications today, to see what he should be taking. Pt notes that he has been off his lisinopril for 1 month. Pt denies unusual SOB, chest pain, and any recent ER visits or hospitalizations. Pt is seeing a cardiologist, Dr. Teagan Cuadra, for his CAD. 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 2/25/2010    Other and unspecified hyperlipidemia 2/25/2010    Positive PPD 2/25/2010    Reflux esophagitis 2/25/2010    Type II or unspecified type diabetes mellitus without mention of complication, not stated as uncontrolled 2/25/2010     Past Surgical History:   Procedure Laterality Date    Layo Loganon  12/13/2013         HC CAPSULE ENDOSCOPE M2A  4/4/2014         HX COLONOSCOPY      HX CORONARY STENT PLACEMENT      HX ORTHOPAEDIC      HX VASECTOMY  1983    UPPER GI ENDOSCOPY,BIOPSY  12/13/2013          Current Outpatient Medications on File Prior to Visit   Medication Sig Dispense Refill    carvedilol (COREG) 3.125 mg tablet Take 1 Tab by mouth two (2) times daily (with meals).  60 Tab 1    amLODIPine (NORVASC) 10 mg tablet Take 0.5 Tabs by mouth daily. TAKE 1 TABLET BY MOUTH ONCE DAILY (Patient taking differently: Take 10 mg by mouth daily. TAKE 1 TABLET BY MOUTH ONCE DAILY) 90 Tab 0    amiodarone (CORDARONE) 200 mg tablet TAKE 1 TABLET BY MOUTH ONCE DAILY. *GENERIC FOR CORDARONE 90 Tab 1    atorvastatin (LIPITOR) 20 mg tablet TAKE 1 TABLET BY MOUTH ONCE DAILY 90 Tab 1    metFORMIN ER (GLUCOPHAGE XR) 500 mg tablet TAKE 4 TABLETS BY MOUTH ONCE DAILY WITH BREAKFAST 360 Tab 1    lisinopril (PRINIVIL, ZESTRIL) 40 mg tablet TAKE 1 TABLET BY MOUTH ONCE DAILY 90 Tab 0    warfarin (COUMADIN) 5 mg tablet TAKE 1 & 1/2 (ONE & ONE-HALF) TABLETS (7.5 MG) BY MOUTH ONCE DAILY 180 Tab 3    aspirin delayed-release 81 mg tablet Take 81 mg by mouth daily. Indications: MYOCARDIAL REINFARCTION PREVENTION       No current facility-administered medications on file prior to visit. Allergies   Allergen Reactions    Adhesive Tape Rash    Baycol Nausea and Vomiting    Clonidine Other (comments)     Headache and sleepiness      Pravachol [Pravastatin] Nausea and Vomiting     Family History   Problem Relation Age of Onset    Heart Disease Mother     Cancer Father         colon    Heart Disease Maternal Grandmother     Heart Disease Maternal Grandfather     Cancer Paternal Grandmother     Cancer Paternal Grandfather      Social History     Socioeconomic History    Marital status:      Spouse name: Not on file    Number of children: Not on file    Years of education: Not on file    Highest education level: Not on file   Tobacco Use    Smoking status: Never Smoker    Smokeless tobacco: Former User     Types: Chew    Tobacco comment: patient reports quitting over 20 yeaers ago.     Substance and Sexual Activity    Alcohol use: Yes     Comment: once a year    Drug use: No   Other Topics Concern     Service Yes    Blood Transfusions Yes    Caffeine Concern Yes     Comment: 2-3 cups of coffee daily  Occupational Exposure No    Hobby Hazards No    Sleep Concern No    Stress Concern No    Weight Concern No    Special Diet No    Back Care No    Exercise Yes    Bike Helmet No     Comment: pt doesn't ride a bike   [place] Yes    Self-Exams No           Review of Systems   Constitutional: Negative for chills, diaphoresis, fever, malaise/fatigue and weight loss. Eyes: Negative for blurred vision, double vision and pain. Respiratory: Negative for cough, shortness of breath and wheezing. Cardiovascular: Negative for chest pain, palpitations, orthopnea, claudication, leg swelling and PND. Genitourinary: Negative for frequency. Skin: Negative for itching and rash. Neurological: Negative for dizziness, tingling, tremors, sensory change, speech change, focal weakness, seizures, loss of consciousness, weakness and headaches. Endo/Heme/Allergies: Negative for environmental allergies and polydipsia. Does not bruise/bleed easily. Results for orders placed or performed in visit on 12/14/18   AMB PT/INR EXTERNAL   Result Value Ref Range    INR, External 2.7 2.0 - 3.0   AMB POC PT/INR   Result Value Ref Range    VALID INTERNAL CONTROL POC Yes     Prothrombin time (POC) 32.3 seconds    INR POC 2.7          Physical Exam  Visit Vitals  /60 (BP 1 Location: Left arm, BP Patient Position: Sitting)   Pulse 66   Temp 97.8 °F (36.6 °C) (Oral)   Resp 18   Ht 6' 4\" (1.93 m)   Wt 263 lb 3.2 oz (119.4 kg)   SpO2 97%   BMI 32.04 kg/m²     Head: Normocephalic, without obvious abnormality, atraumatic  Eyes: conjunctivae/corneas clear. PERRL, EOM's intact.    Neck: supple, symmetrical, trachea midline, no adenopathy, thyroid: not enlarged, symmetric, no tenderness/mass/nodules, no carotid bruit and no JVD  Lungs: clear to auscultation bilaterally  Heart: regular rate and rhythm, S1, S2 normal, no murmur, click, rub or gallop  Extremities: extremities normal, atraumatic, no cyanosis or edema  Pulses: 2+ and symmetric  Lymph nodes: Cervical, supraclavicular, and axillary nodes normal.      ASSESSMENT and PLAN    ICD-10-CM ICD-9-CM    1. Persistent atrial fibrillation (HCC) I48.1 427.31    2. Type 2 diabetes with nephropathy (HCC) E11.21 250.40      583.81    3. Coronary artery disease involving native coronary artery of native heart without angina pectoris I25.10 414.01    4. 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 TOPHER (obstructive sleep apnea) G47.33 327.23    6. Rosacea L71.9 695.3    7. Dyslipidemia, goal LDL below 70 E78.5 272.4    8. Diverticula of colon K57.30 562.10    9. Reflux esophagitis K21.0 530.11    10. Iron deficiency anemia due to chronic blood loss D50.0 280.0     neg w/u 2009     Diagnoses and all orders for this visit:    1. Persistent atrial fibrillation ([address]    2. Type 2 diabetes with nephropathy [facility])  Assessment & Plan:  Improving, based on history, physical exam and review of pertinent labs, studies and medications; meds reconciled; continue current treatment plan, lifestyle modifications recommended, medication compliance emphasized.   Key Antihyperglycemic Medications             metFORMIN ER (GLUCOPHAGE XR) 500 mg tablet  (Taking) TAKE 4 TABLETS BY MOUTH ONCE DAILY WITH BREAKFAST        Other Key Diabetic Medications             atorvastatin (LIPITOR) 20 mg tablet  (Taking) TAKE 1 TABLET BY MOUTH ONCE DAILY    lisinopril (PRINIVIL, ZESTRIL) 40 mg tablet  (Taking) TAKE 1 TABLET BY MOUTH ONCE DAILY        Lab Results   Component Value Date/Time    Hemoglobin A1c 7.0 10/24/2018 09:53 AM    Glucose 143 10/24/2018 09:53 AM    Creatinine 1.11 10/24/2018 09:53 AM    Microalb/Creat ratio (ug/mg creat.) 60.9 10/24/2018 09:53 AM    Cholesterol, total 135 10/24/2018 09:53 AM    HDL Cholesterol 46 10/24/2018 09:53 AM    LDL, calculated 72 10/24/2018 09:53 AM    Triglyceride 86 10/24/2018 09:53 AM     Diabetic Foot and Eye Exam HM Status   Topic Date Due    Eye Exam  02/20/2019 (Originally 5/12/2017)    Diabetic Foot Care  10/24/2019         3. Coronary artery disease involving native coronary artery of native heart without angina pectoris    4. 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 TOPHER (obstructive sleep apnea)    6. Rosacea    7. Dyslipidemia, goal LDL below 70    8. Diverticula of colon    9. Reflux esophagitis    10. Iron deficiency anemia due to chronic blood loss  Comments:  neg w/u 2009    Follow-up Disposition:  Return in about 1 month (around 2/9/2019), or increased HURD, for fatigue, CHF.       lab results and schedule of future lab studies reviewed with patient  reviewed diet, exercise and weight control  cardiovascular risk and specific lipid/LDL goals reviewed  reviewed medications and side effects in detail  Please call my office if there are any questions- [phone]. I will arrange for follow up after the lab tests done from today return  Recommended a weekly \"heart check. \" I went into detail how to do this. Call for refills on medications as needed. Discussed expected course/resolution/complications of diagnosis in detail with patient. Medication risks/benefits/costs/interactions/alternatives discussed with patient. Pt was given an after visit summary which includes diagnoses, current medications & vitals. Pt expressed understanding with the diagnosis and plan. Total 25 minutes,60 % counseling re:  Regular exercise is very important to your health; it helps mentally, physically, socially; it prevents injuries if done properly. Exercise, even as simple as walking 20-30 minutes daily has major benefits to your health even though your \"numbers\" are the same in the lab. See if you can add this into your daily regimen and after a few months it will become a regular habit-\"just something you do,\" like brushing your teeth.      A combination of aerobic exercise and strengthening and stretching is felt to be the best for you, so this should be your ultimate goal.   This can be done in the privacy of your home or in a group setting as at the gym  Some prefer having a , others prefer to do exercise in groups or individually. Do what \"works\" for you. You need to make it simple and \"fun,\" or you most likely you will not continue it. Reviewed in detail the proper technique of checking the blood pressure- check it on an average day only, not on a stressful day, sitting, no exercise for at least 1 hour and not experiencing any new pain( chronic pain is OK). Patient encouraged to check BP sitting and standing at least once a month and to report these readings to me if > 140/ 90 on average , or if the standing BP is >  15 points lower than the sitting. Reviewed symptoms, or lack thereof, of hypertension and elevated cholesterol. Reviewed BP, cholesterol and diabetic goals. LDL goal<100,HDL goal>45, Triglyceride goal<150, A1C< 7.0, BP<140/90. Discussed specific diabetic recommendations: low cholesterol diet, weight control and daily exercise discussed, home glucose monitoring emphasized, all medications, side effects and compliance discussed carefully, foot care discussed and Podiatry visits discussed, annual eye examinations at Ophthalmology discussed, glycohemoglobin and other lab monitoring discussed and long term diabetic complications discussed. Recommended a weekly \"heart check. \" I went into detail how to do this. Regular exercise is very important to your health; it helps mentally, physically, socially; it prevents injuries if done properly. Exercise, even as simple as walking 20-30 minutes daily has major benefits to your health even though your \"numbers\" are the same in the lab. See if you can add this into your daily regimen and after a few months it will become a regular habit-\"just something you do,\" like brushing your teeth.      A combination of aerobic exercise and strengthening and stretching is felt to be the best for you, so this should be your ultimate goal.   This can be done in the privacy of your home or in a group setting as at the gym  Some prefer having a , others prefer to do exercise in groups or individually. Do what \"works\" for you. You need to make it simple and \"fun,\" or you most likely you will not continue it. His recent lab work is acceptable. His A1C is 7.0. He is doing much better since starting the Coreg. He has been off the lisinopril for one month, and we will keep him off of this until his follow up visit on 1/22. He needs to buy a BP cuff and measure his BP once a day until his FUV, checking it standing once a week. Encouraged him to continue working on weight loss and maintaining a low salt diet. BMI is significantly elevated- in the obese range. I reviewed diet, exercise and weight control. Discussed weight control in detail, the importance of mainly decreased carbs, and for weight maintenance, exercise; discussed different diets and that it isn't as important to watch the type of foods as it is to decrease calorie intake no matter what type of diet you do, etc.   Also, discussed symptoms of concern that were noted today in the note above, treatment options( including doing nothing), when to follow up before recommended time frame. Also, answered all questions. This document was written by Celine Jennings, as dictated by Loman Bernheim, MD.  I have reviewed and agree with the above note and have made corrections where appropriate Shimon Escobar M.D.

## 2019-01-09 NOTE — TELEPHONE ENCOUNTER
"[facility] Medicine  History & Physical    Patient Name: Elayne Almanzar  MRN: [id number]  Admission Date: 2017  Attending Physician: Ike Maya MD   Primary Care Provider: Nubia Crocker MD         Patient information was obtained from patient, past medical records and ER records.     Subjective:     Principal Problem:COPD exacerbation    Chief Complaint:   Chief Complaint   Patient presents with    Asthma     + increased SOB x two weeks. Pt reports using prescribed inhaler x 4 today. Pt states," I have asthma real bad and my COPD real bad but they don't give me no oxygen or a CPAP at night for my sleep apnea". Denies chest pain, jaw pain, weakness, fatigue, N/V/D or dizziness at this time        HPI: Ms. Elayne Almanzar is a 62 y.o. female, with PMH of COPD, diastolic dysfunction (EF 55% on 10/5/16), Chronic A. 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 She states the cough just became productive of clear mucus in the past 2 days. She states she has also had intermittent fever (TMAX of 100.2F PO), as well as sweats, and palpitations. She states she has been using her inhaler at home without complete relief. She last used steroids 2 weeks ago for 3 days. She has a remote 11 pack-year smoking history, quit .     Past Medical History:   Diagnosis Date    Anticoagulant long-term use     Atrial fibrillation     Cataract     Chronic kidney disease     COPD (chronic obstructive pulmonary disease)     Depression     Dysuria     Flank pain     HTN (hypertension)     Nephrolithiasis     KEYSHAWN (obstructive sleep apnea)     awaiting CPAP machine     Rheumatic disease of mitral valve     Urinary incontinence     Urinary tract infection        Past Surgical History:   Procedure Laterality Date     SECTION      kidney stone " Holly Duke spoke with pt's dentist and pt needs a tooth extraction done. Pt needs an updated INR completed before procedure. Roddy Palacio (coumadin nurse) to call pt for appointment. removal      MITRAL VALVE REPLACEMENT  2/2004    TUBAL LIGATION         Review of patient's allergies indicates:  No Known Allergies    No current facility-administered medications on file prior to encounter.      Current Outpatient Prescriptions on File Prior to Encounter   Medication Sig    albuterol 90 mcg/actuation inhaler Inhale 1-2 puffs into the lungs every 6 (six) hours as needed for Wheezing or Shortness of Breath. Rescue    aspirin (ECOTRIN) 81 MG EC tablet Take 81 mg by mouth once daily.     brimonidine 0.2% (ALPHAGAN) 0.2 % Drop Place 1 drop into the right eye 3 (three) times daily. (Patient taking differently: Place 1 drop into the right eye 2 (two) times daily. )    ergocalciferol (ERGOCALCIFEROL) 50,000 unit Cap Take 1 capsule (50,000 Units total) by mouth twice a week. (Patient taking differently: Take 50,000 Units by mouth once a week. on tuesday)    fluticasone-salmeterol 250-50 mcg/dose (ADVAIR) 250-50 mcg/dose diskus inhaler Inhale 1 puff into the lungs 2 (two) times daily.    furosemide (LASIX) 20 MG tablet Take 1 tablet (20 mg total) by mouth every other day. (Patient taking differently: Take 20 mg by mouth every other day. as needed)    metoprolol succinate (TOPROL-XL) 200 MG 24 hr tablet Take 1 tablet (200 mg total) by mouth once daily.    oxybutynin (DITROPAN-XL) 10 MG 24 hr tablet Take 10 mg by mouth once daily.     sertraline (ZOLOFT) 100 MG tablet TAKE 1 TABLET (100 MG TOTAL) BY MOUTH ONCE DAILY.    warfarin (COUMADIN) 5 MG tablet 5mg Monday and 7.5mg all other days or Or as directed by [facility] Clinic. (Patient taking differently: Take 7.5mg by mouth once daily or Or as directed by Coumadin Clinic.)    ferrous sulfate 325 mg (65 mg iron) Tab tablet Take 1 tablet (325 mg total) by mouth once daily. Take one tablet daily on an empty stomach for anemia (Patient taking differently: Take one tablet daily on an empty stomach for anemia)    fluticasone (FLONASE) 50 mcg/actuation  "nasal spray 1 spray by Each Nare route 2 (two) times daily as needed for Rhinitis.    needle, disp, 26 gauge 26 gauge x 1/2" Ndle 1 application by Misc.(Non-Drug; Combo Route) route once a week.    [DISCONTINUED] albuterol (VENTOLIN HFA) 90 mcg/actuation inhaler Inhale 2 puffs into the lungs every 6 (six) hours as needed for Wheezing.    [DISCONTINUED] syringe, disposable, 3 mL Syrg 1 Syringe by Misc.(Non-Drug; Combo Route) route once a week.    [DISCONTINUED] tamsulosin (FLOMAX) 0.4 mg Cp24 Take 1 capsule (0.4 mg total) by mouth once daily.     Family History     Problem Relation (Age of Onset)    Breast cancer Paternal Aunt    Cancer Brother    Colon cancer Maternal Grandmother, Mother    Diabetes Sister, Sister, Sister, Father    Heart disease Father (70)    Hypertension Sister    Stroke Paternal Grandmother        Social History Main Topics    Smoking status: Former Smoker     Packs/day: 0.50     Years: 20.00     Types: Cigarettes     Quit date: 5/8/2002    Smokeless tobacco: Never Used    Alcohol use No    Drug use: No    Sexual activity: No     Review of Systems   Constitutional: Positive for diaphoresis and fever. Negative for appetite change, chills and fatigue.   HENT: Negative for congestion, postnasal drip and rhinorrhea.    Respiratory: Positive for cough and shortness of breath. Negative for chest tightness and wheezing.    Cardiovascular: Positive for palpitations. Negative for chest pain (with cough and touch only) and leg swelling.   Gastrointestinal: Positive for diarrhea. Negative for abdominal pain, constipation, nausea and vomiting.   Genitourinary: Positive for dysuria. Negative for flank pain, frequency, hematuria and urgency.   Musculoskeletal: Positive for back pain (bilateral sides of mid back) and myalgias (bilateral sides of mid back). Negative for arthralgias and neck pain.   Skin: Negative for color change and pallor.   Neurological: Positive for headaches. Negative for " dizziness, weakness and light-headedness.     Objective:     Vital Signs (Most Recent):  Temp: 98.5 °F (36.9 °C) (09/07/17 2045)  Pulse: (!) 130 (09/07/17 2045)  Resp: 20 (09/07/17 2045)  BP: (!) 154/90 (09/07/17 2045)  SpO2: 95 % (09/07/17 2045) Vital Signs (24h Range):  Temp:  [98.2 °F (36.8 °C)-98.5 °F (36.9 °C)] 98.5 °F (36.9 °C)  Pulse:  [104-152] 130  Resp:  [20-36] 20  SpO2:  [95 %-100 %] 95 %  BP: (121-159)/(67-90) 154/90     Weight: 112.5 kg (248 lb)  Body mass index is 45.36 kg/m².    Physical Exam   Constitutional: She is oriented to person, place, and time. She appears well-developed and well-nourished. No distress.   Obese female, appears stated age. NAD, speaking in full sentences, able to change positions with ease.    HENT:   Head: Normocephalic and atraumatic.   Eyes: Conjunctivae and EOM are normal. Pupils are equal, round, and reactive to light. No scleral icterus.   Neck: Normal range of motion. Neck supple. No tracheal deviation present.   Cardiovascular: Normal rate, regular rhythm and intact distal pulses.  Exam reveals no gallop and no friction rub.    No murmur heard.  Audible clicking of mechanical MV.   2+ distal radial/DT/PT pulses. No carotid bruits.     Pulmonary/Chest: Effort normal. No stridor. No respiratory distress. She has no wheezes. She has no rales. She exhibits tenderness (peristernal chest wall TTP ).   Decreased bilateral breath sounds.     Abdominal: Soft. Bowel sounds are normal. She exhibits no distension. There is no tenderness. There is no guarding.   Discomfort with palpation of the RUQ of abdomen. Negative Valdes's sign. No guarding.    Musculoskeletal: Normal range of motion. She exhibits no deformity.   Neurological: She is alert and oriented to person, place, and time. No cranial nerve deficit. She exhibits normal muscle tone.   Skin: Skin is warm and dry. No rash noted. She is not diaphoretic. No erythema. No pallor.   Psychiatric: She has a normal mood and  affect. Her behavior is normal. Judgment and thought content normal.   Nursing note and vitals reviewed.       Significant Labs:   BMP:   Recent Labs  Lab 09/07/17  1543   *      K 3.8      CO2 24   BUN 17   CREATININE 1.1   CALCIUM 9.3     CBC:   Recent Labs  Lab 09/07/17  1543   WBC 5.45   HGB 10.5*   HCT 37.0        CMP:   Recent Labs  Lab 09/07/17  1543      K 3.8      CO2 24   *   BUN 17   CREATININE 1.1   CALCIUM 9.3   ANIONGAP 12   EGFRNONAA 54*     All pertinent labs within the past 24 hours have been reviewed.    Significant Imaging: I have reviewed all pertinent imaging results/findings within the past 24 hours.     Imaging Results          X-Ray Chest AP Portable (Final result)  Result time 09/07/17 16:23:18    Final result by Galileo Jarrett MD (09/07/17 16:23:18)                 Impression:        Central vascular congestion      Electronically signed by: GALILEO JARRETT MD  Date:     09/07/17  Time:    16:23              Narrative:    PORTABLE AP CHEST:      Comparison: 2/2/17    Findings:     No consolidations. Central vascular congestion. There is no pleural effusion or pneumothorax. The cardiac silhouette is unchanged in size.  Prior mitral valve repair. There are no acute bony abnormalities.                               Assessment/Plan:     * COPD exacerbation    - likely 2/2 inadequate home medication regimen vs. Influenza    - rapid flu ordered   - overall appears stable, and is not in acute distress upon exam   - has increased sputum volume, increased dyspnea, and cardiac disease without recent ABX use   - continue oral steroids started in ED   - Nebs A1lbtrt   - consider antibiotics   - not started as patient is afebrile, no leukocytosis          Chronic atrial fibrillation with rapid ventricular response    - anticoagulated on coumadin, and rate controlled with Metoprolol   - did not take metoprolol today and had A. Fib with RVR on EKG in ED     - Toprol administered in ED   - will monitor on tele   - continue all home meds             Status post heart valve replacement with mechanical valve    - 2/2 Rheumatic Mitral Valve disease           Chronic anticoagulation    - continue coumadin 7.5 mg QD as per Coumadin clinic rec's   - follows with Dr. Rahman at Coumadin clinic           Supratherapeutic INR    - diagnosed at Coumadin clinic today  - patient only too 5 mg coumadin as advised by clinic   - monitor INR in AM & resume 7.5 mg dose per coumadin clinic unless INR continues to be elevated           Diastolic dysfunction    - Last ECHO was 10/2016 with EF 55% and diastolic dysfunction   - takes Lasix PRN, no recent use, no b/l LE swelling   - as CXR shows pulmonary vascular congestion, ordered Lasix 60 mg one time dose           Complex sleep apnea syndrome    - has had 2 sleep studies  - can not afford CPAP machine    - consulted social work for discharge planning/assistance with obtaining affordable CPAP  - will order CPAP for while in patient          Essential (primary) hypertension    - patient states she was taken off of lisinopril by her PCP 1 year ago  - BP in ED was 154/90  - hydralazine PRN for SBP > 170           VTE Risk Mitigation         Ordered     warfarin tablet 7.5 mg  Daily     Route:  Oral        09/07/17 2039     Medium Risk of VTE  Once      09/07/17 2039     Place SAÚL hose  Until discontinued      09/07/17 2039     Place sequential compression device  Until discontinued      09/07/17 2039     Reason for No Pharmacological VTE Prophylaxis  Once      09/07/17 2039             Ping Jaeger PA-C  Department of Hospital Medicine   [facility]

## 2019-01-10 ENCOUNTER — ANTI-COAG VISIT (OUTPATIENT)
Dept: CARDIOLOGY CLINIC | Age: 79
End: 2019-01-10

## 2019-01-10 DIAGNOSIS — I10 ESSENTIAL HYPERTENSION, BENIGN: ICD-10-CM

## 2019-01-10 DIAGNOSIS — R53.82 CHRONIC FATIGUE: Primary | ICD-10-CM

## 2019-01-10 DIAGNOSIS — I48.91 ATRIAL FIBRILLATION, UNSPECIFIED TYPE (HCC): ICD-10-CM

## 2019-01-10 LAB
INR BLD: 3
INR, EXTERNAL: 3 (ref 2–3)
PT POC: 36.5 SECONDS
VALID INTERNAL CONTROL?: YES

## 2019-01-10 NOTE — ASSESSMENT & PLAN NOTE
Improving, based on history, physical exam and review of pertinent labs, studies and medications; meds reconciled; continue current treatment plan, lifestyle modifications recommended, medication compliance emphasized.   Key Antihyperglycemic Medications             metFORMIN ER (GLUCOPHAGE XR) 500 mg tablet  (Taking) TAKE 4 TABLETS BY MOUTH ONCE DAILY WITH BREAKFAST        Other Key Diabetic Medications             atorvastatin (LIPITOR) 20 mg tablet  (Taking) TAKE 1 TABLET BY MOUTH ONCE DAILY    lisinopril (PRINIVIL, ZESTRIL) 40 mg tablet  (Taking) TAKE 1 TABLET BY MOUTH ONCE DAILY        Lab Results   Component Value Date/Time    Hemoglobin A1c 7.0 10/24/2018 09:53 AM    Glucose 143 10/24/2018 09:53 AM    Creatinine 1.11 10/24/2018 09:53 AM    Microalb/Creat ratio (ug/mg creat.) 60.9 10/24/2018 09:53 AM    Cholesterol, total 135 10/24/2018 09:53 AM    HDL Cholesterol 46 10/24/2018 09:53 AM    LDL, calculated 72 10/24/2018 09:53 AM    Triglyceride 86 10/24/2018 09:53 AM     Diabetic Foot and Eye Exam HM Status   Topic Date Due    Eye Exam  02/20/2019 (Originally 5/12/2017)   Fabiana Unger Diabetic Foot [facility]  10/24/2019

## 2019-01-22 ENCOUNTER — OFFICE VISIT (OUTPATIENT)
Dept: FAMILY MEDICINE CLINIC | Age: 79
End: 2019-01-22

## 2019-01-22 VITALS
WEIGHT: 265 LBS | BODY MASS INDEX: 32.27 KG/M2 | HEART RATE: 62 BPM | SYSTOLIC BLOOD PRESSURE: 148 MMHG | OXYGEN SATURATION: 98 % | RESPIRATION RATE: 16 BRPM | DIASTOLIC BLOOD PRESSURE: 63 MMHG | TEMPERATURE: 97.1 F | HEIGHT: 76 IN

## 2019-01-22 DIAGNOSIS — I48.91 ATRIAL FIBRILLATION, UNSPECIFIED TYPE (HCC): ICD-10-CM

## 2019-01-22 DIAGNOSIS — I63.40 CEREBRAL INFARCTION DUE TO EMBOLISM OF CEREBRAL ARTERY (HCC): ICD-10-CM

## 2019-01-22 DIAGNOSIS — G47.33 OSA (OBSTRUCTIVE SLEEP APNEA): ICD-10-CM

## 2019-01-22 DIAGNOSIS — K21.00 REFLUX ESOPHAGITIS: ICD-10-CM

## 2019-01-22 DIAGNOSIS — E78.5 DYSLIPIDEMIA, GOAL LDL BELOW 70: ICD-10-CM

## 2019-01-22 DIAGNOSIS — I25.10 CORONARY ARTERY DISEASE INVOLVING NATIVE CORONARY ARTERY OF NATIVE HEART WITHOUT ANGINA PECTORIS: ICD-10-CM

## 2019-01-22 DIAGNOSIS — E11.21 TYPE 2 DIABETES WITH NEPHROPATHY (HCC): Primary | ICD-10-CM

## 2019-01-22 DIAGNOSIS — R53.82 CHRONIC FATIGUE: ICD-10-CM

## 2019-01-22 DIAGNOSIS — L71.9 ROSACEA: ICD-10-CM

## 2019-01-22 DIAGNOSIS — I10 ESSENTIAL HYPERTENSION, BENIGN: ICD-10-CM

## 2019-01-22 PROBLEM — I25.751 CORONARY ARTERY DISEASE INVOLVING NATIVE ARTERY OF TRANSPLANTED HEART WITH ANGINA PECTORIS WITH DOCUMENTED SPASM (HCC): Status: ACTIVE | Noted: 2019-01-22

## 2019-01-22 RX ORDER — LISINOPRIL 40 MG/1
20 TABLET ORAL DAILY
Qty: 90 TAB | Refills: 0
Start: 2019-01-22 | End: 2019-05-15 | Stop reason: DRUGHIGH

## 2019-01-22 RX ORDER — CARVEDILOL 3.12 MG/1
3.12 TABLET ORAL 2 TIMES DAILY WITH MEALS
Qty: 60 TAB | Refills: 1 | Status: SHIPPED | OUTPATIENT
Start: 2019-01-22 | End: 2019-03-26 | Stop reason: SDUPTHER

## 2019-01-22 NOTE — PROGRESS NOTES
Identified pt with two pt identifiers(name and ). Reviewed record in preparation for visit and have obtained necessary documentation. Chief Complaint   Patient presents with    Hypertension     BP check; check home BP cuff        Health Maintenance Due   Topic    Shingrix Vaccine Age 50> (1 of 2)    GLAUCOMA SCREENING Q2Y        Coordination of Care Questionnaire:  :   1) Have you been to an emergency room, urgent care, or hospitalized since your last visit?   no   If yes, where when, and reason for visit? 2. Have seen or consulted any other health care provider since your last visit? YES  If yes, where when, and reason for visit? Dr. Judy Martinez    3) Do you have an Advanced Directive/ Living Will in place? YES  If yes, do we have a copy on file YES  If no, would you like information NO    Patient is accompanied by self I have received verbal consent from Sheila Spring to discuss any/all medical information while they are present in the room.

## 2019-01-22 NOTE — PROGRESS NOTES
HISTORY OF PRESENT ILLNESS  HPI  Luly Toure is a 69 y.o. Male with a history of HTN, CAD, A-fib, DM-II with nephropathy, BCC, rosacea, cerebral infarction, TOPHER, anemia, fatigue and hyperlipidemia with LDL goal <70, who presents at the office today for a follow up. Pt brought in his BP cuff. Pt reports readings that average 170/85. Pt is currently taking 10 mg amlodipine daily, and is taking carvedilol 3.125 mg BID. Pt denies unusual SOB, chest pain, and any recent ER visits or hospitalizations. 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 2/25/2010    Other and unspecified hyperlipidemia 2/25/2010    Positive PPD 2/25/2010    Reflux esophagitis 2/25/2010    Type II or unspecified type diabetes mellitus without mention of complication, not stated as uncontrolled 2/25/2010     Past Surgical History:   Procedure Laterality Date    Kassy Orellanaon  12/13/2013         HC CAPSULE ENDOSCOPE M2A  4/4/2014         HX COLONOSCOPY      HX CORONARY STENT PLACEMENT      HX ORTHOPAEDIC      HX VASECTOMY  1983    UPPER GI ENDOSCOPY,BIOPSY  12/13/2013          Current Outpatient Medications on File Prior to Visit   Medication Sig Dispense Refill    amLODIPine (NORVASC) 10 mg tablet Take 0.5 Tabs by mouth daily. TAKE 1 TABLET BY MOUTH ONCE DAILY (Patient taking differently: Take 10 mg by mouth daily. TAKE 1 TABLET BY MOUTH ONCE DAILY) 90 Tab 0    amiodarone (CORDARONE) 200 mg tablet TAKE 1 TABLET BY MOUTH ONCE DAILY.  *GENERIC FOR CORDARONE 90 Tab 1    atorvastatin (LIPITOR) 20 mg tablet TAKE 1 TABLET BY MOUTH ONCE DAILY 90 Tab 1    metFORMIN ER (GLUCOPHAGE XR) 500 mg tablet TAKE 4 TABLETS BY MOUTH ONCE DAILY WITH BREAKFAST 360 Tab 1    warfarin (COUMADIN) 5 mg tablet TAKE 1 & 1/2 (ONE & ONE-HALF) TABLETS (7.5 MG) BY MOUTH ONCE DAILY 180 Tab 3    aspirin delayed-release 81 mg tablet Take 81 mg by mouth daily. Indications: MYOCARDIAL REINFARCTION PREVENTION       No current facility-administered medications on file prior to visit. Allergies   Allergen Reactions    Adhesive Tape Rash    Baycol Nausea and Vomiting    Clonidine Other (comments)     Headache and sleepiness      Pravachol [Pravastatin] Nausea and Vomiting     Family History   Problem Relation Age of Onset    Heart Disease Mother     Cancer Father         colon    Heart Disease Maternal Grandmother     Heart Disease Maternal Grandfather     Cancer Paternal Grandmother     Cancer Paternal Grandfather      Social History     Socioeconomic History    Marital status:      Spouse name: Not on file    Number of children: Not on file    Years of education: Not on file    Highest education level: Not on file   Tobacco Use    Smoking status: Never Smoker    Smokeless tobacco: Former User     Types: Chew    Tobacco comment: patient reports quitting over 20 yeaers ago. Substance and Sexual Activity    Alcohol use: Yes     Comment: once a year    Drug use: No   Other Topics Concern     Service Yes    Blood Transfusions Yes    Caffeine Concern Yes     Comment: 2-3 cups of coffee daily    Occupational Exposure No    Hobby Hazards No    Sleep Concern No    Stress Concern No    Weight Concern No    Special Diet No    Back Care No    Exercise Yes    Bike Helmet No     Comment: pt doesn't ride a bike   [address] Yes    Self-Exams No           Review of Systems   Constitutional: Negative for chills, diaphoresis, fever, malaise/fatigue and weight loss.    Eyes: Negative for blurred vision, double vision and pain. Respiratory: Negative for cough, shortness of breath and wheezing. Cardiovascular: Negative for chest pain, palpitations, orthopnea, claudication, leg swelling and PND. Genitourinary: Negative for frequency. Skin: Negative for itching and rash. Neurological: Negative for dizziness, tingling, tremors, sensory change, speech change, focal weakness, seizures, loss of consciousness, weakness and headaches. Endo/Heme/Allergies: Negative for environmental allergies and polydipsia. Does not bruise/bleed easily. Results for orders placed or performed in visit on 01/10/19   AMB PT/INR EXTERNAL   Result Value Ref Range    INR, External 3.0 2.0 - 3.0   AMB POC PT/INR   Result Value Ref Range    VALID INTERNAL CONTROL POC Yes     Prothrombin time (POC) 36.5 seconds    INR POC 3.0          Physical Exam  Visit Vitals  /63 (BP 1 Location: Left arm, BP Patient Position: Sitting)   Pulse 62   Temp 97.1 °F (36.2 °C) (Oral)   Resp 16   Ht 6' 4\" (1.93 m)   Wt 265 lb (120.2 kg)   SpO2 98%   BMI 32.26 kg/m²     No orthostatic changes. Head: Normocephalic, without obvious abnormality, atraumatic  Eyes: conjunctivae/corneas clear. PERRL, EOM's intact. Neck: supple, symmetrical, trachea midline, no adenopathy, thyroid: not enlarged, symmetric, no tenderness/mass/nodules, no carotid bruit and no JVD  Lungs: clear to auscultation bilaterally  Heart: regular rate and rhythm, S1, S2 normal, no murmur, click, rub or gallop  Extremities: extremities normal, atraumatic, no cyanosis or edema  Pulses: 2+ and symmetric  Lymph nodes: Cervical, supraclavicular, and axillary nodes normal.  Neurologic: Grossly normal      ASSESSMENT and PLAN    ICD-10-CM ICD-9-CM    1. Type 2 diabetes with nephropathy (HCC) E11.21 250.40      583.81    2. Essential hypertension, benign I10 401.1 lisinopril (PRINIVIL, ZESTRIL) 40 mg tablet      carvedilol (COREG) 3.125 mg tablet   3. Dyslipidemia, goal LDL below 70 E78.5 272.4    4. Atrial fibrillation, unspecified type ([facility] I48.91 427.31    5. 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 Chronic fatigue R53.82 780.79    7. TOPHER (obstructive sleep apnea) G47.33 327.23    8. Reflux esophagitis K21.0 530.11    9. Coronary artery disease involving native coronary artery of native heart without angina pectoris I25.10 414.01    10. Rosacea L71.9 695.3      Diagnoses and all orders for this visit:    1. Type 2 diabetes with nephropathy ([facility]    2. Essential hypertension, benign  -     lisinopril (PRINIVIL, ZESTRIL) 40 mg tablet; Take 0.5 Tabs by mouth daily. -     carvedilol (COREG) 3.125 mg tablet; Take 1 Tab by mouth two (2) times daily (with meals). 3. Dyslipidemia, goal LDL below 70    4. Atrial fibrillation, unspecified type ([facility]    5. 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 Chronic fatigue    7. TOPHER (obstructive sleep apnea)    8. Reflux esophagitis    9. Coronary artery disease involving native coronary artery of native heart without angina pectoris    10. Rosacea      Follow-up Disposition: Not on File       lab results and schedule of future lab studies reviewed with patient  reviewed diet, exercise and weight control  cardiovascular risk and specific lipid/LDL goals reviewed  reviewed medications and side effects in detail  Please call my office if there are any questions- [phone]. I will arrange for follow up after the lab tests done from today return  Recommended a weekly \"heart check. \" I went into detail how to do this. Call for refills on medications as needed. Discussed expected course/resolution/complications of diagnosis in detail with patient. Medication risks/benefits/costs/interactions/alternatives discussed with patient.    Pt was given an after visit summary which includes diagnoses, current medications & vitals. Pt expressed understanding with the diagnosis and plan. Total 25 minutes,60 % counseling re: Recommended a weekly \"heart check. \" I went into detail how to do this. Regular exercise is very important to your health; it helps mentally, physically, socially; it prevents injuries if done properly. Exercise, even as simple as walking 20-30 minutes daily has major benefits to your health even though your \"numbers\" are the same in the lab. See if you can add this into your daily regimen and after a few months it will become a regular habit-\"just something you do,\" like brushing your teeth. A combination of aerobic exercise and strengthening and stretching is felt to be the best for you, so this should be your ultimate goal.   This can be done in the privacy of your home or in a group setting as at the gym  Some prefer having a , others prefer to do exercise in groups or individually. Do what \"works\" for you. You need to make it simple and \"fun,\" or you most likely you will not continue it. Reviewed BP, cholesterol and diabetic goals. LDL goal<100,HDL goal>45, Triglyceride goal<150, A1C< 7.0, BP<140/90. Discussed specific diabetic recommendations: low cholesterol diet, weight control and daily exercise discussed, home glucose monitoring emphasized, all medications, side effects and compliance discussed carefully, foot care discussed and Podiatry visits discussed, annual eye examinations at Ophthalmology discussed, glycohemoglobin and other lab monitoring discussed and long term diabetic complications discussed. Reviewed symptoms, or lack thereof, of hypertension and elevated cholesterol. Reviewed in detail the proper technique of checking the blood pressure- check it on an average day only, not on a stressful day, sitting, no exercise for at least 1 hour and not experiencing any new pain( chronic pain is OK).  Patient encouraged to check BP sitting and standing at least once a month and to report these readings to me if > 140/ 90 on average , or if the standing BP is >  15 points lower than the sitting. BMI is significantly elevated- in the obese range. I reviewed diet, exercise and weight control. Discussed weight control in detail, the importance of mainly decreased carbs, and for weight maintenance, exercise; discussed different diets and that it isn't as important to watch the type of foods as it is to decrease calorie intake no matter what type of diet you do, etc.     Because his systolic pressure is averaging higher than 140, we will restart him on 0.5 tablet of lisinopril every morning, which equals 20 mg. Encouraged him to check his standing BP at least weekly, and his sitting BP 2-3 times a week. We will have him follow up here in 3 weeks, and instructed him to bring his recorded readings. I informed him that it is a non-fasting visit, and that he should hydrate before coming in. Also, discussed symptoms of concern that were noted today in the note above, treatment options( including doing nothing), when to follow up before recommended time frame. Also, answered all questions. This document was written by Shaina Jimenez, as dictated by Kena Nye MD.  I have reviewed and agree with the above note and have made corrections where appropriate Shimon Monahan M.D.

## 2019-02-11 ENCOUNTER — OFFICE VISIT (OUTPATIENT)
Dept: FAMILY MEDICINE CLINIC | Age: 79
End: 2019-02-11

## 2019-02-11 ENCOUNTER — HOSPITAL ENCOUNTER (OUTPATIENT)
Dept: LAB | Age: 79
Discharge: HOME | End: 2019-02-11
Payer: MEDICARE

## 2019-02-11 DIAGNOSIS — K21.00 REFLUX ESOPHAGITIS: ICD-10-CM

## 2019-02-11 DIAGNOSIS — I10 ESSENTIAL HYPERTENSION, BENIGN: Primary | ICD-10-CM

## 2019-02-11 DIAGNOSIS — K57.30 DIVERTICULA OF COLON: ICD-10-CM

## 2019-02-11 DIAGNOSIS — E11.21 TYPE 2 DIABETES WITH NEPHROPATHY (HCC): ICD-10-CM

## 2019-02-11 DIAGNOSIS — D50.9 IRON DEFICIENCY ANEMIA, UNSPECIFIED IRON DEFICIENCY ANEMIA TYPE: ICD-10-CM

## 2019-02-11 DIAGNOSIS — I63.40 CEREBRAL INFARCTION DUE TO EMBOLISM OF CEREBRAL ARTERY (HCC): ICD-10-CM

## 2019-02-11 DIAGNOSIS — I25.10 CORONARY ARTERY DISEASE INVOLVING NATIVE CORONARY ARTERY OF NATIVE HEART WITHOUT ANGINA PECTORIS: ICD-10-CM

## 2019-02-11 DIAGNOSIS — I48.19 PERSISTENT ATRIAL FIBRILLATION (HCC): ICD-10-CM

## 2019-02-11 DIAGNOSIS — G47.33 OSA (OBSTRUCTIVE SLEEP APNEA): ICD-10-CM

## 2019-02-11 DIAGNOSIS — R53.82 CHRONIC FATIGUE: ICD-10-CM

## 2019-02-11 DIAGNOSIS — Z71.89 ACP (ADVANCE CARE PLANNING): ICD-10-CM

## 2019-02-11 DIAGNOSIS — D50.0 IRON DEFICIENCY ANEMIA DUE TO CHRONIC BLOOD LOSS: ICD-10-CM

## 2019-02-11 DIAGNOSIS — E78.5 DYSLIPIDEMIA, GOAL LDL BELOW 70: ICD-10-CM

## 2019-02-11 PROCEDURE — 83036 HEMOGLOBIN GLYCOSYLATED A1C: CPT

## 2019-02-11 PROCEDURE — 80053 COMPREHEN METABOLIC PANEL: CPT

## 2019-02-11 PROCEDURE — 36415 COLL VENOUS BLD VENIPUNCTURE: CPT

## 2019-02-11 PROCEDURE — 80061 LIPID PANEL: CPT

## 2019-02-11 NOTE — PROGRESS NOTES
HISTORY OF PRESENT ILLNESS 
HPI Erick Oliveira is a 69 y.o. Male with a history of HTN, CAD, A-fib, DM-II with nephropathy, BCC, cerebral infarction, TOPHER, anemia, fatigue and hyperlipidemia with LDL goal <70, who presents at the office today for a follow up. Pt does check his BP at home and reports readings of 170/80. Pt denies unusual SOB, chest pain, and any recent ER visits or hospitalizations. 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 Past Medical History:  
Diagnosis Date  Arrhythmia   
 a fib  Atrial fibrillation ([address] 8/16/2013  Basal cell carcinoma of anterior chest 2/25/2010  CAD (coronary artery disease) 2/25/2010  CVA (cerebral infarction) 2/25/2010  
 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  
Procedure Laterality Date  COLONOSCOPY,CHARMAINE MALDONADO,SNARE  12/13/2013  HC CAPSULE ENDOSCOPE M2A  4/4/2014  HX COLONOSCOPY    
 HX CORONARY STENT PLACEMENT    
 HX ORTHOPAEDIC    
 [facility]  UPPER GI ENDOSCOPY,BIOPSY  12/13/2013 Current Outpatient Medications on File Prior to Visit Medication Sig Dispense Refill  warfarin (COUMADIN) 5 mg tablet TAKE 1 & 1/2 (ONE & ONE-HALF) TABLETS (7.5 MG) BY MOUTH ONCE DAILY 180 Tab 3  
 aspirin delayed-release 81 mg tablet Take 81 mg by mouth daily. Indications: MYOCARDIAL REINFARCTION PREVENTION No current facility-administered medications on file prior to visit. Allergies Allergen Reactions  Adhesive Tape Rash  Baycol Nausea and Vomiting  Clonidine Other (comments) Headache and sleepiness  Pravachol [Pravastatin] Nausea and Vomiting Family History Problem Relation Age of Onset  Heart Disease Mother  Cancer Father   
     colon  Heart Disease Maternal Grandmother  Heart Disease Maternal Grandfather  Cancer Paternal Grandmother  Cancer Paternal Grandfather Social History Socioeconomic History  Marital status:  Spouse name: Not on file  Number of children: Not on file  Years of education: Not on file  Highest education level: Not on file Tobacco Use  Smoking status: Never Smoker  Smokeless tobacco: Former User Types: Chew  Tobacco comment: patient reports quitting over 20 yeaers ago. Substance and Sexual Activity  Alcohol use: Yes Comment: once a year  Drug use: No  
Other Topics Concern [address] Yes  Blood Transfusions Yes  Caffeine Concern Yes Comment: 2-3 cups of coffee daily  Occupational Exposure No  
 Hobby Hazards No  
 Sleep Concern No  
 Stress Concern No  
 Weight Concern No  
 Special Diet No  
 Back Care No  
 Exercise Yes  Bike Helmet No  
  Comment: pt doesn't ride a bike [address] Yes  Self-Exams No  
 
 
 
 
Review of Systems Constitutional: Negative for chills, diaphoresis, fever, malaise/fatigue and weight loss. Eyes: Negative for blurred vision, double vision and pain. Respiratory: Negative for cough, shortness of breath and wheezing. Cardiovascular: Negative for chest pain, palpitations, orthopnea, claudication, leg swelling and PND. Genitourinary: Negative for frequency. Skin: Negative for itching and rash. Neurological: Negative for dizziness, tingling, tremors, sensory change, speech change, focal weakness, seizures, loss of consciousness, weakness and headaches. Endo/Heme/Allergies: Negative for environmental allergies and polydipsia. Does not bruise/bleed easily. Results for orders placed or performed in visit on 02/11/19 LIPID PANEL Result Value Ref Range Cholesterol, total 133 100 - 199 mg/dL Triglyceride 98 0 - 149 mg/dL HDL Cholesterol 49 >39 mg/dL VLDL, calculated 20 5 - 40 mg/dL LDL, calculated 64 0 - 99 mg/dL METABOLIC PANEL, COMPREHENSIVE Result Value Ref Range Glucose 163 (H) 65 - 99 mg/dL BUN 19 8 - 27 mg/dL Creatinine 1.26 0.76 - 1.27 mg/dL GFR est non-AA 55 (L) >59 mL/min/1.73 GFR est AA 63 >59 mL/min/1.73  
 BUN/Creatinine ratio 15 10 - 24 Sodium 142 134 - 144 mmol/L Potassium 4.1 3.5 - 5.2 mmol/L Chloride 104 96 - 106 mmol/L  
 CO2 22 20 - 29 mmol/L Calcium 9.0 8.6 - 10.2 mg/dL Protein, total 6.8 6.0 - 8.5 g/dL Albumin 3.9 3.5 - 4.8 g/dL GLOBULIN, TOTAL 2.9 1.5 - 4.5 g/dL A-G Ratio 1.3 1.2 - 2.2 Bilirubin, total 0.3 0.0 - 1.2 mg/dL Alk. phosphatase 151 (H) 39 - 117 IU/L  
 AST (SGOT) 15 0 - 40 IU/L  
 ALT (SGPT) 24 0 - 44 IU/L HEMOGLOBIN A1C WITH EAG Result Value Ref Range Hemoglobin A1c 8.1 (H) 4.8 - 5.6 % Estimated average glucose 186 mg/dL CVD REPORT Result Value Ref Range INTERPRETATION Note PDF IMAGE Not applicable CKD REPORT Result Value Ref Range Interpretation Note Physical Exam 
Visit Vitals /78 (BP 1 Location: Right arm, BP Patient Position: Sitting) Pulse 60 Temp 97.7 °F (36.5 °C) (Oral) Resp 18 Ht 6' 4\" (1.93 m) Wt 270 lb (122.5 kg) SpO2 98% BMI 32.87 kg/m² His BP dropped from 214 systolic to 769 on standing, but took 1-2 minutes to get to that. After another minute, it thalia back to 115/120. He was asymptomatic for all of this. Head: Normocephalic, without obvious abnormality, atraumatic Eyes: conjunctivae/corneas clear. PERRL, EOM's intact. Neck: supple, symmetrical, trachea midline, no adenopathy, thyroid: not enlarged, symmetric, no tenderness/mass/nodules, no carotid bruit and no JVD Lungs: clear to auscultation bilaterally Heart: regular rate and rhythm, S1, S2 normal, no murmur, click, rub or gallop Extremities: extremities normal, atraumatic, no cyanosis or edema Pulses: 2+ and symmetric Lymph nodes: Cervical, supraclavicular, and axillary nodes normal. 
Neurologic: Grossly normal. Non-focal, except for baseline weakness on the left side. ASSESSMENT and PLAN 
  ICD-10-CM ICD-9-CM 1. Essential hypertension, benign I10 401.1 2. Dyslipidemia, goal LDL below 70 E78.5 272.4 LIPID PANEL  
   METABOLIC PANEL, COMPREHENSIVE 3. Type 2 diabetes with nephropathy (HCC) E11.21 250.40 HEMOGLOBIN A1C WITH EAG  
  583.81   
4. TOPHER (obstructive sleep apnea) G47.33 327.23   
5. Iron deficiency anemia due to chronic blood loss D50.0 280.0 6. ACP (advance care planning) Z71.89 V65.49 7. Persistent atrial fibrillation (HCC) I48.1 427.31   
8. Reflux esophagitis K21.0 530.11   
9. 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   
10. Coronary artery disease involving native coronary artery of native heart without angina pectoris I25.10 414.01   
11. Diverticula of colon K57.30 562.10   
12. Iron deficiency anemia, unspecified iron deficiency anemia type D50.9 280.9 13. Chronic fatigue R53.82 780.79 Diagnoses and all orders for this visit: 1. Essential hypertension, benign 2. Dyslipidemia, goal LDL below 70 
-     LIPID PANEL 
-     METABOLIC PANEL, COMPREHENSIVE 3. Type 2 diabetes with nephropathy (HCC) 
-     HEMOGLOBIN A1C WITH EAG 
 
4. TOPHER (obstructive sleep apnea) 5. Iron deficiency anemia due to chronic blood loss 6. ACP (advance care planning) 7. Persistent atrial fibrillation ([address] 8. Reflux esophagitis 9. 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 Coronary artery disease involving native coronary artery of native heart without angina pectoris 11. Diverticula of colon 12. Iron deficiency anemia, unspecified iron deficiency anemia type 13. Chronic fatigue Other orders -     CVD REPORT 
-     CKD REPORT Follow-up and Dispositions · Return Weight or BP increasing, for F/U HTN,CHOL,DM, F/U of obesity. lab results and schedule of future lab studies reviewed with patient 
reviewed diet, exercise and weight control 
cardiovascular risk and specific lipid/LDL goals reviewed 
reviewed medications and side effects in detail Please call my office if there are any questions- [phone]. I will arrange for follow up after the lab tests done from today return Recommended a weekly \"heart check. \" I went into detail how to do this. Call for refills on medications as needed. Discussed expected course/resolution/complications of diagnosis in detail with patient. Medication risks/benefits/costs/interactions/alternatives discussed with patient. Pt was given an after visit summary which includes diagnoses, current medications & vitals. Pt expressed understanding with the diagnosis and plan. Total 25 minutes,60 % counseling re: Recommended a weekly \"heart check. \" I went into detail how to do this. Regular exercise is very important to your health; it helps mentally, physically, socially; it prevents injuries if done properly. Exercise, even as simple as walking 20-30 minutes daily has major benefits to your health even though your \"numbers\" are the same in the lab. See if you can add this into your daily regimen and after a few months it will become a regular habit-\"just something you do,\" like brushing your teeth.    
 A combination of aerobic exercise and strengthening and stretching is felt to be the best for you, so this should be your ultimate goal.  
This can be done in the privacy of your home or in a group setting as at the gym  Some prefer having a , others prefer to do exercise in groups or individually. Do what \"works\" for you. You need to make it simple and \"fun,\" or you most likely you will not continue it. Review of  the proper technique of checking the blood pressure- check it on an average day only, not on a stressful day, sitting, no exercise for at least 1 hour and not experiencing any new pain( chronic pain is OK). Patient encouraged to check BP sitting and standing at least once a month and to report these readings to me if > 140/ 90 on average , or if the standing BP is >  15 points lower than the sitting. Always check it twice and if there is > 5 points decrease from the previous reading( top reading or systolic) keep checking it until it does not drop 5 points. Write only this final reading down, not the preceding readings. If out of these readings there is only 1 out of 4 or less > 360, or > 90 diastolic then your blood pressure is OK; it needs further treatment if it is above this. Also, don't forget,  as noted above, to check your blood pressure standing once a month; this is to detect a drop in your BP that might lead to fainting and serious injury; you check it standing with your arm hanging straight down and relaxed. Check it twice waiting 1 minute between the two readings. If, with either one of these 2 readings there is a > 15 point drop of the systolic compared to your sitting pressure( done before the standing BP), then let me know. Following these guidelines, continue to check your BP and write down only the ones described above and it will help me to effectively treat your blood pressure. Reviewed symptoms, or lack thereof, of hypertension and elevated cholesterol.   
 
Discussed specific diabetic recommendations: low cholesterol diet, weight control and daily exercise discussed, home glucose monitoring emphasized, all medications, side effects and compliance discussed carefully, foot care discussed and Podiatry visits discussed, annual eye examinations at Ophthalmology discussed, glycohemoglobin and other lab monitoring discussed and long term diabetic complications discussed. Reviewed BP, cholesterol and diabetic goals. LDL goal<100,HDL goal>45, Triglyceride goal<150, A1C< 7.0, BP<140/90. BMI is significantly elevated- in the obese range. I reviewed diet, exercise and weight control. Discussed weight control in detail, the importance of mainly decreased carbs, and for weight maintenance, exercise; discussed different diets and that it isn't as important to watch the type of foods as it is to decrease calorie intake no matter what type of diet you do, etc.  
 
I instructed him to bring his cuff on his next visit so that we can check it. His BP is likely falling when he stands, as his symptoms support that. I told him that we would look to control his two major complaints of HURD and weakness when he stands. First, we will need to find a medication that does not drop his BP when he stands. The carvedilol will help his HURD, though it may aggravate his other issue. .  Also, discussed symptoms of concern that were noted today in the note above, treatment options( including doing nothing), when to follow up before recommended time frame. Also, answered all questions. This document was written by Tamara Bingham, as dictated by Darnell Moscoso MD. 
I have reviewed and agree with the above note and have made corrections where appropriate Shimon Hicks M.D.

## 2019-02-11 NOTE — PROGRESS NOTES
Chief Complaint Patient presents with  Hypertension  
  follow up  Diabetes  
  follow up 1. Have you been to the ER, urgent care clinic since your last visit? Hospitalized since your last visit? No 
 
2. Have you seen or consulted any other health care providers outside of the [address] [place] [address] since your last visit? Include any pap smears or colon screening.  Dermatology  2/6

## 2019-02-12 LAB
ALBUMIN SERPL-MCNC: 3.9 G/DL (ref 3.5–4.8)
ALBUMIN/GLOB SERPL: 1.3 {RATIO} (ref 1.2–2.2)
ALP SERPL-CCNC: 151 IU/L (ref 39–117)
ALT SERPL-CCNC: 24 IU/L (ref 0–44)
AST SERPL-CCNC: 15 IU/L (ref 0–40)
BILIRUB SERPL-MCNC: 0.3 MG/DL (ref 0–1.2)
BUN SERPL-MCNC: 19 MG/DL (ref 8–27)
BUN/CREAT SERPL: 15 (ref 10–24)
CALCIUM SERPL-MCNC: 9 MG/DL (ref 8.6–10.2)
CHLORIDE SERPL-SCNC: 104 MMOL/L (ref 96–106)
CHOLEST SERPL-MCNC: 133 MG/DL (ref 100–199)
CO2 SERPL-SCNC: 22 MMOL/L (ref 20–29)
CREAT SERPL-MCNC: 1.26 MG/DL (ref 0.76–1.27)
EST. AVERAGE GLUCOSE BLD GHB EST-MCNC: 186 MG/DL
GLOBULIN SER CALC-MCNC: 2.9 G/DL (ref 1.5–4.5)
GLUCOSE SERPL-MCNC: 163 MG/DL (ref 65–99)
HBA1C MFR BLD: 8.1 % (ref 4.8–5.6)
HDLC SERPL-MCNC: 49 MG/DL
INTERPRETATION, 910389: NORMAL
INTERPRETATION: NORMAL
LDLC SERPL CALC-MCNC: 64 MG/DL (ref 0–99)
PDF IMAGE, 910387: NORMAL
POTASSIUM SERPL-SCNC: 4.1 MMOL/L (ref 3.5–5.2)
PROT SERPL-MCNC: 6.8 G/DL (ref 6–8.5)
SODIUM SERPL-SCNC: 142 MMOL/L (ref 134–144)
TRIGL SERPL-MCNC: 98 MG/DL (ref 0–149)
VLDLC SERPL CALC-MCNC: 20 MG/DL (ref 5–40)

## 2019-02-13 DIAGNOSIS — I25.10 CORONARY ARTERY DISEASE INVOLVING NATIVE CORONARY ARTERY OF NATIVE HEART WITHOUT ANGINA PECTORIS: ICD-10-CM

## 2019-02-14 ENCOUNTER — ANTI-COAG VISIT (OUTPATIENT)
Dept: CARDIOLOGY CLINIC | Age: 79
End: 2019-02-14

## 2019-02-14 DIAGNOSIS — I10 ESSENTIAL HYPERTENSION, BENIGN: ICD-10-CM

## 2019-02-14 DIAGNOSIS — I48.91 ATRIAL FIBRILLATION, UNSPECIFIED TYPE (HCC): ICD-10-CM

## 2019-02-14 DIAGNOSIS — R53.82 CHRONIC FATIGUE: Primary | ICD-10-CM

## 2019-02-14 LAB
INR BLD: 4.3
INR, EXTERNAL: 4.3 (ref 2–3)
PT POC: 51.5 SECONDS
VALID INTERNAL CONTROL?: YES

## 2019-02-14 RX ORDER — AMLODIPINE BESYLATE 10 MG/1
TABLET ORAL
Qty: 90 TAB | Refills: 0 | Status: SHIPPED | OUTPATIENT
Start: 2019-02-14 | End: 2019-05-26 | Stop reason: SDUPTHER

## 2019-02-28 ENCOUNTER — CLINICAL SUPPORT (OUTPATIENT)
Dept: CARDIOLOGY CLINIC | Age: 79
End: 2019-02-28

## 2019-02-28 DIAGNOSIS — I48.91 ATRIAL FIBRILLATION, UNSPECIFIED TYPE (HCC): ICD-10-CM

## 2019-02-28 DIAGNOSIS — R53.82 CHRONIC FATIGUE: Primary | ICD-10-CM

## 2019-02-28 DIAGNOSIS — I10 ESSENTIAL HYPERTENSION, BENIGN: ICD-10-CM

## 2019-02-28 LAB
INR BLD: 2
INR, EXTERNAL: 2 (ref 2–3)
PT POC: 22.1 SECONDS
VALID INTERNAL CONTROL?: YES

## 2019-03-26 DIAGNOSIS — I10 ESSENTIAL HYPERTENSION, BENIGN: ICD-10-CM

## 2019-03-27 RX ORDER — CARVEDILOL 3.12 MG/1
TABLET ORAL
Qty: 180 TAB | Refills: 1 | Status: SHIPPED | OUTPATIENT
Start: 2019-03-27 | End: 2019-05-15

## 2019-03-28 ENCOUNTER — ANTI-COAG VISIT (OUTPATIENT)
Dept: CARDIOLOGY CLINIC | Age: 79
End: 2019-03-28

## 2019-03-28 DIAGNOSIS — I10 ESSENTIAL HYPERTENSION, BENIGN: ICD-10-CM

## 2019-03-28 DIAGNOSIS — I48.91 ATRIAL FIBRILLATION, UNSPECIFIED TYPE (HCC): ICD-10-CM

## 2019-03-28 DIAGNOSIS — R53.82 CHRONIC FATIGUE: Primary | ICD-10-CM

## 2019-03-28 LAB
INR BLD: 2.2
INR, EXTERNAL: 2.2 (ref 2–3)
PT POC: 26.9 SECONDS
VALID INTERNAL CONTROL?: YES

## 2019-04-02 NOTE — PROGRESS NOTES
[address] [facility] [address], [address] [facility] Office [phone] Fax [phone] Erick Oliveira is a 69 y.o. male. Last seen 4 months ago. Assessment Encounter Diagnoses Name Primary?  Essential hypertension, benign  Coronary artery disease involving native coronary artery of native heart without angina pectoris  Dyslipidemia  Paroxysmal atrial fibrillation (HCC)  TOPHER (obstructive sleep apnea)  Type 2 diabetes with nephropathy ([facility] [address]  Chronic heart failure with preserved ejection fraction ([facility] [address] Yes Recommendations: 
Chronic HURD with edema/orthopnea/dry cough. His symptoms sound like chronic HFpEF in the setting of AF/CAD/HTN/untreated TOPHER/DM. Will start lasix 20 mg/d and reassess LV function and PA pressures with echo in the near future. We had a long discussion about low sodium diet, pursing alternate treatments of TOPHER such as a dental appliance. AF with recurrence, albeit rate controlled. Unclear if he is symptomatic or not. Will reassess atrial dimensions with echo and reconsider rate control vs rhythm control. Continue Amiodarone for now. Continue Warfarin. He has a history of GI bleeding. High risk of recurrent stroke. History of GI bleeding 2008 and 2013 without clear cut abnormalities. M2A capsule endoscopy 2014 was negative. No recurrent GI bleeding on warfarin. 
 
CAD. s/p multiple PCI's, most recently 2 years ago, s/p PCI RCA with LEO. Last cath most recently 4/24/18 demonstrated no RCA restenosis, distal LAD 80%. He has chronic fatigue and HURD as noted above, but no anginal symptoms. We discussed the role of repeat cath if necessary. HTN, controlled on combination therapy. Reviewed his medications in detail. Chronic fatigue, multifactorial, suspect deconditioning. untreated sleep apnea may be playing significant role. SR has not impacted sxs. We discussed exercise training and reevaluation of sleep apnea. T2DM on Metformin alone. Recent A1c 7.0%. We discussed the role of Jardiance for CV protection. He will discuss further with Dr. Lindsey Laureano in the near future. Phone follow up after reviewing tests Subjective: 
Mr. Tonny Anaya reports significant fatigue, which limits his activity. He has chronic LE edema, which seems worse. He also reports more dyspnea and wheezing. He has a frequent, moist cough. He has orthopnea, and has to sleep with his head elevated on more pillows. He has rare chest tightness and left arm pain, which occurs at rest. His wife states his urine output has decreased. He admits to adding salt to his foods. He states his BP at home has been around 140/80s. He has TOPHER, but refuses to wear his CPAP due to claustrophobia. He is here today with his wife. Cardiac risk factors HTN yes DM  yes Smoking no Cardiac testing Cath (8/27/8): LAD p90, m70. OM1 70, OM2 90 (small). RCA m40. EF 55%. No AVG/MR. Patent L SC. 
PCI (11/13/8): mLAD 2.75x18 Xience, pLAD 2.75x23 Xience. OM1 3.0x15 Xience. Cath 4/28/17:Cath (4/28/17): LAD patent with patent prox and mid stent. LCx patent with patent OM1 stent. RCA p90 (ISR) and p90 distal to stent. EF 65%. No AVG/MR. PCI osteal/prox RCA (4/28/17): 3.0x28 Xience. Cath 4/24/18: RCA - prior stents present; Prox ISR 60%; Mid to Distal - 60% Difficulty engaging guide secondary to stent at ostium JR4/AR1 guide would not engage ; [address] [place] [address] guide used to engage RCA 
FFR - not siginificant 0.89 Lexiscan Cardiolite (8/11/9): Mild fixed inf defect. No reversible defects. EF 51% with mild inf HK. Cath (8/29/13): LAD patent prox and mid stents. LCx m40; patent OM1 stent. RCA p80. EF 55%. No AVG/MR. PCI ost/proxRCA (8/29/13): 3.0x18 Xience. Echo (8/22/13): EF 55%. Mod dil LA. Mild dil RA. Mild MR/TR/AL. PASP 24. Lexiscan Cardiolite 4/15/14 - normal perfusion imaging, EF 52% DCCV 12/18/14 Echo 6/18/18 - EF 55%. No WMA. Mild asymmetric hypertrophy of the septum. MIld to mod LAE. AoV calcification and sclerosis. No significant change c/t study of 22-Aug-2013. Past Medical History:  
Diagnosis Date  Arrhythmia   
 a fib  Atrial fibrillation ([address] 8/16/2013  Basal cell carcinoma of anterior chest 2/25/2010  CAD (coronary artery disease) 2/25/2010  CVA (cerebral infarction) 2/25/2010  
 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  
 amLODIPine (NORVASC) 10 mg tablet TAKE 1 TABLET BY MOUTH ONCE DAILY 90 Tab 0  
 lisinopril (PRINIVIL, ZESTRIL) 40 mg tablet Take 0.5 Tabs by mouth daily. 90 Tab 0  
 amLODIPine (NORVASC) 10 mg tablet Take 0.5 Tabs by mouth daily. TAKE 1 TABLET BY MOUTH ONCE DAILY (Patient taking differently: Take 10 mg by mouth daily. TAKE 1 TABLET BY MOUTH ONCE DAILY) 90 Tab 0  
 amiodarone (CORDARONE) 200 mg tablet TAKE 1 TABLET BY MOUTH ONCE DAILY. *GENERIC FOR CORDARONE 90 Tab 1  
 atorvastatin (LIPITOR) 20 mg tablet TAKE 1 TABLET BY MOUTH ONCE DAILY 90 Tab 1  
 metFORMIN ER (GLUCOPHAGE XR) 500 mg tablet TAKE 4 TABLETS BY MOUTH ONCE DAILY WITH BREAKFAST 360 Tab 1  
 warfarin (COUMADIN) 5 mg tablet TAKE 1 & 1/2 (ONE & ONE-HALF) TABLETS (7.5 MG) BY MOUTH ONCE DAILY 180 Tab 3  
 aspirin delayed-release 81 mg tablet Take 81 mg by mouth daily. Indications: MYOCARDIAL REINFARCTION PREVENTION Allergies Allergen Reactions  Adhesive Tape Rash  Baycol Nausea and Vomiting  Clonidine Other (comments) Headache and sleepiness  Pravachol [Pravastatin] Nausea and Vomiting Review of Systems Constitutional: Negative for fever, chills, and diaphoresis. +fatigue Respiratory: Negative for cough, hemoptysis, sputum production +wheezing, SOB, cough. Cardiovascular: Negative for chest pain, palpitations, claudication, and PND. +Lower extremity edema, orthopnea, rare chest pain at rest 
Gastrointestinal: Negative for heartburn, nausea, vomiting, blood in stool and melena. Genitourinary: Negative for dysuria and flank pain. +polyuria Musculoskeletal: Negative for joint pain and back pain. Skin: Negative for rash. Neurological: Negative for focal weakness, seizures, loss of consciousness, weakness and headaches. Endo/Heme/Allergies: Does not bruise/bleed easily. Psychiatric/Behavioral: Negative for memory loss. The patient does not have insomnia. Physical Exam 
Visit Vitals /80 (BP 1 Location: Left arm, BP Patient Position: Sitting) Pulse 74 Resp 20 Ht 6' 4\" (1.93 m) Wt 276 lb 3.2 oz (125.3 kg) SpO2 97% BMI 33.62 kg/m² Wt Readings from Last 3 Encounters:  
04/03/19 276 lb 3.2 oz (125.3 kg) 02/11/19 270 lb (122.5 kg) 01/22/19 265 lb (120.2 kg) General - well developed well nourished Neck - JVP normal, thyroid nl Cardiac - Irregularly irregular, no murmurs, rubs or gallops. No clicks Vascular - carotids without bruits, radials, femorals and pedal pulses equal bilateral 
Lungs - clear to auscultation bilaterals, no rales ,wheezing or rhonchi Abd - rounded/obese, soft nontender Extremities - 1+ pretibial and pedal edema Skin - no rash Neuro - nonfocal. Right sided weakness Psych - normal mood and affect Cardiographics: 
EKG 4/17/18 - Afib ECHO 6/18/18 - LVEF of 60%, moderate to severe LAE 
EKG 9/28/18 - SR 65, first degree AG block, , left anterior live block EKG 4/3/19 - AF 70s, LAHB, compared to 9/28/18, AF is newly noted Written by Misael Luo, as dictated by Dr. Radha Almazan.   
 
Radha Almazan MD

## 2019-04-03 ENCOUNTER — OFFICE VISIT (OUTPATIENT)
Dept: CARDIOLOGY CLINIC | Age: 79
End: 2019-04-03

## 2019-04-03 VITALS
BODY MASS INDEX: 33.63 KG/M2 | OXYGEN SATURATION: 97 % | WEIGHT: 276.2 LBS | RESPIRATION RATE: 20 BRPM | SYSTOLIC BLOOD PRESSURE: 140 MMHG | HEIGHT: 76 IN | HEART RATE: 74 BPM | DIASTOLIC BLOOD PRESSURE: 80 MMHG

## 2019-04-03 DIAGNOSIS — I10 ESSENTIAL HYPERTENSION, BENIGN: ICD-10-CM

## 2019-04-03 DIAGNOSIS — E11.21 TYPE 2 DIABETES WITH NEPHROPATHY (HCC): ICD-10-CM

## 2019-04-03 DIAGNOSIS — I48.0 PAROXYSMAL ATRIAL FIBRILLATION (HCC): ICD-10-CM

## 2019-04-03 DIAGNOSIS — I25.10 CORONARY ARTERY DISEASE INVOLVING NATIVE CORONARY ARTERY OF NATIVE HEART WITHOUT ANGINA PECTORIS: ICD-10-CM

## 2019-04-03 DIAGNOSIS — I50.32 CHRONIC HEART FAILURE WITH PRESERVED EJECTION FRACTION (HCC): Primary | ICD-10-CM

## 2019-04-03 DIAGNOSIS — G47.33 OSA (OBSTRUCTIVE SLEEP APNEA): ICD-10-CM

## 2019-04-03 DIAGNOSIS — E78.5 DYSLIPIDEMIA: ICD-10-CM

## 2019-04-03 RX ORDER — FUROSEMIDE 20 MG/1
20 TABLET ORAL DAILY
Qty: 30 TAB | Refills: 5 | Status: SHIPPED | OUTPATIENT
Start: 2019-04-03 | End: 2019-04-25

## 2019-04-03 NOTE — PROGRESS NOTES
Identified pt with two pt identifiers(name and ). Reviewed record in preparation for visit and have obtained necessary documentation. Chief Complaint Patient presents with  Shortness of Breath  
  not feeling well Visit Vitals /80 (BP 1 Location: Left arm, BP Patient Position: Sitting) Pulse 74 Resp 20 Ht 6' 4\" (1.93 m) Wt 276 lb 3.2 oz (125.3 kg) SpO2 97% BMI 33.62 kg/m² Health Maintenance Due Topic  Shingrix Vaccine Age 50> (1 of 2)  Pneumococcal 65+ years (1 of 2 - PCV13)  GLAUCOMA SCREENING Q2Y   
 EYE EXAM RETINAL OR DILATED Coordination of Care Questionnaire: 
:  
1) Have you been to an emergency room, urgent care, or hospitalized since your last visit? If yes, where when, and reason for visit? no  
 
 
2. Have seen or consulted any other health care provider since your last visit? If yes, where when, and reason for visit? NO 
 
 
3) Do you have an Advanced Directive/ Living Will in place? YES If yes, do we have a copy on file YES If no, would you like information NO Patient is accompanied by self I have received verbal consent from Bunnie Cabot to discuss any/all medical information while they are present in the room.

## 2019-04-03 NOTE — LETTER
4/3/19 Patient: Princess Anderson YOB: 1940 Date of Visit: 4/3/2019 Helen Sood MD 
[address] [facility] [address] [address] VIA In Basket Dear Helen Sood MD, Thank you for referring Mr. Marco A Gomez to [address] [facility] [address] for evaluation. My notes for this consultation are attached. If you have questions, please do not hesitate to call me. I look forward to following your patient along with you. Sincerely, Redd Man MD 
 
 none

## 2019-04-17 ENCOUNTER — TELEPHONE (OUTPATIENT)
Dept: CARDIOLOGY CLINIC | Age: 79
End: 2019-04-17

## 2019-04-17 DIAGNOSIS — G47.33 OSA (OBSTRUCTIVE SLEEP APNEA): ICD-10-CM

## 2019-04-17 DIAGNOSIS — I50.32 CHRONIC DIASTOLIC CONGESTIVE HEART FAILURE (HCC): ICD-10-CM

## 2019-04-17 DIAGNOSIS — I10 ESSENTIAL HYPERTENSION, BENIGN: Primary | ICD-10-CM

## 2019-04-17 DIAGNOSIS — R06.00 DYSPNEA, UNSPECIFIED TYPE: ICD-10-CM

## 2019-04-17 NOTE — TELEPHONE ENCOUNTER
[id number] notified patient of ECHO results. Patient stated still feeling SOB with exertion, increase fatigue and dry cough continues. Continues to take Lasix 20mg daily    Requested patient weigh every day with BP/HR and create a log. Bring it with him to INR appt.

## 2019-04-17 NOTE — TELEPHONE ENCOUNTER
----- Message from Tc Martell NP sent at 4/16/2019  9:39 AM EDT -----  Please notify Mr. Amos Taylor that the pumping function of his heart remains strong but the pressures inside his heart are high, which is what Dr. Aida Solomon suspected. Dr. Aida Solomon added Lasix 20 mg daily to address this. How has he felt since adding this? Any improvement in HURD, orthopnea, dry cough? Has he been weighing himself daily? He is scheduled to come in for an INR check on 4/25. If he doesn't have a scale at home, we could do a nurse visit that day and do BP and weight check.     ----- Message -----  From: Amy Oakes MD  Sent: 4/11/2019  12:33 PM  To: Jossie Bonilla MD

## 2019-04-18 DIAGNOSIS — I10 ESSENTIAL HYPERTENSION, BENIGN: ICD-10-CM

## 2019-04-18 RX ORDER — LISINOPRIL 40 MG/1
TABLET ORAL
Qty: 90 TAB | Refills: 1 | Status: SHIPPED | OUTPATIENT
Start: 2019-04-18 | End: 2019-05-15 | Stop reason: SINTOL

## 2019-04-25 ENCOUNTER — ANTI-COAG VISIT (OUTPATIENT)
Dept: CARDIOLOGY CLINIC | Age: 79
End: 2019-04-25

## 2019-04-25 DIAGNOSIS — E11.9 TYPE 2 DIABETES MELLITUS WITHOUT COMPLICATION (HCC): ICD-10-CM

## 2019-04-25 DIAGNOSIS — I10 ESSENTIAL HYPERTENSION, BENIGN: ICD-10-CM

## 2019-04-25 DIAGNOSIS — R53.82 CHRONIC FATIGUE: Primary | ICD-10-CM

## 2019-04-25 DIAGNOSIS — I48.91 ATRIAL FIBRILLATION, UNSPECIFIED TYPE (HCC): ICD-10-CM

## 2019-04-25 DIAGNOSIS — E78.5 DYSLIPIDEMIA: ICD-10-CM

## 2019-04-25 LAB
INR BLD: 2.9
INR, EXTERNAL: 2.9 (ref 2–3)
PT POC: 34.4 SECONDS
VALID INTERNAL CONTROL?: YES

## 2019-04-25 RX ORDER — FUROSEMIDE 20 MG/1
40 TABLET ORAL DAILY
Qty: 180 TAB | Refills: 0 | Status: SHIPPED | OUTPATIENT
Start: 2019-04-25 | End: 2019-05-15

## 2019-04-25 RX ORDER — FUROSEMIDE 20 MG/1
40 TABLET ORAL DAILY
Qty: 30 TAB | Refills: 5 | Status: SHIPPED | OUTPATIENT
Start: 2019-04-25 | End: 2019-04-25 | Stop reason: SDUPTHER

## 2019-04-25 NOTE — TELEPHONE ENCOUNTER
PTIDX2 instructed to take lasix 40mg daily ( 2 tabs)   and to have labs done in 2 weeks. Will continue to keep log with BP and Weights.     NOV 5/22/19    Lab slips mailed to patient

## 2019-04-25 NOTE — TELEPHONE ENCOUNTER
Mr. Umm Maguire was in the office today for INR check. He dropped of Weight and BP data from 4/18/19 thru 4/24/19. Weight trend 267, 268, 267, 270, 268, 267, 268. *Weight during last office visit 276 - essentially unchanged, if not a bit higher on Lasix 20 mg daily. BP trend 179/87, 16/103, 147/85 and 152/63. BP during Echo 156/88  Current medications Coreg 3.125 mg BID, Amlodipine 10 mg daily, Lisinopril 20 mg daily    Pulse rate 74 during recent visit. At this time, will further intensify Lasix to 40 mg daily. Last lab work 2/11/19:  Lab Results   Component Value Date/Time    Sodium 142 02/11/2019 11:39 AM    Potassium 4.1 02/11/2019 11:39 AM    Chloride 104 02/11/2019 11:39 AM    CO2 22 02/11/2019 11:39 AM    Anion gap 5 08/21/2018 09:24 AM    Glucose 163 (H) 02/11/2019 11:39 AM    BUN 19 02/11/2019 11:39 AM    Creatinine 1.26 02/11/2019 11:39 AM    BUN/Creatinine ratio 15 02/11/2019 11:39 AM    GFR est AA 63 02/11/2019 11:39 AM    GFR est non-AA 55 (L) 02/11/2019 11:39 AM    Calcium 9.0 02/11/2019 11:39 AM     Will repeat BMP again in 2 weeks. Phone follow up to review lab results. Will request additional BP and weight data at that time. Would again re-iterate recommendations for repeat sleep study. If BP remains elevated, will consider further dose increase of Coreg to 6.25 mg BID.

## 2019-04-26 RX ORDER — METFORMIN HYDROCHLORIDE 500 MG/1
TABLET, EXTENDED RELEASE ORAL
Qty: 360 TAB | Refills: 1 | Status: SHIPPED | OUTPATIENT
Start: 2019-04-26 | End: 2019-10-25 | Stop reason: SDUPTHER

## 2019-04-26 RX ORDER — ATORVASTATIN CALCIUM 20 MG/1
TABLET, FILM COATED ORAL
Qty: 90 TAB | Refills: 1 | Status: SHIPPED | OUTPATIENT
Start: 2019-04-26 | End: 2019-05-25 | Stop reason: SDDI

## 2019-05-07 LAB
BUN SERPL-MCNC: 23 MG/DL (ref 8–27)
BUN/CREAT SERPL: 22 (ref 10–24)
CALCIUM SERPL-MCNC: 9.2 MG/DL (ref 8.6–10.2)
CHLORIDE SERPL-SCNC: 103 MMOL/L (ref 96–106)
CO2 SERPL-SCNC: 23 MMOL/L (ref 20–29)
CREAT SERPL-MCNC: 1.05 MG/DL (ref 0.76–1.27)
GLUCOSE SERPL-MCNC: 152 MG/DL (ref 65–99)
POTASSIUM SERPL-SCNC: 4.2 MMOL/L (ref 3.5–5.2)
SODIUM SERPL-SCNC: 144 MMOL/L (ref 134–144)

## 2019-05-07 NOTE — TELEPHONE ENCOUNTER
Stable renal function on recently increased diuretic regimen. Continue current dose and follow up in the office on 5/22, as previously scheduled.

## 2019-05-09 ENCOUNTER — TELEPHONE (OUTPATIENT)
Dept: CARDIOLOGY CLINIC | Age: 79
End: 2019-05-09

## 2019-05-09 NOTE — TELEPHONE ENCOUNTER
----- Message from Aiden Shaikh NP sent at 5/7/2019  1:16 PM EDT -----  I was given weight and BP data on 4/25, when he was in for his INR check. Can you peak and my note for details. In short, I increased his lasix and asked for these labs. They look fine. Please call and inquire how BP's and weights are now.      Scheduled to follow up on 5/22.    ----- Message -----  From: Donn Amato Lab Results In  Sent: 5/7/2019   9:37 AM  To: Oral Lizama MD

## 2019-05-09 NOTE — TELEPHONE ENCOUNTER
[facility] weights have been increasing over 4 days  Weight 260lb               263lb                  264lb  Pt stated SOB increasing with exertion. Cannot go up stairs without havint to sit down.  SOB will resolve with rest.    /80  Patient did not have log  Will call back in morning for more BP's

## 2019-05-10 NOTE — TELEPHONE ENCOUNTER
Liz Sessions has continued to take lasix 40mg daily.   appt made for Wednesday at 10:40am    [facility]

## 2019-05-15 ENCOUNTER — OFFICE VISIT (OUTPATIENT)
Dept: CARDIOLOGY CLINIC | Age: 79
End: 2019-05-15

## 2019-05-15 ENCOUNTER — TELEPHONE (OUTPATIENT)
Dept: CARDIOLOGY CLINIC | Age: 79
End: 2019-05-15

## 2019-05-15 VITALS
DIASTOLIC BLOOD PRESSURE: 60 MMHG | HEIGHT: 74 IN | RESPIRATION RATE: 24 BRPM | BODY MASS INDEX: 34.14 KG/M2 | SYSTOLIC BLOOD PRESSURE: 150 MMHG | OXYGEN SATURATION: 96 % | WEIGHT: 266 LBS | HEART RATE: 70 BPM

## 2019-05-15 DIAGNOSIS — E11.21 TYPE 2 DIABETES WITH NEPHROPATHY (HCC): ICD-10-CM

## 2019-05-15 DIAGNOSIS — I25.10 CORONARY ARTERY DISEASE INVOLVING NATIVE CORONARY ARTERY OF NATIVE HEART WITHOUT ANGINA PECTORIS: ICD-10-CM

## 2019-05-15 DIAGNOSIS — I50.32 CHRONIC HEART FAILURE WITH PRESERVED EJECTION FRACTION (HCC): Primary | ICD-10-CM

## 2019-05-15 DIAGNOSIS — I10 ESSENTIAL HYPERTENSION, BENIGN: ICD-10-CM

## 2019-05-15 DIAGNOSIS — G47.33 OSA (OBSTRUCTIVE SLEEP APNEA): ICD-10-CM

## 2019-05-15 DIAGNOSIS — I48.19 PERSISTENT ATRIAL FIBRILLATION (HCC): ICD-10-CM

## 2019-05-15 RX ORDER — CARVEDILOL 6.25 MG/1
6.25 TABLET ORAL 2 TIMES DAILY WITH MEALS
Qty: 180 TAB | Refills: 3 | Status: SHIPPED | OUTPATIENT
Start: 2019-05-15 | End: 2020-07-21

## 2019-05-15 RX ORDER — FUROSEMIDE 20 MG/1
60 TABLET ORAL DAILY
Qty: 270 TAB | Refills: 3 | Status: SHIPPED | OUTPATIENT
Start: 2019-05-15 | End: 2019-09-04 | Stop reason: SDUPTHER

## 2019-05-15 RX ORDER — SPIRONOLACTONE 25 MG/1
25 TABLET ORAL 2 TIMES DAILY
Qty: 60 TAB | Refills: 3 | Status: SHIPPED | OUTPATIENT
Start: 2019-05-15 | End: 2019-09-04 | Stop reason: SDUPTHER

## 2019-05-15 RX ORDER — LOSARTAN POTASSIUM 100 MG/1
100 TABLET ORAL DAILY
Qty: 30 TAB | Refills: 3 | Status: SHIPPED | OUTPATIENT
Start: 2019-05-15 | End: 2019-09-20 | Stop reason: SDUPTHER

## 2019-05-15 NOTE — PROGRESS NOTES
[id number]  [place], [address] [facility]  Office [phone]  Fax [phone]    Raina Franco is a 67 y.o. male. Last seen 1 month ago. Assessment  Encounter Diagnoses   Name Primary?  Essential hypertension, benign     TOPHER (obstructive sleep apnea)     Type 2 diabetes with nephropathy (HCC)     Persistent atrial fibrillation (HCC)     Coronary artery disease involving native coronary artery of native heart without angina pectoris     Chronic heart failure with preserved ejection fraction ([facility] [address] Yes       Recommendations:  1. Chronic HFpEF in the setting of AF/CAD/HTN/untreated TOPHER/DM, Class 3 . Unfortunately he remains volume overloaded despite increased Lasix. Driving factors include poorly controlled HTN, excess sodium intake and untreated TOPHER. Will increase Lasix to 60mg/d, start Spiranolactone 25mg BID, and replace lisinopril with Losartan due to cough. , Will arrange for a home sleep study. 2. Persistent AF, controlled and asymptomatic at the time. Recent echo with moderate LAE. Favor rate control strategy. Stop Amiodarone and increase Coreg to 6.25 BID. Continue Warfarin. He has a history of GI bleeding. High risk of recurrent stroke. History of GI bleeding 2008 and 2013 without clear cut abnormalities. M2A capsule endoscopy 2014 was negative. No recurrent GI bleeding on warfarin. 3. CAD. s/p multiple PCI's, most recently 2 years ago, s/p PCI RCA with LEO. Last cath most recently 4/24/18 demonstrated no RCA restenosis, distal LAD 80%. He has chronic fatigue and HURD as noted above, but no anginal symptoms. We discussed the role of repeat cath if necessary. 4. HTN, suboptimal control on combination therapy. Med changes noted as above. 5. T2DM on Metformin alone. Recent A1c 7.0%. We discussed the role of Jardiance for CV protection. He will discuss further with Dr. Greer Lara in the near future.      Follow up as scheduled in 2 weeks.    Subjective:  Mr. Sherry Myers BP is still significantly elevated though he has lost ten pounds. Still reports significant dyspne with minimal effort as well as chronic LE edema. He also reports a dry cough that has been on going for the last several months. Most likely d/t  Lisinopril. He reports he had a sleep study about 10 years ago. His wife reports he has sleep apnea, though he denies. He is taking two 20 mg of lasix daily. Reports he is still having a lot of urine output; though not as much as previously though. Reports he does still consume salt, as he eats slices of tomatoes with [employer] salt packets. He also admits to eating out a lot. Dr. Brendon Fuller  increased his Coreg to 2 tablets in the morning and 1 tablet in the evening. Patient denies any exertional chest pain, , palpitations, syncope,  or paroxysmal nocturnal dyspnea. He is here today with his wife. Cardiac risk factors   HTN yes  DM  yes  Smoking no    Cardiac testing  Cath (8/27/8): LAD p90, m70. OM1 70, OM2 90 (small). RCA m40. EF 55%. No AVG/MR. Patent L SC.  PCI (11/13/8): mLAD 2.75x18 Xience, pLAD 2.75x23 Xience. OM1 3.0x15 Xience. Cath 4/28/17:Cath (4/28/17): LAD patent with patent prox and mid stent. LCx patent with patent OM1 stent. RCA p90 (ISR) and p90 distal to stent. EF 65%. No AVG/MR. PCI osteal/prox RCA (4/28/17): 3.0x28 Xience. Cath 4/24/18: RCA - prior stents present; Prox ISR 60%; Mid to Distal - 60%  Difficulty engaging guide secondary to stent at ostium  JR4/AR1 guide would not engage ; [facility] guide used to engage RCA  FFR - not siginificant 0.89      Lexiscan Cardiolite (8/11/9): Mild fixed inf defect. No reversible defects. EF 51% with mild inf HK. Cath (8/29/13): LAD patent prox and mid stents. LCx m40; patent OM1 stent. RCA p80. EF 55%. No AVG/MR. PCI ost/proxRCA (8/29/13): 3.0x18 Xience. Echo (8/22/13): EF 55%. Mod dil LA. Mild dil RA. Mild MR/TR/NM. PASP 24.   Lexiscan Cardiolite 4/15/14 - normal perfusion imaging, EF 52%  DCCV 12/18/14  Echo 6/18/18 - EF 55%. No WMA. Mild asymmetric hypertrophy of the septum. MIld to mod LAE. AoV calcification and sclerosis. No significant change c/t study of 22-Aug-2013. Echo 4/11/19 - EF 55-60%. Mod LAE. AoV sclerosis. Mild MR. Mild TR. 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 180 Tab 0    lisinopril (PRINIVIL, ZESTRIL) 40 mg tablet TAKE 1 TABLET BY MOUTH ONCE DAILY 90 Tab 1    carvedilol (COREG) 3.125 mg tablet TAKE 1 TABLET BY MOUTH TWICE DAILY WITH MEALS 180 Tab 1    amLODIPine (NORVASC) 10 mg tablet TAKE 1 TABLET BY MOUTH ONCE DAILY 90 Tab 0    warfarin (COUMADIN) 5 mg tablet TAKE 1 & 1/2 (ONE & ONE-HALF) TABLETS (7.5 MG) BY MOUTH ONCE DAILY 180 Tab 3    aspirin delayed-release 81 mg tablet Take 81 mg by mouth daily.  Indications: MYOCARDIAL REINFARCTION PREVENTION         Allergies   Allergen Reactions    Adhesive Tape Rash    Baycol Nausea and Vomiting    Clonidine Other (comments)     Headache and sleepiness      Pravachol [Pravastatin] Nausea and Vomiting      Review of Systems  Constitutional: Negative for fever, chills, and diaphoresis. +fatigue  Respiratory: Negative for cough, hemoptysis, sputum production +wheezing, SOB, cough. Cardiovascular: Negative for chest pain, palpitations, claudication, and PND. +Lower extremity edema, orthopnea, rare chest pain at rest  Gastrointestinal: Negative for heartburn, nausea, vomiting, blood in stool and melena. Genitourinary: Negative for dysuria and flank pain. +polyuria  Musculoskeletal: Negative for joint pain and back pain. Skin: Negative for rash. Neurological: Negative for focal weakness, seizures, loss of consciousness, weakness and headaches. Endo/Heme/Allergies: Does not bruise/bleed easily. Psychiatric/Behavioral: Negative for memory loss. The patient does not have insomnia. Physical Exam  Visit Vitals  /60   Pulse 70   Resp 24   Ht 6' 2\" (1.88 m)   Wt 266 lb (120.7 kg)   SpO2 96%   BMI 34.15 kg/m²     Wt Readings from Last 3 Encounters:   05/15/19 266 lb (120.7 kg)   04/10/19 276 lb (125.2 kg)   04/03/19 276 lb 3.2 oz (125.3 kg)      General - well developed well nourished  Neck - JVP normal, thyroid nl  Cardiac - Irregularly irregular, no murmurs, rubs or gallops.  No clicks  Vascular - carotids without bruits, radials, femorals and pedal pulses equal bilateral  Lungs - clear to auscultation bilaterals, no rales ,wheezing or rhonchi  Abd - rounded/obese, soft nontender  Extremities - 1+ pretibial and pedal edema  Skin - no rash  Neuro - nonfocal. Right sided weakness  Psych - normal mood and affect    Cardiographics:  EKG 4/17/18 - Afib  ECHO 6/18/18 - LVEF of 60%, moderate to severe LAE  EKG 9/28/18 - SR 65, first degree AG block, , left anterior live block  EKG 4/3/19 - AF 70s, LAHB, compared to 9/28/18, AF is newly noted    Written by Cricket Byers Eloy Winter, as dictated by Dr. Felipa Lutz MD.     Felipa Lutz MD

## 2019-05-15 NOTE — TELEPHONE ENCOUNTER
----- Message from Spring Gutierrez MD sent at 5/15/2019 11:58 AM EDT -----  Regarding: home sleep study  Can you arrange for home sleep study thru [facility]

## 2019-05-15 NOTE — LETTER
5/22/19 Patient: Jessica Duke YOB: 1940 Date of Visit: 5/15/2019 Rashmi Johnson MD 
[address] [facility] [address] [phone] VIA In Basket Dear Rashmi Johnson MD, Thank you for referring Mr. Yayo Mao to [address] for evaluation. My notes for this consultation are attached. If you have questions, please do not hesitate to call me. I look forward to following your patient along with you. Sincerely, Linda Stewart MD

## 2019-05-15 NOTE — PROGRESS NOTES
Visit Vitals  /60   Pulse 70   Resp 24   Ht 6' 2\" (1.88 m)   Wt 266 lb (120.7 kg)   SpO2 96%   BMI 34.15 kg/m²     Increase lasix 40mg daily SOB unchanged and continues to have a dry cough. When sitting feels fine ,unable to walk mary to SOB  Edema in LE.

## 2019-05-15 NOTE — PATIENT INSTRUCTIONS
Stop Lisinopril.  Start Losartan 100 mg daily   Recommend repeat sleep study  Start Spironolactone 25 mg twice daily   Increase Lasix to Three 20 mg of lasix daily: Total 60 mg   Stop Amiodarone   Increase Carvedilol two tablets twice daily with food; l will send new prescription for 6.125 mg

## 2019-05-16 ENCOUNTER — TELEPHONE (OUTPATIENT)
Dept: CARDIOLOGY CLINIC | Age: 79
End: 2019-05-16

## 2019-05-16 VITALS
BODY MASS INDEX: 32.88 KG/M2 | RESPIRATION RATE: 18 BRPM | HEART RATE: 60 BPM | WEIGHT: 270 LBS | TEMPERATURE: 97.7 F | OXYGEN SATURATION: 98 % | DIASTOLIC BLOOD PRESSURE: 78 MMHG | HEIGHT: 76 IN | SYSTOLIC BLOOD PRESSURE: 140 MMHG

## 2019-05-16 NOTE — TELEPHONE ENCOUNTER
Patient states that the doctor prescribed him 3 new medications and he is concerned and confused as to how to take them. Please advise.     Phone #: [phone]  Thanks

## 2019-05-17 NOTE — TELEPHONE ENCOUNTER
PTIDX2 reviewed change in medications.   Patient verbalized understanding and had no further questions

## 2019-05-22 ENCOUNTER — OFFICE VISIT (OUTPATIENT)
Dept: FAMILY MEDICINE CLINIC | Age: 79
End: 2019-05-22

## 2019-05-22 VITALS
TEMPERATURE: 99.9 F | HEART RATE: 78 BPM | WEIGHT: 255.6 LBS | SYSTOLIC BLOOD PRESSURE: 132 MMHG | OXYGEN SATURATION: 97 % | RESPIRATION RATE: 18 BRPM | DIASTOLIC BLOOD PRESSURE: 56 MMHG | BODY MASS INDEX: 32.8 KG/M2 | HEIGHT: 74 IN

## 2019-05-22 DIAGNOSIS — I48.91 ATRIAL FIBRILLATION, UNSPECIFIED TYPE (HCC): ICD-10-CM

## 2019-05-22 DIAGNOSIS — I10 ESSENTIAL HYPERTENSION, BENIGN: ICD-10-CM

## 2019-05-22 DIAGNOSIS — I63.40 CEREBRAL INFARCTION DUE TO EMBOLISM OF CEREBRAL ARTERY (HCC): ICD-10-CM

## 2019-05-22 DIAGNOSIS — I25.10 CORONARY ARTERY DISEASE INVOLVING NATIVE CORONARY ARTERY OF NATIVE HEART WITHOUT ANGINA PECTORIS: ICD-10-CM

## 2019-05-22 DIAGNOSIS — D50.0 IRON DEFICIENCY ANEMIA DUE TO CHRONIC BLOOD LOSS: ICD-10-CM

## 2019-05-22 DIAGNOSIS — J06.9 VIRAL URI WITH COUGH: Primary | ICD-10-CM

## 2019-05-22 DIAGNOSIS — R06.09 DOE (DYSPNEA ON EXERTION): ICD-10-CM

## 2019-05-22 DIAGNOSIS — G47.33 OSA (OBSTRUCTIVE SLEEP APNEA): ICD-10-CM

## 2019-05-22 DIAGNOSIS — I48.19 PERSISTENT ATRIAL FIBRILLATION (HCC): ICD-10-CM

## 2019-05-22 DIAGNOSIS — E11.9 TYPE 2 DIABETES MELLITUS WITHOUT COMPLICATION, WITHOUT LONG-TERM CURRENT USE OF INSULIN (HCC): ICD-10-CM

## 2019-05-22 DIAGNOSIS — K21.00 REFLUX ESOPHAGITIS: ICD-10-CM

## 2019-05-22 PROBLEM — I50.32 CHRONIC HEART FAILURE WITH PRESERVED EJECTION FRACTION (HCC): Status: ACTIVE | Noted: 2019-05-22

## 2019-05-22 RX ORDER — AZITHROMYCIN 250 MG/1
TABLET, FILM COATED ORAL
Qty: 6 TAB | Refills: 0 | Status: SHIPPED | OUTPATIENT
Start: 2019-05-22 | End: 2019-07-31 | Stop reason: ALTCHOICE

## 2019-05-22 RX ORDER — AMIODARONE HYDROCHLORIDE 200 MG/1
TABLET ORAL
COMMUNITY
Start: 2019-02-13 | End: 2019-05-22 | Stop reason: ALTCHOICE

## 2019-05-22 RX ORDER — LISINOPRIL 40 MG/1
TABLET ORAL
Refills: 1 | COMMUNITY
Start: 2019-04-18 | End: 2019-05-22 | Stop reason: ALTCHOICE

## 2019-05-22 RX ORDER — CARVEDILOL 3.12 MG/1
TABLET ORAL
Refills: 1 | COMMUNITY
Start: 2019-03-27 | End: 2019-05-22 | Stop reason: DRUGHIGH

## 2019-05-22 NOTE — PROGRESS NOTES
HISTORY OF PRESENT ILLNESS  HPI  Brenda Lopez is a 67 y.o. Male with a history of HTN, CAD, A-fib, reflux esophagitis, DM-II with nephropathy, BCC of anterior chest, cerebral infarction, TOPHER, osteitis deformans, anemia, fatigue and hyperlipidemia with LDL goal <70, who presents at the office today with his wife for cold symptoms. Pt reports that he has had symptoms of non-productive cough, congestion, and a fever of 101.2 since 5/20. Pt also complains of sore throat. Pt notes that his first symptoms were lightheadedness, though pt's wife notes that there [employer] AC stopped working that day, and thought he might have heat related symptoms. Pt saw Dr. Sherrell Moreira last week, who stopped the pt's lisinopril and replaced it with losartan, due to chronic cough. He also stopped the pt's amiodarone and increased his carvedilol. Pt had misunderstood the recommendations and instead stopped taking the Lipitor, but plans to start taking it again. He will stop the lisinopril  Pt denies unusual SOB, chest pain, and any recent ER visits or hospitalizations.            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 2/25/2010    Other and unspecified hyperlipidemia 2/25/2010    Positive PPD 2/25/2010    Reflux esophagitis 2/25/2010    Type II or unspecified type diabetes mellitus without mention of complication, not stated as uncontrolled 2/25/2010     Past Surgical History:   Procedure Laterality Date    Donnell Natarajan  12/13/2013         HC CAPSULE ENDOSCOPE M2A  4/4/2014  HX COLONOSCOPY      HX CORONARY STENT PLACEMENT      HX ORTHOPAEDIC      HX VASECTOMY  1983    UPPER GI ENDOSCOPY,BIOPSY  12/13/2013          Current Outpatient Medications on File Prior to Visit   Medication Sig Dispense Refill    furosemide (LASIX) 20 mg tablet Take 3 Tabs by mouth daily. 270 Tab 3    carvedilol (COREG) 6.25 mg tablet Take 1 Tab by mouth two (2) times daily (with meals). 180 Tab 3    spironolactone (ALDACTONE) 25 mg tablet Take 1 Tab by mouth two (2) times a day. 60 Tab 3    losartan (COZAAR) 100 mg tablet Take 1 Tab by mouth daily. Indications: high blood pressure 30 Tab 3    metFORMIN ER (GLUCOPHAGE XR) 500 mg tablet TAKE 4 TABLETS BY MOUTH ONCE DAILY WITH BREAKFAST 360 Tab 1    amLODIPine (NORVASC) 10 mg tablet TAKE 1 TABLET BY MOUTH ONCE DAILY 90 Tab 0    warfarin (COUMADIN) 5 mg tablet TAKE 1 & 1/2 (ONE & ONE-HALF) TABLETS (7.5 MG) BY MOUTH ONCE DAILY 180 Tab 3    aspirin delayed-release 81 mg tablet Take 81 mg by mouth daily. Indications: MYOCARDIAL REINFARCTION PREVENTION       No current facility-administered medications on file prior to visit. Allergies   Allergen Reactions    Adhesive Tape Rash    Baycol Nausea and Vomiting    Clonidine Other (comments)     Headache and sleepiness      Pravachol [Pravastatin] Nausea and Vomiting     Family History   Problem Relation Age of Onset    Heart Disease Mother     Cancer Father         colon    Heart Disease Maternal Grandmother     Heart Disease Maternal Grandfather     Cancer Paternal Grandmother     Cancer Paternal Grandfather      Social History     Socioeconomic History    Marital status:      Spouse name: Not on file    Number of children: Not on file    Years of education: Not on file    Highest education level: Not on file   Tobacco Use    Smoking status: Never Smoker    Smokeless tobacco: Former User     Types: Chew    Tobacco comment: patient reports quitting over 20 yeaers ago.     Substance and Sexual Activity    Alcohol use: Yes     Comment: once a year    Drug use: No   Other Topics Concern     Service Yes    Blood Transfusions Yes    Caffeine Concern Yes     Comment: 2-3 cups of coffee daily    Occupational Exposure No    Hobby Hazards No    Sleep Concern No    Stress Concern No    Weight Concern No    Special Diet No    Back Care No    Exercise Yes    Bike Helmet No     Comment: pt doesn't ride a bike   [address] Yes    Self-Exams No           Review of Systems   Constitutional: Positive for fever. Negative for chills, diaphoresis, malaise/fatigue and weight loss. HENT: Positive for congestion and sore throat. Eyes: Negative for blurred vision, double vision and pain. Respiratory: Positive for cough. Negative for sputum production, shortness of breath and wheezing. Cardiovascular: Positive for leg swelling (bilateral). Negative for chest pain, palpitations, orthopnea, claudication and PND. Genitourinary: Negative for frequency. Skin: Negative for itching and rash. Neurological: Negative for dizziness, tingling, tremors, sensory change, speech change, focal weakness, seizures, loss of consciousness, weakness and headaches. Endo/Heme/Allergies: Negative for environmental allergies and polydipsia. Does not bruise/bleed easily. Results for orders placed or performed in visit on 04/25/19   AMB PT/INR EXTERNAL   Result Value Ref Range    INR, External 2.9 2.0 - 3.0   AMB POC PT/INR   Result Value Ref Range    VALID INTERNAL CONTROL POC Yes     Prothrombin time (POC) 34.4 seconds    INR POC 2.9          Physical Exam  Visit Vitals  /56 (BP 1 Location: Left arm, BP Patient Position: Sitting)   Pulse 78   Temp 99.9 °F (37.7 °C) (Oral)   Resp 18   Ht 6' 2\" (1.88 m)   Wt 255 lb 9.6 oz (115.9 kg)   SpO2 97%   BMI 32.82 kg/m²        Has obvious laryngitis. Head: Normocephalic, without obvious abnormality, atraumatic  Eyes: conjunctivae/corneas clear. PERRL, EOM's intact. Nose: Nares normal. Septum midline. Mucosa normal. No drainage or sinus tenderness. Moderate congestion. Throat: Lips, mucosa, and tongue normal. Teeth and gums normal. Moderate posterior pharyngeal erythema with no exudate. Neck: supple, symmetrical, trachea midline, no adenopathy, thyroid: not enlarged, symmetric, no tenderness/mass/nodules, no carotid bruit and no JVD  Lungs: clear to auscultation bilaterally. Few rhonchi and bronchial wheezing with cough. No wheezing with forced expiration. Heart: regular rate and rhythm, S1, S2 normal, no murmur, click, rub or gallop  Extremities: extremities normal, atraumatic, no cyanosis. 1+ edema bilaterally, was 2-3+ approximately 1 week ago. Pulses: 2+ and symmetric  Lymph nodes: Cervical, supraclavicular, and axillary nodes normal.  Neurologic: Grossly normal      ASSESSMENT and PLAN    ICD-10-CM ICD-9-CM    1. Viral URI with cough J06.9 465.9     B97.89     2. Coronary artery disease involving native coronary artery of native heart without angina pectoris I25.10 414.01    3. Persistent atrial fibrillation (HCC) I48.1 427.31    4. TOPHER (obstructive sleep apnea) G47.33 327.23    5. Atrial fibrillation, unspecified type ([facility] I48.91 427.31    6. Type 2 diabetes mellitus without complication, without long-term current use of insulin (HCC) E11.9 250.00    7. Iron deficiency anemia due to chronic blood loss D50.0 280.0    8. Reflux esophagitis K21.0 530.11    9. 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 Essential hypertension, benign I10 401.1    11. HURD (dyspnea on exertion) R06.09 786.09      Diagnoses and all orders for this visit:    1. Viral URI with cough    2. Coronary artery disease involving native coronary artery of native heart without angina pectoris    3. Persistent atrial fibrillation ([facility]    4. TOPHER (obstructive sleep apnea)    5.  Atrial fibrillation, unspecified type ([facility]    6. Type 2 diabetes mellitus without complication, without long-term current use of insulin (HCC)    7. Iron deficiency anemia due to chronic blood loss    8. Reflux esophagitis    9. 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 Essential hypertension, benign    11. HURD (dyspnea on exertion)    Other orders  -     azithromycin (ZITHROMAX) 250 mg tablet; Take 2 today and then 1 a day for the next 4 days      Follow-up and Dispositions    · Return in about 3 months (around 8/22/2019), or if symptoms worsen or fail to improve, for F/U HTN,CHOL,DM, F/U of obesity. reviewed medications and side effects in detail  Please call my office if there are any questions- [phone]. Discussed expected course/resolution/complications of diagnosis in detail with patient. Medication risks/benefits/costs/interactions/alternatives discussed with patient. Pt was given an after visit summary which includes diagnoses, current medications & vitals. Pt expressed understanding with the diagnosis and plan. Patient to call if no better in 3 -4 days and prn new problems. Review of  the proper technique of checking the blood pressure- check it on an average day only, not on a stressful day, sitting, no exercise for at least 1 hour and not experiencing any new pain( chronic pain is OK). Patient encouraged to check BP sitting and standing at least once a month and to report these readings to me if > 140/ 90 on average , or if the standing BP is >  15 points lower than the sitting. Always check it twice and if there is > 5 points decrease from the previous reading( top reading or systolic) keep checking it until it does not drop 5 points. Write only this final reading down, not the preceding readings.   If out of these readings there is only 1 out of 4 or less > 523, or > 90 diastolic then your blood pressure is OK; it needs further treatment if it is above this. Also, don't forget,  as noted above, to check your blood pressure standing once a month; this is to detect a drop in your BP that might lead to fainting and serious injury; you check it standing with your arm hanging straight down and relaxed. Check it twice waiting 1 minute between the two readings. If, with either one of these 2 readings there is a > 15 point drop of the systolic compared to your sitting pressure( done before the standing BP), then let me know. Following these guidelines, continue to check your BP and write down only the ones described above and it will help me to effectively treat your blood pressure. Recommended a weekly \"heart check. \" I went into detail how to do this. . Regular exercise is very important to your health; it helps mentally, physically, socially; it prevents injuries if done properly. Exercise, even as simple as walking 20-30 minutes daily has major benefits to your health even though your \"numbers\" are the same in the lab. See if you can add this into your daily regimen and after a few months it will become a regular habit-\"just something you do,\" like brushing your teeth. A combination of aerobic exercise and strengthening and stretching is felt to be the best for you, so this should be your ultimate goal.   This can be done in the privacy of your home or in a group setting as at the gym  Some prefer having a , others prefer to do exercise in groups or individually. Do what \"works\" for you. You need to make it simple and \"fun,\" or you most likely you will not continue it. BMI is significantly elevated- in the obese range. I reviewed diet, exercise and weight control.  Discussed weight control in detail, the importance of mainly decreased carbs, and for weight maintenance, exercise; discussed different diets and that it isn't as important to watch the type of foods as it is to decrease calorie intake no matter what type of diet you do, etc.       .Discussed specific diabetic recommendations: low cholesterol diet, weight control and daily exercise discussed, home glucose monitoring emphasized, all medications, side effects and compliance discussed carefully, foot care discussed and Podiatry visits discussed, annual eye examinations at Ophthalmology discussed, glycohemoglobin and other lab monitoring discussed and long term diabetic complications discussed. Review of  the proper technique of checking the blood pressure- check it on an average day only, not on a stressful day, sitting, no exercise for at least 1 hour and not experiencing any new pain( chronic pain is OK). Patient encouraged to check BP sitting and standing at least once a month and to report these readings to me if > 140/ 90 on average , or if the standing BP is >  15 points lower than the sitting. Always check it twice and if there is > 5 points decrease from the previous reading( top reading or systolic) keep checking it until it does not drop 5 points. Write only this final reading down, not the preceding readings. If out of these readings there is only 1 out of 4 or less > 813, or > 90 diastolic then your blood pressure is OK; it needs further treatment if it is above this. Also, don't forget,  as noted above, to check your blood pressure standing once a month; this is to detect a drop in your BP that might lead to fainting and serious injury; you check it standing with your arm hanging straight down and relaxed. Check it twice waiting 1 minute between the two readings. If, with either one of these 2 readings there is a > 15 point drop of the systolic compared to your sitting pressure( done before the standing BP), then let me know. Following these guidelines, continue to check your BP and write down only the ones described above and it will help me to effectively treat your blood pressure.      Reviewed symptoms, or lack thereof, of hypertension and elevated cholesterol. Reviewed BP, cholesterol and diabetic goals. LDL goal<100,HDL goal>45, Triglyceride goal<150, A1C< 7.0, BP<140/90. Discussed specific diabetic recommendations: low cholesterol diet, weight control and daily exercise discussed, home glucose monitoring emphasized, all medications, side effects and compliance discussed carefully, foot care discussed and Podiatry visits discussed, annual eye examinations at Ophthalmology discussed, glycohemoglobin and other lab monitoring discussed and long term diabetic complications discussed. Because he is no longer on the amiodarone, we can have him safely use the Z-boston. He will need to cut his Coumadin back from 7.5 to 2.5 mg daily while using the Z-boston. Pt has a PT/INR scheduled for next week, and they will make adjustments at the cardiology office. This document was written by Lisbet Mckenzie, as dictated by Remberto Glover MD.  I have reviewed and agree with the above note and have made corrections where appropriate Shimon Fuller M.D.

## 2019-05-22 NOTE — PROGRESS NOTES
Chief Complaint   Patient presents with    Cold Symptoms     temperature of 101.2 yesterday, productive cough, congestion x 3 days      1. Have you been to the ER, urgent care clinic since your last visit? Hospitalized since your last visit? No    2. Have you seen or consulted any other health care providers outside of the [address] [place] [address] since your last visit? Include any pap smears or colon screening. No    Patient reports having fever everyday this week around 101. Patient stated that he went to the cardiologist 5/15/19, some changes were made to medications, but patient is not sure about what medications he is taking at the moment.

## 2019-05-24 ENCOUNTER — APPOINTMENT (OUTPATIENT)
Dept: CT IMAGING | Age: 79
End: 2019-05-24
Attending: EMERGENCY MEDICINE
Payer: MEDICARE

## 2019-05-24 ENCOUNTER — TELEPHONE (OUTPATIENT)
Dept: CARDIOLOGY CLINIC | Age: 79
End: 2019-05-24

## 2019-05-24 ENCOUNTER — APPOINTMENT (OUTPATIENT)
Dept: GENERAL RADIOLOGY | Age: 79
End: 2019-05-24
Attending: EMERGENCY MEDICINE
Payer: MEDICARE

## 2019-05-24 ENCOUNTER — HOSPITAL ENCOUNTER (EMERGENCY)
Age: 79
Discharge: HOME | End: 2019-05-24
Attending: EMERGENCY MEDICINE
Payer: MEDICARE

## 2019-05-24 VITALS
OXYGEN SATURATION: 92 % | TEMPERATURE: 98.6 F | RESPIRATION RATE: 20 BRPM | SYSTOLIC BLOOD PRESSURE: 157 MMHG | HEART RATE: 88 BPM | DIASTOLIC BLOOD PRESSURE: 66 MMHG

## 2019-05-24 DIAGNOSIS — J06.9 ACUTE URI: ICD-10-CM

## 2019-05-24 DIAGNOSIS — J02.9 ACUTE PHARYNGITIS, UNSPECIFIED ETIOLOGY: Primary | ICD-10-CM

## 2019-05-24 LAB
ALBUMIN SERPL-MCNC: 3.1 G/DL (ref 3.5–5)
ALBUMIN/GLOB SERPL: 0.6 {RATIO} (ref 1.1–2.2)
ALP SERPL-CCNC: 130 U/L (ref 45–117)
ALT SERPL-CCNC: 17 U/L (ref 12–78)
ANION GAP SERPL CALC-SCNC: 6 MMOL/L (ref 5–15)
AST SERPL-CCNC: 11 U/L (ref 15–37)
ATRIAL RATE: 86 BPM
BASOPHILS # BLD: 0.1 K/UL (ref 0–0.1)
BASOPHILS NFR BLD: 0 % (ref 0–1)
BILIRUB SERPL-MCNC: 0.7 MG/DL (ref 0.2–1)
BNP SERPL-MCNC: 1477 PG/ML
BUN SERPL-MCNC: 18 MG/DL (ref 6–20)
BUN/CREAT SERPL: 15 (ref 12–20)
CALCIUM SERPL-MCNC: 9.1 MG/DL (ref 8.5–10.1)
CALCULATED R AXIS, ECG10: -18 DEGREES
CALCULATED T AXIS, ECG11: 20 DEGREES
CHLORIDE SERPL-SCNC: 101 MMOL/L (ref 97–108)
CO2 SERPL-SCNC: 30 MMOL/L (ref 21–32)
COMMENT, HOLDF: NORMAL
CREAT SERPL-MCNC: 1.21 MG/DL (ref 0.7–1.3)
DIAGNOSIS, 93000: NORMAL
DIFFERENTIAL METHOD BLD: ABNORMAL
EOSINOPHIL # BLD: 0.1 K/UL (ref 0–0.4)
EOSINOPHIL NFR BLD: 1 % (ref 0–7)
ERYTHROCYTE [DISTWIDTH] IN BLOOD BY AUTOMATED COUNT: 18.9 % (ref 11.5–14.5)
GLOBULIN SER CALC-MCNC: 4.8 G/DL (ref 2–4)
GLUCOSE SERPL-MCNC: 166 MG/DL (ref 65–100)
HCT VFR BLD AUTO: 32 % (ref 36.6–50.3)
HGB BLD-MCNC: 9.5 G/DL (ref 12.1–17)
IMM GRANULOCYTES # BLD AUTO: 0.1 K/UL (ref 0–0.04)
IMM GRANULOCYTES NFR BLD AUTO: 1 % (ref 0–0.5)
INR PPP: 1.4 (ref 0.9–1.1)
LACTATE BLD-SCNC: 1.23 MMOL/L (ref 0.4–2)
LYMPHOCYTES # BLD: 1.4 K/UL (ref 0.8–3.5)
LYMPHOCYTES NFR BLD: 11 % (ref 12–49)
MCH RBC QN AUTO: 22.4 PG (ref 26–34)
MCHC RBC AUTO-ENTMCNC: 29.7 G/DL (ref 30–36.5)
MCV RBC AUTO: 75.3 FL (ref 80–99)
MONOCYTES # BLD: 1.2 K/UL (ref 0–1)
MONOCYTES NFR BLD: 9 % (ref 5–13)
NEUTS SEG # BLD: 10.3 K/UL (ref 1.8–8)
NEUTS SEG NFR BLD: 78 % (ref 32–75)
NRBC # BLD: 0 K/UL (ref 0–0.01)
NRBC BLD-RTO: 0 PER 100 WBC
PLATELET # BLD AUTO: 323 K/UL (ref 150–400)
PMV BLD AUTO: 10 FL (ref 8.9–12.9)
POTASSIUM SERPL-SCNC: 3.3 MMOL/L (ref 3.5–5.1)
PROT SERPL-MCNC: 7.9 G/DL (ref 6.4–8.2)
PROTHROMBIN TIME: 13.7 SEC (ref 9–11.1)
Q-T INTERVAL, ECG07: 382 MS
QRS DURATION, ECG06: 100 MS
QTC CALCULATION (BEZET), ECG08: 474 MS
RBC # BLD AUTO: 4.25 M/UL (ref 4.1–5.7)
SAMPLES BEING HELD,HOLD: NORMAL
SODIUM SERPL-SCNC: 137 MMOL/L (ref 136–145)
TROPONIN I SERPL-MCNC: <0.05 NG/ML
VENTRICULAR RATE, ECG03: 93 BPM
WBC # BLD AUTO: 13 K/UL (ref 4.1–11.1)

## 2019-05-24 PROCEDURE — 71046 X-RAY EXAM CHEST 2 VIEWS: CPT

## 2019-05-24 PROCEDURE — 99283 EMERGENCY DEPT VISIT LOW MDM: CPT

## 2019-05-24 PROCEDURE — 85610 PROTHROMBIN TIME: CPT

## 2019-05-24 PROCEDURE — 94640 AIRWAY INHALATION TREATMENT: CPT

## 2019-05-24 PROCEDURE — 96361 HYDRATE IV INFUSION ADD-ON: CPT

## 2019-05-24 PROCEDURE — 93005 ELECTROCARDIOGRAM TRACING: CPT

## 2019-05-24 PROCEDURE — 80053 COMPREHEN METABOLIC PANEL: CPT

## 2019-05-24 PROCEDURE — 77030013140 HC MSK NEB VYRM -A

## 2019-05-24 PROCEDURE — 83605 ASSAY OF LACTIC ACID: CPT

## 2019-05-24 PROCEDURE — 74011636320 HC RX REV CODE- 636/320: Performed by: RADIOLOGY

## 2019-05-24 PROCEDURE — 96375 TX/PRO/DX INJ NEW DRUG ADDON: CPT

## 2019-05-24 PROCEDURE — 74011250636 HC RX REV CODE- 250/636: Performed by: EMERGENCY MEDICINE

## 2019-05-24 PROCEDURE — 36415 COLL VENOUS BLD VENIPUNCTURE: CPT

## 2019-05-24 PROCEDURE — 85025 COMPLETE CBC W/AUTO DIFF WBC: CPT

## 2019-05-24 PROCEDURE — 83880 ASSAY OF NATRIURETIC PEPTIDE: CPT

## 2019-05-24 PROCEDURE — 70491 CT SOFT TISSUE NECK W/DYE: CPT

## 2019-05-24 PROCEDURE — 74011250637 HC RX REV CODE- 250/637: Performed by: EMERGENCY MEDICINE

## 2019-05-24 PROCEDURE — 96374 THER/PROPH/DIAG INJ IV PUSH: CPT

## 2019-05-24 PROCEDURE — 84484 ASSAY OF TROPONIN QUANT: CPT

## 2019-05-24 PROCEDURE — 74011000250 HC RX REV CODE- 250: Performed by: EMERGENCY MEDICINE

## 2019-05-24 RX ORDER — KETOROLAC TROMETHAMINE 30 MG/ML
15 INJECTION, SOLUTION INTRAMUSCULAR; INTRAVENOUS
Status: COMPLETED | OUTPATIENT
Start: 2019-05-24 | End: 2019-05-24

## 2019-05-24 RX ORDER — DEXAMETHASONE SODIUM PHOSPHATE 10 MG/ML
10 INJECTION INTRAMUSCULAR; INTRAVENOUS
Status: COMPLETED | OUTPATIENT
Start: 2019-05-24 | End: 2019-05-24

## 2019-05-24 RX ORDER — IPRATROPIUM BROMIDE AND ALBUTEROL SULFATE 2.5; .5 MG/3ML; MG/3ML
3 SOLUTION RESPIRATORY (INHALATION)
Status: COMPLETED | OUTPATIENT
Start: 2019-05-24 | End: 2019-05-24

## 2019-05-24 RX ADMIN — LIDOCAINE HYDROCHLORIDE 40 ML: 20 SOLUTION ORAL; TOPICAL at 14:32

## 2019-05-24 RX ADMIN — IOPAMIDOL 100 ML: 612 INJECTION, SOLUTION INTRAVENOUS at 15:17

## 2019-05-24 RX ADMIN — SODIUM CHLORIDE 1000 ML: 900 INJECTION, SOLUTION INTRAVENOUS at 14:34

## 2019-05-24 RX ADMIN — IPRATROPIUM BROMIDE AND ALBUTEROL SULFATE 3 ML: .5; 3 SOLUTION RESPIRATORY (INHALATION) at 14:33

## 2019-05-24 RX ADMIN — DEXAMETHASONE SODIUM PHOSPHATE 10 MG: 10 INJECTION, SOLUTION INTRAMUSCULAR; INTRAVENOUS at 14:48

## 2019-05-24 RX ADMIN — KETOROLAC TROMETHAMINE 15 MG: 30 INJECTION, SOLUTION INTRAMUSCULAR; INTRAVENOUS at 15:37

## 2019-05-24 NOTE — TELEPHONE ENCOUNTER
Patients wife called to speak with you regarding the pt going to his PCP for cold like symptoms. They now believe it could be cardiac related but they are unsure. The patient told his wife this morning he could hardly swallow.    phone: [phone]

## 2019-05-24 NOTE — DISCHARGE INSTRUCTIONS
Patient Education        Sore Throat: Care Instructions  Your Care Instructions    Infection by bacteria or a virus causes most sore throats. Cigarette smoke, dry air, air pollution, allergies, and yelling can also cause a sore throat. Sore throats can be painful and annoying. Fortunately, most sore throats go away on their own. If you have a bacterial infection, your doctor may prescribe antibiotics. Follow-up care is a key part of your treatment and safety. Be sure to make and go to all appointments, and call your doctor if you are having problems. It's also a good idea to know your test results and keep a list of the medicines you take. How can you care for yourself at home? · If your doctor prescribed antibiotics, take them as directed. Do not stop taking them just because you feel better. You need to take the full course of antibiotics. · Gargle with warm salt water once an hour to help reduce swelling and relieve discomfort. Use 1 teaspoon of salt mixed in 1 cup of warm water. · Take an over-the-counter pain medicine, such as acetaminophen (Tylenol), ibuprofen (Advil, Motrin), or naproxen (Aleve). Read and follow all instructions on the label. · Be careful when taking over-the-counter cold or flu medicines and Tylenol at the same time. Many of these medicines have acetaminophen, which is Tylenol. Read the labels to make sure that you are not taking more than the recommended dose. Too much acetaminophen (Tylenol) can be harmful. · Drink plenty of fluids. Fluids may help soothe an irritated throat. Hot fluids, such as tea or soup, may help decrease throat pain. · Use over-the-counter throat lozenges to soothe pain. Regular cough drops or hard candy may also help. These should not be given to young children because of the risk of choking. · Do not smoke or allow others to smoke around you. If you need help quitting, talk to your doctor about stop-smoking programs and medicines.  These can increase your chances of quitting for good. · Use a vaporizer or humidifier to add moisture to your bedroom. Follow the directions for cleaning the machine. When should you call for help? Call your doctor now or seek immediate medical care if:    · You have new or worse trouble swallowing.     · Your sore throat gets much worse on one side.    Watch closely for changes in your health, and be sure to contact your doctor if you do not get better as expected. Where can you learn more? Go to http://ruth-ellyn.info/. Enter 062 441 80 19 in the search box to learn more about \"Sore Throat: Care Instructions. \"  Current as of: March 27, 2018  Content Version: 11.9  © 9742-3705 [employer]. Care instructions adapted under license by [employer] (which disclaims liability or warranty for this information). If you have questions about a medical condition or this instruction, always ask your healthcare professional. [place] [address] any warranty or liability for your use of this information. Patient Education   Upper Respiratory Infection: After Your Visit to the Emergency Room  Your Care Instructions  You were seen in the emergency room for an upper respiratory infection (URI). This is an infection of the nose, sinuses, or throat. Viruses or bacteria can cause URIs. Colds, flu, and sinusitis are examples of URIs. These infections are spread by coughs, sneezes, and close contact with people who have a URI. Your doctor may have given you antibiotics to treat the infection if it was caused by bacteria. But antibiotics will not help a viral infection. You can treat most infections with home care. This may include drinking lots of fluids and taking over-the-counter medicine for your symptoms. Even though you have been released from the emergency room, you still need to watch for any problems. The doctor carefully checked you. But sometimes problems can develop later.  If you have new symptoms, or if your symptoms do not get better, return to the emergency room or call your doctor right away. A visit to the emergency room is only one step in your treatment. Even if you feel better, you still need to do what your doctor recommends, such as going to all suggested follow-up appointments and taking medicines exactly as directed. This will help you recover and help prevent future problems. How can you care for yourself at home? · To prevent dehydration, drink plenty of fluids, enough so that your urine is light yellow or clear like water. Choose water and other caffeine-free clear liquids until you feel better. If you have kidney, heart, or liver disease and have to limit fluids, talk with your doctor before you increase the amount of fluids you drink. · Take acetaminophen (Tylenol) or ibuprofen (Advil, Motrin) for fever or pain. Read and follow all instructions on the label. · If your doctor prescribed antibiotics, take them as directed. Do not stop taking them just because you feel better. You need to take the full course of antibiotics. · Take cough medicine and a decongestant if your doctor suggests it. · Get plenty of rest.  · Use saline (saltwater) nasal washes to help keep your nasal passages open and wash out mucus and bacteria. You can buy saline nose drops at a grocery store or drugstore. Or you can make your own at home by adding 1 teaspoon of salt and 1 teaspoon of baking soda to 2 cups of distilled water. If you make your own, fill a bulb syringe with the solution, insert the tip into your nostril, and squeeze gently. Alease Ruffini your nose. · Use a vaporizer or humidifier to add moisture to the air in your bedroom. Follow the instructions for cleaning it. · Do not smoke or allow others to smoke around you. If you need help quitting, talk to your doctor about stop-smoking programs and medicines. These can increase your chances of quitting for good.   When should you call for help?  Call 911 if:  · You have severe trouble breathing. Return to the emergency room now if:  · You have a fever with stiff neck or a severe headache. · You have signs of needing more fluids. You have sunken eyes, a dry mouth, and pass only a little dark urine. · You cannot keep down fluids or medicine. Call your doctor today if:  · You have a deep cough and a lot of mucus. · You are too tired to eat or drink. · You have a new symptom, such as a sore throat, an earache, or a rash. Where can you learn more? Go to Movetis.be  Enter D150 in the search box to learn more about \"Upper Respiratory Infection: After Your Visit to the Emergency Room. \"   © 2914-2148 [employer]. Care instructions adapted under license by [employer] (which disclaims liability or warranty for this information). This care instruction is for use with your licensed healthcare professional. If you have questions about a medical condition or this instruction, always ask your healthcare professional. [place] [address] any warranty or liability for your use of this information.   Content Version: 9.3.40561; Last Revised: February 13, 2012

## 2019-05-24 NOTE — ED TRIAGE NOTES
Pt c/o cough, fever, sore throat since Monday. Saw PCP Wed and was rx a Dee Vasquez.  Reports worsening sore throat; unable to swallow

## 2019-05-24 NOTE — ED NOTES
Feels better  Lungs clear  Does not think albuterol helped that much  Will complete azithro but I told him I thought he had a viral illness  Reviewed CT images  DC home  D/w pt, wife, daughter

## 2019-05-24 NOTE — ED PROVIDER NOTES
I have evaluated the patient as the Provider in Triage. I have reviewed His vital signs and the triage nurse assessment. I have talked with the patient and any available family and advised that I am the provider in triage and have ordered the appropriate study to initiate their work up based on the clinical presentation during my assessment. I have advised that the patient will be accommodated in the Main ED as soon as possible. I have also requested to contact the triage nurse or myself immediately if the patient experiences any changes in their condition during this brief waiting period. Note written by Portillo Pereira, as dictated by Betsy Daly., MD 1:22 PM    Patient has had a fever for ~5 days with accompanying cough, sore throat, and rhinorrhea. Patient was seen by PCP and given Zithromax with little relief. Patient states ~2 days ago his symptoms worsened and newly onset dysphagia and loss of appetite prompted him to visit [facility] ED. Patient denies generaized body pain and chills. -------------------------------------------------------------------------------------------------------------------------------------    Susana Arellano Maria L Pike is a 67 y.o. male who presents ambulatory to the ED with a c/o cough, fever, chills, sore throat, trouble swallowing today. Pt reports that on Monday morning he began experiencing fever and cough. He states that Tuesday his cough got worse and he went to his PCP on Wednesday and was prescribed Azithromycin. He denies any relief of sx, in, fact, pt states that his symptoms have only gotten worse. Of note, pt is on Coumadin, ASA and Lasix. Pt specifically denies chest pain, shortness of breath, n/v,d ,numbness, tingling, abdominal pain, back pain, leg swelling, dizziness or any other acute sx.        PCP: Olya Hi MD  PMHx significant for: CHF, Diverticulosis, HTN, Basal Cell Carcinoma, Reflux Esophagitis, HLD, DM, CAD, A-fib,  Dyslipidemia, TOPHER, CVA  PSHx significant for: Vasectomy, Coronary Stents, Colonoscopy, Upper GI Endoscopy  Social Hx: Tobacco: none EtOH: rare Illicit drug use: none    There are no further complaints or symptoms at this time. Signed by: Caryle Bushy, scribing for Eusebio Mccarthy. Yolis Goff MD on  May 24th, 2019 at 1:55 PM.    The history is provided by the patient and the spouse. No  was used.         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 2/25/2010    Other and unspecified hyperlipidemia 2/25/2010    Positive PPD 2/25/2010    Reflux esophagitis 2/25/2010    Type II or unspecified type diabetes mellitus without mention of complication, not stated as uncontrolled 2/25/2010       Past Surgical History:   Procedure Laterality Date    Trevon Howellkaryn  12/13/2013         HC CAPSULE ENDOSCOPE M2A  4/4/2014         HX COLONOSCOPY      HX CORONARY STENT PLACEMENT      HX ORTHOPAEDIC      HX VASECTOMY  1983    UPPER GI ENDOSCOPY,BIOPSY  12/13/2013              Family History:   Problem Relation Age of Onset    Heart Disease Mother     Cancer Father         colon    Heart Disease Maternal Grandmother     Heart Disease Maternal Grandfather     Cancer Paternal Grandmother     Cancer Paternal Grandfather        Social History     Socioeconomic History    Marital status:      Spouse name: Not on file    Number of children: Not on file    Years of education: Not on file    Highest education level: Not on file   Occupational History    Not on file Social Needs    Financial resource strain: Not on file    Food insecurity:     Worry: Not on file     Inability: Not on file    Transportation needs:     Medical: Not on file     Non-medical: Not on file   Tobacco Use    Smoking status: Never Smoker    Smokeless tobacco: Former User     Types: Chew    Tobacco comment: patient reports quitting over 20 yeaers ago. Substance and Sexual Activity    Alcohol use: Yes     Comment: once a year    Drug use: No    Sexual activity: Not on file   Lifestyle    Physical activity:     Days per week: Not on file     Minutes per session: Not on file    Stress: Not on file   Relationships    Social connections:     Talks on phone: Not on file     Gets together: Not on file     Attends Rastafarian service: Not on file     Active member of club or organization: Not on file     Attends meetings of clubs or organizations: Not on file     Relationship status: Not on file    Intimate partner violence:     Fear of current or ex partner: Not on file     Emotionally abused: Not on file     Physically abused: Not on file     Forced sexual activity: Not on file   Other Topics Concern     Service Yes    Blood Transfusions Yes    Caffeine Concern Yes     Comment: 2-3 cups of coffee daily    Occupational Exposure No    Hobby Hazards No    Sleep Concern No    Stress Concern No    Weight Concern No    Special Diet No    Back Care No    Exercise Yes    Bike Helmet No     Comment: pt doesn't ride a bike   [place] Yes    Self-Exams No   Social History Narrative    Not on file         ALLERGIES: Adhesive tape; Baycol; Clonidine; and Pravachol [pravastatin]    Review of Systems   Constitutional: Positive for chills and fever. Negative for diaphoresis. HENT: Positive for sore throat and trouble swallowing. Respiratory: Positive for cough. Negative for shortness of breath. Cardiovascular: Negative for chest pain.    Gastrointestinal: Negative for abdominal pain, constipation, diarrhea, nausea and vomiting. Genitourinary: Negative for dysuria. Musculoskeletal: Negative for back pain and neck pain. All other systems reviewed and are negative. There were no vitals filed for this visit. Physical Exam   Vital signs reviewed. Nursing notes reviewed. Const:  No acute distress, well developed, well nourished  Head:  Atraumatic, normocephalic  HEENT:  Uvula is midline, no appreciable swelling, minimal posterior oropharynx erythema  Eyes:  PERRL, conjunctiva normal, no scleral icterus  Neck:  Supple, trachea midline  Cardiovascular:  RRR, no murmurs, no gallops, no rubs  Resp:  No resp distress, no increased work of breathing, no wheezes, no rhonchi, no rales,  Abd:  Soft, non-tender, non-distended, no rebound, no guarding, no CVA tenderness  MSK:  No pedal edema, normal ROM  Neuro:  Alert and oriented x3, no cranial nerve defect  Skin:  Warm, dry, intact  Psych: normal mood and affect, behavior is normal, judgement and thought content is normal      MDM  Number of Diagnoses or Management Options  Acute pharyngitis, unspecified etiology:   Acute URI:      Amount and/or Complexity of Data Reviewed  Clinical lab tests: ordered and reviewed  Tests in the radiology section of CPT®: ordered and reviewed  Review and summarize past medical records: yes    Patient Progress  Patient progress: stable          Pt. Presents to the ER with URI sx and sore throat. No pneumonia on xray. No signs of swelling or abscess on exam.  CT pending. Pt. Signed out to Dr. Colby Garcia.           Procedures

## 2019-05-26 DIAGNOSIS — I25.10 CORONARY ARTERY DISEASE INVOLVING NATIVE CORONARY ARTERY OF NATIVE HEART WITHOUT ANGINA PECTORIS: ICD-10-CM

## 2019-05-28 RX ORDER — AMLODIPINE BESYLATE 10 MG/1
TABLET ORAL
Qty: 90 TAB | Refills: 0 | Status: SHIPPED | OUTPATIENT
Start: 2019-05-28 | End: 2019-08-24 | Stop reason: SDUPTHER

## 2019-06-03 ENCOUNTER — ANTI-COAG VISIT (OUTPATIENT)
Dept: CARDIOLOGY CLINIC | Age: 79
End: 2019-06-03

## 2019-06-03 ENCOUNTER — OFFICE VISIT (OUTPATIENT)
Dept: CARDIOLOGY CLINIC | Age: 79
End: 2019-06-03

## 2019-06-03 VITALS
DIASTOLIC BLOOD PRESSURE: 64 MMHG | OXYGEN SATURATION: 96 % | BODY MASS INDEX: 31.57 KG/M2 | SYSTOLIC BLOOD PRESSURE: 112 MMHG | RESPIRATION RATE: 20 BRPM | HEIGHT: 74 IN | WEIGHT: 246 LBS | HEART RATE: 72 BPM

## 2019-06-03 DIAGNOSIS — R53.82 CHRONIC FATIGUE: ICD-10-CM

## 2019-06-03 DIAGNOSIS — I48.0 PAROXYSMAL ATRIAL FIBRILLATION (HCC): ICD-10-CM

## 2019-06-03 DIAGNOSIS — I10 ESSENTIAL HYPERTENSION, BENIGN: ICD-10-CM

## 2019-06-03 DIAGNOSIS — I50.32 CHRONIC HEART FAILURE WITH PRESERVED EJECTION FRACTION (HCC): Primary | ICD-10-CM

## 2019-06-03 DIAGNOSIS — D50.0 IRON DEFICIENCY ANEMIA DUE TO CHRONIC BLOOD LOSS: ICD-10-CM

## 2019-06-03 DIAGNOSIS — I25.10 CORONARY ARTERY DISEASE INVOLVING NATIVE CORONARY ARTERY OF NATIVE HEART WITHOUT ANGINA PECTORIS: ICD-10-CM

## 2019-06-03 DIAGNOSIS — E11.21 TYPE 2 DIABETES WITH NEPHROPATHY (HCC): ICD-10-CM

## 2019-06-03 DIAGNOSIS — R53.82 CHRONIC FATIGUE: Primary | ICD-10-CM

## 2019-06-03 LAB
INR BLD: 1.2
INR, EXTERNAL: 1.2 (ref 2–3)
PT POC: 13.9 SECONDS
VALID INTERNAL CONTROL?: YES

## 2019-06-03 NOTE — PROGRESS NOTES
Visit Vitals  /64   Pulse 72   Resp 20   Ht 6' 2\" (1.88 m)   Wt 246 lb (111.6 kg)   SpO2 96%   BMI 31.58 kg/m²     Denies CP. Complains of SOB, dizzy. Swelling in legs improved since Aldactone started. ED visit for illness last week.

## 2019-06-03 NOTE — PROGRESS NOTES
[address], [address]  [address],  [address]  [place], [address] [facility]    Office [phone]  Fax [phone]  Cell [phone]       Geo Anglin is a 67 y.o. male. Last seen by me two weeks ago. Assessment  Encounter Diagnoses   Name Primary?  Coronary artery disease involving native coronary artery of native heart without angina pectoris     Type 2 diabetes with nephropathy (HCC)     Paroxysmal atrial fibrillation (HCC)     Chronic heart failure with preserved ejection fraction (HCC) Yes    Iron deficiency anemia due to chronic blood loss     Essential hypertension, benign     Chronic fatigue        Recommendations:  1. Chronic HFpEF in the setting of AF/CAD/HTN/untreated TOPHER/DM, Class 3 . His volume status seems better. His BP is better. His cough is marginally improved after replacing Lisinopril with Losartan, but he has also had an interim viral URI. He is significantly fatigued, which I suspect is multifactorial: HF, recent viral illness, and progressive anemia. Recent Hb 9.5 with Microcytic indices.(13.7 1 year ago). Update BMP today to recheck potassium and CBC    2. Chronic AF, controlled and asymptomatic at the time. Recent echo with moderate LAE. Blade Kike continued rate control strategy. Continue Coreg 6.25 BID. He remains on Warfarin, but I am concerned about ongoing Gi blood loss. Janny Lopez History of GI bleeding 2008 and 2013 without clear cut abnormalities. M2A capsule endoscopy 2014 was negative. Will recheck Hgb today. At the very least I encouraged him to resume iron. , he will need to figure out the next steps with 'palmira [place]iver. If he cannot continue anticoagulation, will refer for Watchman implantation. 3. CAD. s/p multiple PCI's, most recently 2 years ago, s/p PCI RCA with LEO. Last cath most recently 4/24/18 demonstrated no RCA restenosis, distal LAD 80%. He has chronic fatigue and HURD as noted above, but no anginal symptoms.  We discussed the role of repeat cath if necessary. 4. HTN, reasonable control on combination therapy with recent med changes. 5. T2DM on Metformin alone. Recent A1c 7.0%. We discussed the role of Jardiance for CV protection. He will discuss further with Dr. Robin Simms in the near future. Phone follow up after reviewing tests     Subjective:    Suze Reveles was seen in the ED 5/24/19 with difficulty swallowing, which was determined to be viral. CT of the neck was unremarkable. His labs were remarkable for K 3.3, normal troponin, Hb 9.5 with MCV 75. He received IV fluids, steroids, and replacement K. He reports HURD doing minor activities since the ER visit, which has persisted. He fatigues easily. He ambulates with a cane. Patient denies any exertional chest pain, palpitations, syncope, or paroxysmal nocturnal dyspnea. He is here today with his wife. Cardiac risk factors   HTN yes  DM  yes  Smoking no    Cardiac testing  Cath (8/27/8): LAD p90, m70. OM1 70, OM2 90 (small). RCA m40. EF 55%. No AVG/MR. Patent L SC.  PCI (11/13/8): mLAD 2.75x18 Xience, pLAD 2.75x23 Xience. OM1 3.0x15 Xience. Cath 4/28/17:Cath (4/28/17): LAD patent with patent prox and mid stent. LCx patent with patent OM1 stent. RCA p90 (ISR) and p90 distal to stent. EF 65%. No AVG/MR. PCI osteal/prox RCA (4/28/17): 3.0x28 Xience. Cath 4/24/18: RCA - prior stents present; Prox ISR 60%; Mid to Distal - 60%  Difficulty engaging guide secondary to stent at ostium  JR4/AR1 guide would not engage ; THE [facility] guide used to engage RCA  FFR - not siginificant 0.89      Lexiscan Cardiolite (8/11/9): Mild fixed inf defect. No reversible defects. EF 51% with mild inf HK. Cath (8/29/13): LAD patent prox and mid stents. LCx m40; patent OM1 stent. RCA p80. EF 55%. No AVG/MR. PCI ost/proxRCA (8/29/13): 3.0x18 Xience. Echo (8/22/13): EF 55%. Mod dil LA. Mild dil RA. Mild MR/TR/WA. PASP 24.   Lexiscan Cardiolite 4/15/14 - normal perfusion imaging, EF 52%  DCCV 12/18/14  Echo 6/18/18 - EF 55%. No WMA. Mild asymmetric hypertrophy of the septum. MIld to mod LAE. AoV calcification and sclerosis. No significant change c/t study of 22-Aug-2013. Echo 4/11/19 - EF 55-60%. Mod LAE. AoV sclerosis. Mild MR. Mild TR. 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 270 Tab 3    carvedilol (COREG) 6.25 mg tablet Take 1 Tab by mouth two (2) times daily (with meals). 180 Tab 3    spironolactone (ALDACTONE) 25 mg tablet Take 1 Tab by mouth two (2) times a day. 60 Tab 3    losartan (COZAAR) 100 mg tablet Take 1 Tab by mouth daily. Indications: high blood pressure 30 Tab 3    metFORMIN ER (GLUCOPHAGE XR) 500 mg tablet TAKE 4 TABLETS BY MOUTH ONCE DAILY WITH BREAKFAST 360 Tab 1    warfarin (COUMADIN) 5 mg tablet TAKE 1 & 1/2 (ONE & ONE-HALF) TABLETS (7.5 MG) BY MOUTH ONCE DAILY 180 Tab 3    aspirin delayed-release 81 mg tablet Take 81 mg by mouth daily.  Indications: MYOCARDIAL REINFARCTION PREVENTION         Allergies   Allergen Reactions    Adhesive Tape Rash    Baycol Nausea and Vomiting    Clonidine Other (comments)     Headache and sleepiness      Pravachol [Pravastatin] Nausea and Vomiting      Review of Systems  Constitutional: Negative for fever, chills, and diaphoresis. +fatigue  Respiratory: Negative for cough, hemoptysis, sputum production +wheezing, SOB, cough. Cardiovascular: Negative for chest pain, palpitations, claudication, and PND. +Lower extremity edema, orthopnea, rare chest pain at rest  Gastrointestinal: Negative for heartburn, nausea, vomiting, blood in stool and melena. Genitourinary: Negative for dysuria and flank pain. +polyuria  Musculoskeletal: Negative for joint pain and back pain. Skin: Negative for rash. Neurological: Negative for focal weakness, seizures, loss of consciousness, weakness and headaches. Endo/Heme/Allergies: Does not bruise/bleed easily. Psychiatric/Behavioral: Negative for memory loss. The patient does not have insomnia. Physical Exam  Visit Vitals  /64   Pulse 72   Resp 20   Ht 6' 2\" (1.88 m)   Wt 246 lb (111.6 kg)   SpO2 96%   BMI 31.58 kg/m²     Wt Readings from Last 3 Encounters:   06/03/19 246 lb (111.6 kg)   05/22/19 255 lb 9.6 oz (115.9 kg)   05/15/19 266 lb (120.7 kg)      General - well developed well nourished  Neck - JVP normal, thyroid nl  Cardiac - Irregularly irregular, no murmurs, rubs or gallops.  No clicks  Vascular - carotids without bruits, radials, femorals and pedal pulses equal bilateral  Lungs - clear to auscultation bilaterals, no rales ,wheezing or rhonchi  Abd - rounded/obese, soft nontender  Extremities - 1+ pretibial and pedal edema  Skin - no rash  Neuro - nonfocal. Right sided weakness  Psych - normal mood and affect    Cardiographics:  EKG 4/17/18 - Afib  ECHO 6/18/18 - LVEF of 60%, moderate to severe LAE  EKG 9/28/18 - SR 65, first degree AG block, , left anterior live block  EKG 4/3/19 - AF 70s, LAHB, compared to 9/28/18, AF is newly noted    Written by Nilson rWight, as dictated by Dr. Philip Fitzpatrick MD.     Philip Fitzpatrick MD

## 2019-06-04 LAB
BUN SERPL-MCNC: 21 MG/DL (ref 8–27)
BUN/CREAT SERPL: 14 (ref 10–24)
CALCIUM SERPL-MCNC: 9.7 MG/DL (ref 8.6–10.2)
CHLORIDE SERPL-SCNC: 102 MMOL/L (ref 96–106)
CO2 SERPL-SCNC: 24 MMOL/L (ref 20–29)
CREAT SERPL-MCNC: 1.46 MG/DL (ref 0.76–1.27)
ERYTHROCYTE [DISTWIDTH] IN BLOOD BY AUTOMATED COUNT: 19.7 % (ref 12.3–15.4)
GLUCOSE SERPL-MCNC: 178 MG/DL (ref 65–99)
HCT VFR BLD AUTO: 34.1 % (ref 37.5–51)
HGB BLD-MCNC: 10 G/DL (ref 13–17.7)
INTERPRETATION: NORMAL
Lab: NORMAL
MCH RBC QN AUTO: 22.1 PG (ref 26.6–33)
MCHC RBC AUTO-ENTMCNC: 29.3 G/DL (ref 31.5–35.7)
MCV RBC AUTO: 75 FL (ref 79–97)
PLATELET # BLD AUTO: 438 X10E3/UL (ref 150–450)
POTASSIUM SERPL-SCNC: 4.8 MMOL/L (ref 3.5–5.2)
RBC # BLD AUTO: 4.53 X10E6/UL (ref 4.14–5.8)
SODIUM SERPL-SCNC: 142 MMOL/L (ref 134–144)
WBC # BLD AUTO: 8.1 X10E3/UL (ref 3.4–10.8)

## 2019-06-06 ENCOUNTER — TELEPHONE (OUTPATIENT)
Dept: CARDIOLOGY CLINIC | Age: 79
End: 2019-06-06

## 2019-06-06 DIAGNOSIS — I50.32 CHRONIC HEART FAILURE WITH PRESERVED EJECTION FRACTION (HCC): Primary | ICD-10-CM

## 2019-06-06 DIAGNOSIS — I25.10 CORONARY ARTERY DISEASE INVOLVING NATIVE CORONARY ARTERY OF NATIVE HEART WITHOUT ANGINA PECTORIS: ICD-10-CM

## 2019-06-06 NOTE — PROGRESS NOTES
Stable low Hgb - follow up with GI, as scheduled. Mild increase in baseline renal function with normal potassium. Continue current medications including current diuretic regimen with recent improvement in HF sxs. Repeat BMP again in 1 month. Plan to phone follow up to review results and sxs.

## 2019-06-06 NOTE — TELEPHONE ENCOUNTER
----- Message from Suresh Price NP sent at 6/6/2019 12:06 PM EDT -----  Stable low Hgb - follow up with GI, as scheduled. Mild increase in baseline renal function with normal potassium. Continue current medications including current diuretic regimen with recent improvement in HF sxs. Repeat BMP again in 1 month. Plan to phone follow up to review results and sxs.

## 2019-06-06 NOTE — TELEPHONE ENCOUNTER
Left message on VM of lab results per Tomi TORRES note. instructed patient to have labs redrawn in 1 month.     Mailed lab slips to patient

## 2019-06-10 ENCOUNTER — ANTI-COAG VISIT (OUTPATIENT)
Dept: CARDIOLOGY CLINIC | Age: 79
End: 2019-06-10

## 2019-06-10 DIAGNOSIS — I10 ESSENTIAL HYPERTENSION, BENIGN: ICD-10-CM

## 2019-06-10 DIAGNOSIS — I48.91 ATRIAL FIBRILLATION, UNSPECIFIED TYPE (HCC): ICD-10-CM

## 2019-06-10 DIAGNOSIS — R53.82 CHRONIC FATIGUE: Primary | ICD-10-CM

## 2019-06-10 LAB
INR BLD: 3
INR, EXTERNAL: 3 (ref 2–3)
PT POC: 39.1 SECONDS
VALID INTERNAL CONTROL?: YES

## 2019-06-24 ENCOUNTER — OFFICE VISIT (OUTPATIENT)
Dept: SLEEP MEDICINE | Age: 79
End: 2019-06-24

## 2019-06-24 ENCOUNTER — HOSPITAL ENCOUNTER (OUTPATIENT)
Dept: SLEEP MEDICINE | Age: 79
Discharge: HOME | End: 2019-06-24
Payer: MEDICARE

## 2019-06-24 VITALS
DIASTOLIC BLOOD PRESSURE: 70 MMHG | HEIGHT: 74 IN | HEART RATE: 72 BPM | OXYGEN SATURATION: 95 % | WEIGHT: 252 LBS | SYSTOLIC BLOOD PRESSURE: 117 MMHG | BODY MASS INDEX: 32.34 KG/M2

## 2019-06-24 DIAGNOSIS — I48.91 ATRIAL FIBRILLATION, UNSPECIFIED TYPE (HCC): ICD-10-CM

## 2019-06-24 DIAGNOSIS — G47.33 OSA (OBSTRUCTIVE SLEEP APNEA): Primary | ICD-10-CM

## 2019-06-24 DIAGNOSIS — I10 ESSENTIAL HYPERTENSION: ICD-10-CM

## 2019-06-24 PROCEDURE — 95806 SLEEP STUDY UNATT&RESP EFFT: CPT | Performed by: INTERNAL MEDICINE

## 2019-06-24 RX ORDER — CYANOCOBALAMIN (VITAMIN B-12) 1000 MCG
1 TABLET, EXTENDED RELEASE ORAL DAILY
COMMUNITY
End: 2019-10-10 | Stop reason: ALTCHOICE

## 2019-06-24 NOTE — PATIENT INSTRUCTIONS
217 [facility]., Enrique. [place], [address]  Tel.  [phone]  Fax. 100 [facility] 60  [place], [address] [facility]  Tel.  [phone]  Fax. [phone] [address]  Tel.  [phone]  Fax. [phone]     Sleep Apnea: After Your Visit  Your Care Instructions  Sleep apnea occurs when you frequently stop breathing for 10 seconds or longer during sleep. It can be mild to severe, based on the number of times per hour that you stop breathing or have slowed breathing. Blocked or narrowed airways in your nose, mouth, or throat can cause sleep apnea. Your airway can become blocked when your throat muscles and tongue relax during sleep. Sleep apnea is common, occurring in 1 out of 20 individuals. Individuals having any of the following characteristics should be evaluated and treated right away due to high risk and detrimental consequences from untreated sleep apnea:  1. Obesity  2. Congestive Heart failure  3. Atrial Fibrillation  4. Uncontrolled Hypertension  5. Type II Diabetes  6. Night-time Arrhythmias  7. Stroke  8. Pulmonary Hypertension  9. High-risk Driving Populations (pilots, truck drivers, etc.)  10. Patients Considering Weight-loss Surgery    How do you know you have sleep apnea? You probably have sleep apnea if you answer 'yes' to 3 or more of the following questions:  S - Have you been told that you Snore? T - Are you often Tired during the day? O - Has anyone Observed you stop breathing while sleeping? P- Do you have (or are being treated for) high blood Pressure? B - Are you obese (Body Mass Index > 35)? A - Is your Age 48years old or older? N - Is your Neck size greater than 16 inches? G - Are you male Gender? A sleep physician can prescribe a breathing device that prevents tissues in the throat from blocking your airway.  Or your doctor may recommend using a dental device (oral breathing device) to help keep your airway open. In some cases, surgery may be needed to remove enlarged tissues in the throat. Follow-up care is a key part of your treatment and safety. Be sure to make and go to all appointments, and call your doctor if you are having problems. It's also a good idea to know your test results and keep a list of the medicines you take. How can you care for yourself at home? · Lose weight, if needed. It may reduce the number of times you stop breathing or have slowed breathing. · Go to bed at the same time every night. · Sleep on your side. It may stop mild apnea. If you tend to roll onto your back, sew a pocket in the back of your pajama top. Put a tennis ball into the pocket, and stitch the pocket shut. This will help keep you from sleeping on your back. · Avoid alcohol and medicines such as sleeping pills and sedatives before bed. · Do not smoke. Smoking can make sleep apnea worse. If you need help quitting, talk to your doctor about stop-smoking programs and medicines. These can increase your chances of quitting for good. · Prop up the head of your bed 4 to 6 inches by putting bricks under the legs of the bed. · Treat breathing problems, such as a stuffy nose, caused by a cold or allergies. · Use a continuous positive airway pressure (CPAP) breathing machine if lifestyle changes do not help your apnea and your doctor recommends it. The machine keeps your airway from closing when you sleep. · If CPAP does not help you, ask your doctor whether you should try other breathing machines. A bilevel positive airway pressure machine has two types of air pressureâone for breathing in and one for breathing out. Another device raises or lowers air pressure as needed while you breathe. · If your nose feels dry or bleeds when using one of these machines, talk with your doctor about increasing moisture in the air. A humidifier may help.   · If your nose is runny or stuffy from using a breathing machine, talk with your doctor about using decongestants or a corticosteroid nasal spray. When should you call for help? Watch closely for changes in your health, and be sure to contact your doctor if:  · You still have sleep apnea even though you have made lifestyle changes. · You are thinking of trying a device such as CPAP. · You are having problems using a CPAP or similar machine. Where can you learn more? Go to [employer]. Enter N658 in the search box to learn more about \"Sleep Apnea: After Your Visit. \"   © 8888-4405 Healthwise, Incorporated. Care instructions adapted under license by [facility] [employer] [facility] (which disclaims liability or warranty for this information). This care instruction is for use with your licensed healthcare professional. If you have questions about a medical condition or this instruction, always ask your healthcare professional. Weston Cisneros any warranty or liability for your use of this information. PROPER SLEEP HYGIENE    What to avoid  · Do not have drinks with caffeine, such as coffee or black tea, for 8 hours before bed. · Do not smoke or use other types of tobacco near bedtime. Nicotine is a stimulant and can keep you awake. · Avoid drinking alcohol late in the evening, because it can cause you to wake in the middle of the night. · Do not eat a big meal close to bedtime. If you are hungry, eat a light snack. · Do not drink a lot of water close to bedtime, because the need to urinate may wake you up during the night. · Do not read or watch TV in bed. Use the bed only for sleeping and sexual activity. What to try  · Go to bed at the same time every night, and wake up at the same time every morning. Do not take naps during the day. · Keep your bedroom quiet, dark, and cool. · Get regular exercise, but not within 3 to 4 hours of your bedtime. .  · Sleep on a comfortable pillow and mattress.   · If watching the clock makes you anxious, turn it facing away from you so you cannot see the time. · If you worry when you lie down, start a worry book. Well before bedtime, write down your worries, and then set the book and your concerns aside. · Try meditation or other relaxation techniques before you go to bed. · If you cannot fall asleep, get up and go to another room until you feel sleepy. Do something relaxing. Repeat your bedtime routine before you go to bed again. · Make your house quiet and calm about an hour before bedtime. Turn down the lights, turn off the TV, log off the computer, and turn down the volume on music. This can help you relax after a busy day. Drowsy Driving  The [address] [place] [address] Road Traffic Safety Administration cites drowsiness as a causing factor in more than 069,092 police reported crashes annually, resulting in 76,000 injuries and 1,500 deaths. Other surveys suggest 55% of people polled have driven while drowsy in the past year, 23% had fallen asleep but not crashed, 3% crashed, and 2% had and accident due to drowsy driving. Who is at risk? Young Drivers: One study of drowsy driving accidents states that 55% of the drivers were under 25 years. Of those, 75% were male. Shift Workers and Travelers: People who work overnight or travel across time zones frequently are at higher risk of experiencing Circadian Rhythm Disorders. They are trying to work and function when their body is programed to sleep. Sleep Deprived: Lack of sleep has a serious impact on your ability to pay attention or focus on a task. Consistently getting less than the average of 8 hours your body needs creates partial or cumulative sleep deprivation. Untreated Sleep Disorders: Sleep Apnea, Narcolepsy, R.L.S., and other sleep disorders (untreated) prevent a person from getting enough restful sleep. This leads to excessive daytime sleepiness and increases the risk for drowsy driving accidents by up to 7 times.   Medications / Alcohol: Even over the counter medications can cause drowsiness. Medications that impair a drivers attention should have a warning label. Alcohol naturally makes you sleepy and on its own can cause accidents. Combined with excessive drowsiness its effects are amplified. Signs of Drowsy Driving:   * You don't remember driving the last few miles   * You may drift out of your peace   * You are unable to focus and your thoughts wander   * You may yawn more often than normal   * You have difficulty keeping your eyes open / nodding off   * Missing traffic signs, speeding, or tailgating  Prevention-   Good sleep hygiene, lifestyle and behavioral choices have the most impact on drowsy driving. There is no substitute for sleep and the average person requires 8 hours nightly. If you find yourself driving drowsy, stop and sleep. Consider the sleep hygiene tips provided during your visit as well. Medication Refill Policy: Refills for all medications require 1 week advance notice. Please have your pharmacy fax a refill request. We are unable to fax, or call in \"controled substance\" medications and you will need to pick these prescriptions up from our office. [employer] Activation    Thank you for requesting access to [employer]. Please follow the instructions below to securely access and download your online medical record. [employer] allows you to send messages to your doctor, view your test results, renew your prescriptions, schedule appointments, and more. How Do I Sign Up? 1. In your internet browser, go to [email or web address]. 2. Click on the First Time User? Click Here link in the Sign In box. You will see the New Member Sign Up page. 3. Enter your [employer] Access Code exactly as it appears below. You will not need to use this code after youve completed the sign-up process. If you do not sign up before the expiration date, you must request a new code. [employer] Access Code:  Activation code not generated  Current [employer] Status: Patient Declined (This is the date your [employer] access code will )    4. Enter the last four digits of your Social Security Number (xxxx) and Date of Birth (mm/dd/yyyy) as indicated and click Submit. You will be taken to the next sign-up page. 5. Create a [employer] ID. This will be your [employer] login ID and cannot be changed, so think of one that is secure and easy to remember. 6. Create a [employer] password. You can change your password at any time. 7. Enter your Password Reset Question and Answer. This can be used at a later time if you forget your password. 8. Enter your e-mail address. You will receive e-mail notification when new information is available in [address] 9. Click Sign Up. You can now view and download portions of your medical record. 10. Click the Download Summary menu link to download a portable copy of your medical information. Additional Information    If you have questions, please call [phone]. Remember, [employer] is NOT to be used for urgent needs. For medical emergencies, dial 911.

## 2019-06-24 NOTE — PROGRESS NOTES
217 [facility]., Enrique. [place], [address]  Tel.  [phone]  Fax. 100 [facility] 60  [place], [address] [facility]  Tel.  [phone]  Fax. [phone] [address]   Tel.  [phone]  Fax. [phone]         Subjective:      Willeen Boast is an 67 y.o. male referred for evaluation for a sleep disorder. He complains of snoring associated with snorting, tossing and turning, kicking. Symptoms began several years ago, unchanged since that time. He usually can fall asleep in 5 minutes. Family or house members note snoring, snorting. He denies completely or partially paralyzed while falling asleep or waking up. Willeen Boast does wake up frequently at night. He is not bothered by waking up too early and left unable to get back to sleep. He actually sleeps about 7 hours at night and wakes up about 5 times during the night. He   work shifts: Sidney Yoo indicates he does not get too little sleep at night. His bedtime is 2300. He awakens at 0600. He does take naps. He takes 2 naps a week lasting 45 min to an hour. He has the following observed behaviors: Loud snoring, Twitching of legs or feet, Kicking with legs;  . Other remarks: waking with gasp    [place] Sleepiness Score: 12 which reflect mild daytime drowsiness. Allergies   Allergen Reactions    Adhesive Tape Rash    Baycol Nausea and Vomiting    Clonidine Other (comments)     Headache and sleepiness      Pravachol [Pravastatin] Nausea and Vomiting         Current Outpatient Medications:     iron, carbonyl (FEOSOL) 45 mg tab, Take 1 Tab by mouth daily. , Disp: , Rfl:     amLODIPine (NORVASC) 10 mg tablet, TAKE 1 TABLET BY MOUTH ONCE DAILY, Disp: 90 Tab, Rfl: 0    furosemide (LASIX) 20 mg tablet, Take 3 Tabs by mouth daily. , Disp: 270 Tab, Rfl: 3    carvedilol (COREG) 6.25 mg tablet, Take 1 Tab by mouth two (2) times daily (with meals). , Disp: 180 Tab, Rfl: 3    spironolactone (ALDACTONE) 25 mg tablet, Take 1 Tab by mouth two (2) times a day., Disp: 60 Tab, Rfl: 3    losartan (COZAAR) 100 mg tablet, Take 1 Tab by mouth daily. Indications: high blood pressure, Disp: 30 Tab, Rfl: 3    metFORMIN ER (GLUCOPHAGE XR) 500 mg tablet, TAKE 4 TABLETS BY MOUTH ONCE DAILY WITH BREAKFAST, Disp: 360 Tab, Rfl: 1    warfarin (COUMADIN) 5 mg tablet, TAKE 1 & 1/2 (ONE & ONE-HALF) TABLETS (7.5 MG) BY MOUTH ONCE DAILY, Disp: 180 Tab, Rfl: 3    aspirin delayed-release 81 mg tablet, Take 81 mg by mouth daily. 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 He  has a past surgical history that includes hx vasectomy (1983); hx coronary stent placement; hx colonoscopy; hx orthopaedic; colonoscopy,zeeshan clark (12/13/2013); upper gi endoscopy,biopsy (12/13/2013); and  capsule endoscope m2a (4/4/2014). He family history includes Cancer in his father, paternal grandfather, and paternal grandmother; Heart Disease in his maternal grandfather, maternal grandmother, and mother. He  reports that he has never smoked. He quit smokeless tobacco use about 25 years ago.  His smokeless tobacco use included chew. He reports that he drinks alcohol. He reports that he does not use drugs. Review of Systems:  Constitutional:  No significant weight loss or weight gain  Eyes:  No blurred vision  CVS:  No significant chest pain  Pulm:  No significant shortness of breath  GI:  No significant nausea or vomiting  :  significant nocturia  Musculoskeletal:  No significant joint pain at night  Skin:  No significant rashes  Neuro:  No significant dizziness   Psych:  No active mood issues    Sleep Review of Systems: notable for no difficulty falling asleep; frequent awakenings at night;  regular dreaming not reprorted; no nightmares ; no early morning headaches; no memory problems; no concentration issues; no history of any automobile or occupational accidents due to daytime drowsiness. Objective:     Visit Vitals  /70   Pulse 72   Ht 6' 2\" (1.88 m)   Wt 252 lb (114.3 kg)   SpO2 95%   BMI 32.35 kg/m²         General:   Not in acute distress   Eyes:  Anicteric sclerae, no obvious strabismus   Nose:  No obvious nasal septum deviation    Oropharynx:   Class 4 oropharyngeal outlet, thick tongue base, uvula could not be seen due to low-lying soft palate, narrow tonsilo-pharyngeal pilars   Tonsils:   tonsils are not seen due to low-lying soft palate   Neck:   Neck circ. in \"inches\": 16.5; midline trachea   Chest/Lungs:  Equal lung expansion, clear on auscultation    CVS:  Normal rate, regular rhythm; no JVD   Skin:  Warm to touch; no obvious rashes   Neuro:  No focal deficits ; no obvious tremor    Psych:  Normal affect,  normal countenance;          Assessment:       ICD-10-CM ICD-9-CM    1. TOPHER (obstructive sleep apnea) G47.33 327.23 SLEEP STUDY UNATTENDED, 4 CHANNEL   2. Atrial fibrillation, unspecified type ([facility] I48.91 427.31    3. Essential hypertension I10 401.9    4. BMI 32.0-32.9,adult Z68.32 V85.32          Plan:     * The patient currently has a Moderate Risk for having sleep apnea.   STOP-BANG score 5.  * Sleep testing was ordered for initial evaluation. HSAT to start treatment with an Oral Appliance and a PSG to assess sleep in it's entirety. * He was provided information on sleep apnea including coresponding risk factors and the importance of proper treatment. * Treatment options if indicated were reviewed today. Patient states that he has previously been diagnosed with TOPHER and tried CPAP but did not tolerate CPAP Therapy and at present declines CPAP Therapy. He agrees to a trial of OAT therapy if indicated. * Counseling was provided regarding proper sleep hygiene (including effect of light on sleep) and safe driving. * Effect of sleep disturbance on weight was reviewed. We have recommended a dedicated weight loss through appropriate diet and an exercise regiment as significant weight reduction has been shown to reduce severity of obstructive sleep apnea. * Patient agrees to telephone [phone]  follow-up by myself or lead sleep technologist shortly after sleep study to review results and plan final management.     (patient has given permission for a message to be left regarding test results and further management if patient cannot be cannot be reached directly). Thank you for allowing us to participate in your patient's medical care. We'll keep you updated on these investigations. Judi Gant MD, FAASM  Electronically signed.  06/24/19

## 2019-06-25 ENCOUNTER — DOCUMENTATION ONLY (OUTPATIENT)
Dept: SLEEP MEDICINE | Age: 79
End: 2019-06-25

## 2019-06-26 LAB
BUN SERPL-MCNC: 32 MG/DL (ref 8–27)
BUN/CREAT SERPL: 20 (ref 10–24)
CALCIUM SERPL-MCNC: 9.1 MG/DL (ref 8.6–10.2)
CHLORIDE SERPL-SCNC: 103 MMOL/L (ref 96–106)
CO2 SERPL-SCNC: 21 MMOL/L (ref 20–29)
CREAT SERPL-MCNC: 1.61 MG/DL (ref 0.76–1.27)
GLUCOSE SERPL-MCNC: 125 MG/DL (ref 65–99)
INTERPRETATION: NORMAL
POTASSIUM SERPL-SCNC: 4.7 MMOL/L (ref 3.5–5.2)
SODIUM SERPL-SCNC: 140 MMOL/L (ref 134–144)

## 2019-07-05 ENCOUNTER — TELEPHONE (OUTPATIENT)
Dept: CARDIOLOGY CLINIC | Age: 79
End: 2019-07-05

## 2019-07-05 NOTE — TELEPHONE ENCOUNTER
PTIDX2 instructed to decrease lasix to 40mg daily. Patient states symptoms have not improved. Increase in SOB with exertion.      Romelia Parker advise

## 2019-07-05 NOTE — TELEPHONE ENCOUNTER
----- Message from Tereza Willams MD sent at 6/28/2019 12:15 PM EDT -----  Gradual worsening of kidney function.  Would reduce lasix from 60 mg/d to 40 mg/d

## 2019-07-08 ENCOUNTER — ANTI-COAG VISIT (OUTPATIENT)
Dept: CARDIOLOGY CLINIC | Age: 79
End: 2019-07-08

## 2019-07-08 DIAGNOSIS — I10 ESSENTIAL HYPERTENSION, BENIGN: ICD-10-CM

## 2019-07-08 DIAGNOSIS — I48.91 ATRIAL FIBRILLATION, UNSPECIFIED TYPE (HCC): ICD-10-CM

## 2019-07-08 DIAGNOSIS — R53.82 CHRONIC FATIGUE: Primary | ICD-10-CM

## 2019-07-08 LAB
INR BLD: 2
INR, EXTERNAL: 2 (ref 2–3)
PT POC: 20.6 SECONDS
VALID INTERNAL CONTROL?: YES

## 2019-07-08 NOTE — TELEPHONE ENCOUNTER
PTIDX2 in office for INR check. Information in Kathleen's Np note discussed with patient. Instructed patient to call PCP to follow up on SOB. Will fax Clinic note, labs and ECHO to PCP.

## 2019-07-16 ENCOUNTER — TELEPHONE (OUTPATIENT)
Dept: SLEEP MEDICINE | Age: 79
End: 2019-07-16

## 2019-07-22 ENCOUNTER — TELEPHONE (OUTPATIENT)
Dept: SLEEP MEDICINE | Age: 79
End: 2019-07-22

## 2019-07-22 DIAGNOSIS — G47.33 OSA (OBSTRUCTIVE SLEEP APNEA): Primary | ICD-10-CM

## 2019-07-22 NOTE — TELEPHONE ENCOUNTER
Ian Magana is to be contacted by lead sleep technologist regarding results of Sleep Testing which was indicative of an average AHI of 18.6 per hour with an SpO2 yakov of 77% and SpO2 of < 88% being 39.1 minutes. * Treatment options were offered at initial visit. He had elected to proceed with a trial of using an Oral Device (Mandibular Advancing Device, Tongue Retention Device, etc.) which has been shown to be effective treatment for obstructive sleep apnea. * We have referred the patient to Dentistry for oral appliance evaluation. Follow-up office visit and re-testing to be done in 3-4 months after initiation of oral appliance therapy to assess efficacy of therapy. Encounter Diagnosis   Name Primary?     TOPHER (obstructive sleep apnea) Yes       Orders Placed This Encounter    REFERRAL TO DENTISTRY     Referral Priority:   Routine     Referral Type:   Consultation     Referral Reason:   Specialty Services Required     Referred to Provider:   Awa Sanchez DDS     Number of Visits Requested:   1

## 2019-07-31 ENCOUNTER — HOSPITAL ENCOUNTER (OUTPATIENT)
Dept: LAB | Age: 79
Discharge: HOME | End: 2019-07-31
Payer: MEDICARE

## 2019-07-31 ENCOUNTER — OFFICE VISIT (OUTPATIENT)
Dept: FAMILY MEDICINE CLINIC | Age: 79
End: 2019-07-31

## 2019-07-31 VITALS
TEMPERATURE: 97.4 F | HEIGHT: 74 IN | HEART RATE: 84 BPM | WEIGHT: 254 LBS | SYSTOLIC BLOOD PRESSURE: 140 MMHG | BODY MASS INDEX: 32.6 KG/M2 | RESPIRATION RATE: 18 BRPM | DIASTOLIC BLOOD PRESSURE: 60 MMHG | OXYGEN SATURATION: 97 %

## 2019-07-31 DIAGNOSIS — E11.9 TYPE 2 DIABETES MELLITUS WITHOUT COMPLICATION, WITHOUT LONG-TERM CURRENT USE OF INSULIN (HCC): Primary | ICD-10-CM

## 2019-07-31 DIAGNOSIS — G47.33 OSA (OBSTRUCTIVE SLEEP APNEA): ICD-10-CM

## 2019-07-31 DIAGNOSIS — I25.10 CORONARY ARTERY DISEASE INVOLVING NATIVE CORONARY ARTERY OF NATIVE HEART WITHOUT ANGINA PECTORIS: ICD-10-CM

## 2019-07-31 DIAGNOSIS — N18.30 CKD (CHRONIC KIDNEY DISEASE) STAGE 3, GFR 30-59 ML/MIN (HCC): ICD-10-CM

## 2019-07-31 DIAGNOSIS — I63.9 CEREBROVASCULAR ACCIDENT (CVA), UNSPECIFIED MECHANISM (HCC): ICD-10-CM

## 2019-07-31 DIAGNOSIS — I48.19 PERSISTENT ATRIAL FIBRILLATION (HCC): ICD-10-CM

## 2019-07-31 DIAGNOSIS — D50.0 IRON DEFICIENCY ANEMIA DUE TO CHRONIC BLOOD LOSS: ICD-10-CM

## 2019-07-31 DIAGNOSIS — K21.00 REFLUX ESOPHAGITIS: ICD-10-CM

## 2019-07-31 DIAGNOSIS — R29.6 FREQUENT FALLS: ICD-10-CM

## 2019-07-31 PROCEDURE — 83550 IRON BINDING TEST: CPT

## 2019-07-31 PROCEDURE — 85027 COMPLETE CBC AUTOMATED: CPT

## 2019-07-31 PROCEDURE — 36415 COLL VENOUS BLD VENIPUNCTURE: CPT

## 2019-07-31 PROCEDURE — 83036 HEMOGLOBIN GLYCOSYLATED A1C: CPT

## 2019-07-31 PROCEDURE — 80053 COMPREHEN METABOLIC PANEL: CPT

## 2019-07-31 NOTE — PROGRESS NOTES
Chief Complaint   Patient presents with    Diabetes     pt would like to have his a1c checked     Other     pt would like to be evaluated for a wheel chair      1. Have you been to the ER, urgent care clinic since your last visit? Hospitalized since your last visit? Yes, for pharyngitis and URI and dehydration pt went to [facility]     2. Have you seen or consulted any other health care providers outside of the [address] [place] [address] since your last visit? Include any pap smears or colon screening.  No

## 2019-07-31 NOTE — PROGRESS NOTES
HISTORY OF PRESENT ILLNESS  HPI  Doris Felipe is a 67 y.o. Male with a history of HTN, CAD, A-fib, CHF with preserved ejection fraction, reflux esophagitis, DM-II with nephropathy, BCC, cerebral infarction, TOPHER, osteitis deformans, fatigue, anemia and hyperlipidemia with LDL goal <70, who presents to the office today for a follow up on his DM. Major reason for the visit today was to conduct a face-to-face MOBILITY EXAM.   Pt says he rarely checks his glucose out of the office. Pt denies any symptoms of low glucose. Pt presents with documentation to be evaluated for a wheelchair. Pt notes falling 4 times in the last month. Pt complains of right knee pain that contributes to these falls; it will come on suddenly and \"knee gives way\" and then he falls. . Pt mentions feeling very tired after  Pt denies unusual SOB, chest pain, and any recent ER visits or hospitalizations.            Past Medical History:   Diagnosis Date    Arrhythmia     a fib    Atrial fibrillation ([facility] [address] 8/16/2013    Basal cell carcinoma of anterior chest 2/25/2010    CAD (coronary artery disease) 2/25/2010    CVA (cerebral infarction) 2/25/2010    patient denies 12/13/13    Diverticula of colon 2/25/2010    Dyslipidemia 12/20/2010    Essential hypertension, benign 2/25/2010    Hypertension     TOPHER (obstructive sleep apnea) 1/23/2015    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 2/25/2010    Other and unspecified hyperlipidemia 2/25/2010    Positive PPD 2/25/2010    Reflux esophagitis 2/25/2010    Stroke [facility])     in 1958 at age of 26    Type II or unspecified type diabetes mellitus without mention of complication, not stated as uncontrolled 2/25/2010     Past Surgical History:   Procedure Laterality Date    Allison Call  12/13/2013         HC CAPSULE ENDOSCOPE M2A  4/4/2014         HX COLONOSCOPY      HX CORONARY STENT PLACEMENT      HX ORTHOPAEDIC      HX VASECTOMY  1983    UPPER GI ENDOSCOPY,BIOPSY 12/13/2013          Current Outpatient Medications on File Prior to Visit   Medication Sig Dispense Refill    iron, carbonyl (FEOSOL) 45 mg tab Take 1 Tab by mouth daily.  amLODIPine (NORVASC) 10 mg tablet TAKE 1 TABLET BY MOUTH ONCE DAILY 90 Tab 0    furosemide (LASIX) 20 mg tablet Take 3 Tabs by mouth daily. (Patient taking differently: Take 20 mg by mouth daily. Indications: pt taking BID) 270 Tab 3    carvedilol (COREG) 6.25 mg tablet Take 1 Tab by mouth two (2) times daily (with meals). 180 Tab 3    spironolactone (ALDACTONE) 25 mg tablet Take 1 Tab by mouth two (2) times a day. 60 Tab 3    losartan (COZAAR) 100 mg tablet Take 1 Tab by mouth daily. Indications: high blood pressure 30 Tab 3    metFORMIN ER (GLUCOPHAGE XR) 500 mg tablet TAKE 4 TABLETS BY MOUTH ONCE DAILY WITH BREAKFAST 360 Tab 1    warfarin (COUMADIN) 5 mg tablet TAKE 1 & 1/2 (ONE & ONE-HALF) TABLETS (7.5 MG) BY MOUTH ONCE DAILY 180 Tab 3    aspirin delayed-release 81 mg tablet Take 81 mg by mouth daily. Indications: MYOCARDIAL REINFARCTION PREVENTION       No current facility-administered medications on file prior to visit.       Allergies   Allergen Reactions    Adhesive Tape Rash    Baycol Nausea and Vomiting    Clonidine Other (comments)     Headache and sleepiness      Pravachol [Pravastatin] Nausea and Vomiting     Family History   Problem Relation Age of Onset    Heart Disease Mother     Cancer Father         colon    Heart Disease Maternal Grandmother     Heart Disease Maternal Grandfather     Cancer Paternal Grandmother     Cancer Paternal Grandfather      Social History     Socioeconomic History    Marital status:      Spouse name: Not on file    Number of children: Not on file    Years of education: Not on file    Highest education level: Not on file   Tobacco Use    Smoking status: Never Smoker    Smokeless tobacco: Former User     Types: Chew    Tobacco comment: patient reports quitting over 20 yeaers ago. Substance and Sexual Activity    Alcohol use: Yes     Comment: once a year    Drug use: No   Other Topics Concern     Service Yes    Blood Transfusions Yes    Caffeine Concern Yes     Comment: 2-3 cups of coffee daily    Occupational Exposure No    Hobby Hazards No    Sleep Concern No    Stress Concern No    Weight Concern No    Special Diet No    Back Care No    Exercise Yes    Bike Helmet No     Comment: pt doesn't ride a bike   [address] Yes    Self-Exams No             Review of Systems   Constitutional: Negative for chills, diaphoresis, fever, malaise/fatigue and weight loss. Eyes: Negative for blurred vision, double vision and pain. Respiratory: Negative for cough, shortness of breath and wheezing. Cardiovascular: Negative for chest pain, palpitations, orthopnea, claudication, leg swelling and PND. Genitourinary: Negative for frequency. Skin: Negative for itching and rash. Neurological: Negative for dizziness, tingling, tremors, sensory change, speech change, focal weakness, seizures, loss of consciousness, weakness and headaches. Endo/Heme/Allergies: Negative for environmental allergies and polydipsia. Does not bruise/bleed easily. Results for orders placed or performed in visit on 43/03/27   METABOLIC PANEL, COMPREHENSIVE   Result Value Ref Range    Glucose 120 (H) 65 - 99 mg/dL    BUN 22 8 - 27 mg/dL    Creatinine 1.26 0.76 - 1.27 mg/dL    GFR est non-AA 54 (L) >59 mL/min/1.73    GFR est AA 63 >59 mL/min/1.73    BUN/Creatinine ratio 17 10 - 24    Sodium 141 134 - 144 mmol/L    Potassium 4.5 3.5 - 5.2 mmol/L    Chloride 102 96 - 106 mmol/L    CO2 24 20 - 29 mmol/L    Calcium 9.5 8.6 - 10.2 mg/dL    Protein, total 7.6 6.0 - 8.5 g/dL    Albumin 4.5 3.5 - 4.8 g/dL    GLOBULIN, TOTAL 3.1 1.5 - 4.5 g/dL    A-G Ratio 1.5 1.2 - 2.2    Bilirubin, total 0.5 0.0 - 1.2 mg/dL    Alk.  phosphatase 110 39 - 117 IU/L    AST (SGOT) 14 0 - 40 IU/L    ALT (SGPT) 15 0 - 44 IU/L   HEMOGLOBIN A1C WITH EAG   Result Value Ref Range    Hemoglobin A1c 7.2 (H) 4.8 - 5.6 %    Estimated average glucose 160 mg/dL   CBC W/O DIFF   Result Value Ref Range    WBC 7.4 3.4 - 10.8 x10E3/uL    RBC 4.83 4.14 - 5.80 x10E6/uL    HGB 12.4 (L) 13.0 - 17.7 g/dL    HCT 40.3 37.5 - 51.0 %    MCV 83 79 - 97 fL    MCH 25.7 (L) 26.6 - 33.0 pg    MCHC 30.8 (L) 31.5 - 35.7 g/dL    PLATELET 971 667 - 132 x10E3/uL   IRON PROFILE   Result Value Ref Range    TIBC 389 250 - 450 ug/dL    UIBC 288 111 - 343 ug/dL    Iron 101 38 - 169 ug/dL    Iron % saturation 26 15 - 55 %   CKD REPORT   Result Value Ref Range    Interpretation Note          Physical Exam  Visit Vitals  /60 (BP 1 Location: Right arm, BP Patient Position: Sitting)   Pulse 84   Temp 97.4 °F (36.3 °C) (Oral)   Resp 18   Ht 6' 2\" (1.88 m)   Wt 254 lb (115.2 kg)   SpO2 97%   BMI 32.61 kg/m²       Head: Normocephalic, without obvious abnormality, atraumatic  Eyes: conjunctivae/corneas clear. PERRL, EOM's intact. Neck: supple, symmetrical, trachea midline, no adenopathy, thyroid: not enlarged, symmetric, no tenderness/mass/nodules, no carotid bruit and no JVD  Lungs: clear to auscultation bilaterally  Heart: irregular irregular rate and rhythm, S1, S2 normal, no murmur, click, rub or gallop  Extremities: extremities normal, atraumatic, no cyanosis or edema  Pulses: 2+ and symmetric  Lymph nodes: Cervical, supraclavicular, and axillary nodes normal.  Neurologic: Grossly unchanged. Pt has extreme weakness in both his right arm and leg (since stroke at age 26). Pt takes at least a minute to get out of chair using the strength of his left arm and leg, slowly getting into position to get up. When he does walk he drags his right foot to the point where it barely clears the ground when he swings it forward. ASSESSMENT and PLAN    ICD-10-CM ICD-9-CM    1.  Type 2 diabetes mellitus without complication, without long-term current use of insulin (HCC) E11.9 180.13 METABOLIC PANEL, COMPREHENSIVE      HEMOGLOBIN A1C WITH EAG   2. Coronary artery disease involving native coronary artery of native heart without angina pectoris N65.44 578.10 METABOLIC PANEL, COMPREHENSIVE   3. CKD (chronic kidney disease) stage 3, GFR 30-59 ml/min ([facility]) N18.3 585.3    4. Iron deficiency anemia due to chronic blood loss D50.0 280.0 CBC W/O DIFF      IRON PROFILE   5. Persistent atrial fibrillation (HCC) I48.1 427.31    6. TOPHER (obstructive sleep apnea) G47.33 327.23    7. Cerebrovascular accident (CVA), unspecified mechanism ([facility] [address] I63.9 434.91     in 65 at age of 26- permanent weakness on the right side, arm>> leg   8. Reflux esophagitis K21.0 530.11    9. Frequent falls R29.6 V15.88     due to baseline weakness from stroke and now getting older- tends to drag right foot more. Diagnoses and all orders for this visit:    1. Type 2 diabetes mellitus without complication, without long-term current use of insulin ([facility])  -     METABOLIC PANEL, COMPREHENSIVE  -     HEMOGLOBIN A1C WITH EAG    2. Coronary artery disease involving native coronary artery of native heart without angina pectoris  -     METABOLIC PANEL, COMPREHENSIVE    3. CKD (chronic kidney disease) stage 3, GFR 30-59 ml/min ([facility])    4. Iron deficiency anemia due to chronic blood loss  -     CBC W/O DIFF  -     IRON PROFILE    5. Persistent atrial fibrillation ([facility] [address]    6. TOPHER (obstructive sleep apnea)    7. C[facility]brovascular accident (CV[facility] unspecified [facility] ([facility]  Comments:  in 65 at age of 26- permanent weakness on the right side, arm>> leg  Assessment & Plan:     Key Neurological Meds             warfarin (COUMADIN) 5 mg tablet (Taking) TAKE 1 & 1/2 (ONE & ONE-HALF) TABLETS (7.5 MG) BY MOUTH ONCE DAILY    aspirin delayed-release 81 mg tablet (Taking) Take 81 mg by mouth daily.  Indications: MYOCARDIAL REINFARCTION PREVENTION        Lab Results   Component Value Date/Time    WBC 7.4 07/31/2019 01:16 PM    HGB 12.4 07/31/2019 01:16 PM    HCT 40.3 07/31/2019 01:16 PM    PLATELET 220 23/54/6154 01:16 PM    INR 1.4 05/24/2019 01:35 PM    INR, External 2.0 07/08/2019    INR POC 2.0 07/08/2019 10:16 AM    Prothrombin time 13.7 05/24/2019 01:35 PM    Creatinine 1.26 07/31/2019 01:16 PM    BUN 22 07/31/2019 01:16 PM         8. Reflux esophagitis    9. Frequent falls  Comments:  due to baseline weakness from stroke and now getting older- tends to drag right foot more. Other orders  -     CKD REPORT    Follow-up and Dispositions    · Return in about 3 months (around 10/31/2019), or BP OOC or further falls. , for F/U HTN,CHOL,DM. reviewed medications and side effects in detail  Please call my office if there are any questions- [phone]. Discussed expected course/resolution/complications of diagnosis in detail with patient. Medication risks/benefits/costs/interactions/alternatives discussed with patient. Pt was given an after visit summary which includes diagnoses, current medications & vitals. Pt expressed understanding with the diagnosis and plan. Total 25 minutes,60 % counseling re: Review of  the proper technique of checking the blood pressure- check it on an average day only, not on a stressful day, sitting, no exercise for at least 1 hour and not experiencing any new pain( chronic pain is OK). Patient encouraged to check BP sitting and standing at least once a month and to report these readings to me if > 140/ 90 on average , or if the standing BP is >  15 points lower than the sitting. Always check it twice and if there is > 5 points decrease from the previous reading( top reading or systolic) keep checking it until it does not drop 5 points. Write only this final reading down, not the preceding readings. If out of these readings there is only 1 out of 4 or less > 986, or > 90 diastolic then your blood pressure is OK; it needs further treatment if it is above this.     Also, don't forget,  as noted above, to check your blood pressure standing once a month; this is to detect a drop in your BP that might lead to fainting and serious injury; you check it standing with your arm hanging straight down and relaxed. Check it twice waiting 1 minute between the two readings. If, with either one of these 2 readings there is a > 15 point drop of the systolic compared to your sitting pressure( done before the standing BP), then let me know. Following these guidelines, continue to check your BP and write down only the ones described above and it will help me to effectively treat your blood pressure. Discussed specific diabetic recommendations: low cholesterol diet, weight control and daily exercise discussed, home glucose monitoring emphasized, all medications, side effects and compliance discussed carefully, foot care discussed and Podiatry visits discussed, annual eye examinations at Ophthalmology discussed, glycohemoglobin and other lab monitoring discussed and long term diabetic complications discussed. Reviewed BP, cholesterol and diabetic goals. LDL goal<100,HDL goal>45, Triglyceride goal<150, A1C< 7.0, BP<140/90. BMI is significantly elevated- in the obese range. I reviewed diet, exercise and weight control. Discussed weight control in detail, the importance of mainly decreased carbs, and for weight maintenance, exercise; discussed different diets and that it isn't as important to watch the type of foods as it is to decrease calorie intake no matter what type of diet you do, etc.     Due to chronic weakness on right side of body from a stroke at age 26, pt is prone to having falls when he tries to ambulate with a walker or cane. PT has helped but due to weakness of aging he is now more prone to falls. Pt clearly needs a power chair due to the condition noted above, mainly the weakness on his right side. Pt has a normal left arm but a very weak right arm, which would make it impossible to operate a regular wheelchair.  Pt needs an actual chair to support his back due to the weakness from the stroke. A scooter without back support will not be effective or safe for him. Also, discussed symptoms of concern that were noted today in the note above, treatment options( including doing nothing), when to follow up before recommended time frame. Also, answered all questions. This document was written by Telma Daniels, as dictated by Josee Lucero MD.  I have reviewed and agree with the above note and have made corrections where appropriate Shimon Baeza M.D.

## 2019-08-01 LAB
ALBUMIN SERPL-MCNC: 4.5 G/DL (ref 3.5–4.8)
ALBUMIN/GLOB SERPL: 1.5 {RATIO} (ref 1.2–2.2)
ALP SERPL-CCNC: 110 IU/L (ref 39–117)
ALT SERPL-CCNC: 15 IU/L (ref 0–44)
AST SERPL-CCNC: 14 IU/L (ref 0–40)
BILIRUB SERPL-MCNC: 0.5 MG/DL (ref 0–1.2)
BUN SERPL-MCNC: 22 MG/DL (ref 8–27)
BUN/CREAT SERPL: 17 (ref 10–24)
CALCIUM SERPL-MCNC: 9.5 MG/DL (ref 8.6–10.2)
CHLORIDE SERPL-SCNC: 102 MMOL/L (ref 96–106)
CO2 SERPL-SCNC: 24 MMOL/L (ref 20–29)
CREAT SERPL-MCNC: 1.26 MG/DL (ref 0.76–1.27)
EST. AVERAGE GLUCOSE BLD GHB EST-MCNC: 160 MG/DL
GLOBULIN SER CALC-MCNC: 3.1 G/DL (ref 1.5–4.5)
GLUCOSE SERPL-MCNC: 120 MG/DL (ref 65–99)
HBA1C MFR BLD: 7.2 % (ref 4.8–5.6)
HCT VFR BLD AUTO: 40.3 % (ref 37.5–51)
HGB BLD-MCNC: 12.4 G/DL (ref 13–17.7)
INTERPRETATION: NORMAL
IRON SATN MFR SERPL: 26 % (ref 15–55)
IRON SERPL-MCNC: 101 UG/DL (ref 38–169)
MCH RBC QN AUTO: 25.7 PG (ref 26.6–33)
MCHC RBC AUTO-ENTMCNC: 30.8 G/DL (ref 31.5–35.7)
MCV RBC AUTO: 83 FL (ref 79–97)
PLATELET # BLD AUTO: 242 X10E3/UL (ref 150–450)
POTASSIUM SERPL-SCNC: 4.5 MMOL/L (ref 3.5–5.2)
PROT SERPL-MCNC: 7.6 G/DL (ref 6–8.5)
RBC # BLD AUTO: 4.83 X10E6/UL (ref 4.14–5.8)
SODIUM SERPL-SCNC: 141 MMOL/L (ref 134–144)
TIBC SERPL-MCNC: 389 UG/DL (ref 250–450)
UIBC SERPL-MCNC: 288 UG/DL (ref 111–343)
WBC # BLD AUTO: 7.4 X10E3/UL (ref 3.4–10.8)

## 2019-08-01 NOTE — ASSESSMENT & PLAN NOTE
Key Neurological Meds             warfarin (COUMADIN) 5 mg tablet (Taking) TAKE 1 & 1/2 (ONE & ONE-HALF) TABLETS (7.5 MG) BY MOUTH ONCE DAILY    aspirin delayed-release 81 mg tablet (Taking) Take 81 mg by mouth daily.  Indications: MYOCARDIAL REINFARCTION PREVENTION        Lab Results   Component Value Date/Time    WBC 7.4 07/31/2019 01:16 PM    HGB 12.4 07/31/2019 01:16 PM    HCT 40.3 07/31/2019 01:16 PM    PLATELET 219 20/30/6220 01:16 PM    INR 1.4 05/24/2019 01:35 PM    INR, External 2.0 07/08/2019    INR POC 2.0 07/08/2019 10:16 AM    Prothrombin time 13.7 05/24/2019 01:35 PM    Creatinine 1.26 07/31/2019 01:16 PM    BUN 22 07/31/2019 01:16 PM

## 2019-08-01 NOTE — PROGRESS NOTES
GREAT NEWS!! All of your recent lab tests are normal, improved, or at goal. A1C better going from 8.1 to 7.2. Normal liver and iron levels,with improved hemoglobin back up to 12 and improved kidney tests. I would continue everything the same and schedule your next fasting office visit in 3 months. The note from your recent visit is being faxed to the company working on approval of the power chair.

## 2019-08-06 ENCOUNTER — TELEPHONE (OUTPATIENT)
Dept: FAMILY MEDICINE CLINIC | Age: 79
End: 2019-08-06

## 2019-08-06 DIAGNOSIS — N18.30 CKD (CHRONIC KIDNEY DISEASE) STAGE 3, GFR 30-59 ML/MIN (HCC): Primary | ICD-10-CM

## 2019-08-06 NOTE — TELEPHONE ENCOUNTER
Pt is calling stating that Dr Chente Paulino referred him to the kidney specialist,and gave him the Phone # but they want the office notes first before they can schedule his apt,  Pt doesn't remember the name of the doctor may be Dr Leyva Payor.   No referral is in the system  [facility]# [phone]  LOV :  July 31, 2019 11:45 AM

## 2019-08-07 ENCOUNTER — ANTI-COAG VISIT (OUTPATIENT)
Dept: CARDIOLOGY CLINIC | Age: 79
End: 2019-08-07

## 2019-08-07 DIAGNOSIS — I48.91 ATRIAL FIBRILLATION, UNSPECIFIED TYPE (HCC): ICD-10-CM

## 2019-08-07 DIAGNOSIS — R53.82 CHRONIC FATIGUE: Primary | ICD-10-CM

## 2019-08-07 DIAGNOSIS — I10 ESSENTIAL HYPERTENSION, BENIGN: ICD-10-CM

## 2019-08-07 LAB
INR BLD: 2
INR, EXTERNAL: 2 (ref 2–3)
PT POC: 21.2 SECONDS
VALID INTERNAL CONTROL?: YES

## 2019-08-07 NOTE — TELEPHONE ENCOUNTER
Reviewed sleep study results with patient. He expressed understanding and is willing to proceed with oral appliance therapy.

## 2019-08-08 ENCOUNTER — DOCUMENTATION ONLY (OUTPATIENT)
Dept: SLEEP MEDICINE | Age: 79
End: 2019-08-08

## 2019-08-24 DIAGNOSIS — I25.10 CORONARY ARTERY DISEASE INVOLVING NATIVE CORONARY ARTERY OF NATIVE HEART WITHOUT ANGINA PECTORIS: ICD-10-CM

## 2019-08-26 RX ORDER — AMLODIPINE BESYLATE 10 MG/1
TABLET ORAL
Qty: 90 TAB | Refills: 0 | Status: SHIPPED | OUTPATIENT
Start: 2019-08-26 | End: 2019-09-04 | Stop reason: SDUPTHER

## 2019-09-03 RX ORDER — WARFARIN SODIUM 5 MG/1
TABLET ORAL
Qty: 135 TAB | Refills: 5 | Status: SHIPPED | OUTPATIENT
Start: 2019-09-03 | End: 2019-11-19 | Stop reason: ALTCHOICE

## 2019-09-04 ENCOUNTER — OFFICE VISIT (OUTPATIENT)
Dept: CARDIOLOGY CLINIC | Age: 79
End: 2019-09-04

## 2019-09-04 ENCOUNTER — ANTI-COAG VISIT (OUTPATIENT)
Dept: CARDIOLOGY CLINIC | Age: 79
End: 2019-09-04

## 2019-09-04 VITALS
SYSTOLIC BLOOD PRESSURE: 98 MMHG | OXYGEN SATURATION: 98 % | HEART RATE: 66 BPM | DIASTOLIC BLOOD PRESSURE: 50 MMHG | RESPIRATION RATE: 20 BRPM | WEIGHT: 250 LBS | HEIGHT: 74 IN | BODY MASS INDEX: 32.08 KG/M2

## 2019-09-04 DIAGNOSIS — N18.30 CKD (CHRONIC KIDNEY DISEASE) STAGE 3, GFR 30-59 ML/MIN (HCC): ICD-10-CM

## 2019-09-04 DIAGNOSIS — I10 ESSENTIAL HYPERTENSION, BENIGN: ICD-10-CM

## 2019-09-04 DIAGNOSIS — E78.5 DYSLIPIDEMIA, GOAL LDL BELOW 70: ICD-10-CM

## 2019-09-04 DIAGNOSIS — I48.20 CHRONIC ATRIAL FIBRILLATION (HCC): ICD-10-CM

## 2019-09-04 DIAGNOSIS — R53.82 CHRONIC FATIGUE: Primary | ICD-10-CM

## 2019-09-04 DIAGNOSIS — I25.10 CORONARY ARTERY DISEASE INVOLVING NATIVE CORONARY ARTERY OF NATIVE HEART WITHOUT ANGINA PECTORIS: ICD-10-CM

## 2019-09-04 DIAGNOSIS — I63.9 CEREBROVASCULAR ACCIDENT (CVA), UNSPECIFIED MECHANISM (HCC): ICD-10-CM

## 2019-09-04 DIAGNOSIS — I48.91 ATRIAL FIBRILLATION, UNSPECIFIED TYPE (HCC): ICD-10-CM

## 2019-09-04 DIAGNOSIS — I50.32 CHRONIC HEART FAILURE WITH PRESERVED EJECTION FRACTION (HCC): Primary | ICD-10-CM

## 2019-09-04 DIAGNOSIS — Z79.01 CHRONIC ANTICOAGULATION: ICD-10-CM

## 2019-09-04 LAB
INR BLD: 2.1
INR, EXTERNAL: 2.1 (ref 2–3)
PT POC: 25.2 SECONDS
VALID INTERNAL CONTROL?: YES

## 2019-09-04 RX ORDER — AMLODIPINE BESYLATE 10 MG/1
10 TABLET ORAL DAILY
Qty: 90 TAB | Refills: 3 | Status: SHIPPED | OUTPATIENT
Start: 2019-09-04 | End: 2020-10-02

## 2019-09-04 RX ORDER — FUROSEMIDE 40 MG/1
40 TABLET ORAL DAILY
Qty: 90 TAB | Refills: 3 | Status: SHIPPED | OUTPATIENT
Start: 2019-09-04 | End: 2019-11-07

## 2019-09-04 RX ORDER — SPIRONOLACTONE 25 MG/1
25 TABLET ORAL 2 TIMES DAILY
Qty: 180 TAB | Refills: 3 | Status: SHIPPED | OUTPATIENT
Start: 2019-09-04 | End: 2019-09-18

## 2019-09-04 NOTE — PATIENT INSTRUCTIONS
-Refer to Dr. Macky Phoenix for [facility]  -Will schedule Transesophageal Echo in the next few weeks     Transesophageal Echocardiogram: Before Your Procedure  What is a transesophageal echocardiogram?    A transesophageal echocardiogram is a test to help your doctor look at the inside of your heart. A small device called a transducer directs sound waves toward your heart. The sound waves make a picture of the heart's valves and chambers. Your doctor may do this test to look for certain types of heart disease. Or it may be done to see how disease is affecting your heart. You will be given medicine to make you sleepy and comfortable during the test.  The doctor puts a small, flexible tube into your throat and guides it to the esophagus. This is the tube that connects your mouth to your stomach. The doctor will ask you to swallow as the tube goes down. The transducer is at the tip of the tube. It gets close to your heart to make clear pictures. The doctor will look at the ultrasound pictures on a screen. You will not be able to eat or drink until the numbness from the throat spray wears off. Your throat may be sore for a few days after the test.  Follow-up care is a key part of your treatment and safety. Be sure to make and go to all appointments, and call your doctor if you are having problems. It's also a good idea to know your test results and keep a list of the medicines you take. What happens before the procedure?   Preparing for the procedure    · Understand exactly what procedure is planned, along with the risks, benefits, and other options. · Tell your doctors ALL the medicines, vitamins, supplements, and herbal remedies you take. Some of these can increase the risk of bleeding or interact with anesthesia.     · If you take blood thinners, such as warfarin (Coumadin), clopidogrel (Plavix), or aspirin, be sure to talk to your doctor.  He or she will tell you if you should stop taking these medicines before your procedure. Make sure that you understand exactly what your doctor wants you to do.     · Your doctor will tell you which medicines to take or stop before your procedure. You may need to stop taking certain medicines a week or more before the procedure. So talk to your doctor as soon as you can.     · If you have an advance directive, let your doctor know. It may include a living will and a durable power of  for health care. Bring a copy to the hospital. If you don't have one, you may want to prepare one. It lets your doctor and loved ones know your health care wishes. Doctors advise that everyone prepare these papers before any type of surgery or procedure.     · Be sure to tell your doctor about any problems you have with your stomach or esophagus. Procedures can be stressful. This information will help you understand what you can expect. And it will help you safely prepare for your procedure. What happens on the day of the procedure? · Follow the instructions exactly about when to stop eating and drinking. If you don't, your procedure may be canceled. If your doctor told you to take your medicines on the day of the procedure, take them with only a sip of water.     · Take a bath or shower before you come in for your procedure. Do not apply lotions, perfumes, deodorants, or nail polish.     · Take off all jewelry and piercings. And take out contact lenses, if you wear them.    At the hospital or surgery center   · Bring a picture ID.     · You will be kept comfortable and safe by your anesthesia provider. You may get medicine that relaxes you or puts you in a light sleep. The area being worked on will be numb.     · You will get some medicine sprayed in your throat. This will numb your throat to prevent you from gagging when the transducer is moved into your esophagus.     · You will lie on your left side on an exam table.  You may get a mouth guard to protect your teeth.     · The procedure will take about 2 hours. During that time, the tube is in your throat for 10 to 20 minutes. Going home   · Be sure you have someone to drive you home. Anesthesia and pain medicine make it unsafe for you to drive.     · You will be given more specific instructions about recovering from your procedure. They will cover things like diet, wound care, follow-up care, driving, and getting back to your normal routine. When should you call your doctor? · You have questions or concerns.     · You don't understand how to prepare for your procedure.     · You become ill before the procedure (such as fever, flu, or a cold).     · You need to reschedule or have changed your mind about having the procedure. Where can you learn more? Go to http://ruth-ellyn.info/. Enter K500 in the search box to learn more about \"Transesophageal Echocardiogram: Before Your Procedure. \"  Current as of: July 22, 2018  Content Version: 12.1  © 7678-2081 [employer]. Care instructions adapted under license by [employer] (which disclaims liability or warranty for this information). If you have questions about a medical condition or this instruction, always ask your healthcare professional. [place] [address] any warranty or liability for your use of this information.

## 2019-09-04 NOTE — PROGRESS NOTES
Visit Vitals  BP 98/50   Pulse 66   Resp 20   Ht 6' 2\" (1.88 m)   Wt 250 lb (113.4 kg)   SpO2 98%   BMI 32.10 kg/m²     Denies CP  Some SOB,edema    Patient interested in watchman  Patient requested 90 day supply

## 2019-09-04 NOTE — PROGRESS NOTES
Zahraa SERRANO [address], [address]  [address] Raj Bennett, Jr Alex  [place], [address] [facility]    Office [phone]  Fax [phone]  Cell [phone]       David Negrete is a 67 y.o. male. Last seen by me 3 months ago. Assessment  Encounter Diagnoses   Name Primary?  Coronary artery disease involving native coronary artery of native heart without angina pectoris     Chronic heart failure with preserved ejection fraction (HCC) Yes    Essential hypertension, benign     Chronic atrial fibrillation (HCC)     Dyslipidemia, goal LDL below 79     Cerebrovascular accident (CVA), unspecified mechanism ([facility] [address]     CKD (chronic kidney disease) stage 3, GFR 30-59 ml/min (HCC)     Chronic anticoagulation        Recommendations:    Chronic HFpEF in the setting of AF/CAD/HTN/untreated TOPHER/DM, Class 2-3 . His volume status seems fine. His BP is better. Continue current treatment. Chronic AF, controlled and asymptomatic at the time. Echo April 2019 with moderate LAE. Estil Emperor continued rate control strategy. Continue Coreg 6.25 BID. He remains on Warfarin, but is at risk for recurrent GI blood loss/ICH. History of GI bleeding 2008 and 2013 without clear cut abnormalities. Hgb from July was 12  - Favor consideration of WATCHMAN. Refer to Dr. Kathleen Sylvester arrange for pre-procedure YESENIA    CAD. s/p multiple PCI's, most recently 2 years ago, s/p PCI RCA with LEO. Last cath most recently 4/24/18 demonstrated no RCA restenosis, distal LAD 80%. He has chronic fatigue and HURD as noted above, but no anginal symptoms. We discussed the role of repeat cath if necessary. HTN, controlled on combination therapy. His BP is good today. Consider reducing Amlodipine if he is symptomatic at all. He is followed by nephrology as well. Previous left hemispheric stroke with right hemiparesis. 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 He is waiting on a scooter, but he ambulates with a crutch d/t previous stroke. Patient denies any exertional chest pain, dyspnea, palpitations, syncope, or paroxysmal nocturnal dyspnea. He saw Dr. Omkar Paredes yesterday from nephrology - no med adjustments made    He has no bleeding issues on Warfarin. Cardiac risk factors   HTN yes  DM  yes  Smoking no    Cardiac testing  Cath (8/27/8): LAD p90, m70. OM1 70, OM2 90 (small). RCA m40. EF 55%. No AVG/MR. Patent L SC.  PCI (11/13/8): mLAD 2.75x18 Xience, pLAD 2.75x23 Xience. OM1 3.0x15 Xience. Cath 4/28/17:Cath (4/28/17): LAD patent with patent prox and mid stent. LCx patent with patent OM1 stent. RCA p90 (ISR) and p90 distal to stent. EF 65%. No AVG/MR. PCI osteal/prox RCA (4/28/17): 3.0x28 Xience. Cath 4/24/18: RCA - prior stents present; Prox ISR 60%; Mid to Distal - 60%  Difficulty engaging guide secondary to stent at ostium  JR4/AR1 guide would not engage ; [facility] guide used to engage RCA  FFR - not siginificant 0.89      Lexiscan Cardiolite (8/11/9): Mild fixed inf defect. No reversible defects. EF 51% with mild inf HK. Cath (8/29/13): LAD patent prox and mid stents. LCx m40; patent OM1 stent. RCA p80. EF 55%. No AVG/MR. PCI ost/proxRCA (8/29/13): 3.0x18 Xience. Echo (8/22/13): EF 55%. Mod dil LA. Mild dil RA. Mild MR/TR/IN. PASP 24. Lexiscan Cardiolite 4/15/14 - normal perfusion imaging, EF 52%  DCCV 12/18/14  Echo 6/18/18 - EF 55%. No WMA. Mild asymmetric hypertrophy of the septum. MIld to mod LAE. AoV calcification and sclerosis. No significant change c/t study of 22-Aug-2013. Echo 4/11/19 - EF 55-60%. Mod LAE. AoV sclerosis. Mild MR. Mild TR.  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 2/25/2010    Other and unspecified hyperlipidemia 2/25/2010    Positive PPD 2/25/2010    Reflux esophagitis 2/25/2010    Stroke [facility])     in 1959 at age of 26    Type II or unspecified type diabetes mellitus without mention of complication, not stated as uncontrolled 2/25/2010        Current Outpatient Medications   Medication Sig Dispense Refill    warfarin (COUMADIN) 5 mg tablet TAKE 1 AND 1/2 (ONE-HALF) TABLETS BY MOUTH ONCE DAILY 135 Tab 5    amLODIPine (NORVASC) 10 mg tablet TAKE 1 TABLET BY MOUTH ONCE DAILY 90 Tab 0    iron, carbonyl (FEOSOL) 45 mg tab Take 1 Tab by mouth daily.  furosemide (LASIX) 20 mg tablet Take 3 Tabs by mouth daily. (Patient taking differently: Take 20 mg by mouth daily. Indications: pt taking BID) 270 Tab 3    carvedilol (COREG) 6.25 mg tablet Take 1 Tab by mouth two (2) times daily (with meals). 180 Tab 3    spironolactone (ALDACTONE) 25 mg tablet Take 1 Tab by mouth two (2) times a day. 60 Tab 3    losartan (COZAAR) 100 mg tablet Take 1 Tab by mouth daily. Indications: high blood pressure 30 Tab 3    metFORMIN ER (GLUCOPHAGE XR) 500 mg tablet TAKE 4 TABLETS BY MOUTH ONCE DAILY WITH BREAKFAST 360 Tab 1    aspirin delayed-release 81 mg tablet Take 81 mg by mouth daily. Indications: MYOCARDIAL REINFARCTION PREVENTION         Allergies   Allergen Reactions    Adhesive Tape Rash    Baycol Nausea and Vomiting    Clonidine Other (comments)     Headache and sleepiness      Pravachol [Pravastatin] Nausea and Vomiting      Review of Systems  Constitutional: Negative for fever, chills, and diaphoresis.    Respiratory: Negative for cough, hemoptysis, sputum production   Cardiovascular: Negative for chest pain, palpitations, claudication, and PND. Gastrointestinal: Negative for heartburn, nausea, vomiting, blood in stool and melena. Genitourinary: Negative for dysuria and flank pain. Musculoskeletal: Negative for joint pain and back pain. Skin: Negative for rash. +ecchymoses  Neurological: Negative for focal weakness, seizures, loss of consciousness, weakness and headaches. +right sided weakness d/t stroke  Endo/Heme/Allergies: Does not bruise/bleed easily. Psychiatric/Behavioral: Negative for memory loss. The patient does not have insomnia. Physical Exam  Visit Vitals  BP 98/50   Pulse 66   Resp 20   Ht 6' 2\" (1.88 m)   Wt 250 lb (113.4 kg)   SpO2 98%   BMI 32.10 kg/m²     Wt Readings from Last 3 Encounters:   09/04/19 250 lb (113.4 kg)   07/31/19 254 lb (115.2 kg)   06/24/19 252 lb (114.3 kg)      General - well developed well nourished  Neck - JVP normal, thyroid nl  Cardiac - Irregularly irregular, no murmurs, rubs or gallops.  No clicks  Vascular - carotids without bruits, radials, femorals and pedal pulses equal bilateral  Lungs - clear to auscultation bilaterals, no rales ,wheezing or rhonchi  Abd - rounded/obese, soft nontender  Extremities - 1+ pretibial and pedal edema  Skin - no rash +ecchymoses  Neuro - nonfocal. Right sided weakness  Psych - normal mood and affect    Cardiographics:  EKG 4/17/18 - Afib  ECHO 6/18/18 - LVEF of 60%, moderate to severe LAE  EKG 9/28/18 - SR 65, first degree AG block, , left anterior live block  EKG 4/3/19 - AF 70s, LAHB, compared to 9/28/18, AF is newly noted    Written by Wanda Golden, as dictated by Dr. López Goldberg MD.     López Goldberg MD

## 2019-09-04 NOTE — LETTER
9/4/19 Patient: Anali Herman YOB: 1940 Date of Visit: 9/4/2019 Laisha Luis MD 
[address] [facility] [address] VIA In Basket Dear Laisha Luis MD, Thank you for referring Mr. Matilde Dale to [address] for evaluation. My notes for this consultation are attached. If you have questions, please do not hesitate to call me. I look forward to following your patient along with you. Sincerely, Ming Gee MD

## 2019-09-11 ENCOUNTER — HOSPITAL ENCOUNTER (OUTPATIENT)
Dept: ULTRASOUND IMAGING | Age: 79
Discharge: HOME | End: 2019-09-11
Attending: INTERNAL MEDICINE
Payer: MEDICARE

## 2019-09-11 DIAGNOSIS — N18.30 CHRONIC KIDNEY DISEASE, STAGE III (MODERATE) (HCC): ICD-10-CM

## 2019-09-11 PROCEDURE — 76770 US EXAM ABDO BACK WALL COMP: CPT

## 2019-09-13 ENCOUNTER — TELEPHONE (OUTPATIENT)
Dept: CARDIOLOGY CLINIC | Age: 79
End: 2019-09-13

## 2019-09-13 DIAGNOSIS — I48.91 ATRIAL FIBRILLATION, UNSPECIFIED TYPE (HCC): Primary | ICD-10-CM

## 2019-09-13 DIAGNOSIS — Z79.01 CHRONIC ANTICOAGULATION: ICD-10-CM

## 2019-09-13 DIAGNOSIS — I48.20 CHRONIC ATRIAL FIBRILLATION (HCC): ICD-10-CM

## 2019-09-16 ENCOUNTER — TELEPHONE (OUTPATIENT)
Dept: CARDIOLOGY CLINIC | Age: 79
End: 2019-09-16

## 2019-09-16 DIAGNOSIS — I48.20 CHRONIC ATRIAL FIBRILLATION (HCC): Primary | ICD-10-CM

## 2019-09-16 DIAGNOSIS — Z79.01 CHRONIC ANTICOAGULATION: ICD-10-CM

## 2019-09-16 RX ORDER — SODIUM CHLORIDE 0.9 % (FLUSH) 0.9 %
5-40 SYRINGE (ML) INJECTION AS NEEDED
Status: CANCELLED | OUTPATIENT
Start: 2019-09-24

## 2019-09-16 RX ORDER — SODIUM CHLORIDE 0.9 % (FLUSH) 0.9 %
5-40 SYRINGE (ML) INJECTION EVERY 8 HOURS
Status: CANCELLED | OUTPATIENT
Start: 2019-09-24

## 2019-09-16 NOTE — TELEPHONE ENCOUNTER
Bernardino Morales stated Ankur Bingham held his lasix for 3 days and stopped spirolactone due to creatinine elevated. Spoke with pt concerning YESENIA procedure. (Pt IDx2). Instructions given to pt per Ac. Pt NPO after midnight; hold lasix and metformin and take all other meds with sip of water in AM; have someone available to drive pt to and from procedure; pack a bag in case an overnight stay is warranted. YESENIA scheduled for 9am on 9/24. Pt advised to arrive 2hrs prior to procedure for prep. Labs none. Pt expressed understanding. Opportunities for questions, clarifications, and concerns provided.

## 2019-09-16 NOTE — PROGRESS NOTES
Spoke with pt concerning YESENIA procedure. (Pt IDx2). Instructions given to pt per Ac. Pt NPO after midnight; hold lasix and metformin and take all other meds with sip of water in AM; have someone available to drive pt to and from procedure; pack a bag in case an overnight stay is warranted. YESENIA scheduled for 0900 on 9/24. Pt advised to arrive 2hrs prior to procedure for prep. Labs none. Pt expressed understanding. Opportunities for questions, clarifications, and concerns provided.

## 2019-09-18 ENCOUNTER — OFFICE VISIT (OUTPATIENT)
Dept: CARDIOLOGY CLINIC | Age: 79
End: 2019-09-18

## 2019-09-18 VITALS
HEART RATE: 84 BPM | BODY MASS INDEX: 32.73 KG/M2 | OXYGEN SATURATION: 92 % | SYSTOLIC BLOOD PRESSURE: 128 MMHG | HEIGHT: 74 IN | RESPIRATION RATE: 20 BRPM | DIASTOLIC BLOOD PRESSURE: 78 MMHG | WEIGHT: 255 LBS

## 2019-09-18 DIAGNOSIS — I63.9 CEREBROVASCULAR ACCIDENT (CVA), UNSPECIFIED MECHANISM (HCC): ICD-10-CM

## 2019-09-18 DIAGNOSIS — I25.10 CORONARY ARTERY DISEASE INVOLVING NATIVE CORONARY ARTERY OF NATIVE HEART WITHOUT ANGINA PECTORIS: ICD-10-CM

## 2019-09-18 DIAGNOSIS — D50.0 IRON DEFICIENCY ANEMIA DUE TO CHRONIC BLOOD LOSS: ICD-10-CM

## 2019-09-18 DIAGNOSIS — G47.33 OSA (OBSTRUCTIVE SLEEP APNEA): ICD-10-CM

## 2019-09-18 DIAGNOSIS — I48.91 ATRIAL FIBRILLATION, UNSPECIFIED TYPE (HCC): Primary | ICD-10-CM

## 2019-09-18 DIAGNOSIS — I10 ESSENTIAL HYPERTENSION, BENIGN: ICD-10-CM

## 2019-09-18 RX ORDER — AMIODARONE HYDROCHLORIDE 200 MG/1
200 TABLET ORAL DAILY
Qty: 30 TAB | Refills: 1 | Status: SHIPPED | OUTPATIENT
Start: 2019-09-18 | End: 2019-11-18 | Stop reason: SDUPTHER

## 2019-09-18 NOTE — PROGRESS NOTES
Room # 3  Visit Vitals  /78 (BP 1 Location: Left arm, BP Patient Position: Sitting)   Pulse 84   Resp 20   Ht 6' 2\" (1.88 m)   Wt 255 lb (115.7 kg)   SpO2 92%   BMI 32.74 kg/m²     Watchman Implant Discussion  YESENIA scheduled for 9/24

## 2019-09-18 NOTE — PROGRESS NOTES
HISTORY OF PRESENTING ILLNESS      Jose Crane is a 67 y.o. male with permanent atrial fibrillation, CKD stage III, history of CVA, TOPHER, diabetes mellitus, dyslipidemia, diverticulosis who has a history of recurrent GI bleeding in the past.  He is currently on warfarin. He is referred to consider left atrial appendage occlusion due to high risk for recurrent bleeding on anticoagulation. He underwent amiodarone loading and cardioversion in August 2018 and it was felt that he was asymptomatic from his AF. His wife reports that he had improvement in his fatigue following previous PCI. He continues to have fatigue and believes that his AF is driving this.        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 ([address]- @ 27 yo-weak on right arm>leg 04/12/2016    TOPHER (obstructive sleep apnea) 01/23/2015    Iron deficiency anemia due to chronic blood loss 08/21/2014    Atrial fibrillation ([address] 08/16/2013    Rosacea 11/13/2012    Dyslipidemia, goal LDL below 70 12/20/2010    Diverticula of colon 02/25/2010    Positive PPD 02/25/2010    Essential hypertension, benign 02/25/2010    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 02/25/2010    Basal cell carcinoma of anterior chest 02/25/2010    Reflux esophagitis 02/25/2010    CAD (coronary artery disease) 02/25/2010           PAST MEDICAL HISTORY     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 2/25/2010    Other and unspecified hyperlipidemia 2/25/2010    Positive PPD 2/25/2010    Reflux esophagitis 2/25/2010    Stroke ([facility] [address]     in 1959 at age of 26    Type II or unspecified type diabetes mellitus without mention of complication, not stated as uncontrolled 2/25/2010           PAST SURGICAL HISTORY     Past Surgical History:   Procedure Laterality Date    Darline Stokes  12/13/2013         HC CAPSULE ENDOSCOPE M2A  4/4/2014         HX COLONOSCOPY      HX CORONARY STENT PLACEMENT      HX ORTHOPAEDIC      HX VASECTOMY  1983    UPPER GI ENDOSCOPY,BIOPSY  12/13/2013               ALLERGIES     Allergies   Allergen Reactions    Adhesive Tape Rash    Baycol Nausea and Vomiting    Clonidine Other (comments)     Headache and sleepiness      Pravachol [Pravastatin] Nausea and Vomiting          FAMILY HISTORY     Family History   Problem Relation Age of Onset    Heart Disease Mother     Cancer Father         colon    Heart Disease Maternal Grandmother     Heart Disease Maternal Grandfather     Cancer Paternal Grandmother     Cancer Paternal Grandfather     negative for cardiac disease       SOCIAL HISTORY     Social History     Socioeconomic History    Marital status:      Spouse name: Not on file    Number of children: Not on file    Years of education: Not on file    Highest education level: Not on file   Tobacco Use    Smoking status: Never Smoker    Smokeless tobacco: Former User     Types: Chew    Tobacco comment: patient reports quitting over 20 yeaers ago.     Substance and Sexual Activity    Alcohol use: Yes     Comment: once a year    Drug use: No   Other Topics Concern     Service Yes    Blood Transfusions Yes    Caffeine Concern Yes     Comment: 2-3 cups of coffee daily    Occupational Exposure No    Hobby Hazards No    Sleep Concern No    Stress Concern No    Weight Concern No    Special Diet No    Back Care No    Exercise Yes    Bike Helmet No     Comment: pt doesn't ride a bike   [address] Yes    Self-Exams No         MEDICATIONS     Current Outpatient Medications   Medication Sig    amLODIPine (NORVASC) 10 mg tablet Take 1 Tab by mouth daily.  furosemide (LASIX) 40 mg tablet Take 1 Tab by mouth daily.  spironolactone (ALDACTONE) 25 mg tablet Take 1 Tab by mouth two (2) times a day.  warfarin (COUMADIN) 5 mg tablet TAKE 1 AND 1/2 (ONE-HALF) TABLETS BY MOUTH ONCE DAILY    iron, carbonyl (FEOSOL) 45 mg tab Take 1 Tab by mouth daily.  carvedilol (COREG) 6.25 mg tablet Take 1 Tab by mouth two (2) times daily (with meals).  losartan (COZAAR) 100 mg tablet Take 1 Tab by mouth daily. Indications: high blood pressure    metFORMIN ER (GLUCOPHAGE XR) 500 mg tablet TAKE 4 TABLETS BY MOUTH ONCE DAILY WITH BREAKFAST    aspirin delayed-release 81 mg tablet Take 81 mg by mouth daily. Indications: MYOCARDIAL REINFARCTION PREVENTION     No current facility-administered medications for this visit. I have reviewed the nurses notes, vitals, problem list, allergy list, medical history, family, social history and medications. REVIEW OF SYMPTOMS      General: Pt denies excessive weight gain or loss. Pt is able to conduct ADL's  HEENT: Denies blurred vision, headaches, hearing loss, epistaxis and difficulty swallowing. Respiratory: Denies cough, congestion, shortness of breath, HURD, wheezing or stridor.   Cardiovascular: Denies precordial pain, palpitations, edema or PND  Gastrointestinal: Denies poor appetite, indigestion, abdominal pain or blood in stool  Genitourinary: Denies hematuria, dysuria, increased urinary frequency  Musculoskeletal: Denies joint pain or swelling from muscles or joints  Neurologic: Denies tremor, paresthesias, headache, or sensory motor disturbance  Psychiatric: Denies confusion, insomnia, depression  Integumentray: Denies rash, itching or ulcers. Hematologic: Denies easy bruising, bleeding       PHYSICAL EXAMINATION      There were no vitals filed for this visit. General: Well developed, in no acute distress. HEENT: No jaundice, oral mucosa moist, no oral ulcers  Neck: Supple, no stiffness, no lymphadenopathy, supple  Heart: Irregularly irregular, no murmur, gallop or rub, no jugular venous distention  Respiratory: Clear bilaterally x 4, no wheezing or rales  Abdomen:   Soft, non-tender, bowel sounds are active.   Extremities:  No edema, normal cap refill, no cyanosis. Musculoskeletal: No clubbing, no deformities  Neuro: A&Ox3, speech clear, gait stable, cooperative, no focal neurologic deficits  Skin: Skin color is normal. No rashes or lesions.  Non diaphoretic, moist.  Vascular: 2+ pulses symmetric in all extremities       DIAGNOSTIC DATA      EKG:        LABORATORY DATA      Lab Results   Component Value Date/Time    WBC 7.4 07/31/2019 01:16 PM    Hemoglobin (POC) 9.9 03/19/2014 09:14 AM    HGB 12.4 (L) 07/31/2019 01:16 PM    HCT 40.3 07/31/2019 01:16 PM    PLATELET 458 49/27/7432 01:16 PM    MCV 83 07/31/2019 01:16 PM      Lab Results   Component Value Date/Time    Sodium 141 07/31/2019 01:16 PM    Potassium 4.5 07/31/2019 01:16 PM    Chloride 102 07/31/2019 01:16 PM    CO2 24 07/31/2019 01:16 PM    Anion gap 6 05/24/2019 01:35 PM    Glucose 120 (H) 07/31/2019 01:16 PM    BUN 22 07/31/2019 01:16 PM    Creatinine 1.26 07/31/2019 01:16 PM    BUN/Creatinine ratio 17 07/31/2019 01:16 PM    GFR est AA 63 07/31/2019 01:16 PM    GFR est non-AA 54 (L) 07/31/2019 01:16 PM    Calcium 9.5 07/31/2019 01:16 PM    Bilirubin, total 0.5 07/31/2019 01:16 PM    AST (SGOT) 14 07/31/2019 01:16 PM    Alk. phosphatase 110 07/31/2019 01:16 PM    Protein, total 7.6 07/31/2019 01:16 PM    Albumin 4.5 07/31/2019 01:16 PM    Globulin 4.8 (H) 05/24/2019 01:35 PM    A-G Ratio 1.5 07/31/2019 01:16 PM    ALT (SGPT) 15 07/31/2019 01:16 PM           ASSESSMENT      1. Atrial fibrillation   A. Permanent   B. CHASVASC 7   C. Asymptomatic  2. Hx GI bleeding  3. CAD, native  4. Percutaneous transluminal angioplasty  5. Hypertension  6. CVA  7. CKD  8. Diverticulosis  9. Reflux Esophagitis  10. Diabetes mellitus       PLAN     The patient has a CHADSVASC/CHADS 2 score of 7. He/she should avoid long term anticoagulation past/current medical history of recurrent GI bleeding/CKD. The patient feels strongly that anticoagulation and documented stroke risk greatly impacts his/her quality of life. The patient is a candidate for Watchman, a left atrial appendage closure (LAAC) device to reduce risk of thromboembolism. The patient has need for anticoagulation and is considered a high risk for stroke, but has a relative contraindication for long term anticoagulation. The patient is suitable for short term warfarin but deemed unable to take long term oral anticoagulation following the conclusion of shared decision making interaction with the patient. I have discussed at length the risks/benefits and alternatives of this procedure with regards to stroke risk versus bleeding risk. The patient understands that anticoagulation will be maintained in a variety of forms during the first year and agrees with plan. Risks include but not limited to: infection, bleeding, vessel injury, cardiac perforation at times requiring drainage or surgery, heart failure, migration of the device, emergency surgery, myocardial infarction, stroke and death. He is scheduled for YESENIA already.  Plan for WATCHMAN ([id number]) at [facility], anesthesia assist.  Will discuss utility of amiodarone loading once again followed by cardioversion at the time of his YESENIA with Dr. Todd Rodgers though I suspect his AF is not driving his fatigue. FOLLOW-UP     Post procedure      Thank you, Nannette Kline MD and Dr. Todd Rodgers for allowing me to participate in the care of this extraordinarily pleasant male. Please do not hesitate to contact me for further questions/concerns. Brittany Good NP    Patient seen and examined by me with nurse practitioner. I personally performed all components of the history, physical, and medical decision making and agree with the assessment and plan with minor modifications as noted. Ghada Benavides MD  Cardiac Electrophysiology / Cardiology    [place] [id number].  [address] [place] [address], [address]  [phone] / [phone] Fax   [phone] / [phone] Fax      Addendum: Discussed plan with Dr. Todd Rodgers who is in agreement to perform cardioversion at time of YESENIA on 9/24/2019. Will prescribe Amiodarone 200 mg daily, continue post cardioversion. Will evaluate for symptomatic improvement with restoration/maintenance of NSR. Patient was called and updated, no questions at this time. Based on both stroke and bleeding risk, a shared decision has been made to pursue closure of left atrial appendage as a safe and effective alternative to oral anticoagulant therapy for stroke prophylaxis and to reduce his/her long term risk of bleeding.          Brittany Good NP

## 2019-09-19 ENCOUNTER — TELEPHONE (OUTPATIENT)
Dept: CARDIOLOGY CLINIC | Age: 79
End: 2019-09-19

## 2019-09-19 RX ORDER — SODIUM CHLORIDE 0.9 % (FLUSH) 0.9 %
5-40 SYRINGE (ML) INJECTION EVERY 8 HOURS
Status: CANCELLED | OUTPATIENT
Start: 2019-09-19

## 2019-09-19 RX ORDER — SODIUM CHLORIDE 0.9 % (FLUSH) 0.9 %
5-40 SYRINGE (ML) INJECTION AS NEEDED
Status: CANCELLED | OUTPATIENT
Start: 2019-09-19

## 2019-09-19 NOTE — TELEPHONE ENCOUNTER
Na from [facility] is calling to request PAT orders fro patient's watchman procedure. She states he is due to have PAT at [facility] tomorrow.      Fax: [phone]

## 2019-09-19 NOTE — TELEPHONE ENCOUNTER
----- Message from Randall Mendoza LPN sent at 1/69/0428 12:15 PM EDT -----  Regarding: Watchman/YESENIA  Please cancel YESENIA for patient scheduled for 9/24. Patient is schedule for YESENIA/Watchman device on this Tuesday 9/24 at [facility] with Dr. Lloyd Brand d/t a cancellation. Thanks.

## 2019-09-20 ENCOUNTER — HOSPITAL ENCOUNTER (OUTPATIENT)
Dept: PREADMISSION TESTING | Age: 79
Discharge: HOME | End: 2019-09-20
Payer: MEDICARE

## 2019-09-20 VITALS
HEART RATE: 77 BPM | DIASTOLIC BLOOD PRESSURE: 70 MMHG | WEIGHT: 250 LBS | BODY MASS INDEX: 32.08 KG/M2 | TEMPERATURE: 98.6 F | SYSTOLIC BLOOD PRESSURE: 132 MMHG | HEIGHT: 74 IN

## 2019-09-20 LAB
APPEARANCE UR: CLEAR
BACTERIA URNS QL MICRO: NEGATIVE /HPF
BILIRUB UR QL: NEGATIVE
COLOR UR: ABNORMAL
EPITH CASTS URNS QL MICRO: ABNORMAL /LPF
GLUCOSE UR STRIP.AUTO-MCNC: NEGATIVE MG/DL
HGB UR QL STRIP: NEGATIVE
HYALINE CASTS URNS QL MICRO: ABNORMAL /LPF (ref 0–5)
KETONES UR QL STRIP.AUTO: NEGATIVE MG/DL
LEUKOCYTE ESTERASE UR QL STRIP.AUTO: NEGATIVE
NITRITE UR QL STRIP.AUTO: NEGATIVE
PH UR STRIP: 5.5 [PH] (ref 5–8)
PROT UR STRIP-MCNC: ABNORMAL MG/DL
RBC #/AREA URNS HPF: ABNORMAL /HPF (ref 0–5)
SP GR UR REFRACTOMETRY: 1.02 (ref 1–1.03)
UA: UC IF INDICATED,UAUC: ABNORMAL
UROBILINOGEN UR QL STRIP.AUTO: 0.2 EU/DL (ref 0.2–1)
WBC URNS QL MICRO: ABNORMAL /HPF (ref 0–4)

## 2019-09-20 PROCEDURE — 81001 URINALYSIS AUTO W/SCOPE: CPT

## 2019-09-20 NOTE — PERIOP NOTES
Patient verbalizes understanding of preoperative instructions:  Given skin prep chlorhexidine wipes-given written and verbal instructions on use. Pre-Operative Instructions    DO NOT EAT OR DRINK ANYTHING AFTER MIDNIGHT THE NIGHT BEFORE SURGERY. MEDICATIONS VERIFIED. PT WAS INSTRUCTED BY CARDIOLOGIST ON WHICH MEDICATIONS TO TAKE AND HOLD BEFORE SURGERY.

## 2019-09-21 LAB
BACTERIA SPEC CULT: NORMAL
BACTERIA SPEC CULT: NORMAL
SERVICE CMNT-IMP: NORMAL

## 2019-09-24 ENCOUNTER — APPOINTMENT (OUTPATIENT)
Dept: CARDIAC CATH/INVASIVE PROCEDURES | Age: 79
DRG: 274 | End: 2019-09-24
Attending: INTERNAL MEDICINE
Payer: MEDICARE

## 2019-09-24 ENCOUNTER — ANESTHESIA EVENT (OUTPATIENT)
Dept: CARDIAC CATH/INVASIVE PROCEDURES | Age: 79
DRG: 274 | End: 2019-09-24
Payer: MEDICARE

## 2019-09-24 ENCOUNTER — HOSPITAL ENCOUNTER (INPATIENT)
Age: 79
LOS: 1 days | Discharge: HOME | DRG: 274 | End: 2019-09-25
Attending: INTERNAL MEDICINE | Admitting: INTERNAL MEDICINE
Payer: MEDICARE

## 2019-09-24 ENCOUNTER — ANESTHESIA (OUTPATIENT)
Dept: CARDIAC CATH/INVASIVE PROCEDURES | Age: 79
DRG: 274 | End: 2019-09-24
Payer: MEDICARE

## 2019-09-24 DIAGNOSIS — I48.20 CHRONIC ATRIAL FIBRILLATION (HCC): ICD-10-CM

## 2019-09-24 DIAGNOSIS — I48.91 ATRIAL FIBRILLATION, UNSPECIFIED TYPE (HCC): ICD-10-CM

## 2019-09-24 DIAGNOSIS — Z79.01 CHRONIC ANTICOAGULATION: ICD-10-CM

## 2019-09-24 PROBLEM — Z95.818 PRESENCE OF WATCHMAN LEFT ATRIAL APPENDAGE CLOSURE DEVICE: Status: ACTIVE | Noted: 2019-09-24

## 2019-09-24 LAB
ABO + RH BLD: NORMAL
ACT BLD: 202 SECS (ref 79–138)
BLOOD GROUP ANTIBODIES SERPL: NORMAL
INR BLD: 1.7 (ref 0.9–1.2)
SPECIMEN EXP DATE BLD: NORMAL

## 2019-09-24 PROCEDURE — 76060000034 HC ANESTHESIA 1.5 TO 2 HR: Performed by: INTERNAL MEDICINE

## 2019-09-24 PROCEDURE — 74011250636 HC RX REV CODE- 250/636: Performed by: NURSE ANESTHETIST, CERTIFIED REGISTERED

## 2019-09-24 PROCEDURE — 77030029065 HC DRSG HEMO QCLOT ZMED -B: Performed by: INTERNAL MEDICINE

## 2019-09-24 PROCEDURE — 77030026438 HC STYL ET INTUB CARD -A: Performed by: ANESTHESIOLOGY

## 2019-09-24 PROCEDURE — 77030038112 HC IMPL LAA WATCHMAN 30MM BSC -L: Performed by: INTERNAL MEDICINE

## 2019-09-24 PROCEDURE — 74011250636 HC RX REV CODE- 250/636

## 2019-09-24 PROCEDURE — 74011000250 HC RX REV CODE- 250: Performed by: NURSE ANESTHETIST, CERTIFIED REGISTERED

## 2019-09-24 PROCEDURE — 33340 PERQ CLSR TCAT L ATR APNDGE: CPT

## 2019-09-24 PROCEDURE — 77030008684 HC TU ET CUF COVD -B: Performed by: ANESTHESIOLOGY

## 2019-09-24 PROCEDURE — 74011250637 HC RX REV CODE- 250/637: Performed by: INTERNAL MEDICINE

## 2019-09-24 PROCEDURE — 92960 CARDIOVERSION ELECTRIC EXT: CPT | Performed by: INTERNAL MEDICINE

## 2019-09-24 PROCEDURE — C8925 2D TEE W OR W/O FOL W/CON,IN: HCPCS

## 2019-09-24 PROCEDURE — C1894 INTRO/SHEATH, NON-LASER: HCPCS | Performed by: INTERNAL MEDICINE

## 2019-09-24 PROCEDURE — B24BZZ4 ULTRASONOGRAPHY OF HEART WITH AORTA, TRANSESOPHAGEAL: ICD-10-PCS | Performed by: INTERNAL MEDICINE

## 2019-09-24 PROCEDURE — 99218 HC RM OBSERVATION: CPT

## 2019-09-24 PROCEDURE — 65660000000 HC RM CCU STEPDOWN

## 2019-09-24 PROCEDURE — 77030039046 HC PAD DEFIB RADIOTRNSPNT CNMD -B: Performed by: INTERNAL MEDICINE

## 2019-09-24 PROCEDURE — 36415 COLL VENOUS BLD VENIPUNCTURE: CPT

## 2019-09-24 PROCEDURE — 85610 PROTHROMBIN TIME: CPT

## 2019-09-24 PROCEDURE — 86900 BLOOD TYPING SEROLOGIC ABO: CPT

## 2019-09-24 PROCEDURE — 85347 COAGULATION TIME ACTIVATED: CPT

## 2019-09-24 PROCEDURE — C1760 CLOSURE DEV, VASC: HCPCS | Performed by: INTERNAL MEDICINE

## 2019-09-24 PROCEDURE — 74011250636 HC RX REV CODE- 250/636: Performed by: INTERNAL MEDICINE

## 2019-09-24 PROCEDURE — 77030020506 HC NDL TRNSPTL NRG BAYL -F: Performed by: INTERNAL MEDICINE

## 2019-09-24 PROCEDURE — 02L73DK OCCLUSION OF LEFT ATRIAL APPENDAGE WITH INTRALUMINAL DEVICE, PERCUTANEOUS APPROACH: ICD-10-PCS | Performed by: INTERNAL MEDICINE

## 2019-09-24 PROCEDURE — 77030004532 HC CATH ANGI DX IMP BSC -A: Performed by: INTERNAL MEDICINE

## 2019-09-24 PROCEDURE — C1893 INTRO/SHEATH, FIXED,NON-PEEL: HCPCS | Performed by: INTERNAL MEDICINE

## 2019-09-24 PROCEDURE — 77030013797 HC KT TRNSDUC PRSSR EDWD -A: Performed by: INTERNAL MEDICINE

## 2019-09-24 PROCEDURE — C1769 GUIDE WIRE: HCPCS | Performed by: INTERNAL MEDICINE

## 2019-09-24 DEVICE — LEFT ATRIAL APPENDAGE CLOSURE DEVICE WITH DELIVERY SYSTEM
Type: IMPLANTABLE DEVICE | Site: HEART | Status: FUNCTIONAL
Brand: WATCHMAN®

## 2019-09-24 RX ORDER — NEOSTIGMINE METHYLSULFATE 1 MG/ML
INJECTION INTRAVENOUS AS NEEDED
Status: DISCONTINUED | OUTPATIENT
Start: 2019-09-24 | End: 2019-09-24 | Stop reason: HOSPADM

## 2019-09-24 RX ORDER — CARVEDILOL 6.25 MG/1
6.25 TABLET ORAL 2 TIMES DAILY WITH MEALS
Status: DISCONTINUED | OUTPATIENT
Start: 2019-09-24 | End: 2019-09-25 | Stop reason: HOSPADM

## 2019-09-24 RX ORDER — ONDANSETRON 2 MG/ML
4 INJECTION INTRAMUSCULAR; INTRAVENOUS
Status: DISCONTINUED | OUTPATIENT
Start: 2019-09-24 | End: 2019-09-25 | Stop reason: HOSPADM

## 2019-09-24 RX ORDER — SODIUM CHLORIDE 0.9 % (FLUSH) 0.9 %
5-40 SYRINGE (ML) INJECTION EVERY 8 HOURS
Status: DISCONTINUED | OUTPATIENT
Start: 2019-09-24 | End: 2019-09-25 | Stop reason: HOSPADM

## 2019-09-24 RX ORDER — SODIUM CHLORIDE 0.9 % (FLUSH) 0.9 %
5-40 SYRINGE (ML) INJECTION AS NEEDED
Status: DISCONTINUED | OUTPATIENT
Start: 2019-09-24 | End: 2019-09-25 | Stop reason: HOSPADM

## 2019-09-24 RX ORDER — SODIUM CHLORIDE 0.9 % (FLUSH) 0.9 %
5-40 SYRINGE (ML) INJECTION EVERY 8 HOURS
Status: DISCONTINUED | OUTPATIENT
Start: 2019-09-24 | End: 2019-09-24 | Stop reason: HOSPADM

## 2019-09-24 RX ORDER — WARFARIN SODIUM 5 MG/1
10 TABLET ORAL ONCE
Status: COMPLETED | OUTPATIENT
Start: 2019-09-24 | End: 2019-09-24

## 2019-09-24 RX ORDER — SODIUM CHLORIDE 0.9 % (FLUSH) 0.9 %
5-40 SYRINGE (ML) INJECTION AS NEEDED
Status: DISCONTINUED | OUTPATIENT
Start: 2019-09-24 | End: 2019-09-24 | Stop reason: HOSPADM

## 2019-09-24 RX ORDER — HYDROCODONE BITARTRATE AND ACETAMINOPHEN 5; 325 MG/1; MG/1
1 TABLET ORAL
Status: DISCONTINUED | OUTPATIENT
Start: 2019-09-24 | End: 2019-09-25 | Stop reason: HOSPADM

## 2019-09-24 RX ORDER — SODIUM CHLORIDE, SODIUM LACTATE, POTASSIUM CHLORIDE, CALCIUM CHLORIDE 600; 310; 30; 20 MG/100ML; MG/100ML; MG/100ML; MG/100ML
INJECTION, SOLUTION INTRAVENOUS
Status: DISCONTINUED | OUTPATIENT
Start: 2019-09-24 | End: 2019-09-24 | Stop reason: HOSPADM

## 2019-09-24 RX ORDER — ASPIRIN 81 MG/1
81 TABLET ORAL DAILY
Status: DISCONTINUED | OUTPATIENT
Start: 2019-09-25 | End: 2019-09-25 | Stop reason: HOSPADM

## 2019-09-24 RX ORDER — GLYCOPYRROLATE 0.2 MG/ML
INJECTION INTRAMUSCULAR; INTRAVENOUS AS NEEDED
Status: DISCONTINUED | OUTPATIENT
Start: 2019-09-24 | End: 2019-09-24 | Stop reason: HOSPADM

## 2019-09-24 RX ORDER — HEPARIN SODIUM 200 [USP'U]/100ML
INJECTION, SOLUTION INTRAVENOUS
Status: COMPLETED | OUTPATIENT
Start: 2019-09-24 | End: 2019-09-24

## 2019-09-24 RX ORDER — SUCCINYLCHOLINE CHLORIDE 20 MG/ML
INJECTION INTRAMUSCULAR; INTRAVENOUS AS NEEDED
Status: DISCONTINUED | OUTPATIENT
Start: 2019-09-24 | End: 2019-09-24 | Stop reason: HOSPADM

## 2019-09-24 RX ORDER — CARVEDILOL 3.12 MG/1
3.12 TABLET ORAL ONCE
Status: COMPLETED | OUTPATIENT
Start: 2019-09-24 | End: 2019-09-24

## 2019-09-24 RX ORDER — LOSARTAN POTASSIUM 50 MG/1
100 TABLET ORAL DAILY
Status: DISCONTINUED | OUTPATIENT
Start: 2019-09-25 | End: 2019-09-25 | Stop reason: HOSPADM

## 2019-09-24 RX ORDER — CEFAZOLIN SODIUM 1 G/3ML
INJECTION, POWDER, FOR SOLUTION INTRAMUSCULAR; INTRAVENOUS AS NEEDED
Status: DISCONTINUED | OUTPATIENT
Start: 2019-09-24 | End: 2019-09-24 | Stop reason: HOSPADM

## 2019-09-24 RX ORDER — ACETAMINOPHEN 325 MG/1
650 TABLET ORAL
Status: DISCONTINUED | OUTPATIENT
Start: 2019-09-24 | End: 2019-09-25 | Stop reason: HOSPADM

## 2019-09-24 RX ORDER — SODIUM CHLORIDE 9 MG/ML
INJECTION, SOLUTION INTRAVENOUS
Status: DISCONTINUED | OUTPATIENT
Start: 2019-09-24 | End: 2019-09-24 | Stop reason: HOSPADM

## 2019-09-24 RX ORDER — AMLODIPINE BESYLATE 5 MG/1
10 TABLET ORAL DAILY
Status: DISCONTINUED | OUTPATIENT
Start: 2019-09-25 | End: 2019-09-25 | Stop reason: HOSPADM

## 2019-09-24 RX ORDER — FUROSEMIDE 40 MG/1
40 TABLET ORAL DAILY
Status: DISCONTINUED | OUTPATIENT
Start: 2019-09-25 | End: 2019-09-25 | Stop reason: HOSPADM

## 2019-09-24 RX ORDER — ROCURONIUM BROMIDE 10 MG/ML
INJECTION, SOLUTION INTRAVENOUS AS NEEDED
Status: DISCONTINUED | OUTPATIENT
Start: 2019-09-24 | End: 2019-09-24 | Stop reason: HOSPADM

## 2019-09-24 RX ORDER — AMIODARONE HYDROCHLORIDE 200 MG/1
200 TABLET ORAL DAILY
Status: DISCONTINUED | OUTPATIENT
Start: 2019-09-25 | End: 2019-09-25 | Stop reason: HOSPADM

## 2019-09-24 RX ORDER — LIDOCAINE HYDROCHLORIDE 20 MG/ML
INJECTION, SOLUTION EPIDURAL; INFILTRATION; INTRACAUDAL; PERINEURAL AS NEEDED
Status: DISCONTINUED | OUTPATIENT
Start: 2019-09-24 | End: 2019-09-24 | Stop reason: HOSPADM

## 2019-09-24 RX ORDER — FENTANYL CITRATE 50 UG/ML
INJECTION, SOLUTION INTRAMUSCULAR; INTRAVENOUS AS NEEDED
Status: DISCONTINUED | OUTPATIENT
Start: 2019-09-24 | End: 2019-09-24 | Stop reason: HOSPADM

## 2019-09-24 RX ORDER — HEPARIN SODIUM 1000 [USP'U]/ML
INJECTION, SOLUTION INTRAVENOUS; SUBCUTANEOUS AS NEEDED
Status: DISCONTINUED | OUTPATIENT
Start: 2019-09-24 | End: 2019-09-24 | Stop reason: HOSPADM

## 2019-09-24 RX ORDER — ONDANSETRON 2 MG/ML
INJECTION INTRAMUSCULAR; INTRAVENOUS AS NEEDED
Status: DISCONTINUED | OUTPATIENT
Start: 2019-09-24 | End: 2019-09-24 | Stop reason: HOSPADM

## 2019-09-24 RX ADMIN — FENTANYL CITRATE 25 MCG: 50 INJECTION, SOLUTION INTRAMUSCULAR; INTRAVENOUS at 16:01

## 2019-09-24 RX ADMIN — NEOSTIGMINE METHYLSULFATE 1 MG: 1 INJECTION, SOLUTION INTRAVENOUS at 15:54

## 2019-09-24 RX ADMIN — ACETAMINOPHEN 650 MG: 325 TABLET, FILM COATED ORAL at 23:33

## 2019-09-24 RX ADMIN — CARVEDILOL 3.12 MG: 3.12 TABLET, FILM COATED ORAL at 21:20

## 2019-09-24 RX ADMIN — HEPARIN SODIUM 12000 UNITS: 1000 INJECTION, SOLUTION INTRAVENOUS; SUBCUTANEOUS at 15:21

## 2019-09-24 RX ADMIN — CEFAZOLIN 2 G: 330 INJECTION, POWDER, FOR SOLUTION INTRAMUSCULAR; INTRAVENOUS at 14:57

## 2019-09-24 RX ADMIN — SUCCINYLCHOLINE CHLORIDE 130 MG: 20 INJECTION, SOLUTION INTRAMUSCULAR; INTRAVENOUS at 14:44

## 2019-09-24 RX ADMIN — GLYCOPYRROLATE 0.2 MG: 0.2 INJECTION INTRAMUSCULAR; INTRAVENOUS at 15:48

## 2019-09-24 RX ADMIN — NEOSTIGMINE METHYLSULFATE 1 MG: 1 INJECTION, SOLUTION INTRAVENOUS at 15:50

## 2019-09-24 RX ADMIN — ONDANSETRON HYDROCHLORIDE 4 MG: 2 INJECTION, SOLUTION INTRAMUSCULAR; INTRAVENOUS at 15:43

## 2019-09-24 RX ADMIN — SODIUM CHLORIDE: 900 INJECTION, SOLUTION INTRAVENOUS at 14:32

## 2019-09-24 RX ADMIN — ROCURONIUM BROMIDE 20 MG: 10 SOLUTION INTRAVENOUS at 14:51

## 2019-09-24 RX ADMIN — SODIUM CHLORIDE, POTASSIUM CHLORIDE, SODIUM LACTATE AND CALCIUM CHLORIDE: 600; 310; 30; 20 INJECTION, SOLUTION INTRAVENOUS at 14:44

## 2019-09-24 RX ADMIN — FENTANYL CITRATE 25 MCG: 50 INJECTION, SOLUTION INTRAMUSCULAR; INTRAVENOUS at 16:07

## 2019-09-24 RX ADMIN — WARFARIN SODIUM 10 MG: 5 TABLET ORAL at 21:20

## 2019-09-24 RX ADMIN — FENTANYL CITRATE 50 MCG: 50 INJECTION, SOLUTION INTRAMUSCULAR; INTRAVENOUS at 14:44

## 2019-09-24 RX ADMIN — LIDOCAINE HYDROCHLORIDE 60 MG: 20 INJECTION, SOLUTION EPIDURAL; INFILTRATION; INTRACAUDAL; PERINEURAL at 14:44

## 2019-09-24 RX ADMIN — GLYCOPYRROLATE 0.2 MG: 0.2 INJECTION INTRAMUSCULAR; INTRAVENOUS at 15:53

## 2019-09-24 RX ADMIN — Medication 10 ML: at 21:22

## 2019-09-24 RX ADMIN — ROCURONIUM BROMIDE 5 MG: 10 SOLUTION INTRAVENOUS at 14:44

## 2019-09-24 RX ADMIN — SODIUM CHLORIDE: 900 INJECTION, SOLUTION INTRAVENOUS at 14:55

## 2019-09-24 RX ADMIN — PHENYLEPHRINE HYDROCHLORIDE 20 MCG/MIN: 10 INJECTION INTRAVENOUS at 14:54

## 2019-09-24 NOTE — Clinical Note
Transseptal Cath Performed check box under hemodynamic and Fluoro, [place] 98cm via a guiding sheath. Needle inserted.

## 2019-09-24 NOTE — PROGRESS NOTES
TRANSFER - IN REPORT:    Verbal report received from [address], RN(name) on Rhbrodiette Barefoot  being received from CCL(unit) for routine progression of care      Report consisted of patients Situation, Background, Assessment and   Recommendations(SBAR). Information from the following report(s) SBAR, Kardex, Procedure Summary, Intake/Output, MAR and Recent Results was reviewed with the receiving nurse. Opportunity for questions and clarification was provided. Assessment completed upon patients arrival to unit and care assumed. 1913:  Patient arrived to unit, placed on tele and confirmed with monitor. Vitals complete. R groin site with small ooze under tegaderm but not present on gauze. Dressing removed and site assessed. No active oozing from site. New dressing placed. 1930:  Bedside shift change report given to Key Pineda RN (oncoming nurse) by Nikolay Holbrook RN (offgoing nurse). Report included the following information SBAR, Kardex, Procedure Summary, Intake/Output, MAR and Recent Results.

## 2019-09-24 NOTE — ANESTHESIA PREPROCEDURE EVALUATION
Relevant Problems   No relevant active problems       Anesthetic History   No history of anesthetic complications            Review of Systems / Medical History  Patient summary reviewed, nursing notes reviewed and pertinent labs reviewed    Pulmonary  Within defined limits                 Neuro/Psych       CVA       Cardiovascular  Within defined limits  Hypertension        Dysrhythmias : atrial fibrillation  CAD    Exercise tolerance: >4 METS     GI/Hepatic/Renal     GERD: well controlled    Renal disease: CRI       Endo/Other    Diabetes: type 2         Other Findings   Comments:               Physical Exam    Airway  Mallampati: II  TM Distance: > 6 cm  Neck ROM: normal range of motion   Mouth opening: Normal     Cardiovascular    Rhythm: irregular      Murmur: Grade 2, Mitral area     Dental    Dentition: Upper partial plate and Lower partial plate     Pulmonary  Breath sounds clear to auscultation               Abdominal  GI exam deferred       Other Findings            Anesthetic Plan    ASA: 3  Anesthesia type: general    Monitoring Plan: Arterial line      Induction: Intravenous  Anesthetic plan and risks discussed with: Patient

## 2019-09-24 NOTE — ROUTINE PROCESS
Cardiac Cath Lab Recovery Arrival Note: 
 
 
Doris Felipe arrived to Cardiac Cath Lab, Recovery Area. Staff introduced to patient. Patient identifiers verified with NAME and DATE OF BIRTH. Procedure verified with patient. Consent forms reviewed and signed by patient or authorized representative and verified. Allergies verified. Patient informed of procedure and plan of care. Questions answered with review. Patient prepped for procedure, per orders from physician, prior to arrival. 
 
Patient on cardiac monitor, non-invasive blood pressure, SPO2 monitor. Patient is A&Ox 4. Patient reports no complaints. Patient in stretcher, in low position, with side rails up, call bell within reach, patient instructed to call of assistance as needed. Patient prep in: [facility] Recovery Area, Bed# 7. Family in: waiting room. Prep by: NATASHA Hernandez made aware that INR is 1.7 today. No orders received.

## 2019-09-24 NOTE — ROUTINE PROCESS
TRANSFER - OUT REPORT: 
 
Verbal report given to JODY Ortiz(name) on Almaz Lambert  being transferred to CVSU(unit) for routine progression of care Report consisted of patients Situation, Background, Assessment and  
Recommendations(SBAR). Information from the following report(s) SBAR, Kardex, Procedure Summary, Intake/Output, MAR and Recent Results was reviewed with the receiving nurse. Lines:  
Peripheral IV 09/24/19 Left Antecubital (Active) Opportunity for questions and clarification was provided. Patient transported with: 
 Monitor Registered Nurse

## 2019-09-24 NOTE — PROGRESS NOTES
Cardiac Cath Lab Procedure Area Arrival Note:    Priya Guthrie arrived to Cardiac Cath Lab, Procedure Area. Patient identifiers verified with NAME and DATE OF BIRTH. Procedure verified with patient. Consent forms verified. Allergies verified. Patient informed of procedure and plan of care. Questions answered with review. Patient voiced understanding of procedure and plan of care. Patient on cardiac monitor, non-invasive blood pressure, SPO2 monitor. On ra, anesthesia here for sedation and airway management   IV of ns on pump at 25 ml/hr. Patient status doing well without problems. Patient is A&Ox 3. Patient reports no pain. Patient medicated during procedure with orders obtained and verified by Dr. Chato Haynes. Refer to patients Cardiac Cath Lab PROCEDURE REPORT for vital signs, assessment, status, and response during procedure, printed at end of case. Printed report on chart or scanned into chart.

## 2019-09-24 NOTE — DISCHARGE INSTRUCTIONS
Left Atrial Appendage Occlusion (WATCHMAN)   Discharge Instructions      You have just underwent left atrial appendage occlusion utilizing a WATCHMAN device deployment. There were catheters temporarily placed in your heart through a puncture in the Veins and/or Arteries in your groin. WHAT TO EXPECT      If you have had a WATCHMAN procedure please notify Dr. Blake Rayo immediately if you experience chest discomfort, shortness of breath or feeling like you are going to pass out. If the symptoms becomes severe or breathing becomes difficult, call 911 or go to the closest emergency room.  Mild to moderate, non-painful, bruising at the puncture site is not un-common, and will resolve in 7 - 10 days.  If a closure device was used to seal your artery or vein, a separate pamphlet will be given to you with these discharge instructions. It is very important that you review the information in the pamphlet for different restrictions and precautions.  You have a small gauze dressing applied to the puncture site in your groin. You may remove this the following morning. MEDICATIONS      Take only the medications prescribed to you at discharge. ACTIVITY      A responsible adult must take you home. Do not drive a car for 72 hours.  Rest quietly for the remainder of the day.  Do not lift anything greater than 10 pounds for 3 days.  Limit bending at the puncture site and use of stairs for at least 3 days.  You may remove the bandage and shower the morning after the procedure. Do not take a bath for 3 days. SYMPTOMS THAT NEED TO BE REPORTED IMMEDIATELY      Bleeding at the puncture site. If there is bleeding, lie down and hold firm direct pressure for at least 5 minutes. If the bleeding does not stop, go to the closest emergency room, or call 911.  Temperature more than 100.5 F.   Redness or warmth at the puncture site.    Increasing pain, numbness, coolness or blue discoloration of the extremity where the puncture is located.  Pulsating mass at the puncture site.  A new lump at the puncture site, or increasing swelling at the site.  Bruising at the puncture site that enlarges or becomes painful (some bruising at the site is common and will go away in 7 - 10 days).  Rapid heart rate or palpitations.  Dizziness, lightheadedness, fainting.  REMEMBER: If you feel something is an emergency or cannot be handled over the phone, call 911 or go to the closest emergency room.       Marlene Taylor in 2 weeks          Lesvia Carreon MD  Cardiac Electrophysiology / Cardiology    [address]  [address]  [phone] / [phone] Fax   [phone] / [phone] Fax

## 2019-09-24 NOTE — PROGRESS NOTES
1610:  TRANSFER - IN REPORT:    Verbal report received from Rupal Porras. on Nakul Kumar , from the Cardiac Cath lab, for routine progression of care. Report consisted of patients Situation, Background, Assessment and Recommendations(SBAR). Information from the following report(s) Procedure Summary, Intake/Output, MAR and Recent Results was reviewed with the receiving clinician. Opportunity for questions and clarification was provided. Assessment completed upon patients arrival to [address] [place] [address] and care assumed. Cardiac Cath Lab Recovery Arrival Note:     Nakul Kumar arrived to [facility] recovery area. Patient procedure= Watchman. Patient on cardiac monitor, non-invasive blood pressure, Patient status doing well without problems. Patient is A&Ox 4. Patient reports no complaints. Procedure site without any bleeding and no hematoma.

## 2019-09-24 NOTE — PROGRESS NOTES
1731: PIV removed out of left hand. A line removed out of right wrist.  Manual pressure placed for 10 minutes. No bleeding and no hematoma. Will continue to monitor. Head of bed elevated 30degrees right groin site without bleeding and no hematoma.

## 2019-09-24 NOTE — H&P
HISTORY OF PRESENTING ILLNESS      Solitario Huber is a 67 y.o. male with permanent atrial fibrillation, CKD stage III, history of CVA, TOPHER, diabetes mellitus, dyslipidemia, diverticulosis who has a history of recurrent GI bleeding in the past.  He is currently on warfarin. He is referred to consider left atrial appendage occlusion due to high risk for recurrent bleeding on anticoagulation. He underwent amiodarone loading and cardioversion in August 2018 and it was felt that he was asymptomatic from his AF. His wife reports that he had improvement in his fatigue following previous PCI.   He continues to have fatigue and believes that his AF is driving this.         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 ([address]- @ 25 yo-weak on right arm>leg 04/12/2016    TOPHER (obstructive sleep apnea) 01/23/2015    Iron deficiency anemia due to chronic blood loss 08/21/2014    Atrial fibrillation ([address] 08/16/2013    Rosacea 11/13/2012    Dyslipidemia, goal LDL below 70 12/20/2010    Diverticula of colon 02/25/2010    Positive PPD 02/25/2010    Essential hypertension, benign 02/25/2010    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 02/25/2010    Basal cell carcinoma of anterior chest 02/25/2010    Reflux esophagitis 02/25/2010    CAD (coronary artery disease) 02/25/2010             PAST MEDICAL HISTORY           Past Medical History:   Diagnosis Date    Arrhythmia       a fib    Atrial fibrillation ([facility] 8/16/2013    Basal cell carcinoma of anterior chest 2/25/2010    CAD (coronary artery disease) 2/25/2010    CVA (cerebral infarction) 2/25/2010     patient denies 12/13/13    Diverticula of colon 2/25/2010    Dyslipidemia 12/20/2010    Essential hypertension, benign 2/25/2010    Hypertension      TOPHER (obstructive sleep apnea) 1/23/2015    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 2/25/2010    Other and unspecified hyperlipidemia 2/25/2010    Positive PPD 2/25/2010    Reflux esophagitis 2/25/2010    Stroke [facility])       in 66 at age of 26    Type II or unspecified type diabetes mellitus without mention of complication, not stated as uncontrolled 2/25/2010             PAST SURGICAL HISTORY            Past Surgical History:   Procedure Laterality Date    COLONOSCOPY,REMV LESN,SNARE   12/13/2013          HC CAPSULE ENDOSCOPE M2A   4/4/2014          HX COLONOSCOPY        HX CORONARY STENT PLACEMENT        HX ORTHOPAEDIC        HX VASECTOMY   1983    UPPER GI ENDOSCOPY,BIOPSY   12/13/2013                   ALLERGIES            Allergies   Allergen Reactions    Adhesive Tape Rash    Baycol Nausea and Vomiting    Clonidine Other (comments)       Headache and sleepiness       Pravachol [Pravastatin] Nausea and Vomiting            FAMILY HISTORY            Family History   Problem Relation Age of Onset    Heart Disease Mother      Cancer Father           colon    Heart Disease Maternal Grandmother      Heart Disease Maternal Grandfather      Cancer Paternal Grandmother      Cancer Paternal Grandfather      negative for cardiac disease         SOCIAL HISTORY      Social History               Socioeconomic History    Marital status:        Spouse name: Not on file    Number of children: Not on file    Years of education: Not on file    Highest education level: Not on file   Tobacco Use    Smoking status: Never Smoker    Smokeless tobacco: Former User       Types: Chew    Tobacco comment: patient reports quitting over 20 yeaers ago. Substance and Sexual Activity    Alcohol use: Yes       Comment: once a year    Drug use: No   Other Topics Concern     Service Yes    Blood Transfusions Yes    Caffeine Concern Yes       Comment: 2-3 cups of coffee daily    Occupational Exposure No    Hobby Hazards No    Sleep Concern No    Stress Concern No    Weight Concern No    Special Diet No    Back Care No    Exercise Yes    Bike Helmet No       Comment: pt doesn't ride a bike   [address] Yes    Self-Exams No               MEDICATIONS           Current Outpatient Medications   Medication Sig    amLODIPine (NORVASC) 10 mg tablet Take 1 Tab by mouth daily.  furosemide (LASIX) 40 mg tablet Take 1 Tab by mouth daily.  spironolactone (ALDACTONE) 25 mg tablet Take 1 Tab by mouth two (2) times a day.  warfarin (COUMADIN) 5 mg tablet TAKE 1 AND 1/2 (ONE-HALF) TABLETS BY MOUTH ONCE DAILY    iron, carbonyl (FEOSOL) 45 mg tab Take 1 Tab by mouth daily.  carvedilol (COREG) 6.25 mg tablet Take 1 Tab by mouth two (2) times daily (with meals).  losartan (COZAAR) 100 mg tablet Take 1 Tab by mouth daily. Indications: high blood pressure    metFORMIN ER (GLUCOPHAGE XR) 500 mg tablet TAKE 4 TABLETS BY MOUTH ONCE DAILY WITH BREAKFAST    aspirin delayed-release 81 mg tablet Take 81 mg by mouth daily. Indications: MYOCARDIAL REINFARCTION PREVENTION      No current facility-administered medications for this visit.          I have reviewed the nurses notes, vitals, problem list, allergy list, medical history, family, social history and medications.         REVIEW OF SYMPTOMS      General: Pt denies excessive weight gain or loss. Pt is able to conduct ADL's  HEENT: Denies blurred vision, headaches, hearing loss, epistaxis and difficulty swallowing. Respiratory: Denies cough, congestion, shortness of breath, HURD, wheezing or stridor.   Cardiovascular: Denies precordial pain, palpitations, edema or PND  Gastrointestinal: Denies poor appetite, indigestion, abdominal pain or blood in stool  Genitourinary: Denies hematuria, dysuria, increased urinary frequency  Musculoskeletal: Denies joint pain or swelling from muscles or joints  Neurologic: Denies tremor, paresthesias, headache, or sensory motor disturbance  Psychiatric: Denies confusion, insomnia, depression  Integumentray: Denies rash, itching or ulcers. Hematologic: Denies easy bruising, bleeding         PHYSICAL EXAMINATION      There were no vitals filed for this visit. General: Well developed, in no acute distress. HEENT: No jaundice, oral mucosa moist, no oral ulcers  Neck: Supple, no stiffness, no lymphadenopathy, supple  Heart: Irregularly irregular, no murmur, gallop or rub, no jugular venous distention  Respiratory: Clear bilaterally x 4, no wheezing or rales  Abdomen:   Soft, non-tender, bowel sounds are active.   Extremities:  No edema, normal cap refill, no cyanosis. Musculoskeletal: No clubbing, no deformities  Neuro: A&Ox3, speech clear, gait stable, cooperative, no focal neurologic deficits  Skin: Skin color is normal. No rashes or lesions.  Non diaphoretic, moist.  Vascular: 2+ pulses symmetric in all extremities         DIAGNOSTIC DATA      EKG:          LABORATORY DATA            Lab Results   Component Value Date/Time     WBC 7.4 07/31/2019 01:16 PM     Hemoglobin (POC) 9.9 03/19/2014 09:14 AM     HGB 12.4 (L) 07/31/2019 01:16 PM     HCT 40.3 07/31/2019 01:16 PM     PLATELET 008 73/30/0432 01:16 PM     MCV 83 07/31/2019 01:16 PM            Lab Results   Component Value Date/Time     Sodium 141 07/31/2019 01:16 PM     Potassium 4.5 07/31/2019 01:16 PM     Chloride 102 07/31/2019 01:16 PM     CO2 24 07/31/2019 01:16 PM     Anion gap 6 05/24/2019 01:35 PM     Glucose 120 (H) 07/31/2019 01:16 PM     BUN 22 07/31/2019 01:16 PM     Creatinine 1.26 07/31/2019 01:16 PM     BUN/Creatinine ratio 17 07/31/2019 01:16 PM     GFR est AA 63 07/31/2019 01:16 PM     GFR est non-AA 54 (L) 07/31/2019 01:16 PM     Calcium 9.5 07/31/2019 01:16 PM     Bilirubin, total 0.5 07/31/2019 01:16 PM     AST (SGOT) 14 07/31/2019 01:16 PM     Alk. phosphatase 110 07/31/2019 01:16 PM     Protein, total 7.6 07/31/2019 01:16 PM     Albumin 4.5 07/31/2019 01:16 PM     Globulin 4.8 (H) 05/24/2019 01:35 PM     A-G Ratio 1.5 07/31/2019 01:16 PM     ALT (SGPT) 15 07/31/2019 01:16 PM             ASSESSMENT      1. Atrial fibrillation              A. Permanent              B. CHASVASC 7              C. Asymptomatic  2. Hx GI bleeding  3. CAD, native  4. Percutaneous transluminal angioplasty  5. Hypertension  6. CVA  7. CKD  8. Diverticulosis  9. Reflux Esophagitis  10.  Diabetes mellitus         PLAN          Plan for WATCHMAN and DCCV to evaluate whether symptomatic from AF      Suly Price MD  Cardiac Electrophysiology / Cardiology     [address] [place] [address], [address], [address]  [place]  [phone] / [phone] Fax                                    [phone] / [phone] Fax

## 2019-09-24 NOTE — PROGRESS NOTES
TRANSFER - OUT REPORT:    Verbal report given to Toyin Lutz on Verito Merritt being transferred to  for routine progression of care       Report consisted of patients Situation, Background, Assessment and   Recommendations(SBAR). Information from the following report(s) SBAR, Procedure Summary and MAR was reviewed with the receiving nurse. Opportunity for questions and clarification was provided.

## 2019-09-24 NOTE — ANESTHESIA POSTPROCEDURE EVALUATION
Procedure(s):  WATCHMAN MALINA CLOSURE DEVICE  Ep Cardioversion. general    Anesthesia Post Evaluation      Multimodal analgesia: multimodal analgesia used between 6 hours prior to anesthesia start to PACU discharge  Patient location during evaluation: PACU  Patient participation: complete - patient participated  Level of consciousness: awake  Pain score: 2  Pain management: adequate  Airway patency: patent  Anesthetic complications: no  Cardiovascular status: acceptable  Respiratory status: acceptable  Hydration status: acceptable  Comments: I have evaluated the patient and meets criteria for discharge from PACU. Maikol Eddy MD  Post anesthesia nausea and vomiting:  controlled      Vitals Value Taken Time   /59 9/24/2019  5:16 PM   Temp     Pulse 52 9/24/2019  5:24 PM   Resp     SpO2 97 % 9/24/2019  5:24 PM   Vitals shown include unvalidated device data.

## 2019-09-24 NOTE — ANESTHESIA PROCEDURE NOTES
Arterial Line Placement    Start time: 9/24/2019 2:07 PM  End time: 9/24/2019 2:17 PM  Performed by: Bisi Bailey MD  Authorized by: Bisi Bailey MD     Pre-Procedure  Indications:  Arterial pressure monitoring and blood sampling  Preanesthetic Checklist: patient identified, risks and benefits discussed, anesthesia consent, site marked, patient being monitored, timeout performed and patient being monitored    Timeout Time: 14:07        Procedure:   Prep:  ChloraPrep and alcohol  Seldinger Technique?: Yes    Orientation:  Right  Location:  Radial artery  Catheter size:  20 G  Number of attempts:  1  Cont Cardiac Output Sensor: No      Assessment:   Post-procedure:  Line secured and sterile dressing applied  Patient Tolerance:  Patient tolerated the procedure well with no immediate complications

## 2019-09-24 NOTE — PROCEDURES
Cardiac Electrophysiology Report      PATIENT INFORMATION        Patient Name: Venu Bansal  MRN: [id number]           Study Date: 2019    YOB: 1940   Age: 67 y.o. Gender: male      Procedure:  Endocardial Left Atrial Appendage OcclusionRa    Referring Physician:  Guanako Osei MD and Dr. Merlyn Pinon     Duty Name   Electrophysiologist Valentin Jennings MD   Monitor Anesthesia Service   Circulator Raymundo Molina RN; Mai Beltran RN; Mary Spivey RN       PATIENT HISTORY     Ivory Polanco is a 67 y. o. male with permanent atrial fibrillation, CKD stage III, history of CVA, TOPHER, diabetes mellitus, dyslipidemia, diverticulosis who has a history of recurrent GI bleeding in the past. Dyan Williamson is currently on warfarin. He underwent amiodarone loading and cardioversion in 2018 and it was felt that he was asymptomatic from his AF.  His wife reports that he had improvement in his fatigue following previous PCI. Dyan Williamson continues to have fatigue and believes that his AF is driving this. He now presents for left atrial appendage occlusion and cardioversion after restarting amiodarone. PROCEDURE     The patient was brought to the Cardiac Electrophysiology laboratory in a post-absorptive, fasting state. Informed consent was obtained. A peripheral IV was in place. Continuous electrocardiographic, blood pressure, O2 saturation and  CO2 monitoring was initiated. Self-adhesive cardioversion patches were positioned on the chest. 500 cc of normal saline was administered intravenously. General anesthesia was effectuated by the anesthesia service. Intraoperative transesophageal imaging was performed by the anesthesia service. There was no evidence of visualized thrombus in the left atrial appendage. Measurements of the MALINA ostium width and MALINA depth were performed. The patient was then prepped and draped in the usual sterile fashion.  Both groins were infiltrated with a 50/50 mixture of Lidocaine (1%) and bupivicaine (0.5%). Vascular access was obtained and an SL1 sheath and an 8F sheath were placed in the right common femoral vein using the modified Seldinger technique. Through the SL1 sheath, a 0.032, 145-cm [place] Patches wire was advanced to the level of the superior vena cava. After the guidewire was withdrawn, a Sandie transseptal needle was introduced into the sheath. The needle/sheath assembly was withdrawn under fluoroscopic and intraoperative YESENIA guidance until the tip of the sheath prolapsed into the fossa ovalis. Appropriate positioning was confirmed with multiple fluoroscopic views and YESENIA imaging. Transseptal access was obtained following delivery of RF energy with the [facility] needle. Systemic heparinization was initiated and the sheath was flushed continuously with heparinized saline. Anticoagulation status was monitored with frequent ACT measurements. Heparin was given in interrupted doses to maintain an ACT > 300 sec. The dilator was advanced over the wire, followed by the sheath. Cardioversion was performed at 360 J to restore sinus rhythm. Mean left arterial pressure was measured and was found to be 15 mm Hg. The 0.032 wire was then exchanged for the [facility] wire which was positioned into the left superior pulmonary vein. The dilator was then removed over the wire. A [place] double curve access sheath was then advanced over the wire into the left atrium. A 6F pigtail was then advanced over the wire and was then positioned into the left atrial appendage. Arteriogram of the left atrial appendage was performed. The pigtail was then removed. The WATCHMAN device delivery system was then advanced to the tip of the access sheath. The WATCHMAN device was then deployed. Device release criteria were met and the device was deployed. The access sheath/device delivery system was then withdrawn to the right atrium.  There was no pericardial effusion noted at the conclusion of the procedure. All catheters and sheaths were removed and hemostasis obtained using two suture mediated closure devices. The patient was extubated, hemodynamically stable, tolerated the procedure well and was transferred in stable condition. There were no immediate complications encountered during the procedure. There was minimal blood loss and no specimen were removed. WATCHMAN DEVICE DATA      Size Lot #   Bee Matthew 30 mm [id number]       FINDINGS     1. The baseline ECG revealed sinus rhythm  2. Endocardial left atrial appendage occlusion was performed utilizing a 30 mm WATCHMAN device. 3. All pass criteria for position, anchor, size and seal were met (positive tug test, no evidence of para-device leak by color flow Doppler ultrasound imaging on YESENIA, no evidence of para-device flow leak on arteriogram on fluoroscopy and there was > 8% compression of the device on YESENIA). 4. No evidence of early pericardial effusion on YESENIA post-device deployment. RADIOLOGY SUMMARY     Total    Fluoro Time (minutes)  6.5    Dose Area Product (mGy)  702        CONCLUSIONS      1. Successful left atrial appendage occlusion utilizing WATCHMAN device. 2. Monitor on telemetry overnight. 3. Resume coumadin 10 mg oral tonight and then 7.5 mg daily x 45 days. Follow up in coumadin clinic. 4. Limited echocardiogram tomorrow to evaluate for late pericardial effusion. 5. Resume all other home medications  6. Follow up in 2 weeks in EP clinic with Dr. Maico Gross at which time will also evaluate for whether restoration of sinus rhythm engenders clinical benefit; continue amiodarone for now. 7. Repeat YESENIA in 45 days. 8. Follow up with Angy Motta MD and Dr. Jaspal Ruth as scheduled. Thank you, Angy Motta MD and Dr. Jaspal Ruth for allowing me to participate in the care of this extraordinarily pleasant male.        Duc Scott MD  Cardiac Electrophysiology / Cardiology    [facility] [address] [place] [address], [address] [facility] [address], [address], [address]                [facility], [address]   [phone] / [phone] Fax   [phone] / [phone] Fax

## 2019-09-25 ENCOUNTER — APPOINTMENT (OUTPATIENT)
Dept: NON INVASIVE DIAGNOSTICS | Age: 79
DRG: 274 | End: 2019-09-25
Attending: INTERNAL MEDICINE
Payer: MEDICARE

## 2019-09-25 VITALS
HEIGHT: 76 IN | HEART RATE: 51 BPM | RESPIRATION RATE: 16 BRPM | WEIGHT: 253 LBS | BODY MASS INDEX: 30.81 KG/M2 | TEMPERATURE: 97.7 F | DIASTOLIC BLOOD PRESSURE: 63 MMHG | SYSTOLIC BLOOD PRESSURE: 136 MMHG | OXYGEN SATURATION: 100 %

## 2019-09-25 LAB
ECHO LV INTERNAL DIMENSION DIASTOLIC: 3.88 CM (ref 4.2–5.9)
ECHO LV INTERNAL DIMENSION SYSTOLIC: 2.99 CM
INR PPP: 1.8 (ref 0.9–1.1)
PROTHROMBIN TIME: 17.6 SEC (ref 9–11.1)

## 2019-09-25 PROCEDURE — 36415 COLL VENOUS BLD VENIPUNCTURE: CPT

## 2019-09-25 PROCEDURE — 85610 PROTHROMBIN TIME: CPT

## 2019-09-25 PROCEDURE — 74011250637 HC RX REV CODE- 250/637: Performed by: INTERNAL MEDICINE

## 2019-09-25 PROCEDURE — 93308 TTE F-UP OR LMTD: CPT

## 2019-09-25 RX ADMIN — CARVEDILOL 6.25 MG: 6.25 TABLET, FILM COATED ORAL at 08:00

## 2019-09-25 RX ADMIN — Medication 10 ML: at 06:54

## 2019-09-25 RX ADMIN — AMLODIPINE BESYLATE 10 MG: 5 TABLET ORAL at 09:00

## 2019-09-25 RX ADMIN — AMIODARONE HYDROCHLORIDE 200 MG: 200 TABLET ORAL at 09:00

## 2019-09-25 RX ADMIN — LOSARTAN POTASSIUM 100 MG: 50 TABLET, FILM COATED ORAL at 09:00

## 2019-09-25 RX ADMIN — ASPIRIN 81 MG: 81 TABLET ORAL at 09:00

## 2019-09-25 NOTE — PROGRESS NOTES
NICOLE Plan:  1) Discharge home today with family    Reason for Admission:   Presence of Watchman left atrial appendage closure device               RRAT Score:     33             Resources/supports as identified by patient/family:   Patient states he has needed DME. Top Challenges facing patient (as identified by patient/family and CM): Patient states no challenges. CM does not identify any challenges. Finances/Medication cost?      Medicare A & B. [place]. Transportation? Patient drives at baseline. Wife will transport patient home. Support system or lack thereof? Supportive family and friends. Living arrangements? Lives with spouse. Self-care/ADLs/Cognition? Independent with self-care. Patient uses a cane and scooter for ambulation and mobility. Patient is alert and oriented during assessment. Current Advanced Directive/Advance Care Plan: On file. Plan for utilizing home health:    No need indicated at time of assessment. Transition of Care Plan:                CM met with patient and family at bedside to introduce self and discuss role. Patient states the following for initial assessment:    1. ADLs, self-care, orientation baseline - see above. 2. Disposition - Home with family assistance. CM will continue to follow. Jacinda Pacheco MPH    Care Management Interventions  PCP Verified by CM: Yes  Palliative Care Criteria Met (RRAT>21 & CHF Dx)?: No  Mode of Transport at Discharge:  Other (see comment)(Wife, private car)  Transition of Care Consult (CM Consult): Discharge Planning  MyChart Signup: No  Discharge Durable Medical Equipment: No  Health Maintenance Reviewed: Yes  Physical Therapy Consult: No  Occupational Therapy Consult: No  Speech Therapy Consult: No  Current Support Network: Lives with Spouse  Confirm Follow Up Transport: Family  Plan discussed with Pt/Family/Caregiver: Yes  Discharge Location  Discharge Placement: Home

## 2019-09-25 NOTE — PROGRESS NOTES
0730:  Bedside shift change report given to Grant Fitch RN (oncoming nurse) by Lucas Morales RN (offgoing nurse). Report included the following information SBAR, Kardex, Procedure Summary, Intake/Output, MAR and Recent Results. 1415: I have reviewed discharge instructions with the patient and spouse. The patient and spouse verbalized understanding. IV and tele removed. Problem: Falls - Risk of  Goal: *Absence of Falls  Description  Document Marysol Palomo Fall Risk and appropriate interventions in the flowsheet. Outcome: Progressing Towards Goal  Note:   Fall Risk Interventions:  Mobility Interventions: Patient to call before getting OOB         Medication Interventions: Teach patient to arise slowly, Utilize gait belt for transfers/ambulation, Patient to call before getting OOB    Elimination Interventions: Patient to call for help with toileting needs              Problem: Cath Lab Procedures: Post-Cath Day of Procedure (Initiate SCIP Measures for Post-Op Care)  Goal: Discharge Planning  Outcome: Progressing Towards Goal     Problem: Pressure Injury - Risk of  Goal: *Prevention of pressure injury  Description  Document Federico Scale and appropriate interventions in the flowsheet. Outcome: Progressing Towards Goal  Note:   Pressure Injury Interventions:             Activity Interventions: Pressure redistribution bed/mattress(bed type), Increase time out of bed    Mobility Interventions: HOB 30 degrees or less, Pressure redistribution bed/mattress (bed type)

## 2019-09-25 NOTE — DISCHARGE SUMMARY
Cardiology Discharge Summary     Patient ID:  Verito Merritt  [id number]  28 y.o.  1940    Admit Date: 9/24/2019    Discharge Date: 09/25/19    Admitting Physician: Vitaliy Merino MD     Discharge Physician: same    Admission Diagnoses:   Atrial fibrillation, unspecified type [facility]) [I48.91]  Presence of Watchman left atrial appendage closure device [Z95.818]  Atrial fibrillation [facility]) [I48.91]    Discharge Diagnoses: Active Problems:    Presence of Watchman left atrial appendage closure device (9/24/2019)      Atrial fibrillation ([address] (8/16/2013)      Overview: SAUD OLSON (10/7/13). Cecille Manzo (12/18/14): On amio. In sinus rhythm          Discharge Condition: Good    Cardiology Procedures this Admission:  Watchman device insertion    Consults: None    Hospital Course:  Admitted to telemetry overnight for observation following procedures  NSR and remain stable overnight. No CP or SOB    Follow up limited echo in the morning demonstrated:  - Appropriate device position, without pericardial effusion or thrombus around device. - Normal LV function noted. Visit Vitals  /63 (BP 1 Location: Left arm, BP Patient Position: At rest)   Pulse (!) 51   Temp 97.7 °F (36.5 °C)   Resp 16   Ht 6' 4\" (1.93 m)   Wt 253 lb (114.8 kg)   SpO2 100%   BMI 30.80 kg/m²       Physical Exam  Abdomen: soft, non-tender. Bowel sounds normal.   Extremities: no LE edema, + PP bilaterally   Heart: regular rate and rhythm, S1, S2 normal, no murmurs, clicks, rubs or gallops  Lungs: clear to auscultation bilaterally  Neck: supple, symmetrical, trachea midline  Neurologic: Grossly normal  Pulses: 2+ and symmetrical    Labs: No results for input(s): WBC, HGB, HCT, PLT, HGBEXT, HCTEXT, PLTEXT, HGBEXT, HCTEXT, PLTEXT in the last 72 hours. Recent Labs     09/25/19  0424 09/24/19  1238   INR 1.8* 1.7*       No results for input(s): TROIQ, CPK, CKMB in the last 72 hours.       Disposition: home    Patient Instructions:   Current Discharge Medication List      CONTINUE these medications which have NOT CHANGED    Details   losartan (COZAAR) 100 mg tablet TAKE 1 TABLET BY MOUTH ONCE DAILY FOR HIGH BLOOD PRESSURE  Qty: 90 Tab, Refills: 3    Comments: Please consider 90 day supplies to promote better adherence      amiodarone (CORDARONE) 200 mg tablet Take 1 Tab by mouth daily. Qty: 30 Tab, Refills: 1      amLODIPine (NORVASC) 10 mg tablet Take 1 Tab by mouth daily. Qty: 90 Tab, Refills: 3    Associated Diagnoses: Coronary artery disease involving native coronary artery of native heart without angina pectoris      furosemide (LASIX) 40 mg tablet Take 1 Tab by mouth daily. Qty: 90 Tab, Refills: 3      warfarin (COUMADIN) 5 mg tablet TAKE 1 AND 1/2 (ONE-HALF) TABLETS BY MOUTH ONCE DAILY  Qty: 135 Tab, Refills: 5      iron, carbonyl (FEOSOL) 45 mg tab Take 1 Tab by mouth daily. carvedilol (COREG) 6.25 mg tablet Take 1 Tab by mouth two (2) times daily (with meals). Qty: 180 Tab, Refills: 3    Comments: Please consider 90 day supplies to promote better adherence  Associated Diagnoses: Essential hypertension, benign      metFORMIN ER (GLUCOPHAGE XR) 500 mg tablet TAKE 4 TABLETS BY MOUTH ONCE DAILY WITH BREAKFAST  Qty: 360 Tab, Refills: 1    Associated Diagnoses: Type 2 diabetes mellitus without complication (HCC)      aspirin delayed-release 81 mg tablet Take 81 mg by mouth daily. Indications: MYOCARDIAL REINFARCTION PREVENTION    Associated Diagnoses: CVA (cerebral infarction)             Reference discharge instructions provided by nursing for diet and activity.     Follow-up with   Future Appointments   Date Time Provider Julee Berger   10/2/2019  9:20 AM ABBEYADIN [facility] GAIL SCHED   10/11/2019 11:20 AM Serena Landa NP [facility] GAIL SCHED   12/2/2019  8:40 AM Scott Hunter  [address]       Signed:  Lito Wong PA-C

## 2019-09-25 NOTE — PROGRESS NOTES
1930: Bedside and Verbal shift change report given to Ramon Maldonado (oncoming nurse) by Ramon Maldonado (offgoing nurse). Report included the following information SBAR, Kardex, Procedure Summary, MAR, Recent Results and Cardiac Rhythm SB.     2050: Contacted physician on call regarding administration of Coreg. Dr. Jossie Cope ordered 3.125 Coreg PO now. 2345: Bedside and Verbal shift change report given to Laisha Márquez (oncoming nurse) by Ramon Maldonado (offgoing nurse).  Report included the following information SBAR, Kardex, Procedure Summary, MAR and Cardiac Rhythm SB.

## 2019-09-26 ENCOUNTER — PATIENT OUTREACH (OUTPATIENT)
Dept: FAMILY MEDICINE CLINIC | Age: 79
End: 2019-09-26

## 2019-09-26 NOTE — PROGRESS NOTES
Hospital Discharge Follow-Up      Date/Time:  2019 9:55 AM    Patient was admitted to [facility] on  and discharged on  for afib- s/p watchman device implant. The physician discharge summary was available at the time of outreach. Patient was contacted within 1 business days of discharge. Top Challenges reviewed with the provider   [facility] - Afib, watchman device implanted         Method of communication with provider -face to face,chart routing    Inpatient RRAT score: 35  Was this a readmission? no   Patient stated reason for the readmission: na    Nurse Navigator (NN) contacted the patient by telephone to perform post hospital discharge assessment. Verified name and  with patient as identifiers. Provided introduction to self, and explanation of the Nurse Navigator role. Reports he feels good, no discomfort. Slept well last night. Heart rate steady. Hasn't checked bp/p yet but will do so. Wife home with him. Reviewed discharge instructions and red flags with patient who verbalized understanding. Patient given an opportunity to ask questions and does not have any further questions or concerns at this time. The patient agrees to contact the PCP office for questions related to their healthcare. NN provided contact information for future reference. Disease Specific:   N/A    Summary of patient's top problems:  1. afib-  performed Watchman device implant to reduce risk of stroke in patient who has afib. 1. Successful left atrial appendage occlusion utilizing WATCHMAN device. 2. Monitor on telemetry overnight. 3. Resume coumadin 10 mg oral tonight and then 7.5 mg daily x 45 days. Follow up in coumadin clinic. 4. Limited echocardiogram tomorrow to evaluate for late pericardial effusion. 5. Resume all other home medications  6.  Follow up in 2 weeks in EP clinic with Dr. Maico Gross at which time will also evaluate for whether restoration of sinus rhythm engenders clinical benefit; continue amiodarone for now. 7. Repeat YESENIA in 45 days. 8. Follow up with Dannielle Richards MD and Dr. Maryellen Vuong as scheduled       [facility] orders at discharge: [address]: na  Date of initial visit: [address] ordered/company: na  Durable Medical Equipment received: na    Barriers to care? None noted at this time    Advance Care Planning:   Does patient have an Advance Directive:  reviewed and current     Medication(s):   New Medications at Discharge: none  Changed Medications at Discharge: none  Discontinued Medications at Discharge: none    Medication reconciliation was performed with patient, who verbalizes understanding of administration of home medications. There were no barriers to obtaining medications identified at this time. Referral to Pharm D needed: no         There are no discontinued medications. [facility] follow up appointment(s):   Future Appointments   Date Time Provider Julee Berger   9/30/2019  9:20 AM COUMADIN, [facility] GAIL SCHED   10/2/2019  9:20 AM COUMADIN, [facility] GAIL SCHED   10/9/2019 11:00 AM Dannielle Richards MD [facility] GAIL SCHED   10/11/2019  9:20 AM Katrina Mendoza NP [facility] GAIL SCHED   12/2/2019  8:40 AM Nancy Lambert  [address]      Non-[facility] follow up appointment(s):   [facility]:  information provided as a resource       Goals      Prevent complications post hospitalization. 9/26/19 [facility] 9/24-9/25 AFib- Watchman device implanted   · Reviewed discharge instructions. · Reviewed medications-no new meds ordered, no changes, none discontinued. · Reviewed red flags: redness or warmth at puncture site, increasing pain,numbness,coolness or blue discoloration where puncture is located. Pulsating mass at site, new lump at site or increasing swelling at site, bruising at site that enlarges or becomes painful, rapid heart beat or palpitations, dizziness,lightheadedness,fainting.   · Reminded not to lift > 8 lb for 3 days, limit bending at puncture site and stairs for 3 days. · -10/9 at 11am(did not want to see another provider sooner)  · NP Tamika Lockhart(cardio)-10/11 at 11:20am  · Coumadin clinic 10/2 at 9:20am.  · Given number for [facility] as resource  · Given number of NN if any questions/concerns. · NN to check back in 7 days for update. mbt

## 2019-09-30 ENCOUNTER — CLINICAL SUPPORT (OUTPATIENT)
Dept: CARDIOLOGY CLINIC | Age: 79
End: 2019-09-30

## 2019-09-30 DIAGNOSIS — I10 ESSENTIAL HYPERTENSION, BENIGN: ICD-10-CM

## 2019-09-30 DIAGNOSIS — Z79.01 CHRONIC ANTICOAGULATION: ICD-10-CM

## 2019-09-30 DIAGNOSIS — R53.82 CHRONIC FATIGUE: Primary | ICD-10-CM

## 2019-09-30 DIAGNOSIS — I48.91 ATRIAL FIBRILLATION, UNSPECIFIED TYPE (HCC): ICD-10-CM

## 2019-09-30 DIAGNOSIS — Z95.818 PRESENCE OF WATCHMAN LEFT ATRIAL APPENDAGE CLOSURE DEVICE: ICD-10-CM

## 2019-09-30 LAB
INR BLD: 2.6
INR, EXTERNAL: 2.6 (ref 2–3)
PT POC: 31.1 SECONDS
VALID INTERNAL CONTROL?: YES

## 2019-10-09 ENCOUNTER — OFFICE VISIT (OUTPATIENT)
Dept: FAMILY MEDICINE CLINIC | Age: 79
End: 2019-10-09

## 2019-10-09 VITALS
RESPIRATION RATE: 18 BRPM | TEMPERATURE: 98.1 F | DIASTOLIC BLOOD PRESSURE: 70 MMHG | WEIGHT: 249 LBS | OXYGEN SATURATION: 96 % | BODY MASS INDEX: 30.32 KG/M2 | SYSTOLIC BLOOD PRESSURE: 140 MMHG | HEIGHT: 76 IN | HEART RATE: 54 BPM

## 2019-10-09 DIAGNOSIS — Z23 ENCOUNTER FOR IMMUNIZATION: ICD-10-CM

## 2019-10-09 DIAGNOSIS — N18.30 CKD (CHRONIC KIDNEY DISEASE) STAGE 3, GFR 30-59 ML/MIN (HCC): ICD-10-CM

## 2019-10-09 DIAGNOSIS — I10 ESSENTIAL HYPERTENSION, BENIGN: ICD-10-CM

## 2019-10-09 DIAGNOSIS — I63.40 CEREBRAL INFARCTION DUE TO EMBOLISM OF CEREBRAL ARTERY (HCC): ICD-10-CM

## 2019-10-09 DIAGNOSIS — I48.19 PERSISTENT ATRIAL FIBRILLATION (HCC): Primary | ICD-10-CM

## 2019-10-09 DIAGNOSIS — I25.10 CORONARY ARTERY DISEASE INVOLVING NATIVE CORONARY ARTERY OF NATIVE HEART WITHOUT ANGINA PECTORIS: ICD-10-CM

## 2019-10-09 DIAGNOSIS — K21.00 REFLUX ESOPHAGITIS: ICD-10-CM

## 2019-10-09 DIAGNOSIS — R06.09 DOE (DYSPNEA ON EXERTION): ICD-10-CM

## 2019-10-09 DIAGNOSIS — G47.33 OSA (OBSTRUCTIVE SLEEP APNEA): ICD-10-CM

## 2019-10-09 DIAGNOSIS — E11.9 TYPE 2 DIABETES MELLITUS WITHOUT COMPLICATION, WITHOUT LONG-TERM CURRENT USE OF INSULIN (HCC): ICD-10-CM

## 2019-10-09 RX ORDER — SPIRONOLACTONE 25 MG/1
25 TABLET ORAL DAILY
Refills: 3 | COMMUNITY
Start: 2019-09-21 | End: 2021-12-30 | Stop reason: SDUPTHER

## 2019-10-09 NOTE — PROGRESS NOTES
Chief Complaint   Patient presents with   [facility] Follow Up     pt had heart sx on 09/24/2019 (Presence of Watchman left atrial appendage closure device )     1. Have you been to the ER, urgent care clinic since your last visit? Hospitalized since your last visit? No    2. Have you seen or consulted any other health care providers outside of the [address] [place] [address] since your last visit? Include any pap smears or colon screening.  No

## 2019-10-10 ENCOUNTER — PATIENT OUTREACH (OUTPATIENT)
Dept: FAMILY MEDICINE CLINIC | Age: 79
End: 2019-10-10

## 2019-10-10 NOTE — PROGRESS NOTES
Called and spoke to patient for f/u. Says he feels good. No complaints. Goals      Prevent complications post hospitalization. 9/26/19 [facility] 9/24-9/25 AFib- Watchman device implanted   · Reviewed discharge instructions. · Reviewed medications-no new meds ordered, no changes, none discontinued. · Reviewed red flags: redness or warmth at puncture site, increasing pain,numbness,coolness or blue discoloration where puncture is located. Pulsating mass at site, new lump at site or increasing swelling at site, bruising at site that enlarges or becomes painful, rapid heart beat or palpitations, dizziness,lightheadedness,fainting. · Reminded not to lift > 8 lb for 3 days, limit bending at puncture site and stairs for 3 days. · -10/9 at 11am(did not want to see another provider sooner)  · NP Tammy Lockhart(cardio)-10/11 at 11:20am  · Coumadin clinic 10/2 at 9:20am.  · Given number for [facility] as resource  · Given number of NN if any questions/concerns. · NN to check back in 7 days for update. mbt  10/10/19  Spoke to patient, says he is doing very well. Saw pcp yesterday, goes to see cardio tomorrow morning. Denies any red flags. Feels like heart rate is steady and bp has been good when he checks it. Reminded to call if any questions/concerns. mbt

## 2019-10-11 ENCOUNTER — OFFICE VISIT (OUTPATIENT)
Dept: CARDIOLOGY CLINIC | Age: 79
End: 2019-10-11

## 2019-10-11 ENCOUNTER — ANTI-COAG VISIT (OUTPATIENT)
Dept: CARDIOLOGY CLINIC | Age: 79
End: 2019-10-11

## 2019-10-11 VITALS
DIASTOLIC BLOOD PRESSURE: 70 MMHG | HEART RATE: 58 BPM | BODY MASS INDEX: 31.17 KG/M2 | RESPIRATION RATE: 16 BRPM | WEIGHT: 256 LBS | HEIGHT: 76 IN | SYSTOLIC BLOOD PRESSURE: 122 MMHG

## 2019-10-11 DIAGNOSIS — I10 ESSENTIAL HYPERTENSION, BENIGN: ICD-10-CM

## 2019-10-11 DIAGNOSIS — R53.82 CHRONIC FATIGUE: Primary | ICD-10-CM

## 2019-10-11 DIAGNOSIS — I50.32 CHRONIC HEART FAILURE WITH PRESERVED EJECTION FRACTION (HCC): Primary | ICD-10-CM

## 2019-10-11 DIAGNOSIS — R53.83 FATIGUE, UNSPECIFIED TYPE: ICD-10-CM

## 2019-10-11 DIAGNOSIS — I48.0 PAROXYSMAL ATRIAL FIBRILLATION (HCC): ICD-10-CM

## 2019-10-11 DIAGNOSIS — I48.91 ATRIAL FIBRILLATION, UNSPECIFIED TYPE (HCC): ICD-10-CM

## 2019-10-11 DIAGNOSIS — Z95.818 PRESENCE OF WATCHMAN LEFT ATRIAL APPENDAGE CLOSURE DEVICE: ICD-10-CM

## 2019-10-11 DIAGNOSIS — I63.9 CEREBROVASCULAR ACCIDENT (CVA), UNSPECIFIED MECHANISM (HCC): ICD-10-CM

## 2019-10-11 LAB
INR BLD: 2.7
PT POC: 31.9 SECONDS
VALID INTERNAL CONTROL?: YES

## 2019-10-11 NOTE — PROGRESS NOTES
Room # 4  Visit Vitals  /70 (BP 1 Location: Left arm, BP Patient Position: Sitting)   Pulse (!) 58   Resp 16   Ht 6' 4\" (1.93 m)   Wt 256 lb (116.1 kg)   BMI 31.16 kg/m²       Return to exercise post procedure? Previously worked out at [employer].

## 2019-10-11 NOTE — PROGRESS NOTES
HISTORY OF PRESENTING ILLNESS      Michael Escobar is a 67 y.o. male with permanent atrial fibrillation, CKD stage III, history of CVA, TOPHER, diabetes mellitus, dyslipidemia, diverticulosis who has a history of recurrent GI bleeding in the past. Meghna Yang is currently on warfarin. He underwent amiodarone loading and cardioversion in August 2018 and it was felt that he was asymptomatic from his AF.  His wife reports that he had improvement in his fatigue following previous PCI. Meghna Yang continues to have fatigue and believes that his AF is driving this. He underwent  left atrial appendage occlusion and cardioversion after restarting amiodarone. Limited echocardiogram failed to reveal pericardial effusion. He is tolerating warfarin. EKG today shows sinus bradycardia. He is uncertain of whether or not he has experienced symptomatic improvement.  He has been restful at home and not yet resumed normal activities although plans to this weekend.       ACTIVE PROBLEM LIST     Patient Active Problem List    Diagnosis Date Noted    Presence of Watchman left atrial appendage closure device 09/24/2019     Priority: 1 - One    Chronic anticoagulation 09/04/2019    Stroke ([address]     CKD (chronic kidney disease) stage 3, GFR 30-59 ml/min ([address] 07/31/2019    Chronic heart failure with preserved ejection fraction ([address] 05/22/2019    Chronic fatigue 04/23/2018    Type 2 diabetes with nephropathy ([address] 04/18/2018    ACP (advance care planning) 09/30/2016    Closed fracture of shaft of left fibula with routine healing 04/12/2016    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 ([address]- @ 22 yo-weak on right arm>leg 04/12/2016    TOPHER (obstructive sleep apnea) 01/23/2015    Iron deficiency anemia due to chronic blood loss 08/21/2014    Atrial fibrillation ([address] 08/16/2013    Rosacea 11/13/2012    Dyslipidemia, goal LDL below 70 12/20/2010    Diverticula of colon 02/25/2010    Positive PPD 02/25/2010    Essential hypertension, benign 02/25/2010    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 02/25/2010    Basal cell carcinoma of anterior chest 02/25/2010    Reflux esophagitis 02/25/2010    CAD (coronary artery disease) 02/25/2010           PAST MEDICAL HISTORY     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 2/25/2010    Other and unspecified hyperlipidemia 2/25/2010    Positive PPD 2/25/2010    Reflux esophagitis 2/25/2010    NO LONGER A PROBLEM    Stroke [facility])     in 1958 at age of 26    Type II or unspecified type diabetes mellitus without mention of complication, not stated as uncontrolled 2/25/2010           PAST SURGICAL HISTORY     Past Surgical History:   Procedure Laterality Date    CARDIAC SURG PROCEDURE UNLIST      X4 STENTS    COLONOSCOPY,REMV LESN,SNARE  12/13/2013         HC CAPSULE ENDOSCOPE M2A  4/4/2014         HX COLONOSCOPY      HX CORONARY STENT PLACEMENT      HX HEART ASSIST DEVICE Left 09/24/2019    HX KNEE REPLACEMENT Left     HX ORTHOPAEDIC Right     FUSION IN RIGHT WRIST    HX VASECTOMY  1983    WA CARDIOVERSION ELECTIVE ARRHYTHMIA EXTERNAL N/A 9/24/2019    Ep Cardioversion [place]as[place] Glover [place] [id number], [address] CATH LAB    UPPER GI ENDOSCOPY,BIOPSY  12/13/2013               ALLERGIES     Allergies   Allergen Reactions    Adhesive Tape Rash    Baycol Nausea and Vomiting    Clonidine Other (comments)     Headache and sleepiness      Pravachol [Pravastatin] Nausea and Vomiting          FAMILY HISTORY     Family History   Problem Relation Age of Onset    Heart Disease Mother     Cancer Father         colon    Heart Disease Maternal Grandmother     Heart Disease Maternal Grandfather     Cancer Paternal Grandmother     Cancer Paternal Grandfather     Anesth Problems Neg Hx     negative for cardiac disease       SOCIAL HISTORY     Social History     Socioeconomic History    Marital status:      Spouse name: Not on file    Number of children: Not on file    Years of education: Not on file    Highest education level: Not on file   Tobacco Use    Smoking status: Never Smoker    Smokeless tobacco: Former User     Types: Chew    Tobacco comment: patient reports quitting over 20 yeaers ago. Substance and Sexual Activity    Alcohol use: Yes     Frequency: Never     Comment: once a year    Drug use: No   Other Topics Concern     Service Yes    Blood Transfusions Yes    Caffeine Concern Yes     Comment: 2-3 cups of coffee daily    Occupational Exposure No    Hobby Hazards No    Sleep Concern No    Stress Concern No    Weight Concern No    Special Diet No    Back Care No    Exercise Yes    Bike Helmet No     Comment: pt doesn't ride a bike   [address] Yes    Self-Exams No         MEDICATIONS     Current Outpatient Medications   Medication Sig    spironolactone (ALDACTONE) 25 mg tablet 25 mg daily.  losartan (COZAAR) 100 mg tablet TAKE 1 TABLET BY MOUTH ONCE DAILY FOR HIGH BLOOD PRESSURE    amiodarone (CORDARONE) 200 mg tablet Take 1 Tab by mouth daily.  amLODIPine (NORVASC) 10 mg tablet Take 1 Tab by mouth daily.  furosemide (LASIX) 40 mg tablet Take 1 Tab by mouth daily. (Patient taking differently: Take 80 mg by mouth daily.)    warfarin (COUMADIN) 5 mg tablet TAKE 1 AND 1/2 (ONE-HALF) TABLETS BY MOUTH ONCE DAILY    carvedilol (COREG) 6.25 mg tablet Take 1 Tab by mouth two (2) times daily (with meals).     metFORMIN ER (GLUCOPHAGE XR) 500 mg tablet TAKE 4 TABLETS BY MOUTH ONCE DAILY WITH BREAKFAST    aspirin delayed-release 81 mg tablet Take 81 mg by mouth daily. Indications: MYOCARDIAL REINFARCTION PREVENTION     No current facility-administered medications for this visit. I have reviewed the nurses notes, vitals, problem list, allergy list, medical history, family, social history and medications. REVIEW OF SYMPTOMS      General: Pt denies excessive weight gain or loss. Pt is able to conduct ADL's  HEENT: Denies blurred vision, headaches, hearing loss, epistaxis and difficulty swallowing. Respiratory: Denies cough, congestion, shortness of breath, HURD, wheezing or stridor. Cardiovascular: Denies precordial pain, palpitations, edema or PND  Gastrointestinal: Denies poor appetite, indigestion, abdominal pain or blood in stool  Genitourinary: Denies hematuria, dysuria, increased urinary frequency  Musculoskeletal: Denies joint pain or swelling from muscles or joints  Neurologic: Denies tremor, paresthesias, headache, or sensory motor disturbance  Psychiatric: Denies confusion, insomnia, depression  Integumentray: Denies rash, itching or ulcers. Hematologic: Denies easy bruising, bleeding       PHYSICAL EXAMINATION      Vitals:    10/11/19 0926   BP: 122/70   Pulse: (!) 58   Resp: 16   Weight: 256 lb (116.1 kg)   Height: 6' 4\" (1.93 m)     General: Well developed, in no acute distress. HEENT: No jaundice, oral mucosa moist, no oral ulcers  Neck: Supple, no stiffness, no lymphadenopathy, supple  Heart:  Normal S1/S2 negative S3 or S4. Regular, no murmur, gallop or rub, no jugular venous distention  Respiratory: Clear bilaterally x 4, no wheezing or rales  Abdomen:   Soft, non-tender, bowel sounds are active.   Extremities:  No edema, normal cap refill, no cyanosis. Musculoskeletal: No clubbing, no deformities  Neuro: A&Ox3, speech clear, gait stable, cooperative, no focal neurologic deficits  Skin: Skin color is normal. No rashes or lesions.  Non diaphoretic, moist.  Vascular: 2+ pulses symmetric in all extremities       DIAGNOSTIC DATA      EKG: sinus bradycardia       LABORATORY DATA      Lab Results   Component Value Date/Time    WBC 7.4 07/31/2019 01:16 PM    Hemoglobin (POC) 9.9 03/19/2014 09:14 AM    HGB 12.4 (L) 07/31/2019 01:16 PM    HCT 40.3 07/31/2019 01:16 PM    PLATELET 990 09/46/1371 01:16 PM    MCV 83 07/31/2019 01:16 PM      Lab Results   Component Value Date/Time    Sodium 141 07/31/2019 01:16 PM    Potassium 4.5 07/31/2019 01:16 PM    Chloride 102 07/31/2019 01:16 PM    CO2 24 07/31/2019 01:16 PM    Anion gap 6 05/24/2019 01:35 PM    Glucose 120 (H) 07/31/2019 01:16 PM    BUN 22 07/31/2019 01:16 PM    Creatinine 1.26 07/31/2019 01:16 PM    BUN/Creatinine ratio 17 07/31/2019 01:16 PM    GFR est AA 63 07/31/2019 01:16 PM    GFR est non-AA 54 (L) 07/31/2019 01:16 PM    Calcium 9.5 07/31/2019 01:16 PM    Bilirubin, total 0.5 07/31/2019 01:16 PM    AST (SGOT) 14 07/31/2019 01:16 PM    Alk. phosphatase 110 07/31/2019 01:16 PM    Protein, total 7.6 07/31/2019 01:16 PM    Albumin 4.5 07/31/2019 01:16 PM    Globulin 4.8 (H) 05/24/2019 01:35 PM    A-G Ratio 1.5 07/31/2019 01:16 PM    ALT (SGPT) 15 07/31/2019 01:16 PM           ASSESSMENT      1. Atrial fibrillation              A. Permanent              B. CHASVASC 7              C. Asymptomatic  2. Hx GI bleeding  3. CAD, native  4. Percutaneous transluminal angioplasty  5. Hypertension  6. CVA  7. CKD  8. Diverticulosis  9. Reflux Esophagitis  10. Diabetes mellitus     PLAN     Will arrange for post Watchman 45 day YESENIA with Dr. Kacey Corbin at which time plan to transition to ASA/ Plavix regimen x 6 months. Mr. Stephanie Antonio is going to call me next week and let me know how he is feeling. If he continues to feel fatigued while in sinus rhythm, will plan to stop amiodarone and adopt rate control strategy for his AF.  Should he note symptomatic improvement, will plan to continue amiodarone and arrange for AF ablation at [facility].      FOLLOW-UP   6 months from YESENIA, unless AF ablation planned. Thank you, Ilana Morales MD and Dr. Lukas Anderson for allowing me to participate in the care of this extraordinarily pleasant male. Please do not hesitate to contact me for further questions/concerns.      Rowdy March NP

## 2019-10-25 DIAGNOSIS — E11.9 TYPE 2 DIABETES MELLITUS WITHOUT COMPLICATION (HCC): ICD-10-CM

## 2019-10-28 RX ORDER — METFORMIN HYDROCHLORIDE 500 MG/1
TABLET, EXTENDED RELEASE ORAL
Qty: 360 TAB | Refills: 1 | Status: SHIPPED | OUTPATIENT
Start: 2019-10-28 | End: 2020-05-18

## 2019-10-30 ENCOUNTER — TELEPHONE (OUTPATIENT)
Dept: CARDIOLOGY CLINIC | Age: 79
End: 2019-10-30

## 2019-10-30 DIAGNOSIS — Z95.818 PRESENCE OF WATCHMAN LEFT ATRIAL APPENDAGE CLOSURE DEVICE: Primary | ICD-10-CM

## 2019-10-30 DIAGNOSIS — I48.0 PAROXYSMAL ATRIAL FIBRILLATION (HCC): ICD-10-CM

## 2019-10-30 NOTE — TELEPHONE ENCOUNTER
----- Message from Sofiya Aiken LPN sent at 3/10/4617  8:41 AM EDT -----  Regarding: Post Watchman YESENIA  Please schedule post Watchman YESENIA for at least 45 days with Dr. Jodie Vinson. Let me know when is scheduled so I can keep record of it.   Thanks.  ----- Message -----  From: Anshul Penaloza MD  Sent: 9/24/2019   3:55 PM EDT  To: Aakash Gonzalez NP, #    Left atrial appendage occlusion performed [facility]) at [facility] ([facility] [id number])      Needs YESENIA 45 days with [facility] visit 2 weeks with Jannett Dakins

## 2019-11-01 ENCOUNTER — OFFICE VISIT (OUTPATIENT)
Dept: FAMILY MEDICINE CLINIC | Age: 79
End: 2019-11-01

## 2019-11-01 ENCOUNTER — HOSPITAL ENCOUNTER (OUTPATIENT)
Dept: LAB | Age: 79
Discharge: HOME | End: 2019-11-01
Payer: MEDICARE

## 2019-11-01 VITALS
TEMPERATURE: 97.7 F | WEIGHT: 256 LBS | BODY MASS INDEX: 31.17 KG/M2 | SYSTOLIC BLOOD PRESSURE: 118 MMHG | RESPIRATION RATE: 16 BRPM | HEIGHT: 76 IN | OXYGEN SATURATION: 97 % | DIASTOLIC BLOOD PRESSURE: 70 MMHG | HEART RATE: 62 BPM

## 2019-11-01 DIAGNOSIS — I63.40 CEREBRAL INFARCTION DUE TO EMBOLISM OF CEREBRAL ARTERY (HCC): ICD-10-CM

## 2019-11-01 DIAGNOSIS — I25.10 CORONARY ARTERY DISEASE INVOLVING NATIVE CORONARY ARTERY OF NATIVE HEART WITHOUT ANGINA PECTORIS: ICD-10-CM

## 2019-11-01 DIAGNOSIS — D50.0 IRON DEFICIENCY ANEMIA DUE TO CHRONIC BLOOD LOSS: ICD-10-CM

## 2019-11-01 DIAGNOSIS — E11.21 TYPE 2 DIABETES WITH NEPHROPATHY (HCC): ICD-10-CM

## 2019-11-01 DIAGNOSIS — Z71.89 ACP (ADVANCE CARE PLANNING): ICD-10-CM

## 2019-11-01 DIAGNOSIS — K21.00 REFLUX ESOPHAGITIS: ICD-10-CM

## 2019-11-01 DIAGNOSIS — N18.30 CKD (CHRONIC KIDNEY DISEASE) STAGE 3, GFR 30-59 ML/MIN (HCC): ICD-10-CM

## 2019-11-01 DIAGNOSIS — R53.82 CHRONIC FATIGUE: ICD-10-CM

## 2019-11-01 DIAGNOSIS — Z00.00 MEDICARE ANNUAL WELLNESS VISIT, SUBSEQUENT: ICD-10-CM

## 2019-11-01 DIAGNOSIS — E78.5 DYSLIPIDEMIA, GOAL LDL BELOW 70: ICD-10-CM

## 2019-11-01 DIAGNOSIS — G47.33 OSA (OBSTRUCTIVE SLEEP APNEA): ICD-10-CM

## 2019-11-01 DIAGNOSIS — I48.19 PERSISTENT ATRIAL FIBRILLATION (HCC): ICD-10-CM

## 2019-11-01 DIAGNOSIS — I10 ESSENTIAL HYPERTENSION, BENIGN: Primary | ICD-10-CM

## 2019-11-01 PROCEDURE — 36415 COLL VENOUS BLD VENIPUNCTURE: CPT

## 2019-11-01 PROCEDURE — 80061 LIPID PANEL: CPT

## 2019-11-01 PROCEDURE — 80053 COMPREHEN METABOLIC PANEL: CPT

## 2019-11-01 PROCEDURE — 82043 UR ALBUMIN QUANTITATIVE: CPT

## 2019-11-01 PROCEDURE — 83036 HEMOGLOBIN GLYCOSYLATED A1C: CPT

## 2019-11-01 RX ORDER — FUROSEMIDE 20 MG/1
TABLET ORAL
COMMUNITY
Start: 2019-10-25 | End: 2019-11-03 | Stop reason: DRUGHIGH

## 2019-11-01 NOTE — PROGRESS NOTES
Chief Complaint   Patient presents with    Diabetes     Follow up, Fasting     Cholesterol Problem    Hypertension     1. Have you been to the ER, urgent care clinic since your last visit? Hospitalized since your last visit? No    2. Have you seen or consulted any other health care providers outside of the [address] [place] [address] since your last visit? Include any pap smears or colon screening.  No

## 2019-11-01 NOTE — PROGRESS NOTES
HISTORY OF PRESENT ILLNESS  HPI   Brothers is a 67 y.o. Male with a history of HTN, CAD, A-fib, CHF with preserved ejection fraction, reflux esophagitis, DM-II with nephropathy, BCC, cerebral infarction, TOPHER, osteitis deformans, fatigue, anemia and hyperlipidemia with LDL goal <70, who presents to the office today for a follow up on his DM, HTN, and cholesterol. Pt is not fasting today. Pt says he checks his BP out of office with readings around the reading in office today, 118/70. Pt says he is being followed by cardiologist Dr. Kacey Corbin. Pt complains of some bilateral neck pain down to his shoulders. Pt denies unusual SOB, chest pain, and any recent ER visits or hospitalizations.            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 2/25/2010    Other and unspecified hyperlipidemia 2/25/2010    Positive PPD 2/25/2010    Reflux esophagitis 2/25/2010    NO LONGER A PROBLEM    Stroke [facility])     in 1959 at age of 26    Type II or unspecified type diabetes mellitus without mention of complication, not stated as uncontrolled 2/25/2010     Past Surgical History:   Procedure Laterality Date    CARDIAC SURG PROCEDURE UNLIST      X4 STENTS    COLONOSCOPY,REMV LESN,SNARE  12/13/2013         HC CAPSULE ENDOSCOPE M2A  4/4/2014         HX COLONOSCOPY      HX CORONARY STENT PLACEMENT      HX HEART ASSIST DEVICE Left 09/24/2019    HX KNEE REPLACEMENT Left     HX ORTHOPAEDIC Right     FUSION IN RIGHT WRIST    HX VASECTOMY  1983    [facility]ION [facility] ELECTIVE ARRHYTHMIA EXTERNAL N/A 9/24/2019    Ep Cardioversion performed by Zohaib Peña MD at [place] [address], [facility]    UPPER GI ENDOSCOPY,BIOPSY  12/13/2013          Current Outpatient Medications on File Prior to Visit   Medication Sig Dispense Refill    metFORMIN ER (GLUCOPHAGE XR) 500 mg tablet TAKE 4 TABLETS BY MOUTH ONCE DAILY WITH BREAKFAST 360 Tab 1    spironolactone (ALDACTONE) 25 mg tablet 25 mg daily. 3    losartan (COZAAR) 100 mg tablet TAKE 1 TABLET BY MOUTH ONCE DAILY FOR HIGH BLOOD PRESSURE 90 Tab 3    amiodarone (CORDARONE) 200 mg tablet Take 1 Tab by mouth daily. 30 Tab 1    amLODIPine (NORVASC) 10 mg tablet Take 1 Tab by mouth daily. 90 Tab 3    furosemide (LASIX) 40 mg tablet Take 1 Tab by mouth daily. (Patient taking differently: Take 80 mg by mouth daily.) 90 Tab 3    warfarin (COUMADIN) 5 mg tablet TAKE 1 AND 1/2 (ONE-HALF) TABLETS BY MOUTH ONCE DAILY 135 Tab 5    carvedilol (COREG) 6.25 mg tablet Take 1 Tab by mouth two (2) times daily (with meals). 180 Tab 3    aspirin delayed-release 81 mg tablet Take 81 mg by mouth daily. Indications: MYOCARDIAL REINFARCTION PREVENTION      [DISCONTINUED] furosemide (LASIX) 20 mg tablet        No current facility-administered medications on file prior to visit.       Allergies   Allergen Reactions    Adhesive Tape Rash    Baycol Nausea and Vomiting    Clonidine Other (comments)     Headache and sleepiness      Pravachol [Pravastatin] Nausea and Vomiting     Family History   Problem Relation Age of Onset    Heart Disease Mother     Cancer Father         colon    Heart Disease Maternal Grandmother     Heart Disease Maternal Grandfather     Cancer Paternal Grandmother     Cancer Paternal Grandfather     Anesth Problems Neg Hx      Social History     Socioeconomic History    Marital status:      Spouse name: Not on file    Number of children: Not on file    Years of education: Not on file    Highest education level: Not on file Tobacco Use    Smoking status: Never Smoker    Smokeless tobacco: Former User     Types: Chew    Tobacco comment: patient reports quitting over 20 yeaers ago. Substance and Sexual Activity    Alcohol use: Yes     Frequency: Never     Comment: once a year    Drug use: No    Sexual activity: Not Currently   Other Topics Concern     Service Yes    Blood Transfusions Yes    Caffeine Concern Yes     Comment: 2-3 cups of coffee daily    Occupational Exposure No    Hobby Hazards No    Sleep Concern No    Stress Concern No    Weight Concern No    Special Diet No    Back Care No    Exercise Yes    Bike Helmet No     Comment: pt doesn't ride a bike   [place] Yes    Self-Exams No             Review of Systems   Constitutional: Negative for chills, diaphoresis, fever, malaise/fatigue and weight loss. Eyes: Negative for blurred vision, double vision and pain. Respiratory: Negative for cough, shortness of breath and wheezing. Cardiovascular: Negative for chest pain, palpitations, orthopnea, claudication, leg swelling and PND. Genitourinary: Negative for frequency. Skin: Negative for itching and rash. Neurological: Negative for dizziness, tingling, tremors, sensory change, speech change, focal weakness, seizures, loss of consciousness, weakness and headaches. Endo/Heme/Allergies: Negative for environmental allergies and polydipsia. Does not bruise/bleed easily.      Results for orders placed or performed in visit on 11/01/19   LIPID PANEL   Result Value Ref Range    Cholesterol, total 216 (H) 100 - 199 mg/dL    Triglyceride 135 0 - 149 mg/dL    HDL Cholesterol 46 >39 mg/dL    VLDL, calculated 27 5 - 40 mg/dL    LDL, calculated 143 (H) 0 - 99 mg/dL   METABOLIC PANEL, COMPREHENSIVE   Result Value Ref Range    Glucose 157 (H) 65 - 99 mg/dL    BUN 29 (H) 8 - 27 mg/dL    Creatinine 1.65 (H) 0.76 - 1.27 mg/dL    GFR est non-AA 39 (L) >59 mL/min/1.73    GFR est AA 45 (L) >59 mL/min/1.73 BUN/Creatinine ratio 18 10 - 24    Sodium 145 (H) 134 - 144 mmol/L    Potassium 4.3 3.5 - 5.2 mmol/L    Chloride 105 96 - 106 mmol/L    CO2 24 20 - 29 mmol/L    Calcium 9.5 8.6 - 10.2 mg/dL    Protein, total 6.9 6.0 - 8.5 g/dL    Albumin 4.3 3.5 - 4.8 g/dL    GLOBULIN, TOTAL 2.6 1.5 - 4.5 g/dL    A-G Ratio 1.7 1.2 - 2.2    Bilirubin, total 0.4 0.0 - 1.2 mg/dL    Alk. phosphatase 113 39 - 117 IU/L    AST (SGOT) 9 0 - 40 IU/L    ALT (SGPT) 11 0 - 44 IU/L   MICROALBUMIN, UR, RAND W/ MICROALB/CREAT RATIO   Result Value Ref Range    Creatinine, urine 271.3 Not Estab. mg/dL    Microalbumin, urine 97.1 Not Estab. ug/mL    Microalb/Creat ratio (ug/mg creat.) 35.8 (H) 0.0 - 30.0 mg/g creat   HEMOGLOBIN A1C WITH EAG   Result Value Ref Range    Hemoglobin A1c 7.1 (H) 4.8 - 5.6 %    Estimated average glucose 157 mg/dL   CVD REPORT   Result Value Ref Range    INTERPRETATION Note     PDF IMAGE Not applicable    CKD REPORT   Result Value Ref Range    Interpretation Note          Physical Exam  Visit Vitals  /70 (BP 1 Location: Left arm, BP Patient Position: Sitting)   Pulse 62   Temp 97.7 °F (36.5 °C) (Oral)   Resp 16   Ht 6' 4\" (1.93 m)   Wt 256 lb (116.1 kg)   SpO2 97%   BMI 31.16 kg/m²       Head: Normocephalic, without obvious abnormality, atraumatic  Eyes: conjunctivae/corneas clear. PERRL, EOM's intact. Neck: supple, symmetrical, trachea midline, no adenopathy, thyroid: not enlarged, symmetric, no tenderness/mass/nodules, no carotid bruit and no JVD  Lungs: clear to auscultation bilaterally  Heart: regular rate and rhythm, S1, S2 normal, no murmur, click, rub or gallop  Extremities: extremities normal, atraumatic, no cyanosis or edema  Pulses: 2+ and symmetric  Lymph nodes: Cervical, supraclavicular, and axillary nodes normal.  Neurologic: Grossly normal. Baseline weakness on his right side is unchanged. ASSESSMENT and PLAN    ICD-10-CM ICD-9-CM    1.  Essential hypertension, benign G58 131.7 METABOLIC PANEL, COMPREHENSIVE   2. Type 2 diabetes with nephropathy ([facility]) H01.66 341.21 METABOLIC PANEL, COMPREHENSIVE     583.81 MICROALBUMIN, UR, RAND W/ MICROALB/CREAT RATIO      HEMOGLOBIN A1C WITH EAG   3. Dyslipidemia, goal LDL below 70 E78.5 272.4 LIPID PANEL      METABOLIC PANEL, COMPREHENSIVE   4. CKD (chronic kidney disease) stage 3, GFR 30-59 ml/min ([facility]) N18.3 585.3    5. Persistent atrial fibrillation I48.19 427.31    6. Coronary artery disease involving native coronary artery of native heart without angina pectoris I25.10 414.01    7. Reflux esophagitis K21.0 530.11    8. Iron deficiency anemia due to chronic blood loss D50.0 280.0    9. 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 TOPHER (obstructive sleep apnea) G47.33 327.23    11. Chronic fatigue R53.82 780.79      Diagnoses and all orders for this visit:    1. Essential hypertension, benign  -     METABOLIC PANEL, COMPREHENSIVE    2. Type 2 diabetes with nephropathy ([facility])  -     METABOLIC PANEL, COMPREHENSIVE  -     MICROALBUMIN, UR, RAND W/ MICROALB/CREAT RATIO  -     HEMOGLOBIN A1C WITH EAG    3. Dyslipidemia, goal LDL below 70  -     LIPID PANEL  -     METABOLIC PANEL, COMPREHENSIVE    4. CKD (chronic kidney disease) stage 3, GFR 30-59 ml/min ([facility])    5. Persistent atrial fibrillation    6. Coronary artery disease involving native coronary artery of native heart without angina pectoris    7. Reflux esophagitis    8. Iron deficiency anemia due to chronic blood loss    9. 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 TOPHER (obstructive sleep apnea)    11. Chronic fatigue    Other orders  -     CVD REPORT  -     CKD REPORT      Follow-up and Dispositions    · Return in about 6 months (around 5/1/2020) for F/U HTN,CHOL,DM, F/U of obesity.        lab results and schedule of future lab studies reviewed with patient  reviewed diet, exercise and weight control  cardiovascular risk and specific lipid/LDL goals reviewed  reviewed medications and side effects in detail  Please call my office if there are any questions- [phone]. I will arrange for follow up after the lab tests done from today return  Recommended a weekly \"heart check. \" I went into detail how to do this. Call for refills on medications as needed. Discussed expected course/resolution/complications of diagnosis in detail with patient. Medication risks/benefits/costs/interactions/alternatives discussed with patient. Pt was given an after visit summary which includes diagnoses, current medications & vitals. Pt expressed understanding with the diagnosis and plan. Total 25 minutes,60 % counseling re:   Review of  the proper technique of checking the blood pressure- check it on an average day only, not on a stressful day, sitting, no exercise for at least 1 hour and not experiencing any new pain( chronic pain is OK). Patient encouraged to check BP sitting and standing at least once a month and to report these readings to me if > 140/ 90 on average , or if the standing BP is >  15 points lower than the sitting. Always check it twice and if there is > 5 points decrease from the previous reading( top reading or systolic) keep checking it until it does not drop 5 points. Write only this final reading down, not the preceding readings. If out of these readings there is only 1 out of 4 or less > 562, or > 90 diastolic then your blood pressure is OK; it needs further treatment if it is above this. Also, don't forget,  as noted above, to check your blood pressure standing once a month; this is to detect a drop in your BP that might lead to fainting and serious injury; you check it standing with your arm hanging straight down and relaxed. Check it twice waiting 1 minute between the two readings.  If, with either one of these 2 readings there is a > 15 point drop of the systolic compared to your sitting pressure( done before the standing BP), then let me know. Following these guidelines, continue to check your BP and write down only the ones described above and it will help me to effectively treat your blood pressure. Recommended a weekly \"heart check. \" I went into detail how to do this. Regular exercise is very important to your health; it helps mentally, physically, socially; it prevents injuries if done properly. Exercise, even as simple as walking 20-30 minutes daily has major benefits to your health even though your \"numbers\" are the same in the lab. See if you can add this into your daily regimen and after a few months it will become a regular habit-\"just something you do,\" like brushing your teeth. A combination of aerobic exercise and strengthening and stretching is felt to be the best for you, so this should be your ultimate goal.   This can be done in the privacy of your home or in a group setting as at the gym  Some prefer having a , others prefer to do exercise in groups or individually. Do what \"works\" for you. You need to make it simple and \"fun,\" or you most likely you will not continue it. Reviewed symptoms, or lack thereof, of hypertension and elevated cholesterol. Discussed specific diabetic recommendations: low cholesterol diet, weight control and daily exercise discussed, home glucose monitoring emphasized, all medications, side effects and compliance discussed carefully, foot care discussed and Podiatry visits discussed, annual eye examinations at Ophthalmology discussed, glycohemoglobin and other lab monitoring discussed and long term diabetic complications discussed. Reviewed BP, cholesterol and diabetic goals. LDL goal<100,HDL goal>45, Triglyceride goal<150, A1C< 7.0, BP<140/90. BMI is significantly elevated- in the obese range. I reviewed diet, exercise and weight control.  Discussed weight control in detail, the importance of mainly decreased carbs, and for weight maintenance, exercise; discussed different diets and that it isn't as important to watch the type of foods as it is to decrease calorie intake no matter what type of diet you do, etc.       Now that pt is in normal rhythm, [facility] instead of just once to see if he can get in better shape, it may take him a few months to get there. Also encouraged him to work on his weight and discussed different diets including the Keto and Atkins. Told him it didn't matter the diet he is on as long as he is losing weight, as it would help tremendously with his health such as his DM, heart condition, and even just getting out of a chair. Also, discussed symptoms of concern that were noted today in the note above, treatment options( including doing nothing), when to follow up before recommended time frame. Also, answered all questions. This document was written by Cece Hsu, as dictated by Carrol Denney MD.  I have reviewed and agree with the above note and have made corrections where appropriate Shimon Wright M.D. This is the Subsequent Medicare Annual Wellness Exam, performed 12 months or more after the Initial AWV or the last Subsequent AWV    I have reviewed the patient's medical history in detail and updated the computerized patient record.      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    Basal cell carcinoma of anterior chest C44.519    Reflux esophagitis K21.0    CAD (coronary artery disease) I25.10    Dyslipidemia, goal LDL below 70 E78.5    Rosacea L71.9    Atrial fibrillation (HCC) I48.91    Iron deficiency anemia due to chronic blood loss D50.0    TOPHER (obstructive sleep apnea) G47.33    Closed fracture of shaft of left fibula with routine healing S82.402D    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 ([facility] [address]- @ 22 yo-weak on right arm>leg I63.40    ACP (advance care planning) Z71.89    Type 2 diabetes with nephropathy (HCC) E11.21    Chronic fatigue R53.82    Chronic heart failure with preserved ejection fraction ([facility]) I50.32    CKD (chronic kidney disease) stage 3, GFR 30-59 ml/min ([facility]) N18.3    Stroke (HCC) I63.9    Chronic anticoagulation Z79.01    Presence of Watchman left atrial appendage closure device Z95.818     Past Medical History:   Diagnosis Date    Arrhythmia     a fib    Atrial fibrillation ([facility] [address] 8/16/2013    Basal cell carcinoma of anterior chest 2/25/2010    EARS AND NOSE    CAD (coronary artery disease) 2/25/2010    CVA (cerebral infarction) 2/25/2010    patient denies 12/13/13    Diverticula of colon 2/25/2010    Dyslipidemia 12/20/2010    Essential hypertension, benign 2/25/2010    Hypertension     TOPHER (obstructive sleep apnea) 1/23/2015    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 2/25/2010    Other and unspecified hyperlipidemia 2/25/2010    Positive PPD 2/25/2010    Reflux esophagitis 2/25/2010    NO LONGER A PROBLEM    Stroke [facility])     in 1959 at age of 26    Type II or unspecified type diabetes mellitus without mention of complication, not stated as uncontrolled 2/25/2010      Past Surgical History:   Procedure Laterality Date    CARDIAC SURG PROCEDURE UNLIST      X4 STENTS    COLONOSCOPY,REMV LESN,SNARE  12/13/2013          CAPSULE ENDOSCOPE M2A  4/4/2014         HX COLONOSCOPY      HX CORONARY STENT PLACEMENT      HX HEART ASSIST DEVICE Left 09/24/2019    HX KNEE REPLACEMENT Left     HX ORTHOPAEDIC Right     FUSION IN RIGHT WRIST    HX VASECTOMY  1983    NH CARDIOVERSION ELECTIVE ARRHYTHMIA EXTERNAL N/A 9/24/2019    Ep Cardioversion performed by Romeo Magana MD at [place] [address], [facility] CATH LAB    UPPER GI ENDOSCOPY,BIOPSY  12/13/2013          Current Outpatient Medications   Medication Sig Dispense Refill    metFORMIN ER (GLUCOPHAGE XR) 500 mg tablet TAKE 4 TABLETS BY MOUTH ONCE DAILY WITH BREAKFAST 360 Tab 1    spironolactone (ALDACTONE) 25 mg tablet 25 mg daily. 3    losartan (COZAAR) 100 mg tablet TAKE 1 TABLET BY MOUTH ONCE DAILY FOR HIGH BLOOD PRESSURE 90 Tab 3    amiodarone (CORDARONE) 200 mg tablet Take 1 Tab by mouth daily. 30 Tab 1    amLODIPine (NORVASC) 10 mg tablet Take 1 Tab by mouth daily. 90 Tab 3    furosemide (LASIX) 40 mg tablet Take 1 Tab by mouth daily. (Patient taking differently: Take 80 mg by mouth daily.) 90 Tab 3    warfarin (COUMADIN) 5 mg tablet TAKE 1 AND 1/2 (ONE-HALF) TABLETS BY MOUTH ONCE DAILY 135 Tab 5    carvedilol (COREG) 6.25 mg tablet Take 1 Tab by mouth two (2) times daily (with meals). 180 Tab 3    aspirin delayed-release 81 mg tablet Take 81 mg by mouth daily. Indications: MYOCARDIAL REINFARCTION PREVENTION       Allergies   Allergen Reactions    Adhesive Tape Rash    Baycol Nausea and Vomiting    Clonidine Other (comments)     Headache and sleepiness      Pravachol [Pravastatin] Nausea and Vomiting       Family History   Problem Relation Age of Onset    Heart Disease Mother     Cancer Father         colon    Heart Disease Maternal Grandmother     Heart Disease Maternal Grandfather     Cancer Paternal Grandmother     Cancer Paternal Grandfather     Anesth Problems Neg Hx      Social History     Tobacco Use    Smoking status: Never Smoker    Smokeless tobacco: Former User     Types: Chew    Tobacco comment: patient reports quitting over 20 yeaers ago. Substance Use Topics    Alcohol use: Yes     Frequency: Never     Comment: once a year       Depression Risk Factor Screening:     3 most recent PHQ Screens 9/26/2019   Little interest or pleasure in doing things Not at all   Feeling down, depressed, irritable, or hopeless Not at all   Total Score PHQ 2 0       Alcohol Risk Factor Screening (MALE > 65):    Do you average more 1 drink per night or more than 7 drinks a week: No    In the past three months have you have had more than 4 drinks containing alcohol on one occasion: No      Functional Ability and Level of Safety:   Hearing: The patient needs further evaluation. Activities of Daily Living: The home contains: no safety equipment. Patient does total self care    Ambulation: with mild difficulty    Fall Risk:  Fall Risk Assessment, last 12 mths 10/11/2019   Able to walk? Yes   Fall in past 12 months? No   Fall with injury? -   Number of falls in past 12 months -   Fall Risk Score -       Abuse Screen:  Patient is not abused    Cognitive Screening   Has your family/caregiver stated any concerns about your memory: no  Cognitive Screening: Normal - MMSE (Mini Mental Status Exam)30/30    Patient Care Team   Patient Care Team:  Yesica Verdugo MD as PCP - Kashif Granado MD as PCP - [facility] Empaneled Provider  Jewel Delgado MD as Consulting Provider (Cardiology)  Katie Medellin MD (Pulmonary Disease)  Librado De La Torre MD (Gastroenterology)  Sandip Salas MD (Orthopedic Surgery)  Latrice Zimmerman MD (Optometry)  Bernida Kocher, MD as Physician (Sleep Medicine)  Darrin Treviño MD as Physician (Nephrology)    Assessment/Plan   Education and counseling provided:  Are appropriate based on today's review and evaluation    Diagnoses and all orders for this visit:    1. Essential hypertension, benign  -     METABOLIC PANEL, COMPREHENSIVE    2. Type 2 diabetes with nephropathy (HCC)  -     METABOLIC PANEL, COMPREHENSIVE  -     MICROALBUMIN, UR, RAND W/ MICROALB/CREAT RATIO  -     HEMOGLOBIN A1C WITH EAG    3. Dyslipidemia, goal LDL below 70  -     LIPID PANEL  -     METABOLIC PANEL, COMPREHENSIVE    4. CKD (chronic kidney disease) stage 3, GFR 30-59 ml/min ([facility])    5. Persistent atrial fibrillation    6. Coronary artery disease involving native coronary artery of native heart without angina pectoris    7. Reflux esophagitis    8.  Iron deficiency anemia due to chronic blood loss    9. 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 TOPHER (obstructive sleep apnea)    11.  Chronic fatigue    Other orders  -     CVD REPORT  -     CKD REPORT        Health Maintenance Due   Topic Date Due    GLAUCOMA SCREENING Q2Y  01/19/2017    MEDICARE YEARLY EXAM  10/25/2019

## 2019-11-02 LAB
ALBUMIN SERPL-MCNC: 4.3 G/DL (ref 3.5–4.8)
ALBUMIN/CREAT UR: 35.8 MG/G CREAT (ref 0–30)
ALBUMIN/GLOB SERPL: 1.7 {RATIO} (ref 1.2–2.2)
ALP SERPL-CCNC: 113 IU/L (ref 39–117)
ALT SERPL-CCNC: 11 IU/L (ref 0–44)
AST SERPL-CCNC: 9 IU/L (ref 0–40)
BILIRUB SERPL-MCNC: 0.4 MG/DL (ref 0–1.2)
BUN SERPL-MCNC: 29 MG/DL (ref 8–27)
BUN/CREAT SERPL: 18 (ref 10–24)
CALCIUM SERPL-MCNC: 9.5 MG/DL (ref 8.6–10.2)
CHLORIDE SERPL-SCNC: 105 MMOL/L (ref 96–106)
CHOLEST SERPL-MCNC: 216 MG/DL (ref 100–199)
CO2 SERPL-SCNC: 24 MMOL/L (ref 20–29)
CREAT SERPL-MCNC: 1.65 MG/DL (ref 0.76–1.27)
CREAT UR-MCNC: 271.3 MG/DL
EST. AVERAGE GLUCOSE BLD GHB EST-MCNC: 157 MG/DL
GLOBULIN SER CALC-MCNC: 2.6 G/DL (ref 1.5–4.5)
GLUCOSE SERPL-MCNC: 157 MG/DL (ref 65–99)
HBA1C MFR BLD: 7.1 % (ref 4.8–5.6)
HDLC SERPL-MCNC: 46 MG/DL
INTERPRETATION, 910389: NORMAL
INTERPRETATION: NORMAL
LDLC SERPL CALC-MCNC: 143 MG/DL (ref 0–99)
MICROALBUMIN UR-MCNC: 97.1 UG/ML
PDF IMAGE, 910387: NORMAL
POTASSIUM SERPL-SCNC: 4.3 MMOL/L (ref 3.5–5.2)
PROT SERPL-MCNC: 6.9 G/DL (ref 6–8.5)
SODIUM SERPL-SCNC: 145 MMOL/L (ref 134–144)
TRIGL SERPL-MCNC: 135 MG/DL (ref 0–149)
VLDLC SERPL CALC-MCNC: 27 MG/DL (ref 5–40)

## 2019-11-03 NOTE — PROGRESS NOTES
Advance Care Planning (ACP) Provider Note - Comprehensive     Date of ACP Conversation: 11/03/19  Persons included in Conversation:  patient  Length of ACP Conversation in minutes:  16 minutes    Authorized Decision Maker (if patient is incapable of making informed decisions): This person is:  Healthcare Agent/Medical Power of  under Advance Directive            General ACP for ALL Patients with Decision Making Capacity:   Importance of advance care planning, including choosing a healthcare agent to communicate patient's healthcare decisions if patient lost the ability to make decisions, such as after a sudden illness or accident  Understanding of the healthcare agent role was assessed and information provided    Review of Existing Advance Directive:  Does this advance directive still reflect your preferences? Yes (Provide new form/Refer for assistance in updating)   Advanced directive already completed and in the chart. No changes desired.       For Serious or Chronic Illness:  No known illness    Interventions Provided:  Recommended completion of Advance Directive form after review of ACP materials and conversation with prospective healthcare agent   Recommended communicating the plan and making copies for the healthcare agent, personal physician, and others as appropriate (e.g., health system)  Recommended review of completed ACP document annually or upon change in health status

## 2019-11-03 NOTE — PATIENT INSTRUCTIONS
Medicare Wellness Visit, Male The best way to live healthy is to have a lifestyle where you eat a well-balanced diet, exercise regularly, limit alcohol use, and quit all forms of tobacco/nicotine, if applicable. Regular preventive services are another way to keep healthy. Preventive services (vaccines, screening tests, monitoring & exams) can help personalize your care plan, which helps you manage your own care. Screening tests can find health problems at the earliest stages, when they are easiest to treat. [facility] follows the current, evidence-based guidelines published by the [facility] ([facility]) when recommending preventive services for our patients. Because we follow these guidelines, sometimes recommendations change over time as research supports it. (For example, a prostate screening blood test is no longer routinely recommended for men with no symptoms). Of course, you and your doctor may decide to screen more often for some diseases, based on your risk and co-morbidities (chronic disease you are already diagnosed with). Preventive services for you include: - Medicare offers their members a free annual wellness visit, which is time for you and your primary care provider to discuss and plan for your preventive service needs. Take advantage of this benefit every year! 
-All adults over age 72 should receive the recommended pneumonia vaccines. Current USPSTF guidelines recommend a series of two vaccines for the best pneumonia protection.  
-All adults should have a flu vaccine yearly and tetanus vaccine every 10 years. 
-All adults age 48 and older should receive the shingles vaccines (series of two vaccines).       
-All adults age 38-68 who are overweight should have a diabetes screening test once every three years.  
-Other screening tests & preventive services for persons with diabetes include: an eye exam to screen for diabetic retinopathy, a kidney function test, a foot exam, and stricter control over your cholesterol.  
-Cardiovascular screening for adults with routine risk involves an electrocardiogram (ECG) at intervals determined by the provider.  
-Colorectal cancer screening should be done for adults age 54-65 with no increased risk factors for colorectal cancer. There are a number of acceptable methods of screening for this type of cancer. Each test has its own benefits and drawbacks. Discuss with your provider what is most appropriate for you during your annual wellness visit. The different tests include: colonoscopy (considered the best screening method), a fecal occult blood test, a fecal DNA test, and sigmoidoscopy. 
-All adults born between [facility] should be screened once for Hepatitis C. 
-An Abdominal Aortic Aneurysm (AAA) Screening is recommended for men age 73-68 who has ever smoked in their lifetime. Here is a list of your current Health Maintenance items (your personalized list of preventive services) with a due date: 
Health Maintenance Due Topic Date Due  Glaucoma Screening   01/19/2017 [facility] Annual Well Visit  10/25/2019

## 2019-11-04 ENCOUNTER — ANTI-COAG VISIT (OUTPATIENT)
Dept: CARDIOLOGY CLINIC | Age: 79
End: 2019-11-04

## 2019-11-04 DIAGNOSIS — I48.0 PAROXYSMAL ATRIAL FIBRILLATION (HCC): ICD-10-CM

## 2019-11-04 DIAGNOSIS — I10 ESSENTIAL HYPERTENSION, BENIGN: ICD-10-CM

## 2019-11-04 DIAGNOSIS — R53.82 CHRONIC FATIGUE: Primary | ICD-10-CM

## 2019-11-04 LAB
INR BLD: 2
INR, EXTERNAL: 2 (ref 2–3)
PT POC: 23.8 SECONDS
VALID INTERNAL CONTROL?: YES

## 2019-11-05 ENCOUNTER — TELEPHONE (OUTPATIENT)
Dept: CARDIOLOGY CLINIC | Age: 79
End: 2019-11-05

## 2019-11-05 NOTE — TELEPHONE ENCOUNTER
Spoke with pt concerning YESENIA procedure. (Pt [id number]). Instructions given to pt per Bart. Pt NPO after midnight; hold metformin and Lasix and take all other meds with sip of water in AM; have someone available to drive pt to and from procedure; pack a bag in case an overnight stay is warranted. YESENIA scheduled for 10am on 11/19. Pt advised to arrive 2hrs prior to procedure for prep. Labs none. Pt expressed understanding. Opportunities for questions, clarifications, and concerns provided.

## 2019-11-07 ENCOUNTER — HOSPITAL ENCOUNTER (INPATIENT)
Age: 79
LOS: 1 days | Discharge: HOME | DRG: 065 | End: 2019-11-08
Attending: EMERGENCY MEDICINE | Admitting: INTERNAL MEDICINE
Payer: MEDICARE

## 2019-11-07 ENCOUNTER — HOSPITAL ENCOUNTER (EMERGENCY)
Dept: MRI IMAGING | Age: 79
Discharge: HOME | DRG: 065 | End: 2019-11-07
Attending: INTERNAL MEDICINE
Payer: MEDICARE

## 2019-11-07 ENCOUNTER — APPOINTMENT (OUTPATIENT)
Dept: CT IMAGING | Age: 79
DRG: 065 | End: 2019-11-07
Attending: PHYSICIAN ASSISTANT
Payer: MEDICARE

## 2019-11-07 DIAGNOSIS — I48.20 CHRONIC ATRIAL FIBRILLATION (HCC): ICD-10-CM

## 2019-11-07 DIAGNOSIS — I63.9 ACUTE CVA (CEREBROVASCULAR ACCIDENT) (HCC): ICD-10-CM

## 2019-11-07 DIAGNOSIS — Z79.01 CHRONIC ANTICOAGULATION: ICD-10-CM

## 2019-11-07 DIAGNOSIS — E78.5 DYSLIPIDEMIA, GOAL LDL BELOW 70: ICD-10-CM

## 2019-11-07 DIAGNOSIS — R47.1 DYSARTHRIA: Primary | ICD-10-CM

## 2019-11-07 DIAGNOSIS — I48.11 LONGSTANDING PERSISTENT ATRIAL FIBRILLATION (HCC): ICD-10-CM

## 2019-11-07 LAB
ANION GAP SERPL CALC-SCNC: 5 MMOL/L (ref 5–15)
APPEARANCE UR: CLEAR
BACTERIA URNS QL MICRO: NEGATIVE /HPF
BASOPHILS # BLD: 0.1 K/UL (ref 0–0.1)
BASOPHILS NFR BLD: 1 % (ref 0–1)
BILIRUB UR QL: NEGATIVE
BUN SERPL-MCNC: 33 MG/DL (ref 6–20)
BUN/CREAT SERPL: 19 (ref 12–20)
CALCIUM SERPL-MCNC: 9.4 MG/DL (ref 8.5–10.1)
CHLORIDE SERPL-SCNC: 107 MMOL/L (ref 97–108)
CO2 SERPL-SCNC: 28 MMOL/L (ref 21–32)
COLOR UR: NORMAL
CREAT SERPL-MCNC: 1.77 MG/DL (ref 0.7–1.3)
DIFFERENTIAL METHOD BLD: ABNORMAL
EOSINOPHIL # BLD: 0.2 K/UL (ref 0–0.4)
EOSINOPHIL NFR BLD: 2 % (ref 0–7)
EPITH CASTS URNS QL MICRO: NORMAL /LPF
ERYTHROCYTE [DISTWIDTH] IN BLOOD BY AUTOMATED COUNT: 15.6 % (ref 11.5–14.5)
GLUCOSE BLD STRIP.AUTO-MCNC: 114 MG/DL (ref 65–100)
GLUCOSE BLD STRIP.AUTO-MCNC: 155 MG/DL (ref 65–100)
GLUCOSE SERPL-MCNC: 128 MG/DL (ref 65–100)
GLUCOSE UR STRIP.AUTO-MCNC: NEGATIVE MG/DL
HCT VFR BLD AUTO: 39 % (ref 36.6–50.3)
HGB BLD-MCNC: 12.3 G/DL (ref 12.1–17)
HGB UR QL STRIP: NEGATIVE
HYALINE CASTS URNS QL MICRO: NORMAL /LPF (ref 0–5)
IMM GRANULOCYTES # BLD AUTO: 0.1 K/UL (ref 0–0.04)
IMM GRANULOCYTES NFR BLD AUTO: 2 % (ref 0–0.5)
INR PPP: 2.5 (ref 0.9–1.1)
KETONES UR QL STRIP.AUTO: NEGATIVE MG/DL
LEUKOCYTE ESTERASE UR QL STRIP.AUTO: NEGATIVE
LYMPHOCYTES # BLD: 1.8 K/UL (ref 0.8–3.5)
LYMPHOCYTES NFR BLD: 21 % (ref 12–49)
MCH RBC QN AUTO: 30.3 PG (ref 26–34)
MCHC RBC AUTO-ENTMCNC: 31.5 G/DL (ref 30–36.5)
MCV RBC AUTO: 96.1 FL (ref 80–99)
MONOCYTES # BLD: 0.6 K/UL (ref 0–1)
MONOCYTES NFR BLD: 7 % (ref 5–13)
NEUTS SEG # BLD: 5.7 K/UL (ref 1.8–8)
NEUTS SEG NFR BLD: 67 % (ref 32–75)
NITRITE UR QL STRIP.AUTO: NEGATIVE
NRBC # BLD: 0 K/UL (ref 0–0.01)
NRBC BLD-RTO: 0 PER 100 WBC
PH UR STRIP: 5 [PH] (ref 5–8)
PLATELET # BLD AUTO: 284 K/UL (ref 150–400)
PMV BLD AUTO: 11.1 FL (ref 8.9–12.9)
POTASSIUM SERPL-SCNC: 4.1 MMOL/L (ref 3.5–5.1)
PROT UR STRIP-MCNC: NEGATIVE MG/DL
PROTHROMBIN TIME: 23.8 SEC (ref 9–11.1)
RBC # BLD AUTO: 4.06 M/UL (ref 4.1–5.7)
RBC #/AREA URNS HPF: NORMAL /HPF (ref 0–5)
SERVICE CMNT-IMP: ABNORMAL
SERVICE CMNT-IMP: ABNORMAL
SODIUM SERPL-SCNC: 140 MMOL/L (ref 136–145)
SP GR UR REFRACTOMETRY: 1.01 (ref 1–1.03)
TROPONIN I SERPL-MCNC: <0.05 NG/ML
UR CULT HOLD, URHOLD: NORMAL
UROBILINOGEN UR QL STRIP.AUTO: 0.2 EU/DL (ref 0.2–1)
WBC # BLD AUTO: 8.5 K/UL (ref 4.1–11.1)
WBC URNS QL MICRO: NORMAL /HPF (ref 0–4)

## 2019-11-07 PROCEDURE — 85610 PROTHROMBIN TIME: CPT

## 2019-11-07 PROCEDURE — 70450 CT HEAD/BRAIN W/O DYE: CPT

## 2019-11-07 PROCEDURE — 96375 TX/PRO/DX INJ NEW DRUG ADDON: CPT

## 2019-11-07 PROCEDURE — 70544 MR ANGIOGRAPHY HEAD W/O DYE: CPT

## 2019-11-07 PROCEDURE — 70553 MRI BRAIN STEM W/O & W/DYE: CPT

## 2019-11-07 PROCEDURE — 96374 THER/PROPH/DIAG INJ IV PUSH: CPT

## 2019-11-07 PROCEDURE — 74011250636 HC RX REV CODE- 250/636: Performed by: RADIOLOGY

## 2019-11-07 PROCEDURE — 84484 ASSAY OF TROPONIN QUANT: CPT

## 2019-11-07 PROCEDURE — 82962 GLUCOSE BLOOD TEST: CPT

## 2019-11-07 PROCEDURE — 74011250637 HC RX REV CODE- 250/637: Performed by: INTERNAL MEDICINE

## 2019-11-07 PROCEDURE — 85025 COMPLETE CBC W/AUTO DIFF WBC: CPT

## 2019-11-07 PROCEDURE — 93005 ELECTROCARDIOGRAM TRACING: CPT

## 2019-11-07 PROCEDURE — 81001 URINALYSIS AUTO W/SCOPE: CPT

## 2019-11-07 PROCEDURE — A9575 INJ GADOTERATE MEGLUMI 0.1ML: HCPCS | Performed by: RADIOLOGY

## 2019-11-07 PROCEDURE — 36415 COLL VENOUS BLD VENIPUNCTURE: CPT

## 2019-11-07 PROCEDURE — 80048 BASIC METABOLIC PNL TOTAL CA: CPT

## 2019-11-07 PROCEDURE — 99283 EMERGENCY DEPT VISIT LOW MDM: CPT

## 2019-11-07 PROCEDURE — 65660000000 HC RM CCU STEPDOWN

## 2019-11-07 RX ORDER — WARFARIN SODIUM 5 MG/1
5 TABLET ORAL
Status: DISCONTINUED | OUTPATIENT
Start: 2019-11-07 | End: 2019-11-07 | Stop reason: DRUGHIGH

## 2019-11-07 RX ORDER — ATORVASTATIN CALCIUM 20 MG/1
40 TABLET, FILM COATED ORAL DAILY
Status: DISCONTINUED | OUTPATIENT
Start: 2019-11-07 | End: 2019-11-08 | Stop reason: HOSPADM

## 2019-11-07 RX ORDER — GADOTERATE MEGLUMINE 376.9 MG/ML
20 INJECTION INTRAVENOUS
Status: COMPLETED | OUTPATIENT
Start: 2019-11-07 | End: 2019-11-07

## 2019-11-07 RX ORDER — METFORMIN HYDROCHLORIDE 500 MG/1
2000 TABLET, EXTENDED RELEASE ORAL
Status: DISCONTINUED | OUTPATIENT
Start: 2019-11-08 | End: 2019-11-08 | Stop reason: HOSPADM

## 2019-11-07 RX ORDER — CARVEDILOL 6.25 MG/1
6.25 TABLET ORAL 2 TIMES DAILY WITH MEALS
Status: DISCONTINUED | OUTPATIENT
Start: 2019-11-07 | End: 2019-11-08

## 2019-11-07 RX ORDER — AMLODIPINE BESYLATE 5 MG/1
10 TABLET ORAL DAILY
Status: DISCONTINUED | OUTPATIENT
Start: 2019-11-08 | End: 2019-11-08 | Stop reason: HOSPADM

## 2019-11-07 RX ORDER — LOSARTAN POTASSIUM 50 MG/1
100 TABLET ORAL DAILY
Status: DISCONTINUED | OUTPATIENT
Start: 2019-11-08 | End: 2019-11-08 | Stop reason: HOSPADM

## 2019-11-07 RX ORDER — INSULIN LISPRO 100 [IU]/ML
INJECTION, SOLUTION INTRAVENOUS; SUBCUTANEOUS
Status: DISCONTINUED | OUTPATIENT
Start: 2019-11-07 | End: 2019-11-08 | Stop reason: HOSPADM

## 2019-11-07 RX ORDER — SPIRONOLACTONE 25 MG/1
25 TABLET ORAL DAILY
Status: DISCONTINUED | OUTPATIENT
Start: 2019-11-08 | End: 2019-11-08 | Stop reason: HOSPADM

## 2019-11-07 RX ORDER — ASPIRIN 81 MG/1
81 TABLET ORAL DAILY
Status: DISCONTINUED | OUTPATIENT
Start: 2019-11-08 | End: 2019-11-08 | Stop reason: HOSPADM

## 2019-11-07 RX ORDER — FUROSEMIDE 20 MG/1
40 TABLET ORAL DAILY
COMMUNITY
End: 2019-12-27

## 2019-11-07 RX ORDER — ASPIRIN 325 MG
325 TABLET ORAL DAILY
Status: DISCONTINUED | OUTPATIENT
Start: 2019-11-07 | End: 2019-11-07

## 2019-11-07 RX ORDER — MAGNESIUM SULFATE 100 %
4 CRYSTALS MISCELLANEOUS AS NEEDED
Status: DISCONTINUED | OUTPATIENT
Start: 2019-11-07 | End: 2019-11-08 | Stop reason: HOSPADM

## 2019-11-07 RX ORDER — FUROSEMIDE 40 MG/1
40 TABLET ORAL DAILY
Status: DISCONTINUED | OUTPATIENT
Start: 2019-11-08 | End: 2019-11-08 | Stop reason: HOSPADM

## 2019-11-07 RX ORDER — AMIODARONE HYDROCHLORIDE 200 MG/1
200 TABLET ORAL
Status: DISCONTINUED | OUTPATIENT
Start: 2019-11-08 | End: 2019-11-08

## 2019-11-07 RX ORDER — DEXTROSE MONOHYDRATE 100 MG/ML
0-250 INJECTION, SOLUTION INTRAVENOUS AS NEEDED
Status: DISCONTINUED | OUTPATIENT
Start: 2019-11-07 | End: 2019-11-08 | Stop reason: HOSPADM

## 2019-11-07 RX ADMIN — ATORVASTATIN CALCIUM 40 MG: 20 TABLET, FILM COATED ORAL at 20:14

## 2019-11-07 RX ADMIN — GADOTERATE MEGLUMINE 20 ML: 376.9 INJECTION INTRAVENOUS at 17:48

## 2019-11-07 RX ADMIN — CARVEDILOL 6.25 MG: 6.25 TABLET, FILM COATED ORAL at 20:14

## 2019-11-07 RX ADMIN — WARFARIN SODIUM 7.5 MG: 2.5 TABLET ORAL at 20:14

## 2019-11-07 NOTE — ED TRIAGE NOTES
Patient reports slurred speech that began yesterday morning. Patient adds pain at base at neck. Denies chest pain or SOB. At baseline patient has right sided weakness from accident at age 24. Per wife yesterday he had a few episodes of aphagia.

## 2019-11-07 NOTE — PROGRESS NOTES
MRI    Study Result     PRELIMINARY REPORT     MR of the brain demonstrates mild prominence of ventricles and sulci. There are  moderate changes small vessel disease periventricular white matter. 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     There is acute infarction involving the mid annette just to left of midline.  There  are multiple small foci of hemosiderin deposition in the brain may represent  cerebral amyloid angiopathy.

## 2019-11-07 NOTE — PROGRESS NOTES
[facility] [place] Pharmacy Dosing Services: 11/07/19    Consult for Warfarin Dosing by Pharmacy by Dr. Gissel Russell provided for this 67 y.o.  male , for indication of Atrial Fibrillation. Day of Therapy: Continued from PTA med list  Home Regimen: 7.5 mg PO every evening  Dose to achieve an INR goal of 2-3    Plan:  H/H and platelets are stable, no documentation of any bleeding events in progress note, CT head shows no intracranial hemorrhage  Today's INR of 2.5 is within therapeutic range  Will order home dose of 7.5 mg PO x 1 due at 1800 and d/c warfarin 5 mg PO every bedtime order entered by hospitalist  Will order INR daily with AM labs per protocol    Significant drug interactions: Amiodarone 200 mg daily (home med), aspirin 81 mg daily  Previous dose given 7.5 mg yesterday evening on 11/6   PT/INR Lab Results   Component Value Date/Time    INR 2.5 (H) 11/07/2019 11:56 AM        Platelets Lab Results   Component Value Date/Time    PLATELET 674 15/28/6933 11:56 AM        H/H Lab Results   Component Value Date/Time    HGB 12.3 11/07/2019 11:56 AM          Pharmacy to follow daily and will provide subsequent Warfarin dosing based on clinical status.   BLAINE Coombs  Contact information [phone]

## 2019-11-07 NOTE — H&P
Hospitalist Admission Note    NAME: Michael Escobar   :  1940   MRN:  [id number]     Date/Time:  2019 4:52 PM    Patient PCP: Ronald Drew MD  ________________________________________________________________________    My assessment of this patient's clinical condition and my plan of care is as follows. Assessment / Plan:    1) Difficulties finding words: CT head neg, no current neuro deficits. Will keep on telemetry, Get and MRI/MRA brain, neurology consult for eval and recs    2) Hx A fib: S/P watchman procedure, no issues at this time. As per patient he is to have an YESENIA with his cardiologist.  This week. I will put a cardiology consult to evaluate maybe he gets the YESENIA here. INR 2.5, continue coumadin , will consult pharmacy for dosing    3) Hx CAD: No CP, troponin NEG, contiue home meds    4) Hx DM: , continue home meds, will add RISS      Code Status: full  Surrogate Decision Maker:    DVT Prophylaxis: yes  GI Prophylaxis: not indicated    Baseline: lives at home        Subjective:   CHIEF COMPLAINT: Difficulties finding words    HISTORY OF PRESENT ILLNESS:     Kristi Brandon is a 67 y.o. male PMH A. Fib, CAD, DM PUD, who comes to the hospital because since yesterday he has been having difficulties finding words. He nedies any other neuro deficit. He mentione some headache. No palpitations, CP or SOB    We were asked to admit for work up and evaluation of the above problems.      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 2/25/2010    Other and unspecified hyperlipidemia 2/25/2010    Positive PPD 2/25/2010    Reflux esophagitis 2/25/2010    NO LONGER A PROBLEM    Stroke [facility])     in 1959 at age of 26    Type II or unspecified type diabetes mellitus without mention of complication, not stated as uncontrolled 2/25/2010        Past Surgical History:   Procedure Laterality Date    CARDIAC SURG PROCEDURE UNLIST      X4 STENTS    Antony Gerardo  12/13/2013         HC CAPSULE ENDOSCOPE M2A  4/4/2014         HX COLONOSCOPY      HX CORONARY STENT PLACEMENT      HX HEART ASSIST DEVICE Left 09/24/2019    HX KNEE REPLACEMENT Left     HX ORTHOPAEDIC Right     FUSION IN RIGHT WRIST    HX VASECTOMY  1983    [place] CARDIOVERSION ELECTIVE ARRHYTHMIA EXTERNAL N/A 9/24/2019    Ep Cardioversion performed by Francheska Chinchilla MD at [address], [facility] CATH LAB    UPPER GI ENDOSCOPY,BIOPSY  12/13/2013            Social History     Tobacco Use    Smoking status: Never Smoker    Smokeless tobacco: Former User     Types: Chew    Tobacco comment: patient reports quitting over 20 yeaers ago. Substance Use Topics    Alcohol use: Yes     Frequency: Never     Comment: once a year        Family History   Problem Relation Age of Onset    Heart Disease Mother     Cancer Father         colon    Heart Disease Maternal Grandmother     Heart Disease Maternal Grandfather     Cancer Paternal Grandmother     Cancer Paternal Grandfather     Anesth Problems Neg Hx      Allergies   Allergen Reactions    Adhesive Tape Rash    Baycol Nausea and Vomiting    Clonidine Other (comments)     Headache and sleepiness      Pravachol [Pravastatin] Nausea and Vomiting        Prior to Admission medications    Medication Sig Start Date End Date Taking? Authorizing Provider   furosemide (LASIX) 20 mg tablet Take 40 mg by mouth daily.    Yes Provider, Historical   metFORMIN ER (GLUCOPHAGE XR) 500 mg tablet TAKE 4 TABLETS BY MOUTH ONCE DAILY WITH BREAKFAST 10/28/19  Yes Yesica Verdugo MD   spironolactone (ALDACTONE) 25 mg tablet 25 mg daily. 9/21/19  Yes Provider, Historical   losartan (COZAAR) 100 mg tablet TAKE 1 TABLET BY MOUTH ONCE DAILY FOR HIGH BLOOD PRESSURE 9/23/19  Yes Rio Leon NP   amiodarone (CORDARONE) 200 mg tablet Take 1 Tab by mouth daily. 9/18/19  Yes Flaquita Rivas NP   amLODIPine (NORVASC) 10 mg tablet Take 1 Tab by mouth daily. 9/4/19  Yes Sheila Llanos MD   warfarin (COUMADIN) 5 mg tablet TAKE 1 AND 1/2 (ONE-HALF) TABLETS BY MOUTH ONCE DAILY 9/3/19  Yes Rio Leon NP   carvedilol (COREG) 6.25 mg tablet Take 1 Tab by mouth two (2) times daily (with meals). 5/15/19  Yes Sheila Llanos MD   aspirin delayed-release 81 mg tablet Take 81 mg by mouth daily. Indications: MYOCARDIAL REINFARCTION PREVENTION   Yes Provider, Historical       REVIEW OF SYSTEMS:     I am not able to complete the review of systems because:    The patient is intubated and sedated    The patient has altered mental status due to his acute medical problems    The patient has baseline aphasia from prior stroke(s)    The patient has baseline dementia and is not reliable historian    The patient is in acute medical distress and unable to provide information           Total of 12 systems reviewed as follows:       POSITIVE= underlined text  Negative = text not underlined  General:  fever, chills, sweats, generalized weakness, weight loss/gain,      loss of appetite   Eyes:    blurred vision, eye pain, loss of vision, double vision  ENT:    rhinorrhea, pharyngitis   Respiratory:   cough, sputum production, SOB, HURD, wheezing, pleuritic pain   Cardiology:   chest pain, palpitations, orthopnea, PND, edema, syncope   Gastrointestinal:  abdominal pain , N/V, diarrhea, dysphagia, constipation, bleeding   Genitourinary:  frequency, urgency, dysuria, hematuria, incontinence   Muskuloskeletal :  arthralgia, myalgia, back pain  Hematology:  easy bruising, nose or gum bleeding, lymphadenopathy   Dermatological: rash, ulceration, pruritis, color change / jaundice  Endocrine:   hot flashes or polydipsia   Neurological:  headache, dizziness, confusion, focal weakness, paresthesia,     Speech difficulties, memory loss, gait difficulty  Psychological: Feelings of anxiety, depression, agitation    Objective:   VITALS:    Visit Vitals  /78   Pulse (!) 52   Temp 97.5 °F (36.4 °C)   Resp 17   Ht 6' 3\" (1.905 m)   Wt 111.1 kg (245 lb)   SpO2 96%   BMI 30.62 kg/m²       PHYSICAL EXAM:    General:    Alert, cooperative, no distress, appears stated age. HEENT: Atraumatic, anicteric sclerae, pink conjunctivae     No oral ulcers, mucosa moist, throat clear, dentition fair  Neck:  Supple, symmetrical,  thyroid: non tender  Lungs:   Clear to auscultation bilaterally. No Wheezing or Rhonchi. No rales. Chest wall:  No tenderness  No Accessory muscle use. Heart:   Regular  rhythm,  No  murmur   No edema  Abdomen:   Soft, non-tender. Not distended. Bowel sounds normal  Extremities: No cyanosis. No clubbing,      Skin turgor normal, Capillary refill normal, Radial dial pulse 2+  Skin:     Not pale. Not Jaundiced  No rashes   Psych:  Good insight. Not depressed. Not anxious or agitated. Neurologic: EOMs intact. No facial asymmetry. No aphasia or slurred speech. Symmetrical strength, Sensation grossly intact.  Alert and oriented X 3.     _______________________________________________________________________  Care Plan discussed with:    Comments   Patient x    Family  x    RN     Care Manager                    Consultant:  carley SEYMOUR MD   _______________________________________________________________________  Expected  Disposition:   Home with Family x   HH/PT/OT/RN    SNF/LTC    [place]    ________________________________________________________________________  TOTAL TIME:  35  Minutes    Critical Care Provided     Minutes non procedure based      Comments     Reviewed previous records >50% of visit spent in counseling and coordination of care  Discussion with patient and/or family and questions answered       ________________________________________________________________________  Signed: Anne Marie Hanks MD    Procedures: see electronic medical records for all procedures/Xrays and details which were not copied into this note but were reviewed prior to creation of Plan. LAB DATA REVIEWED:    Recent Results (from the past 24 hour(s))   GLUCOSE, POC    Collection Time: 11/07/19 11:55 AM   Result Value Ref Range    Glucose (POC) 114 (H) 65 - 100 mg/dL    Performed by Paul HARMAN (Tech)    CBC WITH AUTOMATED DIFF    Collection Time: 11/07/19 11:56 AM   Result Value Ref Range    WBC 8.5 4.1 - 11.1 K/uL    RBC 4.06 (L) 4.10 - 5.70 M/uL    HGB 12.3 12.1 - 17.0 g/dL    HCT 39.0 36.6 - 50.3 %    MCV 96.1 80.0 - 99.0 FL    MCH 30.3 26.0 - 34.0 PG    MCHC 31.5 30.0 - 36.5 g/dL    RDW 15.6 (H) 11.5 - 14.5 %    PLATELET 966 245 - 953 K/uL    MPV 11.1 8.9 - 12.9 FL    NRBC 0.0 0  WBC    ABSOLUTE NRBC 0.00 0.00 - 0.01 K/uL    NEUTROPHILS 67 32 - 75 %    LYMPHOCYTES 21 12 - 49 %    MONOCYTES 7 5 - 13 %    EOSINOPHILS 2 0 - 7 %    BASOPHILS 1 0 - 1 %    IMMATURE GRANULOCYTES 2 (H) 0.0 - 0.5 %    ABS. NEUTROPHILS 5.7 1.8 - 8.0 K/UL    ABS. LYMPHOCYTES 1.8 0.8 - 3.5 K/UL    ABS. MONOCYTES 0.6 0.0 - 1.0 K/UL    ABS. EOSINOPHILS 0.2 0.0 - 0.4 K/UL    ABS. BASOPHILS 0.1 0.0 - 0.1 K/UL    ABS. IMM.  GRANS. 0.1 (H) 0.00 - 0.04 K/UL    DF AUTOMATED     METABOLIC PANEL, BASIC    Collection Time: 11/07/19 11:56 AM   Result Value Ref Range    Sodium 140 136 - 145 mmol/L    Potassium 4.1 3.5 - 5.1 mmol/L    Chloride 107 97 - 108 mmol/L    CO2 28 21 - 32 mmol/L    Anion gap 5 5 - 15 mmol/L    Glucose 128 (H) 65 - 100 mg/dL    BUN 33 (H) 6 - 20 MG/DL    Creatinine 1.77 (H) 0.70 - 1.30 MG/DL    BUN/Creatinine ratio 19 12 - 20      GFR est AA 45 (L) >60 ml/min/1.73m2    GFR est non-AA 37 (L) >60 ml/min/1.73m2    Calcium 9.4 8.5 - 10.1 MG/DL   URINALYSIS W/MICROSCOPIC    Collection Time: 11/07/19 11:56 AM   Result Value Ref Range    Color YELLOW/STRAW      Appearance CLEAR CLEAR      Specific gravity 1.010 1.003 - 1.030      pH (UA) 5.0 5.0 - 8.0      Protein NEGATIVE  NEG mg/dL    Glucose NEGATIVE  NEG mg/dL    Ketone NEGATIVE  NEG mg/dL    Bilirubin NEGATIVE  NEG      Blood NEGATIVE  NEG      Urobilinogen 0.2 0.2 - 1.0 EU/dL    Nitrites NEGATIVE  NEG      Leukocyte Esterase NEGATIVE  NEG      WBC 0-4 0 - 4 /hpf    RBC 0-5 0 - 5 /hpf    Epithelial cells FEW FEW /lpf    Bacteria NEGATIVE  NEG /hpf    Hyaline cast 2-5 0 - 5 /lpf   URINE CULTURE HOLD SAMPLE    Collection Time: 11/07/19 11:56 AM   Result Value Ref Range    Urine culture hold        URINE ON HOLD IN MICROBIOLOGY DEPT FOR 3 DAYS. IF UNPRESERVED URINE IS SUBMITTED, IT CANNOT BE USED FOR ADDITIONAL TESTING AFTER 24 HRS, RECOLLECTION WILL BE REQUIRED.    TROPONIN I    Collection Time: 11/07/19 11:56 AM   Result Value Ref Range    Troponin-I, Qt. <0.05 <0.05 ng/mL   PROTHROMBIN TIME + INR    Collection Time: 11/07/19 11:56 AM   Result Value Ref Range    INR 2.5 (H) 0.9 - 1.1      Prothrombin time 23.8 (H) 9.0 - 11.1 sec

## 2019-11-07 NOTE — ED PROVIDER NOTES
67 y.o. male with past medical history significant for diverticulitis, HTN, type 2 DM, CAD, CVA, a-fib, stroke, basel cell carcinoma, who presents from home via wife with chief complaint of slurred speech. Pt states he has had slurred speech and word finding issues since he woke up yesterday morning. Pt states is family member have told him that he sounds \"drunk\", and he is noticeably stuttering. Pt states he also has a headache that also began upon waking yesterday the begins at the base of the neck, and radiates to his shoulders. Pt states he had a watchman placed in his heart recently, and was hoping to be able to stop taking warfarin for a-fib. Pt also had a PCP appointment last Friday and states \"everything was okay\". He notes he has an echo scheduled for November 19th. Pt denies any fever, chills, numbness, nausea, vomiting, urine changes, chest pain, abdominal pain, and SOB. There are no other acute medical concerns at this time. Social hx: Former Smoker, Rare EtOH use, No illicit drug use  PCP: Kate Campos MD        Note written by Portillo Vyas, as dictated by Vince Garrett MD 3:22 PM      The history is provided by the patient and the spouse. No  was used.         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 2/25/2010    Other and unspecified hyperlipidemia 2/25/2010    Positive PPD 2/25/2010    Reflux esophagitis 2/25/2010    NO LONGER A PROBLEM    Stroke [facility])     in 65 at age of 26    Type II or unspecified type diabetes mellitus without mention of complication, not stated as uncontrolled 2/25/2010       Past Surgical History:   Procedure Laterality Date    CARDIAC SURG PROCEDURE UNLIST      X4 STENTS    Xavi Blade  12/13/2013         HC CAPSULE ENDOSCOPE M2A  4/4/2014         HX COLONOSCOPY      HX CORONARY STENT PLACEMENT      HX HEART ASSIST DEVICE Left 09/24/2019    HX KNEE REPLACEMENT Left     HX ORTHOPAEDIC Right     FUSION IN RIGHT WRIST    HX VASECTOMY  1983    [place] CARDIOVERSION ELECTIVE ARRHYTHMIA EXTERNAL N/A 9/24/2019    Ep Cardioversion performed by Adalberto Ojeda MD [facility] [address] [facility] [address] CATH LAB    UPPER GI ENDOSCOPY,BIOPSY  12/13/2013              Family History:   Problem Relation Age of Onset    Heart Disease Mother     Cancer Father         colon    Heart Disease Maternal Grandmother     Heart Disease Maternal Grandfather     Cancer Paternal Grandmother     Cancer Paternal Grandfather     Anesth Problems Neg Hx        Social History     Socioeconomic History    Marital status:      Spouse name: Not on file    Number of children: Not on file    Years of education: Not on file    Highest education level: Not on file   Occupational History    Not on file   Social Needs    Financial resource strain: Not on file    Food insecurity:     Worry: Not on file     Inability: Not on file    Transportation needs:     Medical: Not on file     Non-medical: Not on file   Tobacco Use    Smoking status: Never Smoker    Smokeless tobacco: Former User     Types: Chew    Tobacco comment: patient reports quitting over 20 yeaers ago.     Substance and Sexual Activity    Alcohol use: Yes     Frequency: Never     Comment: once a year    Drug use: No    Sexual activity: Not Currently   Lifestyle    Physical activity:     Days per week: Not on file     Minutes per session: Not on file    Stress: Not on file   Relationships    Social connections:     Talks on phone: Not on file     Gets together: Not on file     Attends Congregational service: Not on file     Active member of club or organization: Not on file     Attends meetings of clubs or organizations: Not on file     Relationship status: Not on file    Intimate partner violence:     Fear of current or ex partner: Not on file     Emotionally abused: Not on file     Physically abused: Not on file     Forced sexual activity: Not on file   Other Topics Concern     Service Yes    Blood Transfusions Yes    Caffeine Concern Yes     Comment: 2-3 cups of coffee daily    Occupational Exposure No    Hobby Hazards No    Sleep Concern No    Stress Concern No    Weight Concern No    Special Diet No    Back Care No    Exercise Yes    Bike Helmet No     Comment: pt doesn't ride a bike   [place] Yes    Self-Exams No   Social History Narrative    Not on file         ALLERGIES: Adhesive tape; Baycol; Clonidine; and Pravachol [pravastatin]    Review of Systems   Constitutional: Negative for chills and fever. HENT: Negative for rhinorrhea and sore throat. Respiratory: Negative for cough and shortness of breath. Cardiovascular: Negative for chest pain. Gastrointestinal: Negative for abdominal pain, diarrhea, nausea and vomiting. Genitourinary: Negative for dysuria and hematuria. Musculoskeletal: Negative for arthralgias and myalgias. Skin: Negative for pallor and rash. Neurological: Positive for speech difficulty. Negative for dizziness, weakness and light-headedness. All other systems reviewed and are negative. Vitals:    11/07/19 1139   BP: 170/76   Pulse: (!) 55   Resp: 15   Temp: 97.5 °F (36.4 °C)   SpO2: 96%   Weight: 111.1 kg (245 lb)   Height: 6' 3\" (1.905 m)            Physical Exam   Vital signs reviewed. Nursing notes reviewed.     Const:  No acute distress, well developed, well nourished  Head:  Atraumatic, normocephalic  Eyes:  PERRL, conjunctiva normal, no scleral icterus  Neck: Supple, trachea midline  Cardiovascular:  RRR, no murmurs, no gallops, no rubs  Resp:  No resp distress, no increased work of breathing, no wheezes, no rhonchi, no rales,  Abd:  Soft, non-tender, non-distended, no rebound, no guarding, no CVA tenderness  MSK:  No pedal edema, 2/6 strength RUE (chronic r/t football injury @ 19), = bilateral LE strength. Neuro:  Alert and oriented x3, no cranial nerve defect, slurred speech, noticeable word finding difficulties. Skin:  Warm, dry, intact  Psych: normal mood and affect, behavior is normal, judgement and thought content is normal    Note written by Portillo Rosas, as dictated by Ramona Quiroz MD 3:22 PM      MDM  Number of Diagnoses or Management Options  Dysarthria:      Amount and/or Complexity of Data Reviewed  Clinical lab tests: reviewed and ordered  Tests in the radiology section of CPT®: reviewed and ordered  Review and summarize past medical records: yes  Discuss the patient with other providers: yes    Patient Progress  Patient progress: stable         Mr. Reed Salas is a 68 yo male who presents to the ER with complaints of difficulty speaking. Concern for acute stroke. No mass or bleed on head CT. Pt's INR is theraputic. Concern for stroke. Pt.  To be evaluated for admission by the hospitalist.      Procedures

## 2019-11-07 NOTE — ED NOTES
11:39 AM  I have evaluated the patient as the Provider in Triage. I have reviewed His vital signs and the triage nurse assessment. I have talked with the patient and any available family and advised that I am the provider in triage and have ordered the appropriate study to initiate their work up based on the clinical presentation during my assessment. I have advised that the patient will be accommodated in the Main ED as soon as possible. I have also requested to contact the triage nurse or myself immediately if the patient experiences any changes in their condition during this brief waiting period. Pt here with slurred speech which started yesterday morning when he woke up. 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 Pt also complains of headache since the slurred speech started.     Trey Christensen PA-C

## 2019-11-07 NOTE — ED NOTES
Received call from MRI tech re: radiologist's preliminary result that showed acute stroke. Called Dr. Aziza Rayo to notify him. Updated patient and family. Per patient, he feels slurring and word retrieval has improved slightly since he arrived to ED.

## 2019-11-07 NOTE — PROGRESS NOTES
[facility]: MED RECONCILIATION    Information obtained from: Patient     Allergies: Adhesive tape; Baycol; Clonidine; and Pravachol [pravastatin]    Comment:  Warfarin: Current dose 7.5 mg daily in evening. INR was 2 on 11/4/19 per patient and it is 2.5 today. Prior to Admission Medications:     Medication Documentation Review Audit       Reviewed by MARIO StantonD (Pharmacist) on 11/07/19 at 1553      Medication Sig Documenting Provider Last Dose Status Taking?   amiodarone (CORDARONE) 200 mg tablet Take 1 Tab by mouth daily. Payal Nguyen NP 11/7/2019 AM Active Yes   amLODIPine (NORVASC) 10 mg tablet Take 1 Tab by mouth daily. Silvino Mcqueen MD 11/7/2019 AM Active Yes   aspirin delayed-release 81 mg tablet Take 81 mg by mouth daily. Indications: MYOCARDIAL REINFARCTION PREVENTION Provider, Historical 11/7/2019 AM Active Yes   carvedilol (COREG) 6.25 mg tablet Take 1 Tab by mouth two (2) times daily (with meals). Silvino Mcqueen MD 11/7/2019 AM Active Yes   furosemide (LASIX) 20 mg tablet Take 40 mg by mouth daily. Provider, Historical 11/7/2019 AM Active Yes   losartan (COZAAR) 100 mg tablet TAKE 1 TABLET BY MOUTH ONCE DAILY FOR HIGH BLOOD PRESSURE Kapil Cerrato NP 11/7/2019 AM Active Yes   metFORMIN ER (GLUCOPHAGE XR) 500 mg tablet TAKE 4 TABLETS BY MOUTH ONCE DAILY WITH Santino Bruner MD 11/7/2019 AM Active Yes   spironolactone (ALDACTONE) 25 mg tablet 25 mg daily.  Provider, Historical 11/7/2019 AM Active Yes   warfarin (COUMADIN) 5 mg tablet TAKE 1 AND 1/2 (ONE-HALF) TABLETS BY MOUTH ONCE DAILY Kapil Cerrato NP 11/6/2019 PM Active Yes                        [address] [facility], PHARMD   Contact: [phone]

## 2019-11-08 ENCOUNTER — APPOINTMENT (OUTPATIENT)
Dept: VASCULAR SURGERY | Age: 79
DRG: 065 | End: 2019-11-08
Attending: INTERNAL MEDICINE
Payer: MEDICARE

## 2019-11-08 ENCOUNTER — TELEPHONE (OUTPATIENT)
Dept: CARDIOLOGY CLINIC | Age: 79
End: 2019-11-08

## 2019-11-08 ENCOUNTER — APPOINTMENT (OUTPATIENT)
Dept: NON INVASIVE DIAGNOSTICS | Age: 79
DRG: 065 | End: 2019-11-08
Payer: MEDICARE

## 2019-11-08 VITALS
RESPIRATION RATE: 18 BRPM | WEIGHT: 245 LBS | HEART RATE: 58 BPM | DIASTOLIC BLOOD PRESSURE: 73 MMHG | OXYGEN SATURATION: 98 % | SYSTOLIC BLOOD PRESSURE: 132 MMHG | TEMPERATURE: 97.8 F | BODY MASS INDEX: 30.46 KG/M2 | HEIGHT: 75 IN

## 2019-11-08 LAB
ATRIAL RATE: 52 BPM
CALCULATED P AXIS, ECG09: 72 DEGREES
CALCULATED R AXIS, ECG10: -21 DEGREES
CALCULATED T AXIS, ECG11: 14 DEGREES
DIAGNOSIS, 93000: NORMAL
GLUCOSE BLD STRIP.AUTO-MCNC: 116 MG/DL (ref 65–100)
GLUCOSE BLD STRIP.AUTO-MCNC: 212 MG/DL (ref 65–100)
INR PPP: 2.7 (ref 0.9–1.1)
LEFT CCA DIST DIAS: 15.3 CM/S
LEFT CCA DIST SYS: 80.4 CM/S
LEFT CCA PROX DIAS: 21.2 CM/S
LEFT CCA PROX SYS: 96.4 CM/S
LEFT ECA DIAS: 8.36 CM/S
LEFT ECA SYS: 117.6 CM/S
LEFT ICA DIST DIAS: 24.8 CM/S
LEFT ICA DIST SYS: 125.2 CM/S
LEFT ICA MID DIAS: 14.2 CM/S
LEFT ICA MID SYS: 62.6 CM/S
LEFT ICA PROX DIAS: 15 CM/S
LEFT ICA PROX SYS: 89.9 CM/S
LEFT ICA/CCA SYS: 1.56
LEFT SUBCLAVIAN DIAS: 0 CM/S
LEFT SUBCLAVIAN SYS: 147.4 CM/S
LEFT VERTEBRAL DIAS: 11.13 CM/S
LEFT VERTEBRAL SYS: 39.6 CM/S
P-R INTERVAL, ECG05: 210 MS
PROTHROMBIN TIME: 25.6 SEC (ref 9–11.1)
Q-T INTERVAL, ECG07: 460 MS
QRS DURATION, ECG06: 102 MS
QTC CALCULATION (BEZET), ECG08: 427 MS
RIGHT CCA DIST DIAS: 11 CM/S
RIGHT CCA DIST SYS: 78 CM/S
RIGHT CCA PROX DIAS: 9.1 CM/S
RIGHT CCA PROX SYS: 107.5 CM/S
RIGHT ECA DIAS: 12.99 CM/S
RIGHT ECA SYS: 133.1 CM/S
RIGHT ICA DIST DIAS: 13.2 CM/S
RIGHT ICA DIST SYS: 50.7 CM/S
RIGHT ICA MID DIAS: 16.9 CM/S
RIGHT ICA MID SYS: 95.7 CM/S
RIGHT ICA PROX DIAS: 13 CM/S
RIGHT ICA PROX SYS: 66.1 CM/S
RIGHT ICA/CCA SYS: 1.2
RIGHT SUBCLAVIAN DIAS: 0 CM/S
RIGHT SUBCLAVIAN SYS: 78.1 CM/S
RIGHT VERTEBRAL DIAS: 7.25 CM/S
RIGHT VERTEBRAL SYS: 51.3 CM/S
SERVICE CMNT-IMP: ABNORMAL
SERVICE CMNT-IMP: ABNORMAL
VENTRICULAR RATE, ECG03: 52 BPM

## 2019-11-08 PROCEDURE — 85610 PROTHROMBIN TIME: CPT

## 2019-11-08 PROCEDURE — 74011250637 HC RX REV CODE- 250/637: Performed by: INTERNAL MEDICINE

## 2019-11-08 PROCEDURE — 36415 COLL VENOUS BLD VENIPUNCTURE: CPT

## 2019-11-08 PROCEDURE — 74011250636 HC RX REV CODE- 250/636: Performed by: STUDENT IN AN ORGANIZED HEALTH CARE EDUCATION/TRAINING PROGRAM

## 2019-11-08 PROCEDURE — 92523 SPEECH SOUND LANG COMPREHEN: CPT

## 2019-11-08 PROCEDURE — 99152 MOD SED SAME PHYS/QHP 5/>YRS: CPT | Performed by: STUDENT IN AN ORGANIZED HEALTH CARE EDUCATION/TRAINING PROGRAM

## 2019-11-08 PROCEDURE — 93880 EXTRACRANIAL BILAT STUDY: CPT

## 2019-11-08 PROCEDURE — 99153 MOD SED SAME PHYS/QHP EA: CPT

## 2019-11-08 PROCEDURE — 97161 PT EVAL LOW COMPLEX 20 MIN: CPT

## 2019-11-08 PROCEDURE — 99153 MOD SED SAME PHYS/QHP EA: CPT | Performed by: STUDENT IN AN ORGANIZED HEALTH CARE EDUCATION/TRAINING PROGRAM

## 2019-11-08 PROCEDURE — C8925 2D TEE W OR W/O FOL W/CON,IN: HCPCS

## 2019-11-08 PROCEDURE — 74011000250 HC RX REV CODE- 250: Performed by: STUDENT IN AN ORGANIZED HEALTH CARE EDUCATION/TRAINING PROGRAM

## 2019-11-08 PROCEDURE — 82962 GLUCOSE BLOOD TEST: CPT

## 2019-11-08 PROCEDURE — 99152 MOD SED SAME PHYS/QHP 5/>YRS: CPT

## 2019-11-08 PROCEDURE — 97116 GAIT TRAINING THERAPY: CPT

## 2019-11-08 RX ORDER — LIDOCAINE HYDROCHLORIDE 20 MG/ML
JELLY TOPICAL
Status: COMPLETED | OUTPATIENT
Start: 2019-11-08 | End: 2019-11-08

## 2019-11-08 RX ORDER — PANTOPRAZOLE SODIUM 40 MG/1
40 TABLET, DELAYED RELEASE ORAL
Status: DISCONTINUED | OUTPATIENT
Start: 2019-11-09 | End: 2019-11-08 | Stop reason: HOSPADM

## 2019-11-08 RX ORDER — MIDAZOLAM HYDROCHLORIDE 1 MG/ML
.5-1 INJECTION, SOLUTION INTRAMUSCULAR; INTRAVENOUS
Status: DISCONTINUED | OUTPATIENT
Start: 2019-11-08 | End: 2019-11-08 | Stop reason: HOSPADM

## 2019-11-08 RX ORDER — LIDOCAINE 50 MG/G
OINTMENT TOPICAL
Status: DISCONTINUED | OUTPATIENT
Start: 2019-11-08 | End: 2019-11-08 | Stop reason: HOSPADM

## 2019-11-08 RX ORDER — ATORVASTATIN CALCIUM 40 MG/1
40 TABLET, FILM COATED ORAL DAILY
Qty: 30 TAB | Refills: 0 | OUTPATIENT
Start: 2019-11-08 | End: 2019-11-12 | Stop reason: SDUPTHER

## 2019-11-08 RX ORDER — SODIUM CHLORIDE 9 MG/ML
10 INJECTION INTRAMUSCULAR; INTRAVENOUS; SUBCUTANEOUS
Status: DISCONTINUED | OUTPATIENT
Start: 2019-11-08 | End: 2019-11-08 | Stop reason: HOSPADM

## 2019-11-08 RX ORDER — FENTANYL CITRATE 50 UG/ML
25-200 INJECTION, SOLUTION INTRAMUSCULAR; INTRAVENOUS
Status: DISCONTINUED | OUTPATIENT
Start: 2019-11-08 | End: 2019-11-08 | Stop reason: HOSPADM

## 2019-11-08 RX ORDER — LIDOCAINE HYDROCHLORIDE 20 MG/ML
30 SOLUTION OROPHARYNGEAL
Status: DISCONTINUED | OUTPATIENT
Start: 2019-11-08 | End: 2019-11-08 | Stop reason: HOSPADM

## 2019-11-08 RX ORDER — AMIODARONE HYDROCHLORIDE 200 MG/1
100 TABLET ORAL
Status: DISCONTINUED | OUTPATIENT
Start: 2019-11-09 | End: 2019-11-08 | Stop reason: HOSPADM

## 2019-11-08 RX ADMIN — AMIODARONE HYDROCHLORIDE 200 MG: 200 TABLET ORAL at 10:35

## 2019-11-08 RX ADMIN — FENTANYL CITRATE 25 MCG: 50 INJECTION, SOLUTION INTRAMUSCULAR; INTRAVENOUS at 15:11

## 2019-11-08 RX ADMIN — ASPIRIN 81 MG: 81 TABLET, COATED ORAL at 10:07

## 2019-11-08 RX ADMIN — METFORMIN HYDROCHLORIDE 2000 MG: 500 TABLET, EXTENDED RELEASE ORAL at 10:07

## 2019-11-08 RX ADMIN — MIDAZOLAM 1 MG: 1 INJECTION INTRAMUSCULAR; INTRAVENOUS at 15:10

## 2019-11-08 RX ADMIN — LIDOCAINE: 50 OINTMENT TOPICAL at 15:03

## 2019-11-08 RX ADMIN — LOSARTAN POTASSIUM 100 MG: 50 TABLET, FILM COATED ORAL at 10:35

## 2019-11-08 RX ADMIN — SPIRONOLACTONE 25 MG: 25 TABLET ORAL at 10:35

## 2019-11-08 RX ADMIN — FENTANYL CITRATE 25 MCG: 50 INJECTION, SOLUTION INTRAMUSCULAR; INTRAVENOUS at 15:12

## 2019-11-08 RX ADMIN — MIDAZOLAM 1 MG: 1 INJECTION INTRAMUSCULAR; INTRAVENOUS at 15:11

## 2019-11-08 RX ADMIN — AMLODIPINE BESYLATE 10 MG: 5 TABLET ORAL at 10:35

## 2019-11-08 RX ADMIN — SODIUM CHLORIDE 10 ML: 9 INJECTION INTRAMUSCULAR; INTRAVENOUS; SUBCUTANEOUS at 15:28

## 2019-11-08 RX ADMIN — LIDOCAINE HYDROCHLORIDE 15 ML: 20 SOLUTION ORAL; TOPICAL at 14:55

## 2019-11-08 RX ADMIN — LIDOCAINE HYDROCHLORIDE: 20 JELLY TOPICAL at 15:11

## 2019-11-08 NOTE — PROCEDURES
BRIEF PROCEDURE NOTE    Date of Procedure: 11/8/2019   Preoperative Diagnosis: stroke  Postoperative Diagnosis: same    Procedure:YESENIA  Cardiologist: Emmanuelle Aceves DO  Anesthesia: local (benzocaine spray, Viscous lidocaine gargle) + IV sedation (Midazolam 2 mg, Fentanyl 50 mcg)  Estimated Blood Loss: Minimal    Informed consent obtained prior to procedure. Appropriate time out performed. Findings:     MV: normal  TV: normal  AV: normal  PV: normal    LA: mildly dilated, no thrombus MALINA - Watchman, well seated, no carlton-device leak, no overlaying thrombus  RA: normal  RV:  Not well visualized  LV: normal  LVEF: 55%    Aorta : size normal    Atheroma mild    Complications:    None, no oropharyngeal trauma or bleeding, vital signs stable at end of procedure.

## 2019-11-08 NOTE — PROGRESS NOTES
[facility]    Medical Progress Note      NAME: Kori Araiza   :  1940  MRM:  [id number]    Date/Time: 2019  9:56 AM          Assessment and Plan:     Acute CVA (cerebrovascular accident) / Dysarthria - POA, dysarthria persists. MRI shows \"1. Small acute infarction left annette. Background of chronic white matter disease and old infarctions as above. 2.  Numerous foci of remote microhemorrhage, as well as hemosiderin staining in the surface of the left frontal lobe superiorly. Hemorrhages are both central and peripheral, though raise concern for underlying CNS vasculopathy such as amyloid angiopathy. 3. Mild to moderate intracranial atherosclerosis without flow-limiting stenosis or occlusion\". Consult neurology. Speech/PT/OT eval.  Continue ASA, statin. Had recent unremarkable ECHO. Check dopplers. Atrial fibrillation / Presence of Watchman left atrial appendage closure device / Chronic anticoagulation - Consult cardiology. INR therapeutic 2.7. Continue amiodarone, BB, ASA, coumadin    CAD (coronary artery disease) / Chronic heart failure with preserved ejection fraction / Essential hypertension, benign - Appears stable. 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 SSI per protocol. Continue home metformin. Check A1c.    TOPHER (obstructive sleep apnea) - Refuses CPAP    Dyslipidemia - . Start lipitor    Reflux esophagitis - Start PPI    Chronic fatigue - PT eval as above. Depression likely involved. CKD (chronic kidney disease) stage 3 - Stable, Cr 1.8       Subjective:     Chief Complaint:  Dysarthria persists, wants to go home ASAP    ROS:  (bold if positive, if negative)    Tolerating PT  Tolerating Diet        Objective:     Last 24hrs VS reviewed since prior progress note.  Most recent are:    Visit Vitals  /57   Pulse (!) 47   Temp 97.7 °F (36.5 °C) Resp 15   Ht 6' 3\" (1.905 m)   Wt 111.1 kg (245 lb)   SpO2 99%   BMI 30.62 kg/m²     SpO2 Readings from Last 6 Encounters:   11/08/19 99%   11/01/19 97%   10/09/19 96%   09/25/19 100%   09/18/19 92%   09/04/19 98%            Intake/Output Summary (Last 24 hours) at 11/8/2019 0956  Last data filed at 11/8/2019 0303  Gross per 24 hour   Intake    Output 350 ml   Net -350 ml        Physical Exam:    Gen:  Obese, in no acute distress  HEENT:  Pink conjunctivae, PERRL, hearing intact to voice, moist mucous membranes  Neck:  Supple, without masses, thyroid non-tender  Resp:  No accessory muscle use, clear breath sounds without wheezes rales or rhonchi  Card:  No murmurs, normal S1, S2 without thrills, bruits or peripheral edema  Abd:  Soft, non-tender, non-distended, normoactive bowel sounds are present, no mass  Lymph:  No cervical or inguinal adenopathy  Musc:  No cyanosis or clubbing  Skin:  No rashes or ulcers, skin turgor is good  Neuro:  Cranial nerves are grossly intact aside from slight dysarthria, no focal motor weakness, follows commands appropriately  Psych:  Good insight, oriented to person, place and time, alert    Telemetry reviewed:   AFIB  __________________________________________________________________  Medications Reviewed: (see below)  Medications:     Current Facility-Administered Medications   Medication Dose Route Frequency    amiodarone (CORDARONE) tablet 200 mg  200 mg Oral DAILY WITH BREAKFAST    amLODIPine (NORVASC) tablet 10 mg  10 mg Oral DAILY    aspirin delayed-release tablet 81 mg  81 mg Oral DAILY    carvedilol (COREG) tablet 6.25 mg  6.25 mg Oral BID WITH MEALS    furosemide (LASIX) tablet 40 mg  40 mg Oral DAILY    losartan (COZAAR) tablet 100 mg  100 mg Oral DAILY    metFORMIN ER (GLUCOPHAGE XR) tablet 2,000 mg  2,000 mg Oral DAILY WITH BREAKFAST    spironolactone (ALDACTONE) tablet 25 mg  25 mg Oral DAILY    insulin lispro (HUMALOG) injection   SubCUTAneous AC&HS    glucose chewable tablet 16 g  4 Tab Oral PRN    glucagon (GLUCAGEN) injection 1 mg  1 mg IntraMUSCular PRN    dextrose 10% infusion 0-250 mL  0-250 mL IntraVENous PRN    Warfarin - Pharmacy to Dose   Other Rx Dosing/Monitoring    atorvastatin (LIPITOR) tablet 40 mg  40 mg Oral DAILY     Current Outpatient Medications   Medication Sig    furosemide (LASIX) 20 mg tablet Take 40 mg by mouth daily.  metFORMIN ER (GLUCOPHAGE XR) 500 mg tablet TAKE 4 TABLETS BY MOUTH ONCE DAILY WITH BREAKFAST    spironolactone (ALDACTONE) 25 mg tablet 25 mg daily.  losartan (COZAAR) 100 mg tablet TAKE 1 TABLET BY MOUTH ONCE DAILY FOR HIGH BLOOD PRESSURE    amiodarone (CORDARONE) 200 mg tablet Take 1 Tab by mouth daily.  amLODIPine (NORVASC) 10 mg tablet Take 1 Tab by mouth daily.  warfarin (COUMADIN) 5 mg tablet TAKE 1 AND 1/2 (ONE-HALF) TABLETS BY MOUTH ONCE DAILY    carvedilol (COREG) 6.25 mg tablet Take 1 Tab by mouth two (2) times daily (with meals).  aspirin delayed-release 81 mg tablet Take 81 mg by mouth daily. Indications: MYOCARDIAL REINFARCTION PREVENTION        Lab Data Reviewed: (see below)  Lab Review:     Recent Labs     11/07/19  1156   WBC 8.5   HGB 12.3   HCT 39.0        Recent Labs     11/08/19 0427 11/07/19  1156   NA  --  140   K  --  4.1   CL  --  107   CO2  --  28   GLU  --  128*   BUN  --  33*   CREA  --  1.77*   CA  --  9.4   INR 2.7* 2.5*     Lab Results   Component Value Date/Time    Glucose (POC) 116 (H) 11/08/2019 07:50 AM    Glucose (POC) 155 (H) 11/07/2019 11:45 PM    Glucose (POC) 114 (H) 11/07/2019 11:55 AM    Glucose (POC) 115 (H) 08/21/2018 11:57 AM    Glucose (POC) 131 (H) 04/24/2018 09:27 AM     No results for input(s): PH, PCO2, PO2, HCO3, FIO2 in the last 72 hours.   Recent Labs     11/08/19 0427 11/07/19  1156   INR 2.7* 2.5*     All Micro Results     Procedure Component Value Units Date/Time    URINE CULTURE HOLD SAMPLE [[id number]] Collected:  11/07/19 1156    Order Status:  Completed Specimen:  Urine from Serum Updated:  11/07/19 1206     Urine culture hold       URINE ON HOLD IN MICROBIOLOGY DEPT FOR 3 DAYS. IF UNPRESERVED URINE IS SUBMITTED, IT CANNOT BE USED FOR ADDITIONAL TESTING AFTER 24 HRS, RECOLLECTION WILL BE REQUIRED. Other pertinent lab: none    Total time spent with patient: 39 Minutes I reviewed chart, notes, data and current medications in the medical record. I have examined and treated the patient at bedside during this period.                  Care Plan discussed with: Patient, Care Manager, Nursing Staff, Consultant/Specialist and >50% of time spent in counseling and coordination of care    Discussed:  Care Plan and D/C Planning    Prophylaxis:  H2B/PPI    Disposition:  Home w/Family           ___________________________________________________    Attending Physician: Lexi Clarke MD

## 2019-11-08 NOTE — DISCHARGE INSTRUCTIONS
Patient Discharge Instructions    Jean Carlos Bhardwaj / [id number] : 1940    Admitted 2019 Discharged: 2019     Primary Diagnoses  Problem List as of 2019 Date Reviewed: 11/3/2019           Presence of Watchman left atrial appendage closure device   Dysarthria   Chronic anticoagulation   * (Principal) Acute CVA (cerebrovascular accident) ([facility] [address]   CKD (chronic kidney disease) stage 3, GFR 30-59 ml/min (HCC)   Chronic heart failure with preserved ejection fraction (HCC)   Chronic fatigue   Type 2 diabetes with nephropathy (HCC)   TOPHER (obstructive sleep apnea)   Atrial fibrillation (HCC)   Dyslipidemia, goal LDL below 70   Essential hypertension, benign   Reflux esophagitis   CAD (coronary artery disease)          Take Home Medications     · It is important that you take the medication exactly as they are prescribed. · Keep your medication in the bottles provided by the pharmacist and keep a list of the medication names, dosages, and times to be taken in your wallet. · Do not take other medications without consulting your doctor. What to do at Home    Recommended diet: Cardiac Diet, Diabetic Diet and Low fat, Low cholesterol    Recommended activity: Activity as tolerated    If you experience worse symptoms, please follow up with neurology. Follow-up with your PCP and cardiology in a few weeks        Information obtained by :  I understand that if any problems occur once I am at home I am to contact my physician. I understand and acknowledge receipt of the instructions indicated above.                                                                                                                                            Physician's or R.N.'s Signature                                                                  Date/Time                                                                                                                                              Patient or Representative Signature                                                          Date/Time    Patient Education   DMV Transient Ischemic Attack and/or Cerebral Vascular Accident Policy   It is the policy of the Department of Motor Vehicles, based on guidance and recommendations from the Medical Advisory Board, that if a  suffers a Transient Ischemic Attack (TIA), the s privilege to operate a motor vehicle will be suspended for three months. The three-month suspension may be shortened for drivers who have suffered a TIA, provided that treatment has been provided mitigating the risk of reoccurrence and there is no impact on a s ability to operate a motor vehicle safely. These cases may be referred to the [address] for their guidance and recommendations. If a  suffers a Cerebral Vascular Accident (CVA), the s privilege to operate a motor vehicle will be suspended for six months. This six-month suspension period may be shortened if V receives information from the 's health care provider indicating that the  has fully recovered. These cases may be referred to the [address] for its guidance and recommendations. Following the six-month suspension, [facility] will request an evaluation with a certified  rehabilitation specialist if the  has suffered paralysis or cognitive changes due to the CVA. If the s physical and cognitive information is not indicated in the medical report received by the agency, [facility] will request it from the health care provider.    ([employer]. asp). Based on the information received about the s cognitive abilities, the  may also be subject to the [facility]. The  is also required to furnish an updated Vision Report with an examination date that is not older than 90 days and occrs after the TIA/CVA, in accordance with [place]  Code Section 46.2-311. DMV may impose additional requirements on the individual depending on the information received by the agency. https://www.[employer].[facility]/. asp     Stroke: Care Instructions  Your Care Instructions    You have had a stroke. This means that the blood flow to a part of your brain was blocked for some time, which damages the nerve cells in that part of the brain. The part of your body controlled by that part of your brain may not function properly now. The brain is an amazing organ that can heal itself to some degree. The stroke you had damaged part of your brain. But other parts of your brain may take over in some way for the damaged areas. You have already started this process. Your doctor will talk with you about what you can do to prevent another stroke. High blood pressure, high cholesterol, and diabetes are all risk factors for stroke. If you have any of these conditions, work with your doctor to make sure they are under control. Other risk factors for stroke include being overweight, smoking, and not getting regular exercise. Going home may be hard for you and your family. The more you can try to do for yourself, the better. Remember to take each day one at a time. Follow-up care is a key part of your treatment and safety. Be sure to make and go to all appointments, and call your doctor if you are having problems. It's also a good idea to know your test results and keep a list of the medicines you take. How can you care for yourself at home?    · Enter a stroke rehabilitation (rehab) program, if your doctor recommends it. Physical, speech, and occupational therapies can help you manage bathing, dressing, eating, and other basics of daily living.     · Do not drive until your doctor says it is okay.     · It is normal to feel sad or depressed after a stroke.  If these feelings last, talk to your doctor.     · If you are having problems with urine leakage, go to the bathroom at regular times, including when you first wake up and at bedtime. Also, limit fluids after dinner.     · If you are constipated, drink plenty of fluids, enough so that your urine is light yellow or clear like water. If you have kidney, heart, or liver disease and have to limit fluids, talk with your doctor before you increase the amount of fluids you drink. Set up a regular time for using the toilet. If you continue to have constipation, your doctor may suggest using a bulking agent, such as Metamucil, or a stool softener, laxative, or enema. Medicines    · Take your medicines exactly as prescribed. Call your doctor if you think you are having a problem with your medicine. You may be taking several medicines. ACE (angiotensin-converting enzyme) inhibitors, angiotensin II receptor blockers (ARBs), beta-blockers, diuretics (water pills), and calcium channel blockers control your blood pressure. Statins help lower cholesterol. Your doctor may also prescribe medicines for depression, pain, sleep problems, anxiety, or agitation.     · If your doctor has given you a blood thinner to prevent another stroke, be sure you get instructions about how to take your medicine safely. Blood thinners can cause serious bleeding problems.     · Do not take any over-the-counter medicines or herbal products without talking to your doctor first.     · If you take birth control pills or hormone therapy, talk to your doctor about whether they are right for you.    For family members and caregivers    · Make the home safe. Set up a room so that your loved one does not have to climb stairs. Be sure the bathroom is on the same floor. Move throw rugs and furniture that could cause falls. Make sure that the lighting is good. Put grab bars and seats in tubs and showers.     · Find out what your loved one can do and what he or she needs help with. Try not to do things for your loved one that your loved one can do on his or her own. Help him or her learn and practice new skills.     · Visit and talk with your loved one often. Try doing activities together that you both enjoy, such as playing cards or board games. Keep in touch with your loved one's friends as much as you can. Encourage them to visit.     · Take care of yourself. Do not try to do everything yourself. Ask other family members to help. Eat well, get enough rest, and take time to do things that you enjoy. Keep up with your own doctor visits, and make sure to take your medicines regularly. Get out of the house as much as you can. Join a local support group. Find out if you qualify for home health care visits to help with rehab or for adult day care. When should you call for help? Call 911 anytime you think you may need emergency care. For example, call if:    · You have signs of another stroke. These may include:  ? Sudden numbness, tingling, weakness, or loss of movement in your face, arm, or leg, especially on only one side of your body. ? Sudden vision changes. ? Sudden trouble speaking. ? Sudden confusion or trouble understanding simple statements. ? Sudden problems with walking or balance. ? A sudden, severe headache that is different from past headaches. Call 911 even if these symptoms go away in a few minutes.    Call your doctor now or seek immediate medical care if:    · You have new symptoms that may be related to your stroke, such as falls or trouble swallowing.    Watch closely for changes in your health, and be sure to contact your doctor if you have any problems. Where can you learn more? Go to http://ruth-ellyn.info/. Enter C751 in the search box to learn more about \"Stroke: Care Instructions. \"  Current as of: September 26, 2018  Content Version: 12.2  © 6318-4932 [employer]. Care instructions adapted under license by [employer] (which disclaims liability or warranty for this information).  If you have questions about a medical condition or this instruction, always ask your healthcare professional. [place] [address] any warranty or liability for your use of this information.

## 2019-11-08 NOTE — TELEPHONE ENCOUNTER
----- Message from Lloyd Quezada DO sent at 11/8/2019  4:21 PM EST -----  YESENIA for 11/19 can be canceled. PT presented with stroke and YESENIA performed today as inpt. Watchman looks good, but still having strokes.     Umm Dunlap

## 2019-11-08 NOTE — PROGRESS NOTES
Problem: Motor Speech Impaired (Adult)  Goal: *Acute Goals and Plan of Care (Insert Text)  Description  Speech pathology goals  Initiated 11/8/2019  1. Patient will achieve 100% intelligibility at the conversation level with independent use of compensatory strategies within 7 days  2. Patient will participate in swallowing evaluation as clinically indicated   Outcome: Progressing Towards Goal     SPEECH LANGUAGE PATHOLOGY EVALUATION  Patient: Annette Amador (58 y.o. male)  Date: 11/8/2019  Primary Diagnosis: Dysarthria [R47.1]        Precautions:        ASSESSMENT :  Based on the objective data described below, the patient presents with intact language and mild dysarthria characterized by imprecise articulation, blended word boundaries, and decreased breath support. Patient reported decreased intelligibility at baseline with his phone and car unable to understand him, however it has worsened with CVA. Patient and family reported patient's speech has improved since arrival to ED. Provided education regarding motor speech compensatory strategies, and patient verbalized understanding. Recommend SLP treatment to improve functional communication. Patient NPO for YESENIA at 1330 today, so unable to complete swallowing evaluation. Note patient passed the STAND and a regular diet was ordered. Patient, family, and RN all reported no concerns regarding swallow function. Patient will benefit from skilled intervention to address the above impairments. Patients rehabilitation potential is considered to be Good  Factors which may influence rehabilitation potential include:   ? None noted  ? Mental ability/status  ? Medical condition  ? Home/family situation and support systems  ? Safety awareness  ? Pain tolerance/management  ?               Other:      PLAN :  Recommendations and Planned Interventions:  --Slow, loud, clear speech  --SLP treatment to improve functional communication  Frequency/Duration: Patient will be followed by speech-language pathology 3 times a week to address goals. Discharge Recommendations: Home Health and Outpatient     SUBJECTIVE:   Patient stated My car can't understand me when I try to call someone. 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 2/25/2010    Other and unspecified hyperlipidemia 2/25/2010    Positive PPD 2/25/2010    Reflux esophagitis 2/25/2010    NO LONGER A PROBLEM    Stroke ([facility] [address]     in 1959 at age of 26    Type II or unspecified type diabetes mellitus without mention of complication, not stated as uncontrolled 2/25/2010     Past Surgical History:   Procedure Laterality Date    CARDIAC SURG PROCEDURE UNLIST      X4 STENTS    COLONOSCOPY,REMV LESN,SNARE  12/13/2013         HC CAPSULE ENDOSCOPE M2A  4/4/2014         HX COLONOSCOPY      HX CORONARY STENT PLACEMENT      HX HEART ASSIST DEVICE Left 09/24/2019    HX KNEE REPLACEMENT Left     HX ORTHOPAEDIC Right     FUSION IN RIGHT WRIST    HX VASECTOMY  1983    [place] CARDIOVERSION ELECTIVE ARRHYTHMIA EXTERNAL N/A 9/24/2019    Ep Cardioversion performed by Amish Farmer MD at [facility], [facility] CATH LAB    UPPER GI ENDOSCOPY,BIOPSY  12/13/2013          Prior Level of Function/Home Situation: Patient reported decreased intelligibility of speech at baseline, with his car and phone being unable to understand him  Home Situation  Home Environment: Private residence  [place]: Spouse/Significant Other/Partner  Mental Status:  Neurologic State: Alert, Appropriate for age  Orientation Level: Oriented X4  Cognition: Appropriate decision making, Appropriate for age attention/concentration, Appropriate safety awareness, Follows commands           Motor Speech:  Oral-Motor Structure/Motor Speech  Labial: No impairment  Dentition: Natural;Limited(has dentures, rarely wears them)  Oral Hygiene: moist oral mucosa  Lingual: Left deviation;Decreased rate;Decreased strength  Velum: No impairment  Mandible: No impairment  Apraxic Characteristics: None  Dysarthric Characteristics: Blended word boundaries; Decreased breath support; Imprecise  Intelligibility: Impaired  Word Intelligibility (%): 100 %  Phrase Intelligibility (%): 100 %  Sentence Intelligibility (%): 100 %  Conversation Intelligibility (%): 90 %  Overall Impairment Severity: Mild  Language Comprehension and Expression:  Auditory Comprehension  Auditory Impairment: No   Response to Basic Yes/No Questions (%): 100 %  Response to Moderately Complex Yes/No Questions (%): 100 %  Response to Complex Yes/No Questions (%) : 100 %  One-Step Basic Commands (%): 100 %  Two-Step Basic Commands (%): 100 %  Three-Step Basic Commands (%): 100 %  Verbal Expression  Primary Mode of Expression: Verbal  Initiation: No impairment  Automatic Speech Task: No impairment  Repetition: No impairment  Naming: No impairment  Confrontation (%): 100 %  Sentence Formulation: Impaired (%)(100% noun, verb, adjective stimulus, 0% functor stimulus)  Conversation: Dysarthric                 NOMS:   The NOMS functional outcome measure was used to quantify this patient's level of motor speech impairment. Based on the NOMS, the patient was determined to be at level 5 for motor speech function.          NOMS Motor Speech:  Level 1 (CN):  100% unintelligible  Level 2 (CM):  Communication partner responsible for message; can do CV or automatic words w/ max cues but rarely intelligible in context  Level 3 (CL): communication partner primarily responsible for message but says CV/automatic words intelligibly; mod cues to say simple words/phrases  Level 4 (CK): In structured conversation with familiar listener can say simple words and phrases. Mod cues for simple sentences  Level 5 (CJ):  Uses simple sentences for ADLs with familiar and unfamiliar listener; min cues for complex sentences  Level 6 (CI):  Intelligible in ADLs; difficulty in voc/social activites; rare cues for complex message; uses comp strategies  Level 7 ([address] [place] [address]):  Intelligible in all activities. May occasionally use compensatory strategies. GRUPO. (2003). National Outcomes Measurement System (NOMS): Adult Speech-Language Pathology User's Guide. After treatment:   ?              Patient left in no apparent distress sitting up in chair  ? Patient left in no apparent distress in bed  ? Call bell left within reach  ? Nursing notified  ? Caregiver present  ? Bed alarm activated    COMMUNICATION/EDUCATION:   The patients plan of care including recommendations and planned interventions was discussed with: Registered Nurse. Patient was educated regarding His deficit(s) of dysarthria as this relates to His diagnosis of CVA. He demonstrated Excellent understanding as evidenced by verbalizing understanding. ? Patient/family have participated as able in goal setting and plan of care. ?  Patient/family agree to work toward stated goals and plan of care. ?  Patient understands intent and goals of therapy, but is neutral about his/her participation. ? Patient is unable to participate in goal setting and plan of care.     Thank you for this referral.  Rhonda Melchor SLP  Time Calculation: 20 mins

## 2019-11-08 NOTE — PHYSICIAN ADVISORY
Short Stay Review Pt Name:  Arnaldo Hutton MR#  [id number] [facility]#   [id number] Room and Hospital  ER13/13  @ [address] [facility] Hospitalization date  11/7/2019 11:42 AM 
No discharge date for patient encounter. Current Attending Physician  No att. providers found A discharge order has been placed for this episode of hospital care for Mr. Arnaldo Hutton; since this hospital stay is less than two midnights, I reviewed Mr. Lily Polanco's chart. INPATIENT is appropriate. Recovery is quicker than expected and patient is ready for discharge with a plan. Mr. Lily Polanco's healthcare insurance/benefit include: 
Payor: VA MEDICARE / Plan: VA MEDICARE PART A & B / Product Type: Medicare /  
 
Utilization Review related case summary:  
Age  67 y.o.  
BMI  Body mass index is 30.62 kg/m². PMHx includes  PMHx : Atrial Fibrillation with presence of left Atrial appendage Watchman device and on coumadin, CAD, Type 2 DM, Diabetic peripheral neuropathy, CKD, Obstructive Sleep apnea and PUD. Hospital course  67 y.o. gentleman with the above medical history was brought to the ER for word finding difficulty. Patient was evaluated with an MRI brain with findings of Small acute infarction left annette, background of chronic white matter disease and old infarctions, Numerous foci of remote microhemorrhage, as well as hemosiderin staining in the surface of the left frontal lobe superiorly. Hemorrhages were both central and peripheral, though there were concerns for underlying CNS vasculopathy such as amyloid angiopathy, Mild to moderate intracranial atherosclerosis without flow-limiting stenosis or occlusion. Neurology and cardiology services were consulted. Recommendations were for patient to be on Aspirin, Coumadin and Statin. Patient was evaluated by PT/OT and speech therapy.  Patient had speedy clinical improvement with plans for oupatient follow up. 
   
 Risk of deterioration at the time this patient  was hospitalized  High On the basis of chart review, this patient's hospitalization status     
is appropriate for INPATIENT The information in this document is a recommendation to be used for utilization review and utilization management purposes only. This recommendation is not an order. The recommendation is made based on the information reviewed at the time of the referral, is pursuant to the [facility] Conditions of Participation (42 CFR Part 482), and is neither a judgment nor an assessment with regard to the appropriateness or quality of clinical care. For all Managed Care patients: The Criteria are intended solely for use as screening guidelines with respect to the medical appropriateness of healthcare services and not for final clinical or payment determinations concerning the type or level of medical care provided, or proposed to be provided, to a patient. They help the reviewers determine whether a patient is appropriate for observation or inpatient admission at the time a decision to admit the patient is being made. All efforts are made to apply the pertinent payor criteria ([employer] or [employer]) as well as the clinical judgements based on the information reviewed at the time of the referral.\" Nothing in this document may be used to limit, alter, or affect clinical services provided to the patient named below. Madi Reynolds MD [facility] Physician Advisor [facility] [phone] 
 [email or web address] 10:44 AM 11/8/2019

## 2019-11-08 NOTE — PROGRESS NOTES
Bedside and Verbal shift change report given to Vamshi (oncoming nurse) by Mateo Jain (offgoing nurse). Report included the following information SBAR, Kardex, Procedure Summary, Intake/Output and MAR.       1720-If you experience chest pain call 911. Do not drive yourself. I have reviewed discharge instructions with the patient and spouse. The patient and spouse verbalized understanding.   IV removed

## 2019-11-08 NOTE — PROGRESS NOTES
[facility] NEUROLOGY [place] . Cathy Garcia   [place] [address]   [address]   [phone] [facility]   [phone] Fax         Formal consult note to be dictated    Patient seen today with his daughter at the bedside. He is a 45-year-old gentleman who has baseline right-sided hemiparesis arm greater than leg and he has this secondary to what he calls a posttraumatic stroke playing football when he was a teenager. He says that he got hit in the head and had an intracerebral hemorrhage. In any regard he is a gentleman with chronic A. fib. He had GI bleeding with anticoagulation. He had a watchman device placed recently. A couple of days ago he started to notice that his speech was slurred as if he was drunk. Friends noticed. He came to the emergency department. He is undergone scanning demonstrating an a left-sided pontine stroke of an acute nature. MRA finds no intracranial stenosis of any degree. His carotid Doppler at this point shows less than 50% stenosis bilaterally but this again is a posterior circulation stroke. He is been seen by cardiology and had a YESENIA is planned for 130 this afternoon to ensure that there is no abnormality with the watchman but his INR was 2.5. On examination he is a very pleasant gentleman. Awake alert oriented and conversant. He is a bit dysarthric. He has intact cranial nerves II-XII although has some right-sided facial droop. He has right spastic hemiparesis arm greater than leg. Strength in the left upper and lower extremity full. Reflexes brisk right versus left. Again his admitting INR 2.5. His hemoglobin A1c from November 1 was 7.1. Lipid panel demonstrates an LDL of 143. At home he was on Coumadin and aspirin. No statin. He did have nausea and vomiting however with Pravachol and notes allergy to statin. At this juncture I would continue the evaluation by cardiology.   He needs to have his LDL pushed well below 70 he has been started on Lipitor. Therapies need to see him prior to discharge as well to ensure that he has been evaluated for any needs he may have to have a successful transfer to home.     Continue with anticoagulation per cardiology    Follow in the office    Juan Beard MD

## 2019-11-08 NOTE — DISCHARGE SUMMARY
Physician Discharge Summary     Patient ID:  Lucas Yee  [id number]  48 y.o.  1940    Admit date: 11/7/2019    Discharge date and time: 11/8/2019    Admission Diagnoses: Dysarthria [R47.1]    Discharge Diagnoses:    Principal Diagnosis   Acute CVA (cerebrovascular accident) [facility])                                             Other Diagnoses    Presence of Watchman left atrial appendage closure device (9/24/2019)    Essential hypertension, benign (2/25/2010)    Reflux esophagitis (2/25/2010)    CAD (coronary artery disease) (2/25/2010)    Dyslipidemia, goal LDL below 70 (12/20/2010)    Atrial fibrillation ([address] (8/16/2013)    TOPHER (obstructive sleep apnea) (1/23/2015)    Type 2 diabetes with nephropathy ([address] (4/18/2018)    Chronic fatigue (4/23/2018)    Chronic heart failure with preserved ejection fraction ([address] (5/22/2019)    CKD (chronic kidney disease) stage 3, GFR 30-59 ml/min ([address] (7/31/2019)    Chronic anticoagulation (9/4/2019)    Dysarthria (11/7/2019)     Hospital Course:   Acute CVA (cerebrovascular accident) / Dysarthria - POA, dysarthria persists. MRI shows \"1.  Small acute infarction left annette. Background of chronic white matter disease and old infarctions as above. 2.  Numerous foci of remote microhemorrhage, as well as hemosiderin staining in the surface of the left frontal lobe superiorly. Hemorrhages are both central and peripheral, though raise concern for underlying CNS vasculopathy such as amyloid angiopathy. 3. Mild to moderate intracranial atherosclerosis without flow-limiting stenosis or occlusion\". Consult neurology. Speech/PT/OT eval.  Continue ASA, statin. Had recent unremarkable ECHO. Check dopplers.     Atrial fibrillation / Presence of Watchman left atrial appendage closure device / Chronic anticoagulation - Consult cardiology. INR therapeutic 2.7.  Continue amiodarone, BB, ASA, coumadin     CAD (coronary artery disease) / Chronic heart failure with preserved ejection fraction / Essential hypertension, benign - Appears stable. 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 SSI per protocol. Continue home metformin. Check A1c.     TOPHER (obstructive sleep apnea) - Refuses CPAP     Dyslipidemia - . Start lipitor     Reflux esophagitis - Start PPI     Chronic fatigue - PT eval as above. Depression likely involved.     CKD (chronic kidney disease) stage 3 - Stable, Cr 1.8     PCP: Letitia Escudero MD    Consults: Cardiology and Neurology    Significant Diagnostic Studies: See Hospital Course    Discharged home in improved condition. Discharge Exam:  /57   Pulse (!) 47   Temp 97.7 °F (36.5 °C)   Resp 15   Ht 6' 3\" (1.905 m)   Wt 111.1 kg (245 lb)   SpO2 99%   BMI 30.62 kg/m²      Gen:  Obese, in no acute distress  HEENT:  Pink conjunctivae, PERRL, hearing intact to voice, moist mucous membranes  Neck:  Supple, without masses, thyroid non-tender  Resp:  No accessory muscle use, clear breath sounds without wheezes rales or rhonchi  Card:  No murmurs, normal S1, S2 without thrills, bruits or peripheral edema  Abd:  Soft, non-tender, non-distended, normoactive bowel sounds are present, no mass  Lymph:  No cervical or inguinal adenopathy  Musc:  No cyanosis or clubbing  Skin:  No rashes or ulcers, skin turgor is good  Neuro:  Cranial nerves are grossly intact aside from slight dysarthria, no focal motor weakness, follows commands appropriately  Psych:  Good insight, oriented to person, place and time, alert    Patient Instructions:   Current Discharge Medication List      START taking these medications    Details   atorvastatin (LIPITOR) 40 mg tablet Take 1 Tab by mouth daily for 30 days. Qty: 30 Tab, Refills: 0         CONTINUE these medications which have NOT CHANGED    Details   furosemide (LASIX) 20 mg tablet Take 40 mg by mouth daily.       metFORMIN ER (GLUCOPHAGE XR) 500 mg tablet TAKE 4 TABLETS BY MOUTH ONCE DAILY WITH BREAKFAST  Qty: 360 Tab, Refills: 1    Associated Diagnoses: Type 2 diabetes mellitus without complication (HCC)      spironolactone (ALDACTONE) 25 mg tablet 25 mg daily. Refills: 3      losartan (COZAAR) 100 mg tablet TAKE 1 TABLET BY MOUTH ONCE DAILY FOR HIGH BLOOD PRESSURE  Qty: 90 Tab, Refills: 3    Comments: Please consider 90 day supplies to promote better adherence      amiodarone (CORDARONE) 200 mg tablet Take 1 Tab by mouth daily. Qty: 30 Tab, Refills: 1      amLODIPine (NORVASC) 10 mg tablet Take 1 Tab by mouth daily. Qty: 90 Tab, Refills: 3    Associated Diagnoses: Coronary artery disease involving native coronary artery of native heart without angina pectoris      warfarin (COUMADIN) 5 mg tablet TAKE 1 AND 1/2 (ONE-HALF) TABLETS BY MOUTH ONCE DAILY  Qty: 135 Tab, Refills: 5      carvedilol (COREG) 6.25 mg tablet Take 1 Tab by mouth two (2) times daily (with meals). Qty: 180 Tab, Refills: 3    Comments: Please consider 90 day supplies to promote better adherence  Associated Diagnoses: Essential hypertension, benign      aspirin delayed-release 81 mg tablet Take 81 mg by mouth daily. Indications: MYOCARDIAL REINFARCTION PREVENTION    Associated Diagnoses: CVA (cerebral infarction)           Activity: Activity as tolerated  Diet: Cardiac Diet, Diabetic Diet and Low fat, Low cholesterol  Wound Care: None needed    Follow-up with your PCP, cardiology and neurolgoy in a few weeks.   Follow-up tests/labs - none    Signed:  Ned Dhaliwal MD  11/8/2019  10:06 AM

## 2019-11-08 NOTE — PROGRESS NOTES
PHYSICAL THERAPY EVALUATION/DISCHARGE  Patient: Luann Jaramillo (52 y.o. male)  Date: 11/8/2019  Primary Diagnosis: Dysarthria [R47.1]       Precautions: old right sided weakness from old CVA many years ago         ASSESSMENT  Based on the objective data described below, the patient presents with modified  independent with ambulation without assistive device and modified  independent with all functional mobility. Patient at his base line level of function had and old right sided weakness since he was 24years old from playing football. Patient able to manage well with his right sided weakness ever since and occasionally use single point cane at home. Patient at his base line level of function except for the slurring of speech no new weakness on the right side. Reviewed all safety precaution and home exercise program with the patient, verbalized understanding, clear to go home per Physical Therapy perspective. Skilled physical therapy is not indicated at this time. Functional Outcome Measure: The patient scored Total: 39/56 on the Myers Balance Assessment which is indicative of moderate fall risk. Other factors to consider for discharge: slurring of speech     Further skilled acute physical therapy is not indicated at this time. PLAN :  Recommendation for discharge: (in order for the patient to meet his/her long term goals)  No skilled physical therapy/ follow up rehabilitation needs identified at this time. This discharge recommendation:  Has been made in collaboration with the attending provider and/or case management    IF patient discharges home will need the following DME: patient owns DME required for discharge         SUBJECTIVE:   Patient stated I have old stroke and can walk by myself.     OBJECTIVE DATA SUMMARY:   HISTORY:    Past Medical History:   Diagnosis Date    Arrhythmia     a fib    Atrial fibrillation (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 2/25/2010    Other and unspecified hyperlipidemia 2/25/2010    Positive PPD 2/25/2010    Reflux esophagitis 2/25/2010    NO LONGER A PROBLEM    Stroke [facility])     in 1959 at age of 26    Type II or unspecified type diabetes mellitus without mention of complication, not stated as uncontrolled 2/25/2010     Past Surgical History:   Procedure Laterality Date    CARDIAC SURG PROCEDURE UNLIST      X4 STENTS    René Lunger  12/13/2013         HC CAPSULE ENDOSCOPE M2A  4/4/2014         HX COLONOSCOPY      HX CORONARY STENT PLACEMENT      HX HEART ASSIST DEVICE Left 09/24/2019    HX KNEE REPLACEMENT Left     HX ORTHOPAEDIC Right     FUSION IN RIGHT WRIST    HX VASECTOMY  1983    [place] CARDIOVERSION ELECTIVE ARRHYTHMIA EXTERNAL N/A 9/24/2019    Ep Cardioversion performed by Yoni Doyle MD at [place] [id number], [facility]/[address] CATH LAB    UPPER GI ENDOSCOPY,BIOPSY  12/13/2013            Prior level of function: modified independent with ambulation some times use rolling walker   Personal factors and/or comorbidities impacting plan of care:     Home Situation  Home Environment: Private residence  # Steps to Enter: 4  One/Two Story Residence: One story  Living Alone: No  Support Systems: Spouse/Significant Other/Partner  Patient Expects to be Discharged to[de-identified] Private residence  Current DME Used/Available at Home: Crutches(when walking long distances)    EXAMINATION/PRESENTATION/DECISION MAKING:   Critical Behavior:  Neurologic State: Alert, Appropriate for age  Orientation Level: Oriented X4  Cognition: Appropriate decision making, Appropriate for age attention/concentration, Appropriate safety awareness, Follows commands     Hearing: Auditory  Auditory Impairment: None    Range Of Motion:  AROM: Within functional limits(except right sided weakness due to old stroke many years ago)           PROM: Within functional limits(except right sided weakness due to old stroke many years ago)           Strength:    Strength: Within functional limits(except right sided weakness due to old stroke many years ago)                    Tone & Sensation:                                  Coordination:  Coordination: Within functional limits  Vision:      Functional Mobility:  Bed Mobility:  Rolling: Modified independent  Supine to Sit: Modified independent  Sit to Supine: Modified independent  Scooting: Modified independent  Transfers:  Sit to Stand: Modified independent  Stand to Sit: Modified independent  Stand Pivot Transfers: Modified independent     Bed to Chair: Modified independent              Balance:   Sitting: Intact  Standing: Intact; Without support  Ambulation/Gait Training:  Distance (ft): 200 Feet (ft)     Ambulation - Level of Assistance: Modified independent     Gait Description (WDL): Exceptions to WDL  Gait Abnormalities: Hemiplegic        Base of Support: Widened     Speed/Catie: Pace decreased (<100 feet/min)                   Therapeutic Exercises:    Instructed patient to continue active range of motion exercise on both legs while up on chair or on bed.        Functional Measure  Myers Balance Test:    Sitting to Standin  Standing Unsupported: 4  Sitting with Back Unsupported: 4  Standing to Sittin  Transfers: 4  Standing Unsupported with Eyes Closed: 4  Standing Unsupported with Feet Together: 2  Reach Forward with Outstretched Arm: 1   Object: 4  Turn to Look Over Shoulders: 4  Turn 360 Degrees: 4  Alternate Foot on Step/Stool: 0  Standing Unsupported One Foot in Front: 0  Stand on One Le  Total: 39/56         56=Maximum possible score;   0-20=High fall risk  21-40=Moderate fall risk   41-56=Low fall risk        Physical Therapy Evaluation Charge Determination   History Examination Presentation Decision-Making   MEDIUM  Complexity : 1-2 comorbidities / personal factors will impact the outcome/ POC  MEDIUM Complexity : 3 Standardized tests and measures addressing body structure, function, activity limitation and / or participation in recreation  MEDIUM Complexity : Evolving with changing characteristics  Other outcome measures erickson balance test  MEDIUM      Based on the above components, the patient evaluation is determined to be of the following complexity level: MEDIUM    Pain Ratin/10    Activity Tolerance:   Good  Please refer to the flowsheet for vital signs taken during this treatment. After treatment patient left in no apparent distress:   Supine in bed, Call bell within reach and Bed / chair alarm activated    COMMUNICATION/EDUCATION:   The patients plan of care was discussed with: Registered Nurse and . Patient was educated regarding his deficit(s) of slurring of speech as this relates to his diagnosis of CVA. He demonstrated Excellent understanding as evidenced by recall. Patient and/or family was verbally educated on the BE FAST acronym for signs/symptoms of CVA and TIA. Informed patient to refer to the Stroke Binder for further BE FAST information. All questions answered with patient indicating good understanding. Fall prevention education was provided and the patient/caregiver indicated understanding. Thank you for this referral.  Je Zapata PT,WCC.    Time Calculation: 27 mins

## 2019-11-08 NOTE — CONSULTS
Reason for Consult: Stroke    Referring: Meghna Darnell MD    HPI: Flaco Dodd is a 67 y.o. male with past medical history significant for permanent atrial fibrillation, CKD stage III, history of CVA, obstructive sleep apnea, diabetes mellitus, dyslipidemia, diverticulosis and recurrent GI bleeding who recently underwent an left atrial appendage occluder device with watchman on September 24, 2019. He is now admitted with acute dysphasia since yesterday and is found to have acute left pontine infarct on MRI at admission. I have been asked to see him because of his recent watchman device implantation and reevaluation if there is a carlton-implant leakage or intracardiac source of thrombus of note he has been therapeutic on Coumadin since his implantation and on presentation his INR was 2.5. Review of systems otherwise negative for all other systems. EKG on presentation demonstrated sinus bradycardia with heart rate of 52 bpm with minimal voltage criteria for LVH. Cath (8/27/8): LAD p90, m70. OM1 70, OM2 90 (small). RCA m40. EF 55%. No AVG/MR. Patent L SC.  PCI (11/13/8): mLAD 2.75x18 Xience, pLAD 2.75x23 Xience. OM1 3.0x15 Xience. Cath 4/28/17:Cath (4/28/17): LAD patent with patent prox and mid stent. LCx patent with patent OM1 stent. RCA p90 (ISR) and p90 distal to stent. EF 65%. No AVG/MR. PCI osteal/prox RCA (4/28/17): 3.0x28 Xience. Cath 4/24/18: RCA - prior stents present; Prox ISR 60%; Mid to Distal - 60%  Difficulty engaging guide secondary to stent at ostium  JR4/AR1 guide would not engage ; [facility] guide used to engage RCA  FFR - not siginificant 0.89      Lexiscan Cardiolite (8/11/9): Mild fixed inf defect. No reversible defects. EF 51% with mild inf HK. Cath (8/29/13): LAD patent prox and mid stents. LCx m40; patent OM1 stent. RCA p80. EF 55%. No AVG/MR. PCI ost/proxRCA (8/29/13): 3.0x18 Xience. Echo (8/22/13): EF 55%. Mod dil LA. Mild dil RA. Mild MR/TR/TN. PASP 24.   Lexiscan Cardiolite 4/15/14 - normal perfusion imaging, EF 52%  DCCV 12/18/14  Echo 6/18/18 - EF 55%. No WMA. Mild asymmetric hypertrophy of the septum. MIld to mod LAE. AoV calcification and sclerosis. No significant change c/t study of 22-Aug-2013. Echo 4/11/19 - EF 55-60%. Mod LAE. AoV sclerosis. Mild MR. Mild TR. Severely elevated CVP (>15 mmHg)    9/24/2019:Endocardial left atrial appendage occlusion was performed utilizing a 30 mm WATCHMAN device per Dr. Leni Lyon:    1. Acute pontine stroke: Will need YESENIA to r/o intracardiac source and evaluate Watchman. Doubt there is going to be an intracardiac thrombus or paradoxical thormbus given therapeutic INR. Continue Coumadin. 2. Permanent Afib: Currently in SR. Currently on Amiodarone and Coreg. Will reduce the dose of Amio to 100mg po daily. 3. CAD: Prior PCI of RCA with LEO. Distal LAD has 80% stenosis on a cardiac catheterization in April 2018. 4. If YESENIA does not demonstrate any intracardiac source of emboli then he can be dced home.        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)     in 1959 at age of 26    Type II or unspecified type diabetes mellitus without mention of complication, not stated as uncontrolled 2/25/2010            Past Surgical History:   Procedure Laterality Date    CARDIAC SURG PROCEDURE UNLIST      X4 STENTS    Ashley Saravia  12/13/2013         HC CAPSULE ENDOSCOPE M2A  4/4/2014         HX COLONOSCOPY      HX CORONARY STENT PLACEMENT      HX HEART ASSIST DEVICE Left 09/24/2019    HX KNEE REPLACEMENT Left     HX ORTHOPAEDIC Right     FUSION IN RIGHT WRIST    HX VASECTOMY  1983    [place] CARDIOVERSION ELECTIVE ARRHYTHMIA EXTERNAL N/A 9/24/2019    Ep Cardioversion performed by Carmelina Oakes MD at [address], [facility] CATH LAB    UPPER GI ENDOSCOPY,BIOPSY  12/13/2013                  Family History   Problem Relation Age of Onset    Heart Disease Mother     Cancer Father         colon    Heart Disease Maternal Grandmother     Heart Disease Maternal Grandfather     Cancer Paternal Grandmother     Cancer Paternal Grandfather     Anesth Problems Neg Hx            Social History     Socioeconomic History    Marital status:      Spouse name: Not on file    Number of children: Not on file    Years of education: Not on file    Highest education level: Not on file   Occupational History    Not on file   Social Needs    Financial resource strain: Not on file    Food insecurity:     Worry: Not on file     Inability: Not on file    Transportation needs:     Medical: Not on file     Non-medical: Not on file   Tobacco Use    Smoking status: Never Smoker    Smokeless tobacco: Former User     Types: Chew    Tobacco comment: patient reports quitting over 20 yeaers ago.     Substance and Sexual Activity    Alcohol use: Yes     Frequency: Never     Comment: once a year    Drug use: No    Sexual activity: Not Currently   Lifestyle    Physical activity:     Days per week: Not on file     Minutes per session: Not on file    Stress: Not on file   Relationships    Social connections:     Talks on phone: Not on file     Gets together: Not on file     Attends Ep[employer] service: Not on file     Active member of club or organization: Not on file     Attends meetings of clubs or organizations: Not on file Relationship status: Not on file    Intimate partner violence:     Fear of current or ex partner: Not on file     Emotionally abused: Not on file     Physically abused: Not on file     Forced sexual activity: Not on file   Other Topics Concern     Service Yes    Blood Transfusions Yes    Caffeine Concern Yes     Comment: 2-3 cups of coffee daily    Occupational Exposure No    Hobby Hazards No    Sleep Concern No    Stress Concern No    Weight Concern No    Special Diet No    Back Care No    Exercise Yes    Bike Helmet No     Comment: pt doesn't ride a bike   [place] Yes    Self-Exams No   Social History Narrative    Not on file         Allergies   Allergen Reactions    Adhesive Tape Rash    Baycol Nausea and Vomiting    Clonidine Other (comments)     Headache and sleepiness      Pravachol [Pravastatin] Nausea and Vomiting            Current Facility-Administered Medications   Medication Dose Route Frequency Provider Last Rate Last Dose    amiodarone (CORDARONE) tablet 200 mg  200 mg Oral DAILY WITH BREAKFAST Ash Hutchison MD        amLODIPine (NORVASC) tablet 10 mg  10 mg Oral DAILY Ash Hutchison MD        aspirin delayed-release tablet 81 mg  81 mg Oral DAILY Ash Hutchison MD        carvedilol (COREG) tablet 6.25 mg  6.25 mg Oral BID WITH MEALS Mathieu Hutchison MD   6.25 mg at 11/07/19 2014    furosemide (LASIX) tablet 40 mg  40 mg Oral DAILY Ash Hutchison MD        losartan (COZAAR) tablet 100 mg  100 mg Oral DAILY Ash Hutchison MD        metFORMIN ER (GLUCOPHAGE XR) tablet 2,000 mg  2,000 mg Oral DAILY WITH BREAKFAST Ash Hutchison MD        spironolactone (ALDACTONE) tablet 25 mg  25 mg Oral DAILY Ash Hutchison MD        insulin lispro (HUMALOG) injection   SubCUTAneous AC&HS Darrel Oates MD   Stopped at 11/07/19 2200    glucose chewable tablet 16 g  4 Tab Oral PRN Kelvin Hutchison MD        glucagon [facility]) injection 1 mg  1 mg IntraMUSCular PRN Mathieu Hutchison MD        dextrose 10% infusion 0-250 mL  0-250 mL IntraVENous PRN John Mckeon MD        Warfarin - Pharmacy to Dose   Other Rx Dosing/Monitoring Kelvin Hutchison MD        atorvastatin (LIPITOR) tablet 40 mg  40 mg Oral DAILY John Mckeon MD   40 mg at 11/07/19 2014     Current Outpatient Medications   Medication Sig Dispense Refill    furosemide (LASIX) 20 mg tablet Take 40 mg by mouth daily.  metFORMIN ER (GLUCOPHAGE XR) 500 mg tablet TAKE 4 TABLETS BY MOUTH ONCE DAILY WITH BREAKFAST 360 Tab 1    spironolactone (ALDACTONE) 25 mg tablet 25 mg daily. 3    losartan (COZAAR) 100 mg tablet TAKE 1 TABLET BY MOUTH ONCE DAILY FOR HIGH BLOOD PRESSURE 90 Tab 3    amiodarone (CORDARONE) 200 mg tablet Take 1 Tab by mouth daily. 30 Tab 1    amLODIPine (NORVASC) 10 mg tablet Take 1 Tab by mouth daily. 90 Tab 3    warfarin (COUMADIN) 5 mg tablet TAKE 1 AND 1/2 (ONE-HALF) TABLETS BY MOUTH ONCE DAILY 135 Tab 5    carvedilol (COREG) 6.25 mg tablet Take 1 Tab by mouth two (2) times daily (with meals). 180 Tab 3    aspirin delayed-release 81 mg tablet Take 81 mg by mouth daily. Indications: MYOCARDIAL REINFARCTION PREVENTION          ROS:  12 point review of systems was performed. All negative except for HPI     Physical Exam:  Visit Vitals  /68 (BP 1 Location: Left arm, BP Patient Position: At rest)   Pulse (!) 49   Temp 97.7 °F (36.5 °C)   Resp 16   Ht 6' 3\" (1.905 m)   Wt 245 lb (111.1 kg)   SpO2 96%   BMI 30.62 kg/m²       Gen:  Well-developed, well-nourished, in no acute distress. Speech is mildly slurred. HEENT:  Pink conjunctivae, PERRL, hearing intact to voice, moist mucous membranes  Neck:  Supple, without masses, thyroid non-tender  Resp:  No accessory muscle use, clear breath sounds without wheezes rales or rhonchi  Card:   No murmurs, normal S1, S2 without thrills, bruits or peripheral edema  Abd:  Soft, non-tender, non-distended, normoactive bowel sounds are present, no palpable organomegaly and no detectable hernias  Lymph:  No cervical or inguinal adenopathy  Musc:  No cyanosis or clubbing  Skin:  No rashes or ulcers, skin turgor is good  Neuro:  Cranial nerves are grossly intact, no focal motor weakness, follows commands appropriately  Psych:  Good insight, oriented to person, place and time, alert     Labs:     Lab Results   Component Value Date/Time    WBC 8.5 11/07/2019 11:56 AM    HGB 12.3 11/07/2019 11:56 AM    Hemoglobin (POC) 9.9 03/19/2014 09:14 AM    HCT 39.0 11/07/2019 11:56 AM    PLATELET 540 53/23/5086 11:56 AM    MCV 96.1 11/07/2019 11:56 AM     Lab Results   Component Value Date/Time    Hemoglobin A1c 7.1 (H) 11/01/2019 11:22 AM    Hemoglobin A1c 7.2 (H) 07/31/2019 01:16 PM    Hemoglobin A1c 8.1 (H) 02/11/2019 11:39 AM    Glucose 128 (H) 11/07/2019 11:56 AM    Glucose (POC) 116 (H) 11/08/2019 07:50 AM    Microalbumin/Creat ratio (mg/g creat) 41 (H) 09/14/2010 09:23 AM    Microalb/Creat ratio (ug/mg creat.) 35.8 (H) 11/01/2019 11:22 AM    Microalbumin,urine random 6.12 09/14/2010 09:23 AM    LDL, calculated 143 (H) 11/01/2019 11:22 AM    Creatinine 1.77 (H) 11/07/2019 11:56 AM      Lab Results   Component Value Date/Time    Cholesterol, total 216 (H) 11/01/2019 11:22 AM    HDL Cholesterol 46 11/01/2019 11:22 AM    LDL, calculated 143 (H) 11/01/2019 11:22 AM    Triglyceride 135 11/01/2019 11:22 AM    CHOL/HDL Ratio 3.3 08/16/2010 08:47 AM     Lab Results   Component Value Date/Time    ALT (SGPT) 11 11/01/2019 11:22 AM    AST (SGOT) 9 11/01/2019 11:22 AM    Alk.  phosphatase 113 11/01/2019 11:22 AM    Bilirubin, direct 0.14 04/23/2018 09:18 AM    Bilirubin, total 0.4 11/01/2019 11:22 AM    Albumin 4.3 11/01/2019 11:22 AM    Protein, total 6.9 11/01/2019 11:22 AM    INR 2.7 (H) 11/08/2019 04:27 AM    Prothrombin time 25.6 (H) 11/08/2019 04:27 AM    PLATELET 285 51/24/3374 11:56 AM     Lab Results   Component Value Date/Time    INR 2.7 (H) 11/08/2019 04:27 AM    INR 2.5 (H) 11/07/2019 11:56 AM    INR 1.8 (H) 09/25/2019 04:24 AM    INR, External 2.0 11/04/2019    INR, External 2.6 09/30/2019    INR POC 2.0 11/04/2019 09:22 AM    INR POC 2.7 10/11/2019 11:12 AM    INR POC 2.6 09/30/2019 09:36 AM    Prothrombin time 25.6 (H) 11/08/2019 04:27 AM    Prothrombin time 23.8 (H) 11/07/2019 11:56 AM    Prothrombin time 17.6 (H) 09/25/2019 04:24 AM      Lab Results   Component Value Date/Time    GFR est non-AA 37 (L) 11/07/2019 11:56 AM    GFR est AA 45 (L) 11/07/2019 11:56 AM    Creatinine 1.77 (H) 11/07/2019 11:56 AM    BUN 33 (H) 11/07/2019 11:56 AM    Sodium 140 11/07/2019 11:56 AM    Potassium 4.1 11/07/2019 11:56 AM    Chloride 107 11/07/2019 11:56 AM    CO2 28 11/07/2019 11:56 AM    Magnesium 1.9 08/21/2018 09:24 AM     No results found for: PSA, Janette Torres, PSAR3, [id number], [id number], PSALT  Lab Results   Component Value Date/Time    TSH 1.330 04/23/2018 09:18 AM    T3 Uptake 31 03/24/2017 10:51 AM    T4, Total 11.3 03/24/2017 10:51 AM      Lab Results   Component Value Date/Time    Glucose 128 (H) 11/07/2019 11:56 AM    Glucose (POC) 116 (H) 11/08/2019 07:50 AM      Lab Results   Component Value Date/Time    Troponin-I, Qt. <0.05 11/07/2019 11:56 AM      Lab Results   Component Value Date/Time    NT pro-BNP 1,477 (H) 05/24/2019 01:35 PM      Lab Results   Component Value Date/Time    Sodium 140 11/07/2019 11:56 AM    Potassium 4.1 11/07/2019 11:56 AM    Chloride 107 11/07/2019 11:56 AM    CO2 28 11/07/2019 11:56 AM    Anion gap 5 11/07/2019 11:56 AM    Glucose 128 (H) 11/07/2019 11:56 AM    BUN 33 (H) 11/07/2019 11:56 AM    Creatinine 1.77 (H) 11/07/2019 11:56 AM    BUN/Creatinine ratio 19 11/07/2019 11:56 AM    GFR est AA 45 (L) 11/07/2019 11:56 AM    GFR est non-AA 37 (L) 11/07/2019 11:56 AM    Calcium 9.4 11/07/2019 11:56 AM      Lab Results   Component Value Date/Time    Sodium 140 11/07/2019 11:56 AM    Potassium 4.1 11/07/2019 11:56 AM    Chloride 107 11/07/2019 11:56 AM    CO2 28 11/07/2019 11:56 AM    Anion gap 5 11/07/2019 11:56 AM    Glucose 128 (H) 11/07/2019 11:56 AM    BUN 33 (H) 11/07/2019 11:56 AM    Creatinine 1.77 (H) 11/07/2019 11:56 AM    BUN/Creatinine ratio 19 11/07/2019 11:56 AM    GFR est AA 45 (L) 11/07/2019 11:56 AM    GFR est non-AA 37 (L) 11/07/2019 11:56 AM    Calcium 9.4 11/07/2019 11:56 AM    Bilirubin, total 0.4 11/01/2019 11:22 AM    ALT (SGPT) 11 11/01/2019 11:22 AM    AST (SGOT) 9 11/01/2019 11:22 AM    Alk.  phosphatase 113 11/01/2019 11:22 AM    Protein, total 6.9 11/01/2019 11:22 AM    Albumin 4.3 11/01/2019 11:22 AM    Globulin 4.8 (H) 05/24/2019 01:35 PM    A-G Ratio 1.7 11/01/2019 11:22 AM      Lab Results   Component Value Date/Time    Hemoglobin A1c 7.1 (H) 11/01/2019 11:22 AM    Hemoglobin A1c (POC) 6.3 08/14/2013 08:48 AM         Recent Labs     11/07/19  1156   TROIQ <0.05           Problem List:     Problem List  Date Reviewed: 11/3/2019          Codes Class Noted    Presence of Watchman left atrial appendage closure device ICD-10-CM: Z95.818  ICD-9-CM: V45.09  9/24/2019        Dysarthria ICD-10-CM: R47.1  ICD-9-CM: 784.51  11/7/2019        Chronic anticoagulation ICD-10-CM: Z79.01  ICD-9-CM: V58.61  9/4/2019        Stroke ([address] ICD-10-CM: I63.9  ICD-9-CM: 434.91  Unknown    Overview Signed 8/1/2019  6:17 AM by Agatha Carreon MD     in 2211             CKD (chronic kidney disease) stage 3, GFR 30-59 ml/min ([facility]) ICD-10-CM: N18.3  ICD-9-CM: 585.3  7/31/2019        Chronic heart failure with preserved ejection fraction (HCC) ICD-10-CM: I50.32  ICD-9-CM: 428.9  5/22/2019        Chronic fatigue ICD-10-CM: R53.82  ICD-9-CM: 780.79  4/23/2018        Type 2 diabetes with nephropathy ([facility] ICD-10-CM: E11.21  ICD-9-CM: 250.40, 583.81  4/18/2018        ACP (advance care planning) ICD-10-CM: Z71.89  ICD-9-CM: V65.49  9/30/2016    Overview Addendum 10/1/2017  4:21 AM by Sandy Renee MD     I reviewed advanced directive information and written information given to patient; patient to make follow up appointment with a NN to review choices for health care, agent, and anatomical gifts. Advanced directive already completed and in the chart. No changes desired. 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  DCCV (10/7/13). Caleb Mackey (12/18/14): On amio. In sinus rhythm               Rosacea ICD-10-CM: L71.9  ICD-9-CM: 695.3  11/13/2012        Dyslipidemia, goal LDL below 70 ICD-10-CM: E78.5  ICD-9-CM: 272.4  12/20/2010    Overview Addendum 6/22/2016  9:41 AM by Berta Houston MD     A. FLP (12/14/10): Tot 168, , HDL 49, LDL 97 (Zocor 20mg qd). B.  FLP (1/6/14): Tot 152, TG 81, HDL 57, LDL 79 (Zocor 40). C.  FLP (5/11/15): Tot 144, TG 66, HDL 57, LDL 74 (Lipitor 20). D.  FLP (12/1/15): Tot 143, TG 63, HDL 55, LDL 75 (Lipitor 20). E.  FLP (4/12/16): Tot 146, TG 84, HDL 51, LDL 58 (Lipitor 20).              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 Cath (8/27/8): LAD p90, m70. OM1 70, OM2 90 (small). RCA m40. EF 55%. No AVG/MR. Patent L SC.  b. PCI (11/13/8): mLAD 2.75x18 Xience, pLAD 2.75x23 Xience. OM1 3.0x15 Xience. c. Starbuck Essex Cardiolite (8/11/9): Mild fixed inf defect. No reversible defects. EF 51% with mild inf HK. D.  Cath (8/29/13):  LAD patent prox and mid stents. LCx m40; patent OM1 stent. RCA p80. EF 55%. No AVG/MR. E.  PCI ost/proxRCA (8/29/13):  3.0x18 Xience. F.  Echo (8/22/13):  EF 55%. Mod dil LA. Mild dil RA. Mild MR/TR/IN. PASP 24.  G.  Lexiscan Cardiolite (4/4/17): Normal perfusion, EF 51%. H.  Cath (4/28/17): LAD patent with patent prox and mid stent. LCx patent with patent OM1 stent. RCA p90 (ISR) and p90 distal to stent. EF 65%. No AVG/MR. I.  PCI osteal/prox RCA (4/28/17): 3.0x28 Xience. Jay Brumfield MD, [facility]

## 2019-11-08 NOTE — PROGRESS NOTES
11/8/2019  9:33 AM  Case management note      Reason for Admission:   Dysarthria    Patient admitted for acute stroke, he came to hospital for slurring and word retrieval issues. Patient has history of afib, cad, DM  Patient uses a cane and has scooter               RRAT Score:     33             Resources/supports as identified by patient/family:   family                Top Challenges facing patient (as identified by patient/family and CM): Finances/Medication cost?      Medicare/ [employer]? Self / family              Support system or lack thereof?  family                     Living arrangements? Lives with spouse / 4 steps to enter           Self-care/ADLs/Cognition? Can do self care, fair health cognition          Current Advanced Directive/Advance Care Plan: On file                          Plan for utilizing home health: To be determined by PT/ OT/ speech                 Transition of Care Plan:              1. Home with family assistance  2. PT/OT/ speech  3. Home health vs out patient therapy  4.  CM to follow until discharge    Care Management Interventions  PCP Verified by CM: Yes(dr. brittany manning nn notified)  Mode of Transport at Discharge: Self  Transition of Care Consult (CM Consult): Discharge Planning  Current Support Network: Lives with Spouse  Confirm Follow Up Transport: Family  Plan discussed with Pt/Family/Caregiver: Yes  Discharge Location  Discharge Placement: Home with family assistance  [place], [place]

## 2019-11-09 NOTE — CONSULTS
[address]     Name:  Marni Escobar  MR#:  [id number]  :  1940  ACCOUNT #:  [de-identified]  DATE OF SERVICE:  2019      HISTORY OF PRESENT ILLNESS:  See my note from earlier today in regards to this 56-year-old gentleman who was seen with his daughter today in the emergency department. He had a couple of days of slurred speech and word-finding difficulty. He came into the emergency department. 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 He had MRI of the brain demonstrating a new pontine stroke. He has chronic atrial fibrillation status post Watchman device implantation a few weeks ago and he has been chronically anticoagulated with Coumadin with an INR of 2.5. He notes no motor weakness except for his baseline right-sided hemiparesis of spastic type. PAST MEDICAL HISTORY:  Significant for atrial fibrillation, Watchman device, coronary artery disease, previous stroke again back when he was a teenager playing football from trauma and he describes intracerebral hemorrhage, dyslipidemia, essential hypertension, obstructive sleep apnea, reflux and type 2 diabetes as well as coronary stent placement, knee replacement, right wrist fusion. MEDICATIONS PRIOR TO ADMISSION:  Reviewed and include Coumadin as well as aspirin and he has been started on Lipitor. ALLERGIES:  NOTED TO BE TO BAYCOL , PRAVACHOL, CLONIDINE, ADHESIVE TAPE. SOCIAL HISTORY:  He is . He quit smoking over 20 years ago. No substances. FAMILY HISTORY:  Heart disease and cancer but no history of stroke. REVIEW OF SYSTEMS:  Negative except as stated. PHYSICAL EXAMINATION:  GENERAL:  A pleasant gentleman, lying on a stretcher, comfortable appearing, afebrile. VITAL SIGNS:  Pulse in the 50s, blood pressure 132/61 and he has good oxygen saturation on room air. HEENT:  Anicteric. Oropharynx is clear and moist.  NECK:  Supple.   LUNGS:  I do not hear bruits. HEART:  Irregular rhythm. Symmetric pulses. NEUROLOGIC:  He is awake, alert, oriented, and conversant. He is a bit dysarthric. Right facial droop. He has spastic right-sided hemiparesis. Left upper and lower extremities are strong throughout. Reflexes are a bit more brisk right than the left. No pathologic reflexes. No ataxia. Gait deferred. LABORATORY DATA:  Studies as stated. MRA of the brain with diffuse atherosclerosis, but nothing flow limiting. Review of cardiology note finds he is going for a YESENIA today to evaluate the Watchman device. IMPRESSION/PLAN:  An unfortunate gentleman with atrial fibrillation with 2 different mechanisms to try to prevent stroke; a therapeutic INR and a Watchman device in place and yet he still had a stroke. We will see what Cardiology thinks about the YESENIA. He is already on Coumadin and aspirin and he does have risk factors that need to be modified including an elevated hemoglobin A1c as well as his LDL, which is quite above 70. Statin has been introduced. Therapy is to see. We will have him follow in the office.       Severiano Bon, MD      [id number]  D:  11/08/2019 16:33  T:  11/08/2019 21:39  JOB #:  [id number]

## 2019-11-11 ENCOUNTER — PATIENT OUTREACH (OUTPATIENT)
Dept: CASE MANAGEMENT | Age: 79
End: 2019-11-11

## 2019-11-11 NOTE — LETTER
11/12/2019 11:31 AM 
 
Mr. Justine Mercer [id number] [phone] [phone]  
 
 
11/13/2019 2:20 PM Vanita Beasley MD [address] [place] [address]  
11/26/2019 1:00 PM Eugenie Romero MD [facility] [place] [address]  
12/3/2019 9:20 AM COUMADINSTFRANCKALINA [facility] [place] [address]  
12/9/2019 2:20 PM Charmaine Pino MD [address] Enclosed is some information on the stages of kidney function -  
I sent the office note from Dr. Falguni Gurrola from Sept 9 - to Dr. Brenda Jaramillo, as requested. There was mention in his note about discussing the  medications including your blood pressure medications and your fluid pills - but no changes made to your medication regimen. RASI -(Reninangiotensin system inhibitors (RASIs), including angiotensin-converting enzyme inhibitors (ACEIs), angiotensin II receptor blockers (ARBs), and direct renin inhibitors, are commonly used in hypertension treatment.) 
 and diuretics - for fluid management - In regards to kidney function - Losartan is an ARB - (noted above for blood pressure) Norvasc (amlodipine) is a calcium channel blocker that dilates (widens) blood vessels and improves blood flow. And, of course, cautions with using NSAIDS - (Advil, Motrin, Aleve)which you do not use. Tylenol is NOT an NSAID  (non steroidal anti- inflammatory) I do not see mention of concern about Lipitor - you can discuss with Dr. Brenda Jaramillo at the visit - Lipitor is metabolized in the liver. I found this in literature - 
Statins and RASIs independently have a significant dose-dependent protective effect  in hypertensive patients. The combination of statins and RASIs can additively protect hypertensive patients against dialysis risk. (This, of course, if you are able to take and tolerate a statin).  
 
Call me if I can help you - 
 
 
 Jose Gilman RN , CHFN, CCM 
[facility] [place] Transitions Nurse [phone]

## 2019-11-11 NOTE — PROGRESS NOTES
[facility] Snowden Discharge Follow-Up  Cardiology - Dr. Beverley Patrick - Dr. Ku Xvh - [phone]  Neurology - Dr. Sanjay Nelson   Nephrology - Dr. Niles Perez [phone] [facility] office      Date/Time:  2019 11:28 AM    Patient was admitted to [facility] on  and discharged on 2019  for r/o CVA . The physician discharge summary was available at the time of outreach. Patient was contacted within 1 business days of discharge. Reached pt and wife - and patient did not want any services or assistance. Requested fax from Dr. Torito Clark from office visit - 2019 -   As pt and wife said Dr. Torito Clark told him not to take Lipitor - d/t kidney function -   Lipitor - filtered through liver - Cardiology NP - advised office nurse to call in rx for Lipitor as directed at discharge - will convey renal office note to cardiology. 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 Sabra Cortes with normal YESENIA eval  History of chronic a fib  DM T2,   Hx CHF with preserved EF. CKD stage 3 - GFR 30-59  Chronic anticoagulation  Lipitor therapy - to be determined - pt and wife said it was stopped by nephrology d/t CKD - requested Dr. Bill Barrios' office note -for provider review. Advance Care Planning:   Does patient have an Advance Directive:  reviewed and current        Method of communication with provider :chart routing, staff message, phone    Was this a readmission? no   Patient stated reason for the readmission: n/a    Care Transition Nurse (CTN) contacted the patient by telephone to perform post hospital discharge assessment. Verified name and  with patient as identifiers. Provided introduction to self, and explanation of the CTN role. Patient received hospital discharge instructions. CTN reviewed discharge instructions and red flags with patient who verbalized understanding.  Patient given an opportunity to ask questions and does not have any further questions or concerns at this time. The patient agrees to contact the PCP office for questions related to their healthcare. CTN provided contact information for future reference. Disease Specific:   CVA    Patients top risk factors for readmission:  medical condition, medication management    Home Health orders at discharge: none ordered  [address]: none  Date of initial visit: n/a  Discussed speech therapy with pt - who did not want anyone to come to [address] [place] [address] outpatient discussed as options, if desired. Durable Medical Equipment ordered at discharge: none  [address]: none  Durable Medical Equipment received: none    Medication(s):   New Medications at Discharge: Lipitor 40 mg qd  Changed Medications at Discharge: none  Discontinued Medications at Discharge: none    Medication reconciliation was performed with patient, who verbalizes understanding of administration of home medications. There were no barriers to obtaining medications identified at this time. Referral to Pharm D needed: no     Current Outpatient Medications   Medication Sig    atorvastatin (LIPITOR) 40 mg tablet Take 1 Tab by mouth daily for 30 days.  furosemide (LASIX) 20 mg tablet Take 40 mg by mouth daily.  metFORMIN ER (GLUCOPHAGE XR) 500 mg tablet TAKE 4 TABLETS BY MOUTH ONCE DAILY WITH BREAKFAST    spironolactone (ALDACTONE) 25 mg tablet 25 mg daily.  losartan (COZAAR) 100 mg tablet TAKE 1 TABLET BY MOUTH ONCE DAILY FOR HIGH BLOOD PRESSURE    amiodarone (CORDARONE) 200 mg tablet Take 1 Tab by mouth daily.  amLODIPine (NORVASC) 10 mg tablet Take 1 Tab by mouth daily.  warfarin (COUMADIN) 5 mg tablet TAKE 1 AND 1/2 (ONE-HALF) TABLETS BY MOUTH ONCE DAILY    carvedilol (COREG) 6.25 mg tablet Take 1 Tab by mouth two (2) times daily (with meals).  aspirin delayed-release 81 mg tablet Take 81 mg by mouth daily.  Indications: MYOCARDIAL REINFARCTION PREVENTION     No current facility-administered medications for this visit. There are no discontinued medications.     [facility] follow up appointment(s): Dr. Parvez Gallardo neurology / pending  Future Appointments   Date Time Provider Julee Berger   11/26/2019  1:00 PM Sharon Merino MD [facility] SCHED   12/2/2019  8:40 AM Colette Whitehead  [address]   12/3/2019  9:20 AM COUMADIN, STFRANCIS [facility] SCHED   5/13/2020 10:15 AM Gris Martell MD [facility] SCHED      Non-[facility] follow up appointment(s): none noted    [facility]:  information provided as a resource      __________________________________________________________________________  Dr. Josefina Ferris note - YESENIA 11/8/19  BRIEF PROCEDURE NOTE     Date of Procedure: 11/8/2019   Preoperative Diagnosis: stroke  Postoperative Diagnosis: same    Procedure:YESENIA  Cardiologist: Meghan Oates DO  Anesthesia: local (benzocaine spray, Viscous lidocaine gargle) + IV sedation (Midazolam 2 mg, Fentanyl 50 mcg)    Findings:      MV: normal  TV: normal  AV: normal  PV: normal     LA: mildly dilated, no thrombus MALINA - Watchman, well seated, no carlton-device leak, no overlaying thrombus  RA: normal  RV:  Not well visualized  LV: normal  LVEF: 55%     Aorta : size normal    Atheroma mild     Complications:    None, no oropharyngeal trauma or bleeding, vital signs stable at end of procedure.             Electronically signed by Justin Guthrie DO at 11/08/19 1554     11/2/2019  1:35 PM - Lm, [facility] Lab Results In     Component Value Flag Ref Range Units Status   Cholesterol, total 216  High   100 - 199 mg/dL Final   Triglyceride 135   0 - 149 mg/dL Final   HDL Cholesterol 46   >39 mg/dL Final   VLDL, calculated 27   5 - 40 mg/dL Final   LDL, calculated 143  High   0 - 99 mg/dL Final         Alfreda Medina RN , [facility], [facility]  Care transitions nurse/ [facility]  [phone]

## 2019-11-12 ENCOUNTER — TELEPHONE (OUTPATIENT)
Dept: CARDIOLOGY CLINIC | Age: 79
End: 2019-11-12

## 2019-11-12 RX ORDER — ATORVASTATIN CALCIUM 40 MG/1
40 TABLET, FILM COATED ORAL DAILY
Qty: 90 TAB | Refills: 1 | Status: SHIPPED | OUTPATIENT
Start: 2019-11-12 | End: 2020-06-11

## 2019-11-12 NOTE — TELEPHONE ENCOUNTER
----- Message from Luis Bartlett NP sent at 11/11/2019  2:30 PM EST -----  Regarding: RE: pt had CVA - had YESENIA for watchman  (implant Sept 2019)   I don't understand why the Nephrologist would have stopped the Lipitor. This is excreted through the liver. No contraindication. Perhaps they were confused. Regardless, per Dr. Rony Rivera note, he needs LDL lowered due to recent CVA. Suha, please send in a new Rx for Lipitor 40 mg daily.    ----- Message -----  From: Jeromy Smith RN  Sent: 11/11/2019  11:58 AM EST  To: Remedios Cheema MD, Aliya Mcintosh NP, #  Subject: pt had CVA - had YESENIA for watchman  (implant #    Pt's watchman was ok - no leak or thrombus; and he was on Coumadin with controlled INR - but he had a stroke. So Dr. Rony Rivera note below - and started him on Lipitor    Pt said Dr. Karly Yoo  stopped his Lipitor for his kidneys -   I'll try to get the office note -   He has CKD stage III - stable Cr 1.8    He and wife want to know yes/ no on Lipitor - he has Dr. Meghna Conner appt on 11/26  If yes - he needs rx -     I will try to see why Dr. Jose Lyles - nephrology stopped Lipitor. Dr. Rony Rivera note -   At this juncture I would continue the evaluation by cardiology. He needs to have his LDL pushed well below 70 he has been started on Lipitor. Therapies need to see him prior to discharge as well to ensure that he has been evaluated for any needs he may have to have a successful transfer to home. 11/2/2019  1:35 PM - Lm, [facility] Lab Results In     Component Value Flag Ref Range Units Status  Cholesterol, total 216  High   100 - 199 mg/dL Final  Triglyceride 135   0 - 149 mg/dL Final  HDL Cholesterol 46   >39 mg/dL Final  VLDL, calculated 27   5 - 40 mg/dL Final  LDL, calculated 143        I asked pt if he wanted speech therapy - and he said no - as far as outpatient - will let Dr. Rony Rivera discuss with them - if they want it.     Willow Shay

## 2019-11-13 ENCOUNTER — OFFICE VISIT (OUTPATIENT)
Dept: NEUROLOGY | Age: 79
End: 2019-11-13

## 2019-11-13 VITALS
HEIGHT: 75 IN | BODY MASS INDEX: 31.71 KG/M2 | OXYGEN SATURATION: 97 % | RESPIRATION RATE: 18 BRPM | HEART RATE: 63 BPM | SYSTOLIC BLOOD PRESSURE: 114 MMHG | DIASTOLIC BLOOD PRESSURE: 64 MMHG | WEIGHT: 255 LBS

## 2019-11-13 DIAGNOSIS — R47.1 DYSARTHRIA: ICD-10-CM

## 2019-11-13 DIAGNOSIS — I48.11 LONGSTANDING PERSISTENT ATRIAL FIBRILLATION (HCC): ICD-10-CM

## 2019-11-13 DIAGNOSIS — I63.9 ACUTE CVA (CEREBROVASCULAR ACCIDENT) (HCC): Primary | ICD-10-CM

## 2019-11-13 DIAGNOSIS — Z95.818 PRESENCE OF WATCHMAN LEFT ATRIAL APPENDAGE CLOSURE DEVICE: ICD-10-CM

## 2019-11-13 DIAGNOSIS — Z79.01 CHRONIC ANTICOAGULATION: ICD-10-CM

## 2019-11-13 NOTE — PROGRESS NOTES
[facility] Neurology Clinics and [address] at [facility] Neurology Clinics at [address] [facility], [address] [place] [address] [address] [facility], [address] [place] [address]   [phone]              Chief Complaint   Patient presents with    Cerebrovascular Accident     Current Outpatient Medications   Medication Sig Dispense Refill    furosemide (LASIX) 20 mg tablet Take 40 mg by mouth daily.  metFORMIN ER (GLUCOPHAGE XR) 500 mg tablet TAKE 4 TABLETS BY MOUTH ONCE DAILY WITH BREAKFAST 360 Tab 1    spironolactone (ALDACTONE) 25 mg tablet 25 mg daily. 3    losartan (COZAAR) 100 mg tablet TAKE 1 TABLET BY MOUTH ONCE DAILY FOR HIGH BLOOD PRESSURE 90 Tab 3    amiodarone (CORDARONE) 200 mg tablet Take 1 Tab by mouth daily. 30 Tab 1    amLODIPine (NORVASC) 10 mg tablet Take 1 Tab by mouth daily. 90 Tab 3    warfarin (COUMADIN) 5 mg tablet TAKE 1 AND 1/2 (ONE-HALF) TABLETS BY MOUTH ONCE DAILY 135 Tab 5    carvedilol (COREG) 6.25 mg tablet Take 1 Tab by mouth two (2) times daily (with meals). 180 Tab 3    aspirin delayed-release 81 mg tablet Take 81 mg by mouth daily. Indications: MYOCARDIAL REINFARCTION PREVENTION      atorvastatin (LIPITOR) 40 mg tablet Take 1 Tab by mouth daily for 180 days. (Patient not taking: Reported on 11/13/2019) 90 Tab 1      Allergies   Allergen Reactions    Adhesive Tape Rash    Baycol Nausea and Vomiting    Clonidine Other (comments)     Headache and sleepiness      Pravachol [Pravastatin] Nausea and Vomiting     Social History     Tobacco Use    Smoking status: Never Smoker    Smokeless tobacco: Former User     Types: Chew    Tobacco comment: patient reports quitting over 20 yeaers ago. Substance Use Topics    Alcohol use: Yes     Frequency: Never     Comment: once a year    Drug use: No     Patient returns today for follow-up.   I actually saw him about a week ago when hospitalized with an acute stroke embolic in nature and this was felt to be due to his atrial fibrillation although he is on 2 different mechanisms for prevention of stroke in the setting of A. fib and that he has a watchman device as well as had a therapeutic INR of 2.5 on Coumadin. He had a YESENIA that did not show any abnormality of the watchman device and no clot. He does have dyslipidemia and was advised to start Lipitor. Apparently the family was told by his nephrologist not to take Lipitor and I reviewed the nurse navigator's note relaying from cardiology the same thought process that Lipitor clears through the liver and not the kidney so none of us are exactly sure what that is. They have requested notes from renal.    Since he was in the hospital no further signs of stroke. He has been compliant with his medicines except that noted above due to the advice he was given. He does have an upcoming appointment with Dr Jodie Vinson on the 26. His wife has several questions today regarding Lipitor, his old injury from playing football when he was a teen and having intracerebral hemorrhage, his MRI scan etc.    Examination  Visit Vitals  /64 (BP 1 Location: Left arm, BP Patient Position: Sitting)   Ht 6' 3\" (1.905 m)   Wt 115.7 kg (255 lb)   BMI 31.87 kg/m²   Pleasant gentleman. Awake alert oriented and conversant. Normal language. Dysarthric. Left facial droop. Right-sided live-parous is unchanged. Left side strong upper and lower in all muscle groups. Uses a crutch for ambulation. Impression/Plan  52-year-old with atrial fibrillation with therapeutic INR and a watchman device in place with stroke in the annette we discussed how ynes he was that he did not have a larger event which would have caused him to be locked in. Continue to modify modifiable risk factors. We discussed that anticoagulation and the watchman device decrease the risk of stroke but do not nullify it.   In terms of dyslipidemia his LDL should be pushed below 70 and I cannot think of a reason for him not to be on Lipitor but he is going to discuss that with Dr. Saúl Beckford and also with Amy Wang as well to make sure I am not missing something. 911 for stroke symptoms. As long as he has no further issues I will see him    Total time: 25 min   Counseling / coordination time: 15 min   > 50% counseling / coordination?: Yes re: as documented above        Khalif Salazar MD      This note was created using voice recognition software. Despite editing, there may be syntax errors. This note will not be viewable in [address]

## 2019-11-13 NOTE — PROGRESS NOTES
Chief Complaint   Patient presents with    Cerebrovascular Accident     most recent was around 11/8/19. Speech affected, no other sx. 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   Nephrology advised not to take atorvastatin

## 2019-11-19 RX ORDER — CLOPIDOGREL BISULFATE 75 MG/1
75 TABLET ORAL DAILY
Qty: 90 TAB | Refills: 1 | Status: SHIPPED | OUTPATIENT
Start: 2019-11-19 | End: 2020-06-23

## 2019-11-20 RX ORDER — AMIODARONE HYDROCHLORIDE 200 MG/1
TABLET ORAL
Qty: 30 TAB | Refills: 1 | Status: SHIPPED | OUTPATIENT
Start: 2019-11-20 | End: 2020-01-28

## 2019-11-26 ENCOUNTER — OFFICE VISIT (OUTPATIENT)
Dept: CARDIOLOGY CLINIC | Age: 79
End: 2019-11-26

## 2019-11-26 VITALS
BODY MASS INDEX: 32.07 KG/M2 | SYSTOLIC BLOOD PRESSURE: 112 MMHG | OXYGEN SATURATION: 94 % | DIASTOLIC BLOOD PRESSURE: 60 MMHG | WEIGHT: 257.9 LBS | HEART RATE: 62 BPM | HEIGHT: 75 IN

## 2019-11-26 DIAGNOSIS — G47.33 OSA (OBSTRUCTIVE SLEEP APNEA): ICD-10-CM

## 2019-11-26 DIAGNOSIS — I10 ESSENTIAL HYPERTENSION, BENIGN: Primary | ICD-10-CM

## 2019-11-26 DIAGNOSIS — I50.32 CHRONIC HEART FAILURE WITH PRESERVED EJECTION FRACTION (HCC): ICD-10-CM

## 2019-11-26 DIAGNOSIS — I25.10 CORONARY ARTERY DISEASE INVOLVING NATIVE CORONARY ARTERY OF NATIVE HEART WITHOUT ANGINA PECTORIS: ICD-10-CM

## 2019-11-26 DIAGNOSIS — R53.82 CHRONIC FATIGUE: ICD-10-CM

## 2019-11-26 DIAGNOSIS — I48.11 LONGSTANDING PERSISTENT ATRIAL FIBRILLATION (HCC): ICD-10-CM

## 2019-11-26 DIAGNOSIS — N18.30 CKD (CHRONIC KIDNEY DISEASE) STAGE 3, GFR 30-59 ML/MIN (HCC): ICD-10-CM

## 2019-11-26 DIAGNOSIS — Z95.818 PRESENCE OF WATCHMAN LEFT ATRIAL APPENDAGE CLOSURE DEVICE: ICD-10-CM

## 2019-11-26 NOTE — PATIENT INSTRUCTIONS
Your fatigue and shortness of breath are likely due to many factors. Deconditioning/out of shape, untreated sleep apnea, anemia (last Hemoglobin level was 12 in early November) and other health conditions such as DM, HTN, etc.  Possibly even your medications are contributing to how you feel. I would recommend that you continue your current cardiac medications, and try to stay as active as you can. Look into a  or programs at your gym to push you. In addition to this inquire with your Neurologist and primary care to see if they would recommend any form of rehab, other than going to the [facility]. Regarding your cholesterol, please continue Lipitor 40 mg daily and we will repeat your cholesterol numbers again in 3 months, 1-2 weeks prior to your next office visit. We will discuss the role of a stress test when you return in 3 months.

## 2019-11-26 NOTE — PROGRESS NOTES
Douglas Rojo is a 67 y.o. male    Chief Complaint   Patient presents with    Coronary Artery Disease    CHF    Hypertension    Cholesterol Problem    Irregular Heart Beat       Chest pain No    SOB with walking    Dizziness No    Swelling No    Refills  No    Visit Vitals  /60 (BP 1 Location: Left arm, BP Patient Position: Sitting)   Pulse 62   Ht 6' 3\" (1.905 m)   Wt 257 lb 14.4 oz (117 kg)   SpO2 94%   BMI 32.24 kg/m²       1. Have you been to the ER, urgent care clinic since your last visit? Hospitalized since your last visit? ED 11/7/19    2. Have you seen or consulted any other health care providers outside of the [address] [place] [address] since your last visit? Include any pap smears or colon screening.   No

## 2019-11-26 NOTE — PROGRESS NOTES
[id number]  [place], [address] [facility]    Office [phone]  Fax [phone]      Flaco Dodd is a 67 y.o. male admitted to [facility] 11/8/19 - 11/9/19 due to acute pontine stroke. Assessment  Encounter Diagnoses   Name Primary?  Essential hypertension, benign Yes    Chronic heart failure with preserved ejection fraction (HCC)     Coronary artery disease involving native coronary artery of native heart without angina pectoris     Longstanding persistent atrial fibrillation     TOPHER (obstructive sleep apnea)     CKD (chronic kidney disease) stage 3, GFR 30-59 ml/min ([facility])     Presence of Watchman left atrial appendage closure device     Chronic fatigue        Recommendations:  1. Chronic HFpEF in the setting of AF, CAD, HTN and untreated TOPHER, deconditioning. Class 3 sxs with ongoing fatigue and dyspnea. His volume status seems fine. EF stable by YESENIA 11/8/19.   - Continue Lasix 40 mg daily  - Low sodium diet  - Discussed the role of PT/OT, stroke rehab, vs. ongoing self management of training at [facility]. He prefers to continued to work by himself. Literature provided about [facility] PREP program.    2. Hx of AF - SR by exam today. No s/sxs of tachycardia or palpitations. Watchman now in place due to hx of GI bleeding.    - Continue Amiodarone and Coreg    3. CAD - hx of multiple PCI's, most recently 2 years ago, s/p PCI RCA with LEO. Last cath most recently 4/24/18 demonstrated no RCA restenosis, distal LAD 80%. He has chronic fatigue and HURD, likely multifactorial, but no anginal symptoms.  - Continue ASA and Plavix  - We discussed the role of repeat stress test if symptoms do not improve with attempts at conditioning. 4. HTN - normotensive in the office today. Continue current regimen. He is followed by nephrology as well. 5. Dyslipidemia - LDL >140 during recent hospitalization; off statin at that time.   Recently resumed Lipitor 40 mg daily.   - Repeat NMR and CMP again in 3 months. 6. Recent acute pontine stroke - YESENIA negative for intracardiac thrombus. Watchman well seated. - Continue follow up with Dr. Warden Crabtree. 7. Anemia - Hgb 12.3 during recent hospitalization.        Subjective:  Arnaldo Hutton denies any significant interval changes since recent hospital admission with recurrent CVA. He denies any change in residual deficits. He and his wife are adamant that he does note need PT/OT because they always focus on his \"old problems\". His primary concern today is ongoing fatigue and shortness of breath. \"I wake up short of breath and tired. I sit on the edge of the bed panting\". He reports sleeping well. No orthopnea or PND. He continues to ambulate with a crutch. Utilizing a scooter when he is goes to softball tournaments. He denies any exertional chest pain. CKD followed by Dr. Jey Rivera. He is confused by Dr. Mari Parker recommendations to discontinue statin, but has resumed it this week, per our recommendations since discharge. No bleeding concerns on ASA and Plavix. Arm ecchymosis remains present. Cardiac risk factors   HTN yes  DM  yes  Smoking no    Cardiac testing  Cath (8/27/8): LAD p90, m70. OM1 70, OM2 90 (small). RCA m40. EF 55%. No AVG/MR. Patent L SC.  PCI (11/13/8): mLAD 2.75x18 Xience, pLAD 2.75x23 Xience. OM1 3.0x15 Xience. Cath 4/28/17:Cath (4/28/17): LAD patent with patent prox and mid stent. LCx patent with patent OM1 stent. RCA p90 (ISR) and p90 distal to stent. EF 65%. No AVG/MR. PCI osteal/prox RCA (4/28/17): 3.0x28 Xience. Cath 4/24/18: RCA - prior stents present; Prox ISR 60%; Mid to Distal - 60%  Difficulty engaging guide secondary to stent at ostium  JR4/AR1 guide would not engage ; THE [facility] guide used to engage RCA  FFR - not siginificant 0.89      Lexiscan Cardiolite (8/11/9): Mild fixed inf defect. No reversible defects. EF 51% with mild inf HK. Cath (8/29/13): LAD patent prox and mid stents.  LCx m40; patent OM1 stent. RCA p80. EF 55%. No AVG/MR. PCI ost/proxRCA (8/29/13): 3.0x18 Xience. Echo (8/22/13): EF 55%. Mod dil LA. Mild dil RA. Mild MR/TR/UT. PASP 24. Lexiscan Cardiolite 4/15/14 - normal perfusion imaging, EF 52%  DCCV 12/18/14  Echo 6/18/18 - EF 55%. No WMA. Mild asymmetric hypertrophy of the septum. MIld to mod LAE. AoV calcification and sclerosis. No significant change c/t study of 22-Aug-2013. Echo 4/11/19 - EF 55-60%. Mod LAE. AoV sclerosis. Mild MR. Mild TR. Severely elevated CVP (>15 mmHg)    9/24/2019:Endocardial left atrial appendage occlusion was performed utilizing a 30 mm WATCHMAN device per Dr. Carlos Enrique Hamlin    YESENIA 11/9/19 - 30mm Watchman device present. Well seated. No evidence of carlton-device leak. No overlying thrombus noted on the device. Very small right to left shunting likely related to recent trans-septal puncture for Watchman implantation. LVEF 56-60%. 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  90 Tab 1    atorvastatin (LIPITOR) 40 mg tablet Take 1 Tab by mouth daily for 180 days. 90 Tab 1    furosemide (LASIX) 20 mg tablet Take 40 mg by mouth daily.  metFORMIN ER (GLUCOPHAGE XR) 500 mg tablet TAKE 4 TABLETS BY MOUTH ONCE DAILY WITH BREAKFAST 360 Tab 1    spironolactone (ALDACTONE) 25 mg tablet 25 mg daily. 3    losartan (COZAAR) 100 mg tablet TAKE 1 TABLET BY MOUTH ONCE DAILY FOR HIGH BLOOD PRESSURE 90 Tab 3    amLODIPine (NORVASC) 10 mg tablet Take 1 Tab by mouth daily. 90 Tab 3    carvedilol (COREG) 6.25 mg tablet Take 1 Tab by mouth two (2) times daily (with meals). 180 Tab 3    aspirin delayed-release 81 mg tablet Take 81 mg by mouth daily. Indications: MYOCARDIAL REINFARCTION PREVENTION         Allergies   Allergen Reactions    Adhesive Tape Rash    Baycol Nausea and Vomiting    Clonidine Other (comments)     Headache and sleepiness      Pravachol [Pravastatin] Nausea and Vomiting      Review of Systems  Constitutional: Negative for fever, chills, and diaphoresis. Respiratory: Negative for cough, hemoptysis, sputum production   Cardiovascular: Negative for chest pain, palpitations, claudication, and PND. Gastrointestinal: Negative for heartburn, nausea, vomiting, blood in stool and melena. Genitourinary: Negative for dysuria and flank pain. Musculoskeletal: Negative for joint pain and back pain. Skin: Negative for rash. +ecchymoses  Neurological: Negative for focal weakness, seizures, loss of consciousness, weakness and headaches. +right sided weakness d/t stroke  Endo/Heme/Allergies: Does not bruise/bleed easily. Psychiatric/Behavioral: Negative for memory loss. The patient does not have insomnia.     Physical Exam  Visit Vitals  /60 (BP 1 Location: Left arm, BP Patient Position: Sitting)   Pulse 62   Ht 6' 3\" (1.905 m)   Wt 257 lb 14.4 oz (117 kg)   SpO2 94%   BMI 32.24 kg/m²     Wt Readings from Last 3 Encounters:   11/26/19 257 lb 14.4 oz (117 kg)   11/13/19 255 lb (115.7 kg)   11/08/19 245 lb (111.1 kg)      General - well developed well nourished  Neck - JVP normal, thyroid nl  Cardiac - regular, no murmurs, rubs or gallops.  No clicks  Vascular - carotids without bruits, radials, femorals and pedal pulses equal bilateral  Lungs - clear to auscultation bilaterals, no rales ,wheezing or rhonchi  Abd - rounded/obese, soft nontender  Extremities - trace ankle edema  Skin - no rash +ecchymoses  Neuro - nonfocal. Right sided weakness  Psych - normal mood and affect    Cardiographics:  EKG 4/17/18 - Afib  EKG 9/28/18 - SR 65, first degree AG block, , left anterior live block  EKG 4/3/19 - AF 70s, LAHB, compared to 9/28/18, AF is newly noted    Jaynee Dominion, NP Jocelyn Jeans, MD

## 2019-12-09 ENCOUNTER — OFFICE VISIT (OUTPATIENT)
Dept: SLEEP MEDICINE | Age: 79
End: 2019-12-09

## 2019-12-09 ENCOUNTER — HOSPITAL ENCOUNTER (OUTPATIENT)
Dept: SLEEP MEDICINE | Age: 79
Discharge: HOME | End: 2019-12-09
Payer: MEDICARE

## 2019-12-09 VITALS
SYSTOLIC BLOOD PRESSURE: 153 MMHG | DIASTOLIC BLOOD PRESSURE: 75 MMHG | WEIGHT: 256.2 LBS | HEART RATE: 85 BPM | HEIGHT: 75 IN | BODY MASS INDEX: 31.85 KG/M2 | OXYGEN SATURATION: 98 %

## 2019-12-09 DIAGNOSIS — G47.33 OSA (OBSTRUCTIVE SLEEP APNEA): Primary | ICD-10-CM

## 2019-12-09 DIAGNOSIS — Z86.73 H/O: STROKE: ICD-10-CM

## 2019-12-09 DIAGNOSIS — I10 ESSENTIAL HYPERTENSION: ICD-10-CM

## 2019-12-09 DIAGNOSIS — I48.91 ATRIAL FIBRILLATION, UNSPECIFIED TYPE (HCC): ICD-10-CM

## 2019-12-09 PROCEDURE — 95806 SLEEP STUDY UNATT&RESP EFFT: CPT | Performed by: INTERNAL MEDICINE

## 2019-12-09 NOTE — PATIENT INSTRUCTIONS
217 [facility]., [place]. [address] [facility], [address] Tel.  [phone] Fax. 100 [facility] [address] [place], [address] [facility] Tel.  [phone] Fax. [phone] [address] Tel.  [phone] Fax. [phone] Sleep Apnea: After Your Visit Your Care Instructions Sleep apnea occurs when you frequently stop breathing for 10 seconds or longer during sleep. It can be mild to severe, based on the number of times per hour that you stop breathing or have slowed breathing. Blocked or narrowed airways in your nose, mouth, or throat can cause sleep apnea. Your airway can become blocked when your throat muscles and tongue relax during sleep. Sleep apnea is common, occurring in 1 out of 20 individuals. Individuals having any of the following characteristics should be evaluated and treated right away due to high risk and detrimental consequences from untreated sleep apnea: 
1. Obesity 2. Congestive Heart failure 3. Atrial Fibrillation 4. Uncontrolled Hypertension 5. Type II Diabetes 6. Night-time Arrhythmias 7. Stroke 8. Pulmonary Hypertension 9. High-risk Driving Populations (pilots, truck drivers, etc.) 10. Patients Considering Weight-loss Surgery How do you know you have sleep apnea? You probably have sleep apnea if you answer 'yes' to 3 or more of the following questions: S - Have you been told that you Snore? T - Are you often Tired during the day? O - Has anyone Observed you stop breathing while sleeping? P- Do you have (or are being treated for) high blood Pressure? B - Are you obese (Body Mass Index > 35)? A - Is your Age 48years old or older? N - Is your Neck size greater than 16 inches? G - Are you male Gender? A sleep physician can prescribe a breathing device that prevents tissues in the throat from blocking your airway.  Or your doctor may recommend using a dental device (oral breathing device) to help keep your airway open. In some cases, surgery may be needed to remove enlarged tissues in the throat. Follow-up care is a key part of your treatment and safety. Be sure to make and go to all appointments, and call your doctor if you are having problems. It's also a good idea to know your test results and keep a list of the medicines you take. How can you care for yourself at home? · Lose weight, if needed. It may reduce the number of times you stop breathing or have slowed breathing. · Go to bed at the same time every night. · Sleep on your side. It may stop mild apnea. If you tend to roll onto your back, sew a pocket in the back of your pajama top. Put a tennis ball into the pocket, and stitch the pocket shut. This will help keep you from sleeping on your back. · Avoid alcohol and medicines such as sleeping pills and sedatives before bed. · Do not smoke. Smoking can make sleep apnea worse. If you need help quitting, talk to your doctor about stop-smoking programs and medicines. These can increase your chances of quitting for good. · Prop up the head of your bed 4 to 6 inches by putting bricks under the legs of the bed. · Treat breathing problems, such as a stuffy nose, caused by a cold or allergies. · Use a continuous positive airway pressure (CPAP) breathing machine if lifestyle changes do not help your apnea and your doctor recommends it. The machine keeps your airway from closing when you sleep. · If CPAP does not help you, ask your doctor whether you should try other breathing machines. A bilevel positive airway pressure machine has two types of air pressureâone for breathing in and one for breathing out. Another device raises or lowers air pressure as needed while you breathe. · If your nose feels dry or bleeds when using one of these machines, talk with your doctor about increasing moisture in the air. A humidifier may help.  
· If your nose is runny or stuffy from using a breathing machine, talk with your doctor about using decongestants or a corticosteroid nasal spray. When should you call for help? Watch closely for changes in your health, and be sure to contact your doctor if: 
· You still have sleep apnea even though you have made lifestyle changes. · You are thinking of trying a device such as CPAP. · You are having problems using a CPAP or similar machine. Where can you learn more? Go to [employer]. Enter Z782 in the search box to learn more about \"Sleep Apnea: After Your Visit. \"  
© 3251-4040 Healthwise, Incorporated. Care instructions adapted under license by [facility] (which disclaims liability or warranty for this information). This care instruction is for use with your licensed healthcare professional. If you have questions about a medical condition or this instruction, always ask your healthcare professional. April Fulling any warranty or liability for your use of this information. PROPER SLEEP HYGIENE What to avoid · Do not have drinks with caffeine, such as coffee or black tea, for 8 hours before bed. · Do not smoke or use other types of tobacco near bedtime. Nicotine is a stimulant and can keep you awake. · Avoid drinking alcohol late in the evening, because it can cause you to wake in the middle of the night. · Do not eat a big meal close to bedtime. If you are hungry, eat a light snack. · Do not drink a lot of water close to bedtime, because the need to urinate may wake you up during the night. · Do not read or watch TV in bed. Use the bed only for sleeping and sexual activity. What to try · Go to bed at the same time every night, and wake up at the same time every morning. Do not take naps during the day. · Keep your bedroom quiet, dark, and cool. · Get regular exercise, but not within 3 to 4 hours of your bedtime. Traphill Ace · Sleep on a comfortable pillow and mattress. · If watching the clock makes you anxious, turn it facing away from you so you cannot see the time. · If you worry when you lie down, start a worry book. Well before bedtime, write down your worries, and then set the book and your concerns aside. · Try meditation or other relaxation techniques before you go to bed. · If you cannot fall asleep, get up and go to another room until you feel sleepy. Do something relaxing. Repeat your bedtime routine before you go to bed again. · Make your house quiet and calm about an hour before bedtime. Turn down the lights, turn off the TV, log off the computer, and turn down the volume on music. This can help you relax after a busy day. Drowsy Driving The [place] [address] cites drowsiness as a causing factor in more than 944,684 police reported crashes annually, resulting in 76,000 injuries and 1,500 deaths. Other surveys suggest 55% of people polled have driven while drowsy in the past year, 23% had fallen asleep but not crashed, 3% crashed, and 2% had and accident due to drowsy driving. Who is at risk? Young Drivers: One study of drowsy driving accidents states that 55% of the drivers were under 25 years. Of those, 75% were male. Shift Workers and Travelers: People who work overnight or travel across time zones frequently are at higher risk of experiencing Circadian Rhythm Disorders. They are trying to work and function when their body is programed to sleep. Sleep Deprived: Lack of sleep has a serious impact on your ability to pay attention or focus on a task. Consistently getting less than the average of 8 hours your body needs creates partial or cumulative sleep deprivation. Untreated Sleep Disorders: Sleep Apnea, Narcolepsy, R.L.S., and other sleep disorders (untreated) prevent a person from getting enough restful sleep.  This leads to excessive daytime sleepiness and increases the risk for drowsy driving accidents by up to 7 times. Medications / Alcohol: Even over the counter medications can cause drowsiness. Medications that impair a drivers attention should have a warning label. Alcohol naturally makes you sleepy and on its own can cause accidents. Combined with excessive drowsiness its effects are amplified. Signs of Drowsy Driving: * You don't remember driving the last few miles * You may drift out of your peace * You are unable to focus and your thoughts wander * You may yawn more often than normal 
 * You have difficulty keeping your eyes open / nodding off * Missing traffic signs, speeding, or tailgating Prevention-  
Good sleep hygiene, lifestyle and behavioral choices have the most impact on drowsy driving. There is no substitute for sleep and the average person requires 8 hours nightly. If you find yourself driving drowsy, stop and sleep. Consider the sleep hygiene tips provided during your visit as well. Medication Refill Policy: Refills for all medications require 1 week advance notice. Please have your pharmacy fax a refill request. We are unable to fax, or call in \"controled substance\" medications and you will need to pick these prescriptions up from our office. H.BLOOM Activation Thank you for requesting access to H.BLOOM. Please follow the instructions below to securely access and download your online medical record. H.BLOOM allows you to send messages to your doctor, view your test results, renew your prescriptions, schedule appointments, and more. How Do I Sign Up? 1. In your internet browser, go to [email or web address]. 2. Click on the First Time User? Click Here link in the Sign In box. You will see the New Member Sign Up page. 3. Enter your H.BLOOM Access Code exactly as it appears below. You will not need to use this code after youve completed the sign-up process.  If you do not sign up before the expiration date, you must request a new code. Synchro Access Code: [email or web address] Expires: 2019 10:06 AM (This is the date your Synchro access code will ) 4. Enter the last four digits of your Social Security Number (xxxx) and Date of Birth (mm/dd/yyyy) as indicated and click Submit. You will be taken to the next sign-up page. 5. Create a Synchro ID. This will be your Synchro login ID and cannot be changed, so think of one that is secure and easy to remember. 6. Create a Synchro password. You can change your password at any time. 7. Enter your Password Reset Question and Answer. This can be used at a later time if you forget your password. 8. Enter your e-mail address. You will receive e-mail notification when new information is available in [address]. 9. Click Sign Up. You can now view and download portions of your medical record. 10. Click the Download Summary menu link to download a portable copy of your medical information. Additional Information If you have questions, please call [phone]. Remember, Synchro is NOT to be used for urgent needs. For medical emergencies, dial 911.

## 2019-12-09 NOTE — PROGRESS NOTES
*Nazanin Winkler is an 67 y.o. male who has been diagnosed as having obstructive sleep apnea. Initial Apnea/Hypopnea index was 18.6 which indicates mild apnea. He opted to have the disorder treated with an oral appliance. He was referred to Dr. Mulu Casiano for a mandibular advancement device which is stated as being used 100 percent of the time. He denies snoring, snorting, choking, periods of not breathing or excessive daytime sleepiness. Arnaldo Hutton does wake up frequently at night to urinate. He does not work shifts: Junita Lombard indicates he does get too little sleep at night. Sleep quality has improved. Sleep duration has increased as has level of alertness during the day. [place] Sleepiness Score: 1       Patient's usual sleep schedule is as follows:     Sleep Review of Systems: notable for no difficulty falling asleep; frequent awakenings at night;  regular dreaming noted; no nightmares ; no early morning headaches; no memory problems; no concentration issues; patient denies of being drowsy while driving. Objective:     Visit Vitals  /75 (BP 1 Location: Left arm)   Pulse 85   Ht 6' 3\" (1.905 m)   Wt 256 lb 3.2 oz (116.2 kg)   SpO2 98%   BMI 32.02 kg/m²         General:   Not in acute distress   Eyes:  Anicteric sclerae, no obvious strabismus   Nose:  No Nasal septum deviation    Oropharynx:   Class 4 oropharyngeal outlet, thick tongue base, enlarged and boggy uvula, low-lying soft palate, narrow tonsilo-pharyngeal pilars   Tonsils:   tonsils are absent   Neck:    midline trachea   Chest/Lungs:  Equal lung expansion, clear on auscultation    CVS:  Normal rate, regular rhythm; no JVD   Skin:  Warm to touch; no obvious rashes   Neuro:  No focal deficits ; no obvious tremor    Psych:  Normal affect,  normal countenance;          Assessment:       ICD-10-CM ICD-9-CM    1. TOPHER (obstructive sleep apnea) G47.33 327.23 SLEEP STUDY UNATTENDED, 4 CHANNEL   2.  BMI 32.0-32.9,adult Z68.32 V85.32 3. Essential hypertension I10 401.9    4. Atrial fibrillation, unspecified type [facility] I48.91 427.31    5. H/O: stroke Z86.73 V12.54          Plan:       * Home sleep testing was ordered today to objectively assess for sleep disordered breathing on current therapy. * Attended testing offered due to a history of stroke - declined by patient. * Telephone [phone]  follow-up shortly after sleep study to review results.   (patient has given permission for a message to be left regarding test results and further management if patient cannot be cannot be reached directly). * Counseling was provided regarding safe driving and proper sleep hygiene. * Counseling was provided regarding the importance of regular therapy and follow-up with dentist as per their recommendation and with us in 1 year or as needed. * Patient was asked to contact our office at any time for further questions regarding their sleep symptoms. Office visit exceeded 15 minutes with counseling and direction of care taking up more than 50% of the allotted time. Thank you for allowing to participate in your patient's medical care. Brenda Ritter MD, FAASM  Electronically signed.  December 9, 2019

## 2019-12-10 NOTE — PROGRESS NOTES
217 [facility]. [place], [address]  Tel.  [phone]  Fax. 100 [facility] 60  [place], [address] [facility]  Tel.  [phone]  Fax. [phone] 9250 [facility] JacquelineMary Ellen ramsey   Tel.  [phone]  Fax. [phone]       S>Berhane Alarcon is a 67 y.o. male seen today to receive a home sleep testing unit (HST). · Patient was educated on proper hookup and operation of the HST. · Instruction forms and documentation were reviewed and signed. · The patient demonstrated good understanding of the HST. O>    Visit Vitals  /75 (BP 1 Location: Left arm)   Pulse 85   Ht 6' 3\" (1.905 m)   Wt 256 lb 3.2 oz (116.2 kg)   SpO2 98%   BMI 32.02 kg/m²         A>  1. TOPHER (obstructive sleep apnea)    2. BMI 32.0-32.9,adult    3. Essential hypertension    4. Atrial fibrillation, unspecified type ([address]    5. H/O: stroke          P>  · General information regarding operations and maintenance of the device was provided. · He was provided information on sleep apnea including coresponding risk factors and the importance of proper treatment. · Follow-up appointment was made to return the HST. He will be contacted once the results have been reviewed. · He was asked to contact our office for any problems regarding his home sleep test study.

## 2019-12-11 ENCOUNTER — TELEPHONE (OUTPATIENT)
Dept: SLEEP MEDICINE | Age: 79
End: 2019-12-11

## 2019-12-11 DIAGNOSIS — G47.33 OSA (OBSTRUCTIVE SLEEP APNEA): Primary | ICD-10-CM

## 2019-12-11 NOTE — TELEPHONE ENCOUNTER
[place] is to be contacted by lead sleep technologist regarding results of Sleep Testing which was indicative of an average AHI of 23.7 per hour with an SpO2 yakov of 77% and SpO2 of < 88% being 20 minutes. * Testing had been order to assess efficacy of oral appliance therapy but per sleep diary an oral appliance was not used on the night of testing. Patient should return for repeat testing while using his oral appliance. Encounter Diagnosis   Name Primary?     TOPHER (obstructive sleep apnea) Yes       Orders Placed This Encounter    SLEEP STUDY UNATTENDED, 4 CHANNEL     Scheduling Instructions:      Perform testing with Oral Appliance in use at night during sleep     Order Specific Question:   Reason for Exam     Answer:   TOPHER

## 2019-12-30 NOTE — TELEPHONE ENCOUNTER
Reviewed sleep study results with patient. He expressed understanding and is willing to proceed with a repeat HSAT. He will  the device from the [facility].

## 2020-01-02 ENCOUNTER — HOSPITAL ENCOUNTER (OUTPATIENT)
Dept: SLEEP MEDICINE | Age: 80
Discharge: HOME | End: 2020-01-02
Payer: MEDICARE

## 2020-01-02 PROCEDURE — 95806 SLEEP STUDY UNATT&RESP EFFT: CPT | Performed by: INTERNAL MEDICINE

## 2020-01-11 ENCOUNTER — TELEPHONE (OUTPATIENT)
Dept: SLEEP MEDICINE | Age: 80
End: 2020-01-11

## 2020-01-11 DIAGNOSIS — G47.33 OSA (OBSTRUCTIVE SLEEP APNEA): Primary | ICD-10-CM

## 2020-01-11 NOTE — TELEPHONE ENCOUNTER
Roger Cueto was contacted by me regarding results of Sleep Testing performed with Oral Appliance in use. Testing was indicative of an average AHI of 21 per hour with an SpO2 yakov of 68% and SpO2 of < 88% being 52 minutes. Events associated with severe desaturations occurred primarily in supine position. *  He had elected to proceed with a trial of using an Oral Device (Mandibular Advancing Device, Tongue Retention Device, etc.) which has been shown to be effective treatment for obstructive sleep apnea. * We have referred the patient to Dentistry for oral appliance evaluation. * We also discussed combination therapy with CPAP which he declined. * He did agree to Positional Therapy with Slumber Bump and repeat testing in 3 months. Follow-up office visit and re-testing to be done in 3 months after initiation of oral appliance therapy to assess efficacy of therapy. Encounter Diagnosis   Name Primary?     TOPHER (obstructive sleep apnea) Yes       Orders Placed This Encounter    REFERRAL TO DENTISTRY     Referral Priority:   Routine     Referral Type:   Consultation     Referral Reason:   Specialty Services Required     Referred to Provider:   Gonzalo Buchanan DDS     Number of Visits Requested:   1

## 2020-01-14 ENCOUNTER — DOCUMENTATION ONLY (OUTPATIENT)
Dept: SLEEP MEDICINE | Age: 80
End: 2020-01-14

## 2020-01-28 RX ORDER — AMIODARONE HYDROCHLORIDE 200 MG/1
TABLET ORAL
Qty: 30 TAB | Refills: 0 | Status: SHIPPED | OUTPATIENT
Start: 2020-01-28 | End: 2020-03-06

## 2020-03-03 NOTE — PROGRESS NOTES
[address] [place], [address] [facility]    Office [phone]  Fax [phone]      Raina Franco is a 67 y.o. male last seen by me 4 months ago. Assessment    Encounter Diagnoses   Name Primary?  Essential hypertension, benign     Coronary artery disease involving native coronary artery of native heart without angina pectoris     Paroxysmal atrial fibrillation (HCC)     Chronic heart failure with preserved ejection fraction (HCC)     Type 2 diabetes with nephropathy (HCC)     TOPHER (obstructive sleep apnea)     CKD (chronic kidney disease) stage 3, GFR 30-59 ml/min (HCC)     Dyslipidemia, goal LDL below 70     Chronic fatigue     Presence of Watchman left atrial appendage closure device     Chronic anticoagulation     HURD (dyspnea on exertion) Yes       Recommendations:    Class 3 HURD differential is wide - HFpEF, myocardial ischemia, AF, amiodarone toxicity, deconditioning. His volume status seems fine on no diuretic therapy. He remains in sinus rhythm by pulse. Concerned about progressive CAD. - Reassess LV function, PA pressures w/ echo  - Low threshold to consider right and left heart cath - will decide after reviewing echo. He does not want stress imaging  - Pulmonary evaluation if above is negative    PAF. No sxs recurrence on amiodarone. Pulse is regular today. No clear sxs of amiodarone toxicity. - Continue amiodarone + Coreg for rhythm control.  - s/p  WATCHMAN d/t GI bleeding  - Continue DAPT indefintely d/t COPD    CAD - hx of multiple PCI's, most recently 2 years ago, s/p PCI RCA with LEO. Last cath most recently 4/24/18 demonstrated no RCA restenosis, distal LAD 80%. He has chronic fatigue and HURD, likely multifactorial, but no anginal symptoms.  - Continue ASA and Plavix  - Cath consideration as noted above     HTN - normotensive in the office today. Continue current regimen. He is followed by nephrology as well.      Dyslipidemia - LDL >140 during recent hospitalization; off statin at that time. Recently resumed Lipitor 40 mg daily.   - Repeat NMR and CMP at some point. Recent acute pontine stroke - YESENIA negative for intracardiac thrombus. Watchman well seated. - Continue follow up with Dr. Héctor Payne. Phone follow up after reviewing tests     Subjective:    Dora Pineda reports sedentary lifestyle and reports fatigue and stable pattern of significant SOB with ADLs. He has not been taking Lasix for past few months d/t frequent urination, did not notice impact on breathing either way. Patient denies any exertional chest pain, palpitations, syncope, orthopnea, edema or paroxysmal nocturnal dyspnea. He reports non-productive cough. T2DM followed by Miguelito Pearson MD.    He continues to ambulate with a crutch. Utilizing a scooter when he is goes to softball tournaments. He denies any exertional chest pain. CKD followed by Dr. Sylvain Urban. No bleeding concerns on ASA and Plavix. Arm ecchymosis remains present. He is here today w/ his wife. Cardiac risk factors   HTN yes  DM  yes  Smoking no    Cardiac testing  Cath (8/27/8): LAD p90, m70. OM1 70, OM2 90 (small). RCA m40. EF 55%. No AVG/MR. Patent L SC.  PCI (11/13/8): mLAD 2.75x18 Xience, pLAD 2.75x23 Xience. OM1 3.0x15 Xience. Cath 4/28/17:Cath (4/28/17): LAD patent with patent prox and mid stent. LCx patent with patent OM1 stent. RCA p90 (ISR) and p90 distal to stent. EF 65%. No AVG/MR. PCI osteal/prox RCA (4/28/17): 3.0x28 Xience. Cath 4/24/18: RCA - prior stents present; Prox ISR 60%; Mid to Distal - 60%  Difficulty engaging guide secondary to stent at ostium  JR4/AR1 guide would not engage ; THE [facility] guide used to engage RCA  FFR - not siginificant 0.89      Lexiscan Cardiolite (8/11/9): Mild fixed inf defect. No reversible defects. EF 51% with mild inf HK. Cath (8/29/13): LAD patent prox and mid stents. LCx m40; patent OM1 stent. RCA p80. EF 55%. No AVG/MR.   PCI ost/proxRCA (8/29/13): 3.0x18 Xience. Echo (8/22/13): EF 55%. Mod dil LA. Mild dil RA. Mild MR/TR/KY. PASP 24. Lexiscan Cardiolite 4/15/14 - normal perfusion imaging, EF 52%  DCCV 12/18/14  Echo 6/18/18 - EF 55%. No WMA. Mild asymmetric hypertrophy of the septum. MIld to mod LAE. AoV calcification and sclerosis. No significant change c/t study of 22-Aug-2013. Echo 4/11/19 - EF 55-60%. Mod LAE. AoV sclerosis. Mild MR. Mild TR. Severely elevated CVP (>15 mmHg)    9/24/2019:Endocardial left atrial appendage occlusion was performed utilizing a 30 mm WATCHMAN device per Dr. Karen Noble    YESENIA 11/9/19 - 30mm Watchman device present. Well seated. No evidence of carlton-device leak. No overlying thrombus noted on the device. Very small right to left shunting likely related to recent trans-septal puncture for Watchman implantation. LVEF 56-60%. 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  90 Tab 1    atorvastatin (LIPITOR) 40 mg tablet Take 1 Tab by mouth daily for 180 days. 90 Tab 1    metFORMIN ER (GLUCOPHAGE XR) 500 mg tablet TAKE 4 TABLETS BY MOUTH ONCE DAILY WITH BREAKFAST 360 Tab 1    spironolactone (ALDACTONE) 25 mg tablet 25 mg daily. 3    losartan (COZAAR) 100 mg tablet TAKE 1 TABLET BY MOUTH ONCE DAILY FOR HIGH BLOOD PRESSURE 90 Tab 3    amLODIPine (NORVASC) 10 mg tablet Take 1 Tab by mouth daily. 90 Tab 3    carvedilol (COREG) 6.25 mg tablet Take 1 Tab by mouth two (2) times daily (with meals). 180 Tab 3    aspirin delayed-release 81 mg tablet Take 81 mg by mouth daily. Indications: MYOCARDIAL REINFARCTION PREVENTION      furosemide (LASIX) 20 mg tablet TAKE 2 TABLETS BY MOUTH ONCE DAILY 30 Tab 3       Allergies   Allergen Reactions    Adhesive Tape Rash    Baycol Nausea and Vomiting    Clonidine Other (comments)     Headache and sleepiness      Pravachol [Pravastatin] Nausea and Vomiting      Review of Systems  Constitutional: Negative for fever, chills, and diaphoresis. Respiratory: Negative for hemoptysis, sputum production +cough   Cardiovascular: Negative for chest pain, palpitations, claudication, and PND. +HURD  Gastrointestinal: Negative for heartburn, nausea, vomiting, blood in stool and melena. Genitourinary: Negative for dysuria and flank pain. Musculoskeletal: Negative for joint pain and back pain. Skin: Negative for rash. +ecchymoses  Neurological: Negative for focal weakness, seizures, loss of consciousness, weakness and headaches. +right sided weakness d/t stroke  Endo/Heme/Allergies: Does not bruise/bleed easily. Psychiatric/Behavioral: Negative for memory loss. The patient does not have insomnia.     Physical Exam  Visit Vitals  /60 (BP 1 Location: Left arm, BP Patient Position: Sitting)   Pulse 67   Resp 16   Ht 6' 3\" (1.905 m)   Wt 263 lb (119.3 kg)   SpO2 98%   BMI 32.87 kg/m²     Wt Readings from Last 3 Encounters:   03/04/20 263 lb (119.3 kg)   12/09/19 256 lb 3.2 oz (116.2 kg)   11/26/19 257 lb 14.4 oz (117 kg) General - well developed well nourished  Neck - JVP normal, thyroid nl  Cardiac - regular, no murmurs, rubs or gallops. No clicks  Vascular - carotids without bruits, radials, femorals and pedal pulses equal bilateral  Lungs - clear to auscultation bilaterals, no rales ,wheezing or rhonchi  Abd - rounded/obese, soft nontender  Extremities - trace ankle edema  Skin - no rash +ecchymoses  Neuro - nonfocal. Right sided weakness  Psych - normal mood and affect    Cardiographics:  EKG 4/17/18 - Afib  EKG 9/28/18 - SR 65, first degree AG block, , left anterior live block  EKG 4/3/19 - AF 70s, LAHB, compared to 9/28/18, AF is newly noted      Written by Ayana Hill. [facility], as dictated by Pedro Mariano M.D.       Pedro Mariano MD

## 2020-03-04 ENCOUNTER — OFFICE VISIT (OUTPATIENT)
Dept: CARDIOLOGY CLINIC | Age: 80
End: 2020-03-04

## 2020-03-04 VITALS
HEART RATE: 67 BPM | SYSTOLIC BLOOD PRESSURE: 118 MMHG | WEIGHT: 263 LBS | OXYGEN SATURATION: 98 % | BODY MASS INDEX: 32.7 KG/M2 | RESPIRATION RATE: 16 BRPM | DIASTOLIC BLOOD PRESSURE: 60 MMHG | HEIGHT: 75 IN

## 2020-03-04 DIAGNOSIS — R06.09 DOE (DYSPNEA ON EXERTION): Primary | ICD-10-CM

## 2020-03-04 DIAGNOSIS — N18.30 CKD (CHRONIC KIDNEY DISEASE) STAGE 3, GFR 30-59 ML/MIN (HCC): ICD-10-CM

## 2020-03-04 DIAGNOSIS — I10 ESSENTIAL HYPERTENSION, BENIGN: ICD-10-CM

## 2020-03-04 DIAGNOSIS — E78.5 DYSLIPIDEMIA, GOAL LDL BELOW 70: ICD-10-CM

## 2020-03-04 DIAGNOSIS — E11.21 TYPE 2 DIABETES WITH NEPHROPATHY (HCC): ICD-10-CM

## 2020-03-04 DIAGNOSIS — Z95.818 PRESENCE OF WATCHMAN LEFT ATRIAL APPENDAGE CLOSURE DEVICE: ICD-10-CM

## 2020-03-04 DIAGNOSIS — Z79.01 CHRONIC ANTICOAGULATION: ICD-10-CM

## 2020-03-04 DIAGNOSIS — R53.82 CHRONIC FATIGUE: ICD-10-CM

## 2020-03-04 DIAGNOSIS — I25.10 CORONARY ARTERY DISEASE INVOLVING NATIVE CORONARY ARTERY OF NATIVE HEART WITHOUT ANGINA PECTORIS: ICD-10-CM

## 2020-03-04 DIAGNOSIS — I48.0 PAROXYSMAL ATRIAL FIBRILLATION (HCC): ICD-10-CM

## 2020-03-04 DIAGNOSIS — I50.32 CHRONIC HEART FAILURE WITH PRESERVED EJECTION FRACTION (HCC): ICD-10-CM

## 2020-03-04 DIAGNOSIS — G47.33 OSA (OBSTRUCTIVE SLEEP APNEA): ICD-10-CM

## 2020-03-04 NOTE — LETTER
3/4/20 Patient: Bunnie Cabot YOB: 1940 Date of Visit: 3/4/2020 Emmie Witt MD 
[address] [facility] ([place], [place]) [id number] VIA In Basket Dear Emmie Witt MD, Thank you for referring Mr. Antwon Moran to [address] [place] [address] for evaluation. My notes for this consultation are attached. If you have questions, please do not hesitate to call me. I look forward to following your patient along with you. Sincerely, Sanford Mcintosh MD

## 2020-03-04 NOTE — PROGRESS NOTES
Chief Complaint   Patient presents with    Irregular Heart Beat    Hypertension    CHF    Coronary Artery Disease    Follow-up     3 month     Visit Vitals  /60 (BP 1 Location: Left arm, BP Patient Position: Sitting)   Pulse 67   Resp 16   Ht 6' 3\" (1.905 m)   Wt 263 lb (119.3 kg)   SpO2 98%   BMI 32.87 kg/m²     Patient presents to office with no complaints of pain, swelling, dizziness, or chest pain. No refills needed. No recent hospitalizations reported. SOB and fatigue noted \"all the time\".  Mukund Rowan LPN

## 2020-03-06 RX ORDER — AMIODARONE HYDROCHLORIDE 200 MG/1
200 TABLET ORAL DAILY
Qty: 30 TAB | Refills: 3 | Status: SHIPPED | OUTPATIENT
Start: 2020-03-06 | End: 2020-03-11

## 2020-03-06 NOTE — TELEPHONE ENCOUNTER
Requested Prescriptions     Signed Prescriptions Disp Refills    amiodarone (CORDARONE) 200 mg tablet 30 Tab 3     Sig: Take 1 Tab by mouth daily. Authorizing Provider: Tamanna Ferreira     Ordering User: Nikki Lock     Refill per verbal order DANIEL Avery NP

## 2020-03-11 RX ORDER — AMIODARONE HYDROCHLORIDE 200 MG/1
TABLET ORAL
Qty: 30 TAB | Refills: 0 | Status: SHIPPED | OUTPATIENT
Start: 2020-03-11 | End: 2020-04-10

## 2020-03-24 ENCOUNTER — TELEPHONE (OUTPATIENT)
Dept: CARDIOLOGY CLINIC | Age: 80
End: 2020-03-24

## 2020-03-24 DIAGNOSIS — R06.09 DOE (DYSPNEA ON EXERTION): Primary | ICD-10-CM

## 2020-03-24 DIAGNOSIS — Z79.899 LONG TERM CURRENT USE OF AMIODARONE: ICD-10-CM

## 2020-03-24 NOTE — TELEPHONE ENCOUNTER
03/19/20   ECHO ADULT COMPLETE 03/19/2020 3/19/2020    Narrative · Normal cavity size and systolic function (ejection fraction normal). Mild concentric hypertrophy. Calculated left ventricular ejection fraction   is 61%. Biplane method used to measure ejection fraction. Mild (grade 1)   left ventricular diastolic dysfunction. · Mildly dilated left atrium. · Severe aortic valve sclerosis with no significant stenosis. · Mild tricuspid valve regurgitation is present. Pulmonary arterial   systolic pressure is 68.4 mmHg. Signed by: Augustin Harris MD     Normal heart function, normal PASP  Still with class 3 HURD  No evidence of volume overload based on recent exam and call today  Has never had chest pain with previous PCI  Ideally favor cath but would be considered Tier 3 procedure in the current COVID19 climate. Favor CXR and PFTs with DLCO.  Based on this consider further pulm evaluation - consult/CT, etc.     He agrees with this strategy    Sanford Mcintosh MD

## 2020-04-10 RX ORDER — AMIODARONE HYDROCHLORIDE 200 MG/1
TABLET ORAL
Qty: 30 TAB | Refills: 0 | Status: SHIPPED | OUTPATIENT
Start: 2020-04-10 | End: 2020-05-22

## 2020-05-13 ENCOUNTER — VIRTUAL VISIT (OUTPATIENT)
Dept: FAMILY MEDICINE CLINIC | Age: 80
End: 2020-05-13

## 2020-05-13 VITALS — SYSTOLIC BLOOD PRESSURE: 135 MMHG | DIASTOLIC BLOOD PRESSURE: 76 MMHG

## 2020-05-13 DIAGNOSIS — R53.82 CHRONIC FATIGUE: ICD-10-CM

## 2020-05-13 DIAGNOSIS — G47.33 OSA (OBSTRUCTIVE SLEEP APNEA): ICD-10-CM

## 2020-05-13 DIAGNOSIS — R47.1 DYSARTHRIA: ICD-10-CM

## 2020-05-13 DIAGNOSIS — K21.00 REFLUX ESOPHAGITIS: ICD-10-CM

## 2020-05-13 DIAGNOSIS — Z79.01 CHRONIC ANTICOAGULATION: ICD-10-CM

## 2020-05-13 DIAGNOSIS — I10 ESSENTIAL HYPERTENSION, BENIGN: ICD-10-CM

## 2020-05-13 DIAGNOSIS — I25.10 CORONARY ARTERY DISEASE INVOLVING NATIVE CORONARY ARTERY OF NATIVE HEART WITHOUT ANGINA PECTORIS: ICD-10-CM

## 2020-05-13 DIAGNOSIS — I48.0 PAROXYSMAL ATRIAL FIBRILLATION (HCC): ICD-10-CM

## 2020-05-13 DIAGNOSIS — E78.5 DYSLIPIDEMIA, GOAL LDL BELOW 70: ICD-10-CM

## 2020-05-13 DIAGNOSIS — E11.21 TYPE 2 DIABETES WITH NEPHROPATHY (HCC): ICD-10-CM

## 2020-05-13 DIAGNOSIS — I63.40 CEREBRAL INFARCTION DUE TO EMBOLISM OF CEREBRAL ARTERY (HCC): Chronic | ICD-10-CM

## 2020-05-13 DIAGNOSIS — R06.09 DOE (DYSPNEA ON EXERTION): Primary | ICD-10-CM

## 2020-05-13 DIAGNOSIS — N18.30 CKD (CHRONIC KIDNEY DISEASE) STAGE 3, GFR 30-59 ML/MIN (HCC): ICD-10-CM

## 2020-05-13 NOTE — PROGRESS NOTES
Chief Complaint   Patient presents with    Diabetes    Hypertension    Breathing Problem     when walking gets sob has appt in june with pulmonary dr    1. Have you been to the ER, urgent care clinic since your last visit? Hospitalized since your last visit? No    2. Have you seen or consulted any other health care providers outside of the [address] [place] [address] since your last visit? Include any pap smears or colon screening.  No

## 2020-05-13 NOTE — ASSESSMENT & PLAN NOTE
Key Neurological Meds             clopidogrel (PLAVIX) 75 mg tab (Taking) Take 1 Tab by mouth daily. aspirin delayed-release 81 mg tablet (Taking) Take 81 mg by mouth daily.  Indications: MYOCARDIAL REINFARCTION PREVENTION        Lab Results   Component Value Date/Time    WBC 8.5 11/07/2019 11:56 AM    HGB 12.3 11/07/2019 11:56 AM    HCT 39.0 11/07/2019 11:56 AM    PLATELET 262 43/19/2988 11:56 AM    INR 2.7 11/08/2019 04:27 AM    Prothrombin time 25.6 11/08/2019 04:27 AM    Creatinine 1.77 11/07/2019 11:56 AM    BUN 33 11/07/2019 11:56 AM

## 2020-05-13 NOTE — PROGRESS NOTES
HISTORY OF PRESENT ILLNESS  HPI  Fabrice Polanco is a 79 y.o. Male with a history of HTN, CAD, A-fib, CHF with preserved ejection fraction, reflux esophagitis, DM-II with nephropathy, BCC, cerebral infarction, TOPHER, osteitis deformans, fatigue, anemia and hyperlipidemia with LDL goal <70. Pt is seen today through virtual visit. Pt mentions having a mini stroke in November, for which he went to [facility] ED in November. He notes having slurred speech at the time, and admits this is still a lingering issue at times. He denies having any weakness in his extremities. Pt reports having a watchman put in place a month prior to this hospital visit. Pt says he checks his BP every once in a while and notes getting good readings. Pt complains of SOB recently. He states that sitting down he is fine but after he walks only a bit, such as to the car, he has to catch his breath. Pt adds this has been an issue for a few months now. He says he has an appointment with a pulmonologist on 6/3 for this, which was ordered by his cardiologist Dr. Manoj Mott. Pt reports he is taking Plavix. Pt denies any issue with blood in his stool. Pt denies unusual SOB, chest pain, and any recent ER visits or hospitalizations. Consent: Shelton Duran, who was seen by synchronous (real-time) audio-video technology, and/or his healthcare decision maker, is aware that this patient-initiated, Telehealth encounter on 5/13/2020 is a billable service, with coverage as determined by his insurance carrier. He is aware that he may receive a bill and has provided verbal consent to proceed: Yes. Shelton Duran is a 79 y.o. male who was evaluated by an audio-video encounter for concerns as above. Patient identification was verified prior to start of the visit. A caregiver was present when appropriate.  Due to this being a TeleHealth encounter (During XSLBF-11 public health emergency), evaluation of the following organ systems was limited: Vitals/Constitutional/EENT/Resp/CV/GI//MS/Neuro/Skin/Heme-Lymph-Imm. Pursuant to the emergency declaration under the [address] [place] [address], [facility] waiver authority and the [employer] Act, this Virtual Visit was conducted, with patient's (and/or legal guardian's) consent, to reduce the patient's risk of exposure to COVID-19 and provide necessary medical care. Services were provided through a synchronous discussion virtually to substitute for in-person clinic visit. I was in the office. The patient was at home.         Past Medical History:   Diagnosis Date    Arrhythmia     a fib    Atrial fibrillation ([facility] [address] 8/16/2013    Basal cell carcinoma of anterior chest 2/25/2010    EARS AND NOSE    CAD (coronary artery disease) 2/25/2010    CVA (cerebral infarction) 2/25/2010    patient denies 12/13/13    Diverticula of colon 2/25/2010    Dyslipidemia 12/20/2010    Essential hypertension, benign 2/25/2010    Hypertension     TOPHER (obstructive sleep apnea) 1/23/2015    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 2/25/2010    Other and unspecified hyperlipidemia 2/25/2010    Positive PPD 2/25/2010    Reflux esophagitis 2/25/2010    NO LONGER A PROBLEM    Stroke [facility])     in 1959 at age of 26    Type II or unspecified type diabetes mellitus without mention of complication, not stated as uncontrolled 2/25/2010     Past Surgical History:   Procedure Laterality Date    CARDIAC SURG PROCEDURE UNLIST      X4 STENTS    COLONOSCOPY,REMV LESN,SNARE  12/13/2013         HC CAPSULE ENDOSCOPE M2A  4/4/2014         HX COLONOSCOPY      HX CORONARY STENT PLACEMENT      HX HEART ASSIST DEVICE Left 09/24/2019    HX KNEE REPLACEMENT Left     HX ORTHOPAEDIC Right     FUSION IN RIGHT WRIST    HX VASECTOMY  1983    KS CARDIOVERSION ELECTIVE ARRHYTHMIA EXTERNAL N/A 9/24/2019    Ep Cardioversion [place]ol[place] Shannon [place] [id number], [facility]  CATH LAB    UPPER GI ENDOSCOPY,BIOPSY  12/13/2013          Current Outpatient Medications on File Prior to Visit   Medication Sig Dispense Refill    amiodarone (CORDARONE) 200 mg tablet Take 1 tablet by mouth once daily 30 Tab 0    furosemide (LASIX) 20 mg tablet TAKE 2 TABLETS BY MOUTH ONCE DAILY 30 Tab 3    clopidogrel (PLAVIX) 75 mg tab Take 1 Tab by mouth daily. 90 Tab 1    metFORMIN ER (GLUCOPHAGE XR) 500 mg tablet TAKE 4 TABLETS BY MOUTH ONCE DAILY WITH BREAKFAST 360 Tab 1    spironolactone (ALDACTONE) 25 mg tablet 25 mg daily. 3    losartan (COZAAR) 100 mg tablet TAKE 1 TABLET BY MOUTH ONCE DAILY FOR HIGH BLOOD PRESSURE 90 Tab 3    amLODIPine (NORVASC) 10 mg tablet Take 1 Tab by mouth daily. 90 Tab 3    carvedilol (COREG) 6.25 mg tablet Take 1 Tab by mouth two (2) times daily (with meals). 180 Tab 3    aspirin delayed-release 81 mg tablet Take 81 mg by mouth daily. Indications: MYOCARDIAL REINFARCTION PREVENTION       No current facility-administered medications on file prior to visit. Allergies   Allergen Reactions    Adhesive Tape Rash    Baycol Nausea and Vomiting    Clonidine Other (comments)     Headache and sleepiness      Pravachol [Pravastatin] Nausea and Vomiting     Family History   Problem Relation Age of Onset    Heart Disease Mother     Cancer Father         colon    Heart Disease Maternal Grandmother     Heart Disease Maternal Grandfather     Cancer Paternal Grandmother     Cancer Paternal Grandfather     Anesth Problems Neg Hx      Social History     Socioeconomic History    Marital status:      Spouse name: Not on file    Number of children: Not on file    Years of education: Not on file    Highest education level: Not on file   Tobacco Use    Smoking status: Never Smoker    Smokeless tobacco: Former User     Types: Chew    Tobacco comment: patient reports quitting over 20 yeaers ago.     Substance and Sexual Activity    Alcohol use: Yes     Frequency: Never     Comment: once a year    Drug use: No    Sexual activity: Not Currently   Other Topics Concern     Service Yes    Blood Transfusions Yes    Caffeine Concern Yes     Comment: 2-3 cups of coffee daily    Occupational Exposure No    Hobby Hazards No    Sleep Concern No    Stress Concern No    Weight Concern No    Special Diet No    Back Care No    Exercise Yes    Bike Helmet No     Comment: pt doesn't ride a bike   [address] Yes    Self-Exams No             Review of Systems   Constitutional: Negative for chills, diaphoresis, fever, malaise/fatigue and weight loss. Eyes: Negative for blurred vision, double vision and pain. Respiratory: Negative for cough, shortness of breath and wheezing. Cardiovascular: Negative for chest pain, palpitations, orthopnea, claudication, leg swelling and PND. Genitourinary: Negative for frequency. Skin: Negative for itching and rash. Neurological: Negative for dizziness, tingling, tremors, sensory change, speech change, focal weakness, seizures, loss of consciousness, weakness and headaches. Endo/Heme/Allergies: Negative for environmental allergies and polydipsia. Does not bruise/bleed easily.      Results for orders placed or performed in visit on 03/19/20   ECHO ADULT COMPLETE   Result Value Ref Range    LA Volume 108.37 18 - 58 mL    Right Atrial Area 4C 21.02 cm2    LV E' Lateral Velocity 8.70 cm/s    LV E' Septal Velocity 7.41 cm/s    Tapse 1.95 1.5 - 2.0 cm    Ao Root D 3.67 cm    AO ASC D 3.62 cm    Aortic Valve Systolic Peak Velocity 904.41 cm/s    AoV VTI 34.51 cm    Aortic Valve Area by Continuity of Peak Velocity 3.0 cm2    Aortic Valve Area by Continuity of VTI 3.3 cm2    AoV PG 12.7 mmHg    LVIDd 5.18 4.2 - 5.9 cm    LVPWd 1.21 (A) 0.6 - 1.0 cm    LVIDs 3.19 cm    IVSd 1.18 (A) 0.6 - 1.0 cm    LV ED Vol A2C 162.2 mL    LV ES Vol A4C 55.0 mL    LV ES Vol BP 59.6 (A) 22 - 58 mL    LVOT d 2.32 cm    LVOT Peak Velocity 125.84 cm/s LVOT Peak Gradient 6.3 mmHg    LVOT VTI 26.80 cm    MV A Demetrius 76.50 cm/s    MV E Demetrius 80.10 cm/s    MV E/A 1.05     MV Mean Gradient 1.2 mmHg    Mitral Valve Annulus Velocity Time Integral 34.29 cm    Left Atrium to Aortic Root Ratio 1.32     RVIDd 4.11 cm    Aortic Valve Systolic Mean Gradient 5.9 mmHg    BP EF 61.5 55 - 100 %    LV Ejection Fraction MOD 4C 62 %    LV Ejection Fraction MOD 2C 62 %    LA Vol 4C 100.57 (A) 18 - 58 mL    LA Vol 2C 98.91 (A) 18 - 58 mL    LA Area 4C 28.7 cm2    LV Mass .8 (A) 88 - 224 g    LV Mass AL Index 119.2 49 - 115 g/m2    E/E' lateral 9.21     E/E' septal 10.81     RVSP 28.6 mmHg    MV Peak Gradient 3.6 mmHg    E/E' ratio (averaged) 10.01     LV ES Vol A2C 62.4 mL    LVES Vol Index BP 24.2 mL/m2    LV ED Vol A4C 144.6 mL    LVED Vol Index BP 62.8 mL/m2    Est. RA Pressure 3.0 mmHg    Mitral Valve E Wave Deceleration Time 258.1 ms    Mitral Valve Pressure Half-time 74.9 ms    Left Atrium Major Axis 4.83 cm    Triscuspid Valve Regurgitation Peak Gradient 25.6 mmHg    Mitral Valve Max Velocity 94.36 cm/s    LVOT .4 ml    LV ED Vol .7 67 - 155 ml    TR Max Velocity 253.13 cm/s    LA Vol Index 43.96 16 - 28 ml/m2    PASP 28.6 mmHg    LA Vol Index 40.12 16 - 28 ml/m2    LA Vol Index 40.80 16 - 28 ml/m2    LVED Vol Index A4C 58.7 mL/m2    LVED Vol Index A2C 65.8 mL/m2    LVES Vol Index A4C 22.3 mL/m2    LVES Vol Index A2C 25.3 mL/m2    KRANTHI/BSA Pk Demetrius 1.2 cm2/m2    KRANTHI/BSA VTI 1.3 cm2/m2    Left Ventricular Fractional Shortening by 2D 15.337999990 %    Left Ventricular Outflow Tract Mean Gradient 1.9857326232617 mmHg    Left Ventricular Outflow Tract Mean Velocity 9.51041943832296 cm/s    Mitral Valve Deceleration Glenn 3.3938604012600     Mitral valve mean inflow velocity 0.98214280224079 m/s    AV Velocity Ratio 0.71     AV VTI Ratio 0.8     Aortic valve mean velocity 5.221660644673 m/s    Left Ventricular End Diastolic Volume by Teichholz Method 1.85949511120 mL Left Ventricular End Systolic Volume by Teichholz Method 4.17489247367585 mL    Left Ventricular Stroke Volume by 2-D Biplane-MOD 94.165321086 mL    Left Ventricular Stroke Volume by 2-D Single Plane- MOD 30.117152645 mL    Left Ventricular Stroke Volume by Teichholz Method 07.57956741 mL    Left Ventricular Stroke Volume by 2-D Single Plane- MOD 58.076104272 mL         Physical Exam  There were no vitals taken for this visit. General: alert, cooperative, no distress   Mental  status: normal mood, behavior, speech, dress, motor activity, and thought processes, able to follow commands   HENT: NCAT   Neck: no visualized mass   Resp: no respiratory distress   Neuro: no gross deficits   Skin: no discoloration or lesions of concern on visible areas   Psychiatric: normal affect, consistent with stated mood, no evidence of hallucinations       ASSESSMENT and PLAN    ICD-10-CM ICD-9-CM    1. HURD (dyspnea on exertion) R06.00 786.09    2. 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 Essential hypertension, benign I10 401.1    4. Reflux esophagitis K21.0 530.11    5. Coronary artery disease involving native coronary artery of native heart without angina pectoris I25.10 414.01    6. Dyslipidemia, goal LDL below 70 E78.5 272.4    7. Paroxysmal atrial fibrillation ([facility]) I48.0 427.31    8. TOPHER (obstructive sleep apnea) G47.33 327.23    9. Type 2 diabetes with nephropathy ([facility]) E11.21 250.40      583.81    10. Chronic fatigue R53.82 780.79    11. CKD (chronic kidney disease) stage 3, GFR 30-59 ml/min ([facility]) N18.3 585.3    12. Chronic anticoagulation Z79.01 V58.61    13. Dysarthria R47.1 784.51      Diagnoses and all orders for this visit:    1. HURD (dyspnea on exertion)    2.  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 aspirin delayed-release 81 mg tablet (Taking) Take 81 mg by mouth daily. Indications: MYOCARDIAL REINFARCTION PREVENTION        Lab Results   Component Value Date/Time    WBC 8.5 11/07/2019 11:56 AM    HGB 12.3 11/07/2019 11:56 AM    HCT 39.0 11/07/2019 11:56 AM    PLATELET 616 28/48/2733 11:56 AM    INR 2.7 11/08/2019 04:27 AM    Prothrombin time 25.6 11/08/2019 04:27 AM    Creatinine 1.77 11/07/2019 11:56 AM    BUN 33 11/07/2019 11:56 AM         3. Essential hypertension, benign    4. Reflux esophagitis    5. Coronary artery disease involving native coronary artery of native heart without angina pectoris    6. Dyslipidemia, goal LDL below 70    7. Paroxysmal atrial fibrillation (HCC)    8. TOPHER (obstructive sleep apnea)    9. Type 2 diabetes with nephropathy ([facility] [address]    10. Chronic fatigue    11. CKD (chronic kidney disease) stage 3, GFR 30-59 ml/min ([facility])    12. Chronic anticoagulation    13. Dysarthria    Follow-up and Dispositions    · Return in about 2 months (around 7/13/2020) for F/U HTN,CHOL,DM, F/U of obesity, f/u CAD. lab results and schedule of future lab studies reviewed with patient  reviewed diet, exercise and weight control  cardiovascular risk and specific lipid/LDL goals reviewed  reviewed medications and side effects in detail  Please call my office if there are any questions- [phone]. I will arrange for follow up after the lab tests done from today return  Recommended a weekly \"heart check. \" I went into detail how to do this. Call for refills on medications as needed. Discussed expected course/resolution/complications of diagnosis in detail with patient. Medication risks/benefits/costs/interactions/alternatives discussed with patient. Pt was given an after visit summary which includes diagnoses, current medications & vitals.    Pt expressed understanding with the diagnosis and plan    Total 25 minutes,60 % counseling re: Review of  the proper technique of checking the blood pressure- check it on an average day only, not on a stressful day, sitting, no exercise for at least 1 hour and not experiencing any new pain( chronic pain is OK). Patient encouraged to check BP sitting and standing at least once a month and to report these readings to me if > 140/ 90 on average , or if the standing BP is >  15 points lower than the sitting. Always check it twice and if there is > 5 points decrease from the previous reading( top reading or systolic) keep checking it until it does not drop 5 points. Write only this final reading down, not the preceding readings. If out of these readings there is only 1 out of 4 or less > 665, or > 90 diastolic then your blood pressure is OK; it needs further treatment if it is above this. Also, don't forget,  as noted above, to check your blood pressure standing once a month; this is to detect a drop in your BP that might lead to fainting and serious injury; you check it standing with your arm hanging straight down and relaxed. Check it twice waiting 1 minute between the two readings. If, with either one of these 2 readings there is a > 15 point drop of the systolic compared to your sitting pressure( done before the standing BP), then let me know. Following these guidelines, continue to check your BP and write down only the ones described above and it will help me to effectively treat your blood pressure. Discussed specific diabetic recommendations: low cholesterol diet, weight control and daily exercise discussed, home glucose monitoring emphasized, all medications, side effects and compliance discussed carefully, foot care discussed and Podiatry visits discussed, annual eye examinations at Ophthalmology discussed, glycohemoglobin and other lab monitoring discussed and long term diabetic complications discussed. Reviewed BP, cholesterol and diabetic goals. LDL goal<100,HDL goal>45, Triglyceride goal<150, A1C< 7.0, BP<140/90. Regular exercise is very important to your health; it helps mentally, physically, socially; it prevents injuries if done properly. Exercise, even as simple as walking 20-30 minutes daily has major benefits to your health even though your \"numbers\" are the same in the lab. See if you can add this into your daily regimen and after a few months it will become a regular habit-\"just something you do,\" like brushing your teeth. A combination of aerobic exercise and strengthening and stretching is felt to be the best for you, so this should be your ultimate goal.   This can be done in the privacy of your home or in a group setting as at the gym  Some prefer having a , others prefer to do exercise in groups or individually. Do what \"works\" for you. You need to make it simple and \"fun,\" or you most likely will not continue it. Recommended a weekly \"heart check. \" I went into detail how to do this. Reviewed symptoms, or lack thereof, of hypertension and elevated cholesterol. Also, discussed symptoms of concern that were noted today in the note above, treatment options( including doing nothing), when to follow up before recommended time frame. Also, answered all questions. BMI is significantly elevated- in the obese range. I reviewed diet, exercise and weight control. Discussed weight control in detail, the importance of mainly decreased carbs, and for weight maintenance, exercise; discussed different diets and that it isn't as important to watch the type of foods as it is to decrease calorie intake no matter what type of diet you do, etc.     Pt has had SOB in the past due to ischemic heart disease and A-fib, and he is being followed by Dr. Aakash Martinez for that. We will await the pulmonary evaluation and hopefully his hemoglobin will be checked at that point when he is seen next month. Since his hemoglobin was down to 9.5 in May of last year, he needs to make sure it is being followed.  It did correct to 12.3 on 11/7 when he was seen for his stroke. His baseline appears to be 13, if you take an average for the past 5 years. Encouraged him to read out loud and to hopefully continue to improve his post stroke speech difficulties. Reviewed with him signs of GI bleeding, which he has had none of. This document was written by Ora Ludwig, as dictated by David Varela MD.  I have reviewed and agree with the above note and have made corrections where appropriate Shimon Zepeda M.D.

## 2020-05-16 DIAGNOSIS — E11.9 TYPE 2 DIABETES MELLITUS WITHOUT COMPLICATION (HCC): ICD-10-CM

## 2020-05-18 RX ORDER — METFORMIN HYDROCHLORIDE 500 MG/1
TABLET, EXTENDED RELEASE ORAL
Qty: 360 TAB | Refills: 1 | Status: SHIPPED | OUTPATIENT
Start: 2020-05-18 | End: 2020-05-22

## 2020-05-20 ENCOUNTER — VIRTUAL VISIT (OUTPATIENT)
Dept: CARDIOLOGY CLINIC | Age: 80
End: 2020-05-20

## 2020-05-20 DIAGNOSIS — Z86.73 HISTORY OF CVA (CEREBROVASCULAR ACCIDENT): ICD-10-CM

## 2020-05-20 DIAGNOSIS — E11.21 TYPE 2 DIABETES WITH NEPHROPATHY (HCC): ICD-10-CM

## 2020-05-20 DIAGNOSIS — N18.30 CKD (CHRONIC KIDNEY DISEASE) STAGE 3, GFR 30-59 ML/MIN (HCC): ICD-10-CM

## 2020-05-20 DIAGNOSIS — I48.0 PAROXYSMAL ATRIAL FIBRILLATION (HCC): Primary | ICD-10-CM

## 2020-05-20 DIAGNOSIS — I25.10 CORONARY ARTERY DISEASE INVOLVING NATIVE CORONARY ARTERY OF NATIVE HEART WITHOUT ANGINA PECTORIS: ICD-10-CM

## 2020-05-20 DIAGNOSIS — G47.33 OSA (OBSTRUCTIVE SLEEP APNEA): ICD-10-CM

## 2020-05-20 DIAGNOSIS — Z95.818 PRESENCE OF WATCHMAN LEFT ATRIAL APPENDAGE CLOSURE DEVICE: ICD-10-CM

## 2020-05-20 NOTE — PROGRESS NOTES
VIRTUAL VISIT DOCUMENTATION     Pursuant to the emergency declaration under the [facility], Critical access hospital waiver authority and the [employer] Act, this Virtual  Visit was conducted, with patient's consent, to reduce the patient's risk of exposure to COVID-19 and provide continuity of care for an established patient. Services were provided through a video synchronous discussion virtually to substitute for in-person clinic visit. HISTORY OF PRESENTING ILLNESS      Duke Craft is a 79 y.o. male who was seen by synchronous (real-time) audio-video technology on 5/20/2020 with permanent atrial fibrillation, CKD stage III, history of CVA, TOPHER, diabetes mellitus, dyslipidemia, diverticulosis who has a history of recurrent GI bleeding in the past. He underwent amiodarone loading and cardioversion in August 2018 and it was felt that he was asymptomatic from his AF. His wife reported that he had improvement in his fatigue following previous PCI. He continued to have fatigue and felt that his AF is driving this. He underwent  left atrial appendage occlusion and cardioversion after restarting amiodarone. Limited echocardiogram failed to reveal pericardial effusion. Following LAAO, he was hospitalized for stroke despite having a therapeutic INR. YESENIA post-WATCHMAN demonstrated stable device position without carlton-device leaks. He is now on aspirin and Plavix alone. He reported fatigue that worsens with exertion. Is now on aspirin and Plavix alone.        ACTIVE PROBLEM LIST     Patient Active Problem List    Diagnosis Date Noted    Presence of Watchman left atrial appendage closure device 09/24/2019     Priority: 1 - One    Dysarthria 11/07/2019    Chronic anticoagulation 09/04/2019    CKD (chronic kidney disease) stage 3, GFR 30-59 ml/min ([facility]) 07/31/2019    Chronic heart failure with preserved ejection fraction (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 ([facility] [address]- @ 25 yo-weak on right arm>leg 04/12/2016    TOPHER (obstructive sleep apnea) 01/23/2015    Atrial fibrillation ([address] 08/16/2013    Dyslipidemia, goal LDL below 70 12/20/2010    Essential hypertension, benign 02/25/2010    Reflux esophagitis 02/25/2010    CAD (coronary artery disease) 02/25/2010           PAST MEDICAL HISTORY     Past Medical History:   Diagnosis Date    Arrhythmia     a fib    Atrial fibrillation ([facility] [address] 8/16/2013    Basal cell carcinoma of anterior chest 2/25/2010    EARS AND NOSE    CAD (coronary artery disease) 2/25/2010    CVA (cerebral infarction) 2/25/2010    patient denies 12/13/13    Diverticula of colon 2/25/2010    Dyslipidemia 12/20/2010    Essential hypertension, benign 2/25/2010    Hypertension     TOPHER (obstructive sleep apnea) 1/23/2015    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 2/25/2010    Other and unspecified hyperlipidemia 2/25/2010    Positive PPD 2/25/2010    Reflux esophagitis 2/25/2010    NO LONGER A PROBLEM    Stroke [facility])     in 1959 at age of 26    Type II or unspecified type diabetes mellitus without mention of complication, not stated as uncontrolled 2/25/2010           PAST SURGICAL HISTORY     Past Surgical History:   Procedure Laterality Date    CARDIAC SURG PROCEDURE UNLIST      X4 STENTS    COLONOSCOPY,REMV LESN,SNARE  12/13/2013          CAPSULE ENDOSCOPE M2A  4/4/2014         HX COLONOSCOPY      HX CORONARY STENT PLACEMENT      HX HEART ASSIST DEVICE Left 09/24/2019    HX KNEE REPLACEMENT Left     HX ORTHOPAEDIC Right     FUSION IN RIGHT WRIST    HX VASECTOMY  1983    DE CARDIOVERSION ELECTIVE ARRHYTHMIA EXTERNAL N/A 9/24/2019    Ep Cardioversion performed by Mimi Lane [place] [address], [facility] CATH LAB    UPPER GI ENDOSCOPY,BIOPSY  12/13/2013               ALLERGIES Allergies   Allergen Reactions    Adhesive Tape Rash    Baycol Nausea and Vomiting    Clonidine Other (comments)     Headache and sleepiness      Pravachol [Pravastatin] Nausea and Vomiting          FAMILY HISTORY     Family History   Problem Relation Age of Onset    Heart Disease Mother     Cancer Father         colon    Heart Disease Maternal Grandmother     Heart Disease Maternal Grandfather     Cancer Paternal Grandmother     Cancer Paternal Grandfather     Anesth Problems Neg Hx     negative for cardiac disease       SOCIAL HISTORY     Social History     Socioeconomic History    Marital status:      Spouse name: Not on file    Number of children: Not on file    Years of education: Not on file    Highest education level: Not on file   Tobacco Use    Smoking status: Never Smoker    Smokeless tobacco: Former User     Types: Chew    Tobacco comment: patient reports quitting over 20 yeaers ago. Substance and Sexual Activity    Alcohol use: Yes     Frequency: Never     Comment: once a year    Drug use: No    Sexual activity: Not Currently   Other Topics Concern     Service Yes    Blood Transfusions Yes    Caffeine Concern Yes     Comment: 2-3 cups of coffee daily    Occupational Exposure No    Hobby Hazards No    Sleep Concern No    Stress Concern No    Weight Concern No    Special Diet No    Back Care No    Exercise Yes    Bike Helmet No     Comment: pt doesn't ride a bike   [facility] Yes    Self-Exams No         MEDICATIONS     Current Outpatient Medications   Medication Sig    metFORMIN ER (GLUCOPHAGE XR) 500 mg tablet TAKE 4 TABLETS BY MOUTH ONCE DAILY WITH BREAKFAST    amiodarone (CORDARONE) 200 mg tablet Take 1 tablet by mouth once daily    furosemide (LASIX) 20 mg tablet TAKE 2 TABLETS BY MOUTH ONCE DAILY (Patient taking differently: daily as needed. Stopped taking about 2 weeks ago)    clopidogrel (PLAVIX) 75 mg tab Take 1 Tab by mouth daily.     spironolactone (ALDACTONE) 25 mg tablet 25 mg daily.  losartan (COZAAR) 100 mg tablet TAKE 1 TABLET BY MOUTH ONCE DAILY FOR HIGH BLOOD PRESSURE    amLODIPine (NORVASC) 10 mg tablet Take 1 Tab by mouth daily.  carvedilol (COREG) 6.25 mg tablet Take 1 Tab by mouth two (2) times daily (with meals).  aspirin delayed-release 81 mg tablet Take 81 mg by mouth daily. Indications: MYOCARDIAL REINFARCTION PREVENTION     No current facility-administered medications for this visit. I have reviewed the nurses notes, vitals, problem list, allergy list, medical history, family, social history and medications. REVIEW OF SYMPTOMS      General: Pt denies excessive weight gain or loss. Pt is able to conduct ADL's  HEENT: Denies blurred vision, headaches, hearing loss, epistaxis and difficulty swallowing. Respiratory: Denies cough, congestion, shortness of breath, HURD, wheezing or stridor. Cardiovascular: Denies precordial pain, palpitations, edema or PND  Gastrointestinal: Denies poor appetite, indigestion, abdominal pain or blood in stool  Genitourinary: Denies hematuria, dysuria, increased urinary frequency  Musculoskeletal: Denies joint pain or swelling from muscles or joints  Neurologic: Denies tremor, paresthesias, headache, or sensory motor disturbance  Psychiatric: Denies confusion, insomnia, depression  Integumentray: Denies rash, itching or ulcers. Hematologic: Denies easy bruising, bleeding       PHYSICAL EXAMINATION      Due to this being a TeleHealth evaluation, many elements of the physical examination are unable to be assessed. General: Well developed, in no acute distress, cooperative and alert  HEENT: Pupils equal/round. No marked JVD visible on video. Respiratory: No audible wheezing, no signs of respiratory distress, lips non cyanotic  Extremities:  No edema  Neuro: A&Ox3, speech clear, no facial droop, answering questions appropriately  Skin: Skin color is normal. No rashes or lesions. Non diaphoretic on visible skin during exam       DIAGNOSTIC DATA      EKG:        LABORATORY DATA      Lab Results   Component Value Date/Time    WBC 8.5 11/07/2019 11:56 AM    Hemoglobin (POC) 9.9 03/19/2014 09:14 AM    HGB 12.3 11/07/2019 11:56 AM    HCT 39.0 11/07/2019 11:56 AM    PLATELET 884 86/15/4412 11:56 AM    MCV 96.1 11/07/2019 11:56 AM      Lab Results   Component Value Date/Time    Sodium 140 11/07/2019 11:56 AM    Potassium 4.1 11/07/2019 11:56 AM    Chloride 107 11/07/2019 11:56 AM    CO2 28 11/07/2019 11:56 AM    Anion gap 5 11/07/2019 11:56 AM    Glucose 128 (H) 11/07/2019 11:56 AM    BUN 33 (H) 11/07/2019 11:56 AM    Creatinine 1.77 (H) 11/07/2019 11:56 AM    BUN/Creatinine ratio 19 11/07/2019 11:56 AM    GFR est AA 45 (L) 11/07/2019 11:56 AM    GFR est non-AA 37 (L) 11/07/2019 11:56 AM    Calcium 9.4 11/07/2019 11:56 AM    Bilirubin, total 0.4 11/01/2019 11:22 AM    AST (SGOT) 9 11/01/2019 11:22 AM    Alk. phosphatase 113 11/01/2019 11:22 AM    Protein, total 6.9 11/01/2019 11:22 AM    Albumin 4.3 11/01/2019 11:22 AM    Globulin 4.8 (H) 05/24/2019 01:35 PM    A-G Ratio 1.7 11/01/2019 11:22 AM    ALT (SGPT) 11 11/01/2019 11:22 AM           ASSESSMENT      1. Atrial fibrillation              A. Permanent              B. CHASVASC 7              C. Asymptomatic   D. WATCHMAN 9/2019  2. Hx GI bleeding  3. CAD, native  4. Percutaneous transluminal angioplasty  5. Hypertension  6. CVA  7. CKD  8. Diverticulosis  9. Reflux Esophagitis  10. Diabetes mellitus        ICD-10-CM ICD-9-CM    1. Paroxysmal atrial fibrillation (HCC) I48.0 427.31    2. Coronary artery disease involving native coronary artery of native heart without angina pectoris I25.10 414.01    3. CKD (chronic kidney disease) stage 3, GFR 30-59 ml/min ([facility]) N18.3 585.3    4. Type 2 diabetes with nephropathy ([facility]) E11.21 250.40      583.81    5. TOPHER (obstructive sleep apnea) G47.33 327.23    6.  History of CVA (cerebrovascular accident) I92.76 V12.54    7. Presence of Watchman left atrial appendage closure device Z95.818 V45.09      No orders of the defined types were placed in this encounter. PLAN     Suspect AF is not the  of his exertional fatigue. Would favor discontinuing amiodarone however will discuss this with Dr. Tresa De La Torre as well as whether an ischemic evaluation is warranted. We will also discuss whether better to continue Plavix or discontinue it with Dr. Rachel Montanez given his recurrence of stroke while on anticoagulation. We discussed the expected course, resolution and complications of the diagnosis(es) in detail. Medication risks, benefits, costs, interactions, and alternatives were discussed as indicated. I advised him to contact the office if his condition worsens, changes or fails to improve as anticipated. He expressed understanding with the diagnosis(es) and plan     FOLLOW-UP     TBD      Patient was made aware and verbalized understanding that an appointment will be scheduled for them for a virtual visit and/or office visit within the above time frame. Patient understanding his/her responsibility to call and change time/date if he/she so chooses. Thank you, Ana Velasquez MD and Dr. Tresa De La Torre for allowing me to participate in the care of this extraordinarily pleasant male. Please do not hesitate to contact me for further questions/concerns. Ester Zhang MD  Cardiac Electrophysiology / Cardiology    [place] [address].  [address] [place] [address], [address]  [phone] / [phone] Fax  [phone] / [phone] Fax        Greater than 25 minutes was spent in direct video patient care, planning and chart review. This visit was conducted using [employer] telemedicine services.

## 2020-05-21 DIAGNOSIS — E11.9 TYPE 2 DIABETES MELLITUS WITHOUT COMPLICATION (HCC): ICD-10-CM

## 2020-05-22 RX ORDER — AMIODARONE HYDROCHLORIDE 200 MG/1
TABLET ORAL
Qty: 30 TAB | Refills: 0 | OUTPATIENT
Start: 2020-05-22

## 2020-05-22 RX ORDER — METFORMIN HYDROCHLORIDE 500 MG/1
TABLET, EXTENDED RELEASE ORAL
Qty: 360 TAB | Refills: 0 | Status: SHIPPED | OUTPATIENT
Start: 2020-05-22 | End: 2020-08-31 | Stop reason: SDUPTHER

## 2020-05-22 NOTE — TELEPHONE ENCOUNTER
Called patient, ID verified using two patient identifiers. Informed patient that per Dr. Christian Espana he can discontinue his Amiodarone. Patient asking whether he is to continue taking the Plavix. Informed patient that I would relay his question to Dr. Christian Espana and DANIEL Ibarra NP and call him back once I have received a response. Patient verbalizes understanding and is agreeable to this plan.

## 2020-05-26 NOTE — TELEPHONE ENCOUNTER
Called patient, ID verified using two patient identifiers. Advised patient that per Dr. Kelvin Shirley and Dr Maribell Rouse he is to continue his Plavix as prescribed. No refills needed at this time. Patient verbalizes understanding and is agreeable to this plan.

## 2020-06-03 ENCOUNTER — HOSPITAL ENCOUNTER (OUTPATIENT)
Dept: PULMONOLOGY | Age: 80
Discharge: HOME | End: 2020-06-03
Attending: SPECIALIST
Payer: MEDICARE

## 2020-06-03 DIAGNOSIS — Z79.899 LONG TERM CURRENT USE OF AMIODARONE: ICD-10-CM

## 2020-06-03 DIAGNOSIS — R06.09 DOE (DYSPNEA ON EXERTION): ICD-10-CM

## 2020-06-03 PROCEDURE — 94729 DIFFUSING CAPACITY: CPT

## 2020-06-03 PROCEDURE — 94375 RESPIRATORY FLOW VOLUME LOOP: CPT

## 2020-06-04 ENCOUNTER — DOCUMENTATION ONLY (OUTPATIENT)
Dept: SLEEP MEDICINE | Age: 80
End: 2020-06-04

## 2020-06-04 NOTE — PROGRESS NOTES
Called patient to offer a virtual visit but he declined. Offered to reschedule for the following month but patient declined to reschedule at this time.

## 2020-06-15 RX ORDER — ATORVASTATIN CALCIUM 40 MG/1
TABLET, FILM COATED ORAL
Qty: 90 TAB | Refills: 1 | Status: SHIPPED | OUTPATIENT
Start: 2020-06-15 | End: 2021-02-03

## 2020-06-15 NOTE — TELEPHONE ENCOUNTER
Requested Prescriptions     Pending Prescriptions Disp Refills    atorvastatin (LIPITOR) 40 mg tablet [Pharmacy Med Name: Atorvastatin Calcium 40 MG Oral Tablet] 90 Tab 1     Sig: Take 1 tablet by mouth once daily     Patient needs labs.   APOLINAR Dobbs

## 2020-06-21 ENCOUNTER — TELEPHONE (OUTPATIENT)
Dept: CARDIOLOGY CLINIC | Age: 80
End: 2020-06-21

## 2020-06-21 NOTE — TELEPHONE ENCOUNTER
PFTs with moderate obstructive disease, normal corrected DLCO  PFTs 2015 with FEV1 % 70, FEV 2.7 l     Recent echo normal  He continues to have class 3 HURD and fatigue. No chest pain.      Has been on amiodarone but recently discontinued    Favor pulmonary evaluation with Dr Kandy Bragg al    Consider right and left heart cath if pulm evaluation unremarkable    Nery Paniagua MD

## 2020-06-23 RX ORDER — CLOPIDOGREL BISULFATE 75 MG/1
TABLET ORAL
Qty: 90 TAB | Refills: 0 | Status: ON HOLD | OUTPATIENT
Start: 2020-06-23 | End: 2020-12-07

## 2020-06-25 ENCOUNTER — TELEPHONE (OUTPATIENT)
Dept: CARDIOLOGY CLINIC | Age: 80
End: 2020-06-25

## 2020-06-25 NOTE — TELEPHONE ENCOUNTER
Left message that referral sent and patient should be getting a call from  if no call phone number given to patient to call  office

## 2020-06-25 NOTE — TELEPHONE ENCOUNTER
Pt states he received a call from Dr. Aracely Hutton on 6/21 and was told he would set an appt up with a lung doctor; inquiring if the appt has been set with the lung doctor.  Please advise      Phone: [phone]

## 2020-07-22 ENCOUNTER — HOSPITAL ENCOUNTER (OUTPATIENT)
Dept: GENERAL RADIOLOGY | Age: 80
Discharge: HOME | End: 2020-07-22
Payer: MEDICARE

## 2020-07-22 DIAGNOSIS — J44.9 OBSTRUCTIVE CHRONIC BRONCHITIS WITHOUT EXACERBATION (HCC): ICD-10-CM

## 2020-07-22 PROCEDURE — 71046 X-RAY EXAM CHEST 2 VIEWS: CPT

## 2020-08-04 NOTE — TELEPHONE ENCOUNTER
Pt given appt with NP tomorrow. Fatigue/fast HR. The patient is a 30y Female complaining of abdominal pain.

## 2020-08-17 ENCOUNTER — OFFICE VISIT (OUTPATIENT)
Dept: FAMILY MEDICINE CLINIC | Age: 80
End: 2020-08-17
Payer: MEDICARE

## 2020-08-17 ENCOUNTER — HOSPITAL ENCOUNTER (OUTPATIENT)
Dept: LAB | Age: 80
Discharge: HOME | End: 2020-08-17
Payer: MEDICARE

## 2020-08-17 VITALS
TEMPERATURE: 98.3 F | RESPIRATION RATE: 16 BRPM | WEIGHT: 263 LBS | HEIGHT: 75 IN | SYSTOLIC BLOOD PRESSURE: 152 MMHG | HEART RATE: 77 BPM | OXYGEN SATURATION: 97 % | BODY MASS INDEX: 32.7 KG/M2 | DIASTOLIC BLOOD PRESSURE: 72 MMHG

## 2020-08-17 DIAGNOSIS — I25.10 CORONARY ARTERY DISEASE INVOLVING NATIVE CORONARY ARTERY OF NATIVE HEART WITHOUT ANGINA PECTORIS: ICD-10-CM

## 2020-08-17 DIAGNOSIS — G47.33 OSA (OBSTRUCTIVE SLEEP APNEA): ICD-10-CM

## 2020-08-17 DIAGNOSIS — E78.5 DYSLIPIDEMIA, GOAL LDL BELOW 70: ICD-10-CM

## 2020-08-17 DIAGNOSIS — I69.320 CVA, OLD, APHASIA: ICD-10-CM

## 2020-08-17 DIAGNOSIS — I10 ESSENTIAL HYPERTENSION, BENIGN: ICD-10-CM

## 2020-08-17 DIAGNOSIS — I48.0 PAROXYSMAL ATRIAL FIBRILLATION (HCC): ICD-10-CM

## 2020-08-17 DIAGNOSIS — K21.00 REFLUX ESOPHAGITIS: ICD-10-CM

## 2020-08-17 DIAGNOSIS — E11.21 TYPE 2 DIABETES MELLITUS WITH DIABETIC NEPHROPATHY, WITHOUT LONG-TERM CURRENT USE OF INSULIN (HCC): Primary | ICD-10-CM

## 2020-08-17 PROCEDURE — 80061 LIPID PANEL: CPT

## 2020-08-17 PROCEDURE — G8427 DOCREV CUR MEDS BY ELIG CLIN: HCPCS | Performed by: FAMILY MEDICINE

## 2020-08-17 PROCEDURE — 80053 COMPREHEN METABOLIC PANEL: CPT

## 2020-08-17 PROCEDURE — 83036 HEMOGLOBIN GLYCOSYLATED A1C: CPT

## 2020-08-17 PROCEDURE — G8510 SCR DEP NEG, NO PLAN REQD: HCPCS | Performed by: FAMILY MEDICINE

## 2020-08-17 PROCEDURE — 3288F FALL RISK ASSESSMENT DOCD: CPT | Performed by: FAMILY MEDICINE

## 2020-08-17 PROCEDURE — G8754 DIAS BP LESS 90: HCPCS | Performed by: FAMILY MEDICINE

## 2020-08-17 PROCEDURE — G8753 SYS BP > OR = 140: HCPCS | Performed by: FAMILY MEDICINE

## 2020-08-17 PROCEDURE — G8417 CALC BMI ABV UP PARAM F/U: HCPCS | Performed by: FAMILY MEDICINE

## 2020-08-17 PROCEDURE — 99215 OFFICE O/P EST HI 40 MIN: CPT | Performed by: FAMILY MEDICINE

## 2020-08-17 PROCEDURE — 1100F PTFALLS ASSESS-DOCD GE2>/YR: CPT | Performed by: FAMILY MEDICINE

## 2020-08-17 PROCEDURE — G8536 NO DOC ELDER MAL SCRN: HCPCS | Performed by: FAMILY MEDICINE

## 2020-08-17 RX ORDER — BUDESONIDE AND FORMOTEROL FUMARATE DIHYDRATE 160; 4.5 UG/1; UG/1
AEROSOL RESPIRATORY (INHALATION)
COMMUNITY
Start: 2020-07-07 | End: 2020-10-05

## 2020-08-17 NOTE — PROGRESS NOTES
Chief Complaint   Patient presents with    Hypertension    Cholesterol Problem    Diabetes    Sore Throat     no fever   1. Have you been to the ER, urgent care clinic since your last visit? Hospitalized since your last visit? No    2. Have you seen or consulted any other health care providers outside of the [address] [place] [address] since your last visit? Include any pap smears or colon screening.  Yes Reason for visit: [facility] for stroke, sees pulmonary

## 2020-08-17 NOTE — PROGRESS NOTES
HISTORY OF PRESENT ILLNESS  HPI  Dionne Polanco is a 79 y.o. Male with a history of HTN, CAD, A-fib, CHF with preserved ejection fraction, reflux esophagitis, DM-II with nephropathy, BCC, cerebral infarction, TOPHER, osteitis deformans, fatigue, anemia and hyperlipidemia with LDL goal <70. Pt says he at a muffin, sausage with cheese, hash browns, and coffee around 8 am today. Pt reports he went to [facility] sore throat in May of last year. Was given fluids. He mentions he gets this cough every now and then. He adds he saw a pulmonary specialist, who gave him Symbicort to take BID without relief. Pt inquires about taking Byetta as it helped him lose weight and manage his DM in the past.    Pt denies unusual SOB, chest pain, and any recent ER visits or hospitalizations.          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 2/25/2010    Other and unspecified hyperlipidemia 2/25/2010    Positive PPD 2/25/2010    Reflux esophagitis 2/25/2010    NO LONGER A PROBLEM    Stroke [facility])     in 1959 at age of 26    Type II or unspecified type diabetes mellitus without mention of complication, not stated as uncontrolled 2/25/2010     Past Surgical History:   Procedure Laterality Date    CARDIAC SURG PROCEDURE UNLIST      X4 STENTS    COLONOSCOPY,REMV LESN,SNARE  12/13/2013         HC CAPSULE ENDOSCOPE M2A  4/4/2014         HX COLONOSCOPY      HX CORONARY STENT PLACEMENT      HX HEART ASSIST DEVICE Left 09/24/2019    HX KNEE REPLACEMENT Left     HX ORTHOPAEDIC Right     FUSION IN RIGHT WRIST    HX VASECTOMY  1983    [place] CARDIOVERSION ELECTIVE ARRHYTHMIA EXTERNAL N/A 9/24/2019    Ep Cardioversion performed by Zac Mancia MD at [facility], [facility]/s  CATH LAB    UPPER GI ENDOSCOPY,BIOPSY  12/13/2013          Current Outpatient Medications on File Prior to Visit   Medication Sig Dispense Refill    Symbicort 160-4.5 mcg/actuation HFAA INHALE 2 PUFFS BY MOUTH TWICE DAILY. RINSE MOUTH AFTER USE      carvediloL (COREG) 6.25 mg tablet TAKE 1 TABLET BY MOUTH TWICE DAILY WITH MEALS 180 Tab 3    clopidogreL (PLAVIX) 75 mg tab Take 1 tablet by mouth once daily 90 Tab 0    atorvastatin (LIPITOR) 40 mg tablet Take 1 tablet by mouth once daily 90 Tab 1    metFORMIN ER (GLUCOPHAGE XR) 500 mg tablet TAKE 4 TABLETS BY MOUTH ONCE DAILY WITH BREAKFAST 360 Tab 0    spironolactone (ALDACTONE) 25 mg tablet 25 mg daily. 3    losartan (COZAAR) 100 mg tablet TAKE 1 TABLET BY MOUTH ONCE DAILY FOR HIGH BLOOD PRESSURE 90 Tab 3    amLODIPine (NORVASC) 10 mg tablet Take 1 Tab by mouth daily. 90 Tab 3    aspirin delayed-release 81 mg tablet Take 81 mg by mouth daily. Indications: MYOCARDIAL REINFARCTION PREVENTION      [DISCONTINUED] furosemide (LASIX) 20 mg tablet TAKE 2 TABLETS BY MOUTH ONCE DAILY (Patient taking differently: daily as needed. Stopped taking about 2 weeks ago) 30 Tab 3     No current facility-administered medications on file prior to visit.       Allergies   Allergen Reactions    Adhesive Tape Rash    Baycol Nausea and Vomiting    Clonidine Other (comments)     Headache and sleepiness      Pravachol [Pravastatin] Nausea and Vomiting     Family History   Problem Relation Age of Onset    Heart Disease Mother     Cancer Father         colon    Heart Disease Maternal Grandmother     Heart Disease Maternal Grandfather     Cancer Paternal Grandmother     Cancer Paternal Grandfather     Anesth Problems Neg Hx      Social History     Socioeconomic History    Marital status:      Spouse name: Not on file    Number of children: Not on file    Years of education: Not on file    Highest education level: Not on file   Tobacco Use    Smoking status: Never Smoker    Smokeless tobacco: Former User     Types: Chew    Tobacco comment: patient reports quitting over 20 yeaers ago. Substance and Sexual Activity    Alcohol use: Yes     Frequency: Never     Comment: once a year    Drug use: No    Sexual activity: Not Currently   Other Topics Concern     Service Yes    Blood Transfusions Yes    Caffeine Concern Yes     Comment: 2-3 cups of coffee daily    Occupational Exposure No    Hobby Hazards No    Sleep Concern No    Stress Concern No    Weight Concern No    Special Diet No    Back Care No    Exercise Yes    Bike Helmet No     Comment: pt doesn't ride a bike   [address] Yes    Self-Exams No             Review of Systems   Constitutional: Negative for chills, diaphoresis, fever, malaise/fatigue and weight loss. Eyes: Negative for blurred vision, double vision, pain and redness. Respiratory: Negative for cough, shortness of breath and wheezing. Cardiovascular: Negative for chest pain, palpitations, orthopnea, claudication, leg swelling and PND. Genitourinary: Negative for frequency. Skin: Negative for itching and rash. Neurological: Negative for dizziness, tingling, tremors, sensory change, speech change, focal weakness, seizures, loss of consciousness, weakness and headaches. Endo/Heme/Allergies: Negative for environmental allergies and polydipsia. Does not bruise/bleed easily.      Results for orders placed or performed in visit on 03/19/20   ECHO ADULT COMPLETE   Result Value Ref Range    LA Volume 108.37 18 - 58 mL    Right Atrial Area 4C 21.02 cm2    LV E' Lateral Velocity 8.70 cm/s    LV E' Septal Velocity 7.41 cm/s    Tapse 1.95 1.5 - 2.0 cm    Ao Root D 3.67 cm    AO ASC D 3.62 cm    Aortic Valve Systolic Peak Velocity 564.09 cm/s AoV VTI 34.51 cm    Aortic Valve Area by Continuity of Peak Velocity 3.0 cm2    Aortic Valve Area by Continuity of VTI 3.3 cm2    AoV PG 12.7 mmHg    LVIDd 5.18 4.2 - 5.9 cm    LVPWd 1.21 (A) 0.6 - 1.0 cm    LVIDs 3.19 cm    IVSd 1.18 (A) 0.6 - 1.0 cm    LV ED Vol A2C 162.2 mL    LV ES Vol A4C 55.0 mL    LV ES Vol BP 59.6 (A) 22 - 58 mL    LVOT d 2.32 cm    LVOT Peak Velocity 125.84 cm/s    LVOT Peak Gradient 6.3 mmHg    LVOT VTI 26.80 cm    MV A Demetrius 76.50 cm/s    MV E Demetrius 80.10 cm/s    MV E/A 1.05     MV Mean Gradient 1.2 mmHg    Mitral Valve Annulus Velocity Time Integral 34.29 cm    Left Atrium to Aortic Root Ratio 1.32     RVIDd 4.11 cm    Aortic Valve Systolic Mean Gradient 5.9 mmHg    BP EF 61.5 55 - 100 %    LV Ejection Fraction MOD 4C 62 %    LV Ejection Fraction MOD 2C 62 %    LA Vol 4C 100.57 (A) 18 - 58 mL    LA Vol 2C 98.91 (A) 18 - 58 mL    LA Area 4C 28.7 cm2    LV Mass .8 (A) 88 - 224 g    LV Mass AL Index 119.2 49 - 115 g/m2    E/E' lateral 9.21     E/E' septal 10.81     RVSP 28.6 mmHg    MV Peak Gradient 3.6 mmHg    E/E' ratio (averaged) 10.01     LV ES Vol A2C 62.4 mL    LVES Vol Index BP 24.2 mL/m2    LV ED Vol A4C 144.6 mL    LVED Vol Index BP 62.8 mL/m2    Est. RA Pressure 3.0 mmHg    Mitral Valve E Wave Deceleration Time 258.1 ms    Mitral Valve Pressure Half-time 74.9 ms    Left Atrium Major Axis 4.83 cm    Triscuspid Valve Regurgitation Peak Gradient 25.6 mmHg    Mitral Valve Max Velocity 94.36 cm/s    LVOT .4 ml    LV ED Vol .7 67 - 155 ml    TR Max Velocity 253.13 cm/s    LA Vol Index 43.96 16 - 28 ml/m2    PASP 28.6 mmHg    LA Vol Index 40.12 16 - 28 ml/m2    LA Vol Index 40.80 16 - 28 ml/m2    LVED Vol Index A4C 58.7 mL/m2    LVED Vol Index A2C 65.8 mL/m2    LVES Vol Index A4C 22.3 mL/m2    LVES Vol Index A2C 25.3 mL/m2    KRANTHI/BSA Pk Demetrius 1.2 cm2/m2    KRANTHI/BSA VTI 1.3 cm2/m2    Left Ventricular Fractional Shortening by 2D 22.892084812 %    Left Ventricular Outflow Tract Mean Gradient 5.8365468683144 mmHg    Left Ventricular Outflow Tract Mean Velocity 0.82799456203425 cm/s    Mitral Valve Deceleration [place] 4.7858889209154     Mitral valve mean inflow velocity 0.56099704667753 m/s    AV Velocity Ratio 0.71     AV VTI Ratio 0.8     Aortic valve mean velocity 1.417175692839 m/s    Left Ventricular End Diastolic Volume by Teichholz Method 9.67579222760 mL    Left Ventricular End Systolic Volume by Teichholz Method 0.36595149252322 mL    Left Ventricular Stroke Volume by 2-D Biplane-MOD 33.410738044 mL    Left Ventricular Stroke Volume by 2-D Single Plane- MOD 40.640923840 mL    Left Ventricular Stroke Volume by Teichholz Method 77.68333120 mL    Left Ventricular Stroke Volume by 2-D Single Plane- MOD 81.760438835 mL             Physical Exam  Visit Vitals  /72   Pulse 77   Temp 98.3 °F (36.8 °C)   Resp 16   Ht 6' 3\" (1.905 m)   Wt 119.3 kg (263 lb)   SpO2 97%   BMI 32.87 kg/m²       Pt is sitting in a motorized chair. Head: Normocephalic, without obvious abnormality, atraumatic  Eyes: conjunctivae/corneas clear. PERRL, EOM's intact. Neck: supple, symmetrical, trachea midline, no adenopathy, thyroid: not enlarged, symmetric, no tenderness/mass/nodules, no carotid bruit and no JVD  Lungs: clear to auscultation bilaterally  Heart: regular rate and rhythm, S1, S2 normal, no murmur, click, rub or gallop  Extremities: extremities normal, atraumatic, no cyanosis or edema. 1+ edema on the right and trace edema on the left. Pulses: 2+ and symmetric  Lymph nodes: Cervical, supraclavicular, and axillary nodes normal.  Neurologic: Grossly normal. Baseline weakness of his right arm and leg are baseline    Throat: very mild erythema of the soft palette but not exudate or petechiae. ASSESSMENT and PLAN    ICD-10-CM ICD-9-CM    1.  Type 2 diabetes mellitus with diabetic nephropathy, without long-term current use of insulin ([facility])  G05.39 268.34 METABOLIC PANEL, COMPREHENSIVE 583.81 HEMOGLOBIN A1C WITH EAG   2. Essential hypertension, benign  I10 401.1    3. Coronary artery disease involving native coronary artery of native heart without angina pectoris  I25.10 414.01 LIPID PANEL   4. Dyslipidemia, goal LDL below 70  E78.5 272.4 LIPID PANEL      METABOLIC PANEL, COMPREHENSIVE   5. Paroxysmal atrial fibrillation (HCC)  I48.0 427.31    6. TOPHER (obstructive sleep apnea)  G47.33 327.23    7. Reflux esophagitis  K21.0 530.11    8. CVA, old, aphasia  I69.320 438.11     Oct 2019- no cause found, but evidence of ongoig microemboli on CT/MRI     Diagnoses and all orders for this visit:    1. Type 2 diabetes mellitus with diabetic nephropathy, without long-term current use of insulin (HCC)  -     METABOLIC PANEL, COMPREHENSIVE  -     HEMOGLOBIN A1C WITH EAG    2. Essential hypertension, benign    3. Coronary artery disease involving native coronary artery of native heart without angina pectoris  -     LIPID PANEL    4. Dyslipidemia, goal LDL below 70  -     LIPID PANEL  -     METABOLIC PANEL, COMPREHENSIVE    5. Paroxysmal atrial fibrillation (HCC)    6. TOPHER (obstructive sleep apnea)    7. Reflux esophagitis    8. CVA, old, aphasia  Comments:  Oct 2019- no cause found, but evidence of ongoig microemboli on CT/MRI      Follow-up and Dispositions    · Return in about 3 months (around 11/17/2020), or if symptoms worsen or fail to improve, for F/U HTN,CHOL,DM, F/U of obesity, atrial fibrillation, and prn worsening of HURD. lab results and schedule of future lab studies reviewed with patient  reviewed diet, exercise and weight control  cardiovascular risk and specific lipid/LDL goals reviewed  reviewed medications and side effects in detail  Please call my office if there are any questions- [phone]. I will arrange for follow up after the lab tests done from today return  Recommended a weekly \"heart check. \" I went into detail how to do this.   Call for refills on medications as needed. Discussed expected course/resolution/complications of diagnosis in detail with patient. Medication risks/benefits/costs/interactions/alternatives discussed with patient. Pt was given an after visit summary which includes diagnoses, current medications & vitals. Pt expressed understanding with the diagnosis and plan      BMI is significantly elevated- in the obese range. I reviewed diet, exercise and weight control. Discussed weight control in detail, the importance of mainly decreased carbs, and for weight maintenance, exercise; discussed different diets and that it isn't as important to watch the type of foods as it is to decrease calorie intake no matter what type of diet you do, etc.       Total 40 minutes,60 % counseling re:  Regular exercise is very important to your health; it helps mentally, physically, socially; it prevents injuries if done properly. Exercise, even as simple as walking 20-30 minutes daily has major benefits to your health even though your \"numbers\" are the same in the lab. See if you can add this into your daily regimen and after a few months it will become a regular habit-\"just something you do,\" like brushing your teeth. A combination of aerobic exercise and strengthening and stretching is felt to be the best for you, so this should be your ultimate goal.   This can be done in the privacy of your home or in a group setting as at the gym  Some prefer having a , others prefer to do exercise in groups or individually. Do what \"works\" for you. You need to make it simple and \"fun,\" or you most likely will not continue it. Recommended a weekly \"heart check. \" I went into detail how to do this.     Discussed specific diabetic recommendations: low cholesterol diet, weight control and daily exercise discussed, home glucose monitoring emphasized, all medications, side effects and compliance discussed carefully, foot care discussed and Podiatry visits discussed, annual eye examinations at Ophthalmology discussed, glycohemoglobin and other lab monitoring discussed and long term diabetic complications discussed. Reviewed BP, cholesterol and diabetic goals. LDL goal<100,HDL goal>45, Triglyceride goal<150, A1C< 7.0, BP<140/90. Review of  the proper technique of checking the blood pressure- check it on an average day only, not on a stressful day, sitting, no exercise for at least 1 hour and not experiencing any new pain( chronic pain is OK). Patient encouraged to check BP sitting and standing at least once a month and to report these readings to me if > 140/ 90 on average , or if the standing BP is >  15 points lower than the sitting. Always check it twice and if there is > 5 points decrease from the previous reading( top reading or systolic) keep checking it until it does not drop 5 points. Write only this final reading down, not the preceding readings. If out of these readings there is only 1 out of 4 or less > 815, or > 90 diastolic then your blood pressure is OK; it needs further treatment if it is above this. Also, don't forget,  as noted above, to check your blood pressure standing once a month; this is to detect a drop in your BP that might lead to fainting and serious injury; you check it standing with your arm hanging straight down and relaxed. Check it twice waiting 1 minute between the two readings. If, with either one of these 2 readings there is a > 15 point drop of the systolic compared to your sitting pressure( done before the standing BP), then let me know. Following these guidelines, continue to check your BP and write down only the ones described above and it will help me to effectively treat your blood pressure. Reviewed symptoms, or lack thereof, of hypertension and elevated cholesterol.  Also, discussed symptoms of concern that were noted today in the note above, treatment options( including doing nothing), when to follow up before recommended time frame. Also, answered all questions. Informed pt that his SOB is due to his maxed out cardiovascular system. The only way to improve it beyond the medications he is taking is to lose some weight. We will try to help him with that by using a medicine Trulicity for his DM (Byetta helped him in the past). We will wait for the A1C before we do that. He was seen for a sore throat in May 2019 at [facility]. This document was written by Yuni Tai, as dictated by Lucho Valle MD.  I have reviewed and agree with the above note and have made corrections where appropriate Shimon Levine M.D.

## 2020-08-18 ENCOUNTER — TELEPHONE (OUTPATIENT)
Dept: FAMILY MEDICINE CLINIC | Age: 80
End: 2020-08-18

## 2020-08-18 LAB
ALBUMIN SERPL-MCNC: 4.2 G/DL (ref 3.7–4.7)
ALBUMIN/GLOB SERPL: 1.4 {RATIO} (ref 1.2–2.2)
ALP SERPL-CCNC: 136 IU/L (ref 39–117)
ALT SERPL-CCNC: 11 IU/L (ref 0–44)
AST SERPL-CCNC: 8 IU/L (ref 0–40)
BILIRUB SERPL-MCNC: 0.2 MG/DL (ref 0–1.2)
BUN SERPL-MCNC: 24 MG/DL (ref 8–27)
BUN/CREAT SERPL: 17 (ref 10–24)
CALCIUM SERPL-MCNC: 9.5 MG/DL (ref 8.6–10.2)
CHLORIDE SERPL-SCNC: 103 MMOL/L (ref 96–106)
CHOLEST SERPL-MCNC: 150 MG/DL (ref 100–199)
CO2 SERPL-SCNC: 23 MMOL/L (ref 20–29)
CREAT SERPL-MCNC: 1.45 MG/DL (ref 0.76–1.27)
EST. AVERAGE GLUCOSE BLD GHB EST-MCNC: 200 MG/DL
GLOBULIN SER CALC-MCNC: 2.9 G/DL (ref 1.5–4.5)
GLUCOSE SERPL-MCNC: 170 MG/DL (ref 65–99)
HBA1C MFR BLD: 8.6 % (ref 4.8–5.6)
HDLC SERPL-MCNC: 50 MG/DL
INTERPRETATION, 910389: NORMAL
INTERPRETATION: NORMAL
LDLC SERPL CALC-MCNC: 74 MG/DL (ref 0–99)
PDF IMAGE, 910387: NORMAL
POTASSIUM SERPL-SCNC: 4.9 MMOL/L (ref 3.5–5.2)
PROT SERPL-MCNC: 7.1 G/DL (ref 6–8.5)
SODIUM SERPL-SCNC: 141 MMOL/L (ref 134–144)
TRIGL SERPL-MCNC: 129 MG/DL (ref 0–149)
VLDLC SERPL CALC-MCNC: 26 MG/DL (ref 5–40)

## 2020-08-18 RX ORDER — DULAGLUTIDE 0.75 MG/.5ML
0.75 INJECTION, SOLUTION SUBCUTANEOUS
Qty: 13 PEN | Refills: 3 | Status: SHIPPED | OUTPATIENT
Start: 2020-08-18 | End: 2021-02-28

## 2020-08-18 NOTE — TELEPHONE ENCOUNTER
----- Message from Gray Diego sent at 8/17/2020  4:06 PM EDT -----  Regarding: Dr. Eris Gonzalez / telephone  Contact: [phone]  Caller's first and last name: n/a  Reason for call: Pt. is inquiring about the new prescription that was discussed during today's appt.   Callback required yes/no and why: yes  Best contact number(s): [phone]  Details to clarify the request: n/a

## 2020-08-31 DIAGNOSIS — E11.9 TYPE 2 DIABETES MELLITUS WITHOUT COMPLICATION (HCC): ICD-10-CM

## 2020-08-31 RX ORDER — METFORMIN HYDROCHLORIDE 500 MG/1
TABLET, EXTENDED RELEASE ORAL
Qty: 360 TAB | Refills: 0 | Status: SHIPPED | OUTPATIENT
Start: 2020-08-31 | End: 2020-12-14

## 2020-10-01 DIAGNOSIS — I25.10 CORONARY ARTERY DISEASE INVOLVING NATIVE CORONARY ARTERY OF NATIVE HEART WITHOUT ANGINA PECTORIS: ICD-10-CM

## 2020-10-02 RX ORDER — AMLODIPINE BESYLATE 10 MG/1
TABLET ORAL
Qty: 90 TAB | Refills: 0 | Status: SHIPPED | OUTPATIENT
Start: 2020-10-02 | End: 2021-02-04

## 2020-10-05 ENCOUNTER — OFFICE VISIT (OUTPATIENT)
Dept: CARDIOLOGY CLINIC | Age: 80
End: 2020-10-05
Payer: MEDICARE

## 2020-10-05 VITALS
BODY MASS INDEX: 32.58 KG/M2 | SYSTOLIC BLOOD PRESSURE: 126 MMHG | OXYGEN SATURATION: 98 % | RESPIRATION RATE: 24 BRPM | HEART RATE: 76 BPM | WEIGHT: 262 LBS | HEIGHT: 75 IN | DIASTOLIC BLOOD PRESSURE: 62 MMHG

## 2020-10-05 DIAGNOSIS — R06.09 DOE (DYSPNEA ON EXERTION): ICD-10-CM

## 2020-10-05 DIAGNOSIS — I48.0 PAROXYSMAL ATRIAL FIBRILLATION (HCC): ICD-10-CM

## 2020-10-05 DIAGNOSIS — Z95.818 PRESENCE OF WATCHMAN LEFT ATRIAL APPENDAGE CLOSURE DEVICE: ICD-10-CM

## 2020-10-05 DIAGNOSIS — I10 ESSENTIAL HYPERTENSION, BENIGN: ICD-10-CM

## 2020-10-05 DIAGNOSIS — I50.32 CHRONIC HEART FAILURE WITH PRESERVED EJECTION FRACTION (HCC): ICD-10-CM

## 2020-10-05 DIAGNOSIS — I25.10 CORONARY ARTERY DISEASE INVOLVING NATIVE CORONARY ARTERY OF NATIVE HEART WITHOUT ANGINA PECTORIS: ICD-10-CM

## 2020-10-05 DIAGNOSIS — E78.5 DYSLIPIDEMIA, GOAL LDL BELOW 70: ICD-10-CM

## 2020-10-05 DIAGNOSIS — I25.10 CORONARY ARTERY DISEASE INVOLVING NATIVE CORONARY ARTERY OF NATIVE HEART WITHOUT ANGINA PECTORIS: Primary | ICD-10-CM

## 2020-10-05 DIAGNOSIS — E11.21 TYPE 2 DIABETES MELLITUS WITH DIABETIC NEPHROPATHY, WITHOUT LONG-TERM CURRENT USE OF INSULIN (HCC): ICD-10-CM

## 2020-10-05 DIAGNOSIS — Z79.01 CHRONIC ANTICOAGULATION: ICD-10-CM

## 2020-10-05 LAB
ANION GAP SERPL CALC-SCNC: 8 MMOL/L (ref 5–15)
BUN SERPL-MCNC: 22 MG/DL (ref 6–20)
BUN/CREAT SERPL: 14 (ref 12–20)
CALCIUM SERPL-MCNC: 9.1 MG/DL (ref 8.5–10.1)
CHLORIDE SERPL-SCNC: 110 MMOL/L (ref 97–108)
CO2 SERPL-SCNC: 22 MMOL/L (ref 21–32)
CREAT SERPL-MCNC: 1.56 MG/DL (ref 0.7–1.3)
ERYTHROCYTE [DISTWIDTH] IN BLOOD BY AUTOMATED COUNT: 20.6 % (ref 11.5–14.5)
GLUCOSE SERPL-MCNC: 150 MG/DL (ref 65–100)
HCT VFR BLD AUTO: 28.7 % (ref 36.6–50.3)
HGB BLD-MCNC: 8.2 G/DL (ref 12.1–17)
MCH RBC QN AUTO: 22.6 PG (ref 26–34)
MCHC RBC AUTO-ENTMCNC: 28.6 G/DL (ref 30–36.5)
MCV RBC AUTO: 79.1 FL (ref 80–99)
NRBC # BLD: 0 K/UL (ref 0–0.01)
NRBC BLD-RTO: 0 PER 100 WBC
PLATELET # BLD AUTO: 339 K/UL (ref 150–400)
PMV BLD AUTO: 10.6 FL (ref 8.9–12.9)
POTASSIUM SERPL-SCNC: 4.5 MMOL/L (ref 3.5–5.1)
RBC # BLD AUTO: 3.63 M/UL (ref 4.1–5.7)
SODIUM SERPL-SCNC: 140 MMOL/L (ref 136–145)
WBC # BLD AUTO: 6.8 K/UL (ref 4.1–11.1)

## 2020-10-05 PROCEDURE — G8754 DIAS BP LESS 90: HCPCS | Performed by: SPECIALIST

## 2020-10-05 PROCEDURE — 99215 OFFICE O/P EST HI 40 MIN: CPT | Performed by: SPECIALIST

## 2020-10-05 PROCEDURE — 1100F PTFALLS ASSESS-DOCD GE2>/YR: CPT | Performed by: SPECIALIST

## 2020-10-05 PROCEDURE — G0463 HOSPITAL OUTPT CLINIC VISIT: HCPCS | Performed by: SPECIALIST

## 2020-10-05 PROCEDURE — G8427 DOCREV CUR MEDS BY ELIG CLIN: HCPCS | Performed by: SPECIALIST

## 2020-10-05 PROCEDURE — G8417 CALC BMI ABV UP PARAM F/U: HCPCS | Performed by: SPECIALIST

## 2020-10-05 PROCEDURE — G8536 NO DOC ELDER MAL SCRN: HCPCS | Performed by: SPECIALIST

## 2020-10-05 PROCEDURE — 3052F HG A1C>EQUAL 8.0%<EQUAL 9.0%: CPT | Performed by: SPECIALIST

## 2020-10-05 PROCEDURE — G8510 SCR DEP NEG, NO PLAN REQD: HCPCS | Performed by: SPECIALIST

## 2020-10-05 PROCEDURE — 3288F FALL RISK ASSESSMENT DOCD: CPT | Performed by: SPECIALIST

## 2020-10-05 PROCEDURE — G8752 SYS BP LESS 140: HCPCS | Performed by: SPECIALIST

## 2020-10-05 NOTE — LETTER
10/5/20 Patient: Gregory Yoder YOB: 1940 Date of Visit: 10/5/2020 Lilian Banegas MD 
[address] [facility] [address] [address] VIA In Basket Dear Lilian Banegas MD, Thank you for referring Mr. Jossy Fontaine to [address] for evaluation. My notes for this consultation are attached. If you have questions, please do not hesitate to call me. I look forward to following your patient along with you. Sincerely, Rick Brothers MD

## 2020-10-05 NOTE — PROGRESS NOTES
Sofía Kenney is a 79 y.o. male    Chief Complaint   Patient presents with    Coronary Artery Disease    Cholesterol Problem    Irregular Heart Beat    CHF     Pt states he stopped taking furosemide because he doesn't want to get up to urinate all day. Chest pain : NO  SOB : Pt states he gets SOB with any exertion. Dizziness : NO  Edema : Pt states he is swelling in both his legs. Refills : NO    Visit Vitals  /62 (BP 1 Location: Left arm, BP Patient Position: Sitting)   Pulse 76   Resp 24   Ht 6' 3\" (1.905 m)   Wt 262 lb (118.8 kg)   SpO2 98%   BMI 32.75 kg/m²       1. Have you been to the ER, urgent care clinic since your last visit? Hospitalized since your last visit? No    2. Have you seen or consulted any other health care providers outside of the [address] [place] [address] since your last visit? Include any pap smears or colon screening.  No

## 2020-10-05 NOTE — PROGRESS NOTES
[id number]  [place], [address] [facility]    Office [phone]  Fax [phone]      Trinity Stern is a 79 y.o. male last seen by me 7 months ago. Assessment    Encounter Diagnoses   Name Primary?  Coronary artery disease involving native coronary artery of native heart without angina pectoris Yes    Essential hypertension, benign     Paroxysmal atrial fibrillation (HCC)     Chronic heart failure with preserved ejection fraction (HCC)     Type 2 diabetes mellitus with diabetic nephropathy, without long-term current use of insulin (HCC)     Dyslipidemia, goal LDL below 70     Chronic anticoagulation     Presence of Watchman left atrial appendage closure device     HURD (dyspnea on exertion)        Recommendations:    Persistent class 3 HURD/fatigue. Differential remains wide - HFpEF, myocardial ischemia, AF, deconditioning. No significant improvement w/ inhaler use. His volume status seems fine on no diuretic therapy. He remains in sinus rhythm by pulse. I remain concerned about progressive CAD. We discussed cath. He is in full agreement. CAD - hx of multiple PCI's, most recently 2 years ago, s/p PCI RCA with LEO. Last cath most recently 4/24/18 demonstrated no RCA restenosis, distal LAD 80%. He has chronic fatigue and HURD, likely multifactorial, but no anginal symptoms.  - Continue ASA and Plavix  - Right and left cath next week as noted above     PAF. Now off amiodarone. No sxs recurrence. Pulse is regular today. No clear sxs of amiodarone toxicity. - Continue Coreg for rhythm control.  - S/p WATCHMAN d/t GI bleeding  - Continue DAPT indefintely d/t CAD    HTN - normotensive on combination therapy. He is followed by nephrology as well. Right and left cath next week  Right femoral approach   ASA 3  Airway 3       Subjective:    Trinity Stern reports stable pattern of class 3 HURD w/ mild activities such as getting up from a seated position.  He states that he is constantly out of breath and does not feel well. Patient denies any exertional chest pain, palpitations, syncope, orthopnea, or paroxysmal nocturnal dyspnea. He leads a mostly sedentary lifestyle, limited d/t HURD. Saw Dr. Erin Benavidez from Pulmonary who prescribed an inhaler which did not seem to help. She prescribed a nebulizer, but it has not yet arrived. He reports trouble w/ speech, residual effects from past stroke. No bleeding issues on ASA and Plavix. He presents today in a motorized scooter. He does not ambulate w/ it at home. Cardiac risk factors   HTN yes  DM  yes  Smoking no    Cardiac testing  Cath (8/27/8): LAD p90, m70. OM1 70, OM2 90 (small). RCA m40. EF 55%. No AVG/MR. Patent L SC.  PCI (11/13/8): mLAD 2.75x18 Xience, pLAD 2.75x23 Xience. OM1 3.0x15 Xience. Cath 4/28/17:Cath (4/28/17): LAD patent with patent prox and mid stent. LCx patent with patent OM1 stent. RCA p90 (ISR) and p90 distal to stent. EF 65%. No AVG/MR. PCI osteal/prox RCA (4/28/17): 3.0x28 Xience. Cath 4/24/18: RCA - prior stents present; Prox ISR 60%; Mid to Distal - 60%  Difficulty engaging guide secondary to stent at ostium  JR4/AR1 guide would not engage ; THE [facility] guide used to engage RCA  FFR - not siginificant 0.89      Lexiscan Cardiolite (8/11/9): Mild fixed inf defect. No reversible defects. EF 51% with mild inf HK. Cath (8/29/13): LAD patent prox and mid stents. LCx m40; patent OM1 stent. RCA p80. EF 55%. No AVG/MR. PCI ost/proxRCA (8/29/13): 3.0x18 Xience. Echo (8/22/13): EF 55%. Mod dil LA. Mild dil RA. Mild MR/TR/SD. PASP 24. Lexiscan Cardiolite 4/15/14 - normal perfusion imaging, EF 52%  DCCV 12/18/14  Echo 6/18/18 - EF 55%. No WMA. Mild asymmetric hypertrophy of the septum. MIld to mod LAE. AoV calcification and sclerosis. No significant change c/t study of 22-Aug-2013. Echo 4/11/19 - EF 55-60%. Mod LAE. AoV sclerosis. Mild MR. Mild TR.  Severely elevated CVP (>15 mmHg)    9/24/2019:Endocardial left atrial appendage occlusion was performed utilizing a 30 mm WATCHMAN device per Dr. Valentino Silver    YESENIA 11/9/19 - 30mm Watchman device present. Well seated. No evidence of carlton-device leak. No overlying thrombus noted on the device. Very small right to left shunting likely related to recent trans-septal puncture for Watchman implantation. LVEF 56-60%. 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 2/25/2010    Other and unspecified hyperlipidemia 2/25/2010    Positive PPD 2/25/2010    Reflux esophagitis 2/25/2010    NO LONGER A PROBLEM    Stroke [facility])     in 1959 at age of 26    Type II or unspecified type diabetes mellitus without mention of complication, not stated as uncontrolled 2/25/2010        Current Outpatient Medications   Medication Sig Dispense Refill    amLODIPine (NORVASC) 10 mg tablet Take 1 tablet by mouth once daily 90 Tab 0    metFORMIN ER (GLUCOPHAGE XR) 500 mg tablet TAKE 4 TABLETS BY MOUTH ONCE DAILY WITH BREAKFAST 360 Tab 0    dulaglutide (Trulicity) 5.34 LX/2.7 mL sub-q pen 0.5 mL by SubCUTAneous route every seven (7) days. 13 Pen 3    carvediloL (COREG) 6.25 mg tablet TAKE 1 TABLET BY MOUTH TWICE DAILY WITH MEALS 180 Tab 3    clopidogreL (PLAVIX) 75 mg tab Take 1 tablet by mouth once daily 90 Tab 0    atorvastatin (LIPITOR) 40 mg tablet Take 1 tablet by mouth once daily 90 Tab 1    spironolactone (ALDACTONE) 25 mg tablet 25 mg daily.   3    losartan (COZAAR) 100 mg tablet TAKE 1 TABLET BY MOUTH ONCE DAILY FOR HIGH BLOOD PRESSURE 90 Tab 3    aspirin delayed-release 81 mg tablet Take 81 mg by mouth daily. Indications: MYOCARDIAL REINFARCTION PREVENTION         Allergies   Allergen Reactions    Adhesive Tape Rash    Baycol Nausea and Vomiting    Clonidine Other (comments)     Headache and sleepiness      Pravachol [Pravastatin] Nausea and Vomiting      Review of Systems  Constitutional: Negative for fever, chills, and diaphoresis. Respiratory: Negative for hemoptysis, sputum production   Cardiovascular: Negative for chest pain, palpitations, claudication, and PND. +HURD/SOB  Gastrointestinal: Negative for heartburn, nausea, vomiting, blood in stool and melena. Genitourinary: Negative for dysuria and flank pain. Musculoskeletal: Negative for joint pain and back pain. Skin: Negative for rash. +ecchymoses  Neurological: Negative for seizures, loss of consciousness and headaches. +right sided weakness d/t stroke  Endo/Heme/Allergies: Does not bruise/bleed easily. Psychiatric/Behavioral: Negative for memory loss. The patient does not have insomnia. Physical Exam  Visit Vitals  /62 (BP 1 Location: Left arm, BP Patient Position: Sitting)   Pulse 76   Resp 24   Ht 6' 3\" (1.905 m)   Wt 262 lb (118.8 kg)   SpO2 98%   BMI 32.75 kg/m²     Wt Readings from Last 3 Encounters:   10/05/20 262 lb (118.8 kg)   08/17/20 263 lb (119.3 kg)   03/19/20 263 lb (119.3 kg)      General - well developed well nourished  Neck - JVP normal, thyroid nl  Cardiac - regular, no murmurs, rubs or gallops.  No clicks  Vascular - carotids without bruits, radials, femorals and pedal pulses equal bilateral  Lungs - clear to auscultation bilaterals, no rales ,wheezing or rhonchi  Abd - rounded/obese, soft nontender  Extremities - trace ankle edema  Skin - no rash +ecchymoses  Neuro - nonfocal. Right sided weakness  Psych - normal mood and affect    Cardiographics:  EKG 4/17/18 - Afib  EKG 9/28/18 - SR 65, first degree AG block, , left anterior live block  EKG 4/3/19 - AF 70s, LAHB, compared to 9/28/18, AF is newly noted  Echo 3/19/20- EF 61%, mild LVH, PASP nl    Written by Javi Phelps, as dictated by Hillary Burks M.D.       Hillary Burks MD

## 2020-10-06 NOTE — PROGRESS NOTES
Stable CKD, but interval decline in Hgb/microcytic anemia. Reviewed with Dr. Wing Keating. Likely the cause of progressive fatigue and dyspnea. Hx of GI bleeding; previously evaluated as an inpatient. He denies that a source was ever found and that he was transfused. Watchman placed in 2019. Denies any evidence of active bleeding. Remains on ASA and Plavix. No established outpatient GI provider. Will refer him for further evaluation with Dr. Enzo Lion. Will cancel plan for C at this time.

## 2020-10-17 ENCOUNTER — TRANSCRIBE ORDER (OUTPATIENT)
Dept: SCHEDULING | Age: 80
End: 2020-10-17

## 2020-10-17 DIAGNOSIS — R06.02 BREATH SHORTNESS: Primary | ICD-10-CM

## 2020-10-26 ENCOUNTER — DOCUMENTATION ONLY (OUTPATIENT)
Dept: CARDIOLOGY CLINIC | Age: 80
End: 2020-10-26

## 2020-10-26 NOTE — PROGRESS NOTES
Pre-procedure Cardiovascular Risk Assessment received for Mr. Ivana Hardin. EGD and Colonoscopy    Date of procedure 11/11/20    Performed by Dr. Elidia Gan    Last seen by Dr. Brook Marie on 10/5/20. Based on his evaluation at that time,  Mr. Ivana Hardin would be considered low risk to proceed with EGD and Colonoscopy. No additional cardiac testing is needed at this time. It is safe to stop antiplatelet therapy with ASA and Plavix 5 days preop, resuming postop when safe to do so.      Paradise Leigh, BRIAN

## 2020-10-27 ENCOUNTER — HOSPITAL ENCOUNTER (OUTPATIENT)
Dept: CT IMAGING | Age: 80
Discharge: HOME | End: 2020-10-27
Attending: INTERNAL MEDICINE
Payer: MEDICARE

## 2020-10-27 DIAGNOSIS — R06.02 BREATH SHORTNESS: ICD-10-CM

## 2020-10-27 PROCEDURE — 71250 CT THORAX DX C-: CPT

## 2020-11-06 RX ORDER — REVEFENACIN 175 UG/3ML
175 SOLUTION RESPIRATORY (INHALATION) DAILY
COMMUNITY
End: 2022-08-21 | Stop reason: ALTCHOICE

## 2020-11-06 RX ORDER — LANOLIN ALCOHOL/MO/W.PET/CERES
CREAM (GRAM) TOPICAL
COMMUNITY

## 2020-11-06 RX ORDER — ALBUTEROL SULFATE 90 UG/1
AEROSOL, METERED RESPIRATORY (INHALATION)
COMMUNITY
End: 2021-02-28 | Stop reason: SDUPTHER

## 2020-11-06 NOTE — PERIOP NOTES
Hailey Meyers  Endoscopy Preprocedure Instructions      1. On the day of your surgery, please report to registration located on the 2nd floor of the  [facility]. yes    2. You must have a responsible adult to drive you to the hospital, stay at the hospital during your procedure and drive you home. If they leave your procedure will not be started (no exceptions). yes    3. Do not have anything to eat or drink (including water, gum, mints, coffee, and juice) after midnight. This does not apply to the medications you were instructed to take by your physician. yesIf you are currently taking Plavix, Coumadin, Aspirin, or other blood-thinning agents, contact your physician for special instructions. not applicable. 4. If you are having a procedure that requires bowel prep: We recommend that you have only clear liquids the day before your procedure and begin your bowel prep by 5:00 pm.  You may continue to drink clear liquids until midnight. If for any reason you are not able to complete your prep please notify your physician immediately. yes    5. Have a list of all current medications, including vitamins, herbal supplements and any other over the counter medications. yes    6. If you wear glasses, contacts, dentures and/or hearing aids, they may be removed prior to procedure, please bring a case to store them in. yes    7. You should understand that if you do not follow these instructions your procedure may be cancelled. If your physical condition changes (I.e. fever, cold or flu) please contact your doctor as soon as possible. 8. It is important that you be on time. If for any reason you are unable to keep your appointment please call [phone]  the day of or your physicians office prior to your scheduled procedure.

## 2020-11-07 ENCOUNTER — HOSPITAL ENCOUNTER (OUTPATIENT)
Dept: LAB | Age: 80
Discharge: HOME | End: 2020-11-07
Payer: MEDICARE

## 2020-11-07 ENCOUNTER — TRANSCRIBE ORDER (OUTPATIENT)
Dept: EMERGENCY DEPT | Age: 80
End: 2020-11-07

## 2020-11-07 DIAGNOSIS — Z01.812 PRE-PROCEDURAL LABORATORY EXAMINATIONS: Primary | ICD-10-CM

## 2020-11-07 DIAGNOSIS — Z01.812 PRE-PROCEDURAL LABORATORY EXAMINATIONS: ICD-10-CM

## 2020-11-07 PROCEDURE — 87635 SARS-COV-2 COVID-19 AMP PRB: CPT

## 2020-11-09 LAB — SARS-COV-2, COV2NT: NOT DETECTED

## 2020-11-11 ENCOUNTER — HOSPITAL ENCOUNTER (OUTPATIENT)
Age: 80
Setting detail: OUTPATIENT SURGERY
Discharge: HOME | End: 2020-11-11
Attending: INTERNAL MEDICINE | Admitting: INTERNAL MEDICINE
Payer: MEDICARE

## 2020-11-11 ENCOUNTER — ANESTHESIA EVENT (OUTPATIENT)
Dept: ENDOSCOPY | Age: 80
End: 2020-11-11
Payer: MEDICARE

## 2020-11-11 ENCOUNTER — ANESTHESIA (OUTPATIENT)
Dept: ENDOSCOPY | Age: 80
End: 2020-11-11
Payer: MEDICARE

## 2020-11-11 VITALS
RESPIRATION RATE: 20 BRPM | OXYGEN SATURATION: 97 % | HEART RATE: 78 BPM | DIASTOLIC BLOOD PRESSURE: 88 MMHG | HEIGHT: 76 IN | SYSTOLIC BLOOD PRESSURE: 131 MMHG | TEMPERATURE: 97.9 F | BODY MASS INDEX: 31.14 KG/M2 | WEIGHT: 255.73 LBS

## 2020-11-11 LAB
GLUCOSE BLD STRIP.AUTO-MCNC: 152 MG/DL (ref 65–100)
H PYLORI FROM TISSUE: NEGATIVE
KIT LOT NO., HCLOLOT: NORMAL
NEGATIVE CONTROL: NEGATIVE
POSITIVE CONTROL: POSITIVE
SERVICE CMNT-IMP: ABNORMAL

## 2020-11-11 PROCEDURE — 88302 TISSUE EXAM BY PATHOLOGIST: CPT

## 2020-11-11 PROCEDURE — 76040000007: Performed by: INTERNAL MEDICINE

## 2020-11-11 PROCEDURE — 82962 GLUCOSE BLOOD TEST: CPT

## 2020-11-11 PROCEDURE — 88305 TISSUE EXAM BY PATHOLOGIST: CPT

## 2020-11-11 PROCEDURE — 74011250636 HC RX REV CODE- 250/636: Performed by: NURSE ANESTHETIST, CERTIFIED REGISTERED

## 2020-11-11 PROCEDURE — 77030010936 HC CLP LIG BSC -C: Performed by: INTERNAL MEDICINE

## 2020-11-11 PROCEDURE — 2709999900 HC NON-CHARGEABLE SUPPLY: Performed by: INTERNAL MEDICINE

## 2020-11-11 PROCEDURE — 77030013992 HC SNR POLYP ENDOSC BSC -B: Performed by: INTERNAL MEDICINE

## 2020-11-11 PROCEDURE — 76060000032 HC ANESTHESIA 0.5 TO 1 HR: Performed by: INTERNAL MEDICINE

## 2020-11-11 PROCEDURE — 74011000250 HC RX REV CODE- 250: Performed by: NURSE ANESTHETIST, CERTIFIED REGISTERED

## 2020-11-11 PROCEDURE — 87077 CULTURE AEROBIC IDENTIFY: CPT | Performed by: INTERNAL MEDICINE

## 2020-11-11 PROCEDURE — 77030021593 HC FCPS BIOP ENDOSC BSC -A: Performed by: INTERNAL MEDICINE

## 2020-11-11 RX ORDER — NALOXONE HYDROCHLORIDE 0.4 MG/ML
0.4 INJECTION, SOLUTION INTRAMUSCULAR; INTRAVENOUS; SUBCUTANEOUS
Status: DISCONTINUED | OUTPATIENT
Start: 2020-11-11 | End: 2020-11-11 | Stop reason: HOSPADM

## 2020-11-11 RX ORDER — DEXTROMETHORPHAN/PSEUDOEPHED 2.5-7.5/.8
1.2 DROPS ORAL
Status: DISCONTINUED | OUTPATIENT
Start: 2020-11-11 | End: 2020-11-11 | Stop reason: HOSPADM

## 2020-11-11 RX ORDER — PROPOFOL 10 MG/ML
INJECTION, EMULSION INTRAVENOUS AS NEEDED
Status: DISCONTINUED | OUTPATIENT
Start: 2020-11-11 | End: 2020-11-11 | Stop reason: HOSPADM

## 2020-11-11 RX ORDER — FLUMAZENIL 0.1 MG/ML
0.2 INJECTION INTRAVENOUS
Status: DISCONTINUED | OUTPATIENT
Start: 2020-11-11 | End: 2020-11-11 | Stop reason: HOSPADM

## 2020-11-11 RX ORDER — MIDAZOLAM HYDROCHLORIDE 1 MG/ML
.25-5 INJECTION, SOLUTION INTRAMUSCULAR; INTRAVENOUS
Status: DISCONTINUED | OUTPATIENT
Start: 2020-11-11 | End: 2020-11-11 | Stop reason: HOSPADM

## 2020-11-11 RX ORDER — SODIUM CHLORIDE 9 MG/ML
INJECTION, SOLUTION INTRAVENOUS
Status: DISCONTINUED | OUTPATIENT
Start: 2020-11-11 | End: 2020-11-11 | Stop reason: HOSPADM

## 2020-11-11 RX ORDER — EPINEPHRINE 0.1 MG/ML
1 INJECTION INTRACARDIAC; INTRAVENOUS
Status: DISCONTINUED | OUTPATIENT
Start: 2020-11-11 | End: 2020-11-11 | Stop reason: HOSPADM

## 2020-11-11 RX ORDER — ATROPINE SULFATE 0.1 MG/ML
0.5 INJECTION INTRAVENOUS
Status: DISCONTINUED | OUTPATIENT
Start: 2020-11-11 | End: 2020-11-11 | Stop reason: HOSPADM

## 2020-11-11 RX ORDER — SODIUM CHLORIDE 9 MG/ML
50 INJECTION, SOLUTION INTRAVENOUS CONTINUOUS
Status: DISCONTINUED | OUTPATIENT
Start: 2020-11-11 | End: 2020-11-11 | Stop reason: HOSPADM

## 2020-11-11 RX ORDER — PROPOFOL 10 MG/ML
INJECTION, EMULSION INTRAVENOUS
Status: DISCONTINUED | OUTPATIENT
Start: 2020-11-11 | End: 2020-11-11 | Stop reason: HOSPADM

## 2020-11-11 RX ORDER — LIDOCAINE HYDROCHLORIDE 20 MG/ML
INJECTION, SOLUTION EPIDURAL; INFILTRATION; INTRACAUDAL; PERINEURAL AS NEEDED
Status: DISCONTINUED | OUTPATIENT
Start: 2020-11-11 | End: 2020-11-11 | Stop reason: HOSPADM

## 2020-11-11 RX ADMIN — PROPOFOL 140 MCG/KG/MIN: 10 INJECTION, EMULSION INTRAVENOUS at 07:54

## 2020-11-11 RX ADMIN — LIDOCAINE HYDROCHLORIDE 40 MG: 20 INJECTION, SOLUTION INTRAVENOUS at 07:54

## 2020-11-11 RX ADMIN — PROPOFOL 50 MG: 10 INJECTION, EMULSION INTRAVENOUS at 07:54

## 2020-11-11 RX ADMIN — SODIUM CHLORIDE: 9 INJECTION, SOLUTION INTRAVENOUS at 07:46

## 2020-11-11 NOTE — ROUTINE PROCESS
Jamilah Melton  1940  [id number]    Situation:  Verbal report received from: [facility]  Procedure: Procedure(s):  COLONOSCOPY/EGD  ESOPHAGOGASTRODUODENOSCOPY (EGD)  ESOPHAGOGASTRODUODENAL (EGD) BIOPSY  ENDOSCOPIC POLYPECTOMY  RESOLUTION CLIP    Background:    Preoperative diagnosis: ANEMIA  Postoperative diagnosis: Descending polyp X 4  Ascending polyp X2  Diverticulosis  Sigmoid polyp    :  Dr. Lupe Peterson  Assistant(s): Endoscopy Technician-1: Tyrell Sanchez  Endoscopy RN-1: Jesus Pulido RN    Specimens:   ID Type Source Tests Collected by Time Destination   1 : biopsy Preservative Duodenum  Angelique Hayes MD 11/11/2020 7181 Pathology   2 : polyp X4 Preservative Colon, Descending  Angelique Hayes MD 11/11/2020 0809 Pathology   3 : polyp Preservative Colon, Ascending  Angelique Hayes MD 11/11/2020 9001 Pathology   4 : polyp Preservative Colon, Ascending  Angelique Hayes MD 11/11/2020 5981 Pathology   5 : polyp Preservative Sigmoid  Angelique Hayes MD 11/11/2020 0830 Pathology     H. Pylori  no    Assessment:  Intra-procedure medications   Anesthesia gave intra-procedure sedation and medications, see anesthesia flow sheet yes    Intravenous fluids: NS@ KVO     Vital signs stable     Abdominal assessment: round and soft     Recommendation:  Discharge patient per MD order.   Family Wife  Permission to share finding with family or friend yes

## 2020-11-11 NOTE — PROCEDURES
[address]  [address] [facility], [address] [facility]       [phone]                   2020                EGD Operative Report  Saravanan Angulo  :  1940  [facility] Medical Record Number:  [id number]      Indication:  Iron deficiency anemia     : Mc Pruett MD    Assistants: None    Referring Provider:  Adelaida Johnson MD      Anesthesia/Sedation:  MAC anesthesia Propofol    Airway assessment: No airway problems anticipated    Pre-Procedural Exam:      Airway: clear, no airway problems anticipated  Heart: RRR, without gallops or rubs  Lungs: clear bilaterally without wheezes, crackles, or rhonchi  Abdomen: soft, nontender, nondistended, bowel sounds present  Mental Status: awake, alert and oriented to person, place and time       Procedure Details     After infomed consent was obtained for the procedure, with all risks and benefits of procedure explained the patient was taken to the endoscopy suite and placed in the left lateral decubitus position. Following sequential administration of sedation as per above, the endoscope was inserted into the mouth and advanced under direct vision to second portion of the duodenum. A careful inspection was made as the gastroscope was withdrawn, including a retroflexed view of the proximal stomach; findings and interventions are described below. Findings:   Esophagus:normal mucosa  Stomach: normal mucosa- bx taken for MANDO test  Duodenum/jejunum: normal bx taken for histology    Therapies:  biopsy of stomach body, antrum  biopsy of duodenal random    Specimens: as above    Implants: none           Complications:   None; patient tolerated the procedure well. EBL:  None. Impression:   Small sliding hiatal hernia    Recommendations:    -Continue acid suppression. ,   -Await MANDO test result and treat for Helicobacter pylori if positive. ,   -GERD diet: avoid fried and fatty foods.  peppermint, chocolate, alcohol, coffee, citrus fruits and juices, tomoato products; avoid lying down for 2 to 3 hours after eating.,   -No NSAIDS  -Follow up in office as scheduled  -You may resume your medications. If Hb is improved in 1-2 weeks and plavix wants to be restarted - ok to do so from a GI perspective. Monitor Hb closely. ,    Raciel Rhodes MD

## 2020-11-11 NOTE — PERIOP NOTES
Received report from [address] , see anesthesia note. Scopes all washed at bedside by BENNETT. Pt transferred to recovery and report given to [facility] regarding procedures, diagnosis, etc. Family updated on POC over phone by Dr. Cristobal Almeida. VSS, abdoman soft.

## 2020-11-11 NOTE — ROUTINE PROCESS
Endoscopy discharge instructions have been reviewed and given to patient. The patient verbalized understanding and acceptance of instructions. Dr. Carlos Adair discussed with patient and spouse procedure findings and next steps.

## 2020-11-11 NOTE — ANESTHESIA PREPROCEDURE EVALUATION
Anesthetic History   No history of anesthetic complications            Review of Systems / Medical History  Patient summary reviewed, nursing notes reviewed and pertinent labs reviewed    Pulmonary        Sleep apnea: No treatment  Smoker         Neuro/Psych           Pertinent negatives: No TIA   Cardiovascular    Hypertension        Dysrhythmias (AFIB) : atrial fibrillation  CAD and CABG/stent    Exercise tolerance: >4 METS  Comments: Coronary stents x4. Multiple interventions, years ago.    GI/Hepatic/Renal     GERD: well controlled           Endo/Other    Diabetes: type 2    Anemia     Other Findings              Physical Exam    Airway  Mallampati: III  TM Distance: 4 - 6 cm  Neck ROM: normal range of motion        Cardiovascular    Rhythm: irregular  Rate: normal         Dental    Dentition: Full upper dentures     Pulmonary  Breath sounds clear to auscultation               Abdominal  GI exam deferred       Other Findings            Anesthetic Plan    ASA: 3  Anesthesia type: MAC          Induction: Intravenous  Anesthetic plan and risks discussed with: Patient

## 2020-11-11 NOTE — H&P
[address] [facility]  [address], [address] [facility]  [phone]                     History and Physical     NAME: Sharla Lin   :  1940   MRN:  [id number]     HPI:  79year old male who presents with a complaint of Anemia. The patient presents due to new symptoms (He was told he has low hemoglobin.  He went to have a heart cath but this was post poned as his hemoglobin was low - reports it was near 7. [facility] feels weak, like he has run a marathon. Roxi Mccarty is no blood in the stools.  He has been taking iron pills. ). The symptoms have been associated with use of anticoagulant,  while the symptoms have not been associated with use of NSAIDs. Pt has had NAIN for > 10 yrs. He has had bidirectional endoscopy and m2a capsule endoscopy x 2 within that time. Past Surgical History:   Procedure Laterality Date    CARDIAC SURG PROCEDURE UNLIST      X4 STENTS    Fredrica De  2013         HC CAPSULE ENDOSCOPE M2A  2014         HX COLONOSCOPY      HX CORONARY STENT PLACEMENT      HX HEART ASSIST DEVICE Left 2019    HX KNEE REPLACEMENT Left     HX ORTHOPAEDIC Right     FUSION IN RIGHT WRIST    HX OTHER SURGICAL      watchman procedure.  HX OTHER SURGICAL      shoulder surgery.      HX VASECTOMY      VT CARDIOVERSION ELECTIVE ARRHYTHMIA EXTERNAL N/A 2019    Ep Cardioversion performed by Sandrita Green MD at [facility], [facility] CATH LAB    UPPER GI ENDOSCOPY,BIOPSY  2013          Past Medical History:   Diagnosis Date    Arrhythmia     a fib    Atrial fibrillation ([facility] [address] 2013    Basal cell carcinoma of anterior chest 2010    EARS AND NOSE    CAD (coronary artery disease) 2010    CVA (cerebral infarction) 2010    patient denies 13    Diverticula of colon 2010    Dyslipidemia 2010    Essential hypertension, benign 2010    Hypertension     TOPHER (obstructive sleep apnea) 2015  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 Substance Use Topics    Alcohol use: Yes     Frequency: Never     Comment: once a year    Drug use: No     Allergies   Allergen Reactions    Adhesive Tape Rash    Baycol Nausea and Vomiting    Clonidine Other (comments)     Headache and sleepiness      Pravachol [Pravastatin] Nausea and Vomiting     Family History   Problem Relation Age of Onset    Heart Disease Mother     Cancer Father         colon    Heart Disease Maternal Grandmother     Heart Disease Maternal Grandfather     Cancer Paternal Grandmother     Cancer Paternal Grandfather     Anesth Problems Neg Hx      No current facility-administered medications for this encounter. PHYSICAL EXAM:  General: WD, WN. Alert, cooperative, no acute distress    HEENT: NC, Atraumatic. PERRLA, EOMI. Anicteric sclerae. Lungs:  CTA Bilaterally. No Wheezing/Rhonchi/Rales. Heart:  Regular  rhythm,  No murmur, No Rubs, No Gallops  Abdomen: Soft, Non distended, Non tender.  +Bowel sounds, no HSM  Extremities: No c/c/e  Neurologic:  CN 2-12 gi, Alert and oriented X 3. No acute neurological distress   Psych:   Good insight. Not anxious nor agitated.            Assessment:   I have reviewed with the patient +/- family alternatives,benefits and risks for the procedure, as well as potential complications(with emphasis on, but not limited to, bleeding, perforation, cardiovascular/cerebrovascular/pulmonary events, reactions to the medications, infection, risk of missing a lesions/a cancer, and the imponderables including death), alternative options, and patient/family voices understanding.       Plan:   · Endoscopic procedure  · Conscious sedation or MAC

## 2020-11-11 NOTE — ANESTHESIA POSTPROCEDURE EVALUATION
Procedure(s):  COLONOSCOPY/EGD  ESOPHAGOGASTRODUODENOSCOPY (EGD)  ESOPHAGOGASTRODUODENAL (EGD) BIOPSY  ENDOSCOPIC POLYPECTOMY  RESOLUTION CLIP. MAC    Anesthesia Post Evaluation      Multimodal analgesia: multimodal analgesia not used between 6 hours prior to anesthesia start to PACU discharge  Patient location during evaluation: PACU  Patient participation: complete - patient participated  Level of consciousness: awake and alert  Pain score: 0  Pain management: adequate  Airway patency: patent  Anesthetic complications: no  Cardiovascular status: acceptable  Respiratory status: acceptable  Hydration status: acceptable  Post anesthesia nausea and vomiting:  none  Final Post Anesthesia Temperature Assessment:  Normothermia (36.0-37.5 degrees C)      INITIAL Post-op Vital signs:   Vitals Value Taken Time   /88 11/11/2020  8:58 AM   Temp 36.6 °C (97.9 °F) 11/11/2020  8:44 AM   Pulse 72 11/11/2020  9:03 AM   Resp 20 11/11/2020  9:03 AM   SpO2 100 % 11/11/2020  9:03 AM   Vitals shown include unvalidated device data.

## 2020-11-11 NOTE — DISCHARGE INSTRUCTIONS
[address] [facility] [address]  [address] [facility], [address] [facility]  [phone]                   Jamilah Melton  [id number]  1940    COLON DISCHARGE INSTRUCTIONS    DISCOMFORT:  Redness at IV site- apply warm compress to area; if redness or soreness persist- contact your physician  There may be a slight amount of blood passed from the rectum  Gaseous discomfort- walking, belching will help relieve any discomfort  You may not operate a vehicle for 12 hours  You may not  engage in an occupation involving machinery or appliances for rest of today  You may not  drink alcoholic beverages for at least 12 hours  Avoid making any critical decisions for at least 24 hour    DIET:   {Gi dietary recommendations:19518}   - however -  remember your colon is empty and a heavy meal will produce gas. Avoid these foods:  vegetables, fried / greasy foods, carbonated drinks for today     ACTIVITY:  It is recommended that you spend the remainder of the day resting -  avoid any strenuous activity. CALL M.D. ANY SIGN OF:   Increasing pain, nausea, vomiting  Abdominal distension (swelling)  New increased bleeding (oral or rectal)  Fever (chills)  Pain in chest area  Bloody discharge from nose or mouth  Shortness of breath    You may resume your medications. If Hb is improved in 1-2 weeks and plavix wants to be restarted - ok to do so from a GI perspective. Monitor Hb closely. ,    Post procedure diagnosis:  Descending polyp X 4  Ascending polyp X2  Diverticulosis  Sigmoid polyp    Follow-up Instructions:    If a specimen was collected, you will receive a letter with the result by mail within two  weeks. Depending on the result this letter will specify your follow up colonoscopy date.       Please call us for any questions or concerns                     Jamilah Melton  [id number]  1940        DISCHARGE SUMMARY from Nurse    The following personal items collected during your admission are returned to you: Dental Appliance: Dental Appliances:  At home, Uppers  Vision: Visual Aid: None  Hearing Aid:    Jewelry:    Clothing:    Other Valuables:    Valuables sent to safe:

## 2020-11-11 NOTE — PROCEDURES
[facility]  [address], [address] [facility]  [phone]                   Colonoscopy Operative Report      Indications:    Iron deficiency anemia     :  Albina Huber MD    Assistants: None    Referring Provider: Robin Ibarra MD    Sedation:  MAC anesthesia Propofol    Procedure Details:  After informed consent was obtained with all risks and benefits of procedure explained and preoperative exam completed, the patient was taken to the endoscopy suite and placed in the left lateral decubitus position. Upon sequential sedation as per above, a digital rectal exam was performed  And was normal.  The Olympus videocolonoscope  was inserted in the rectum and carefully advanced to the terminal ileum. The quality of preparation was good. The colonoscope was slowly withdrawn with careful evaluation between folds. Retroflexion in the rectum was performed and was normal..     Findings:   Rectum: Grade 1 internal hemorrhoid(s);   Sigmoid: 1  Sessile polyp(s), the largest 18 mm in size;      - Diverticulosis  Descending Colon: 4  Sessile polyp(s), the largest 15 mm in size;  Transverse Colon: normal  Ascending Colon: 2  Sessile polyp(s), the largest 18 mm in size;  Cecum: normal  Terminal Ileum: normal    Interventions:  4 complete polypectomy were performed using hot snare  ( 21 W power current)and the polyps were  retrieved  3 complete polypectomy with cold snare  sent for   endoclip was placed for bleeding control and prophylaxis post polypectomy    Specimen Removed:  specimen #1, 15 mm in size, located in the descending colon removed by snare cautery and retrieved for pathology  #2, 7 mm in size, located in the descending colon removed by cold snare and retrieved for pathology  #3, 5 mm in size, located in the descending colon removed by cold snare and retrieved for pathology  #4, 5 mm in size, located in the descending colon removed by cold snare and retrieved for pathology  #5, 6 mm in size, located in the descending colon removed by cold snare and retrieved for pathology  #6, 18 mm in size, located in the ascending colon removed by snare cautery and retrieved for pathology  #7, 20 mm in size, located in the ascending colon removed by snare cautery and retrieved for pathology  #8, 18 mm in size, located in the sigmoid removed by snare cautery and retrieved for pathology    Implants: total of 4 resolution clips placed for bleeding prophylaxis and control postpolypectomy. No bleeding noted at the end of the procedure    Complications: None. EBL:  None. Impression: Diverticulosis, large polyps ( removed) and internal hemorrhoids    Recommendations:   -Await pathology. -Repeat colonoscopy in 3 years.  -High fiber diet.     -Resume normal medication(s)  -Call office for pathology results in 1 week. Discharge Disposition:  Home in the company of a  when able to ambulate.     Brenda Scheuermann, MD  11/11/2020  8:38 AM

## 2020-11-12 DIAGNOSIS — D50.9 IRON DEFICIENCY ANEMIA, UNSPECIFIED IRON DEFICIENCY ANEMIA TYPE: Primary | ICD-10-CM

## 2020-11-26 DIAGNOSIS — D50.9 IRON DEFICIENCY ANEMIA, UNSPECIFIED IRON DEFICIENCY ANEMIA TYPE: ICD-10-CM

## 2020-12-01 ENCOUNTER — TELEPHONE (OUTPATIENT)
Dept: CARDIOLOGY CLINIC | Age: 80
End: 2020-12-01

## 2020-12-01 NOTE — TELEPHONE ENCOUNTER
Patient calling with questions about his heart cath    He can be reached at [phone]    Formerly [facility]

## 2020-12-01 NOTE — TELEPHONE ENCOUNTER
Verified patient with two patient identifiers. Spoke with patient to let you know that he is going to have blood work done today and when he can proceed to have a LHC. Told him base on his blood work result and they will decide when to proceed for LHC.   He has a pending appointment on [id number] to see .

## 2020-12-02 LAB
ERYTHROCYTE [DISTWIDTH] IN BLOOD BY AUTOMATED COUNT: 23.1 % (ref 11.6–15.4)
HCT VFR BLD AUTO: 38.6 % (ref 37.5–51)
HGB BLD-MCNC: 12.2 G/DL (ref 13–17.7)
MCH RBC QN AUTO: 26.8 PG (ref 26.6–33)
MCHC RBC AUTO-ENTMCNC: 31.6 G/DL (ref 31.5–35.7)
MCV RBC AUTO: 85 FL (ref 79–97)
PLATELET # BLD AUTO: 263 X10E3/UL (ref 150–450)
RBC # BLD AUTO: 4.55 X10E6/UL (ref 4.14–5.8)
WBC # BLD AUTO: 7.8 X10E3/UL (ref 3.4–10.8)

## 2020-12-02 NOTE — PROGRESS NOTES
Not stable Hgb; back to normal baseline. No abnormal findings on Colonoscopy. Plan to request that Mr. Kelton Maya return to the office to see Dr. David Noyola, prior to determining if a repeat catheterizations is recommended at this time.

## 2020-12-02 NOTE — TELEPHONE ENCOUNTER
Patient stated that he is returning iLu Lea, NP's call. Please advise.     Phone #: [phone]  Thanks

## 2020-12-03 NOTE — TELEPHONE ENCOUNTER
I was able to speak with Mr. Dia Lu. He reports that although his Hgb has now normalized to his baseline of around 12, he continues to experience profound dyspnea and fatigue. Prior to discovering his Hgb of 8, Dr. Sandra Tejada had planned to proceed with [facility] to further evaluate these symptoms, due to Mr. Polanco's significant CAD hx.     Mr. Dia Lu is now planning to seek the evaluation of Dr. Eliseo Alexandre to determine how to proceed, and has scheduled a follow up visit for 12/15; however, I am concerned that his sxs persist and would like for him to be seen sooner. I am going to work to facilitate a sooner appt. He was advised to reduce mod to high levels of exertion and to call back should sxs progress and/or call 911 or seek immediate medical attention if needed.

## 2020-12-03 NOTE — TELEPHONE ENCOUNTER
Toyin Morejon NP  You; Ba Baker MD 3 hours ago (10:07 AM)       Juan Toluca,   This patient is transferring care to Dr. Rena Ahuja from [facility] current situation is a bit complicated, but my notes should explain most of it.       Long story short, he has an appt with Dr. Rena Ahuja on 12/15, but I am pretty concerned about his sxs and feel that he needs a cath sooner than later. Again, see my notes, they will explain what I mean:)     Do you two think we could maybe squeeze him in around 3 or 3:20 today (it would be a double book) so that Dr. Rena Ahuja can meet him and then we can facilitate a cath with Dr. Emily Duke or Cathy, if he agrees with that plan?  I see that he is out starting lesli up until the 15th and the patient insist to see Dr. Rena Ahuja. His son in law works in the hospital and recommended him.      Routing comment

## 2020-12-04 ENCOUNTER — TELEPHONE (OUTPATIENT)
Dept: CARDIOLOGY CLINIC | Age: 80
End: 2020-12-04

## 2020-12-04 ENCOUNTER — OFFICE VISIT (OUTPATIENT)
Dept: CARDIOLOGY CLINIC | Age: 80
End: 2020-12-04
Payer: MEDICARE

## 2020-12-04 VITALS
WEIGHT: 262 LBS | DIASTOLIC BLOOD PRESSURE: 90 MMHG | BODY MASS INDEX: 31.9 KG/M2 | HEIGHT: 76 IN | SYSTOLIC BLOOD PRESSURE: 150 MMHG | HEART RATE: 86 BPM | OXYGEN SATURATION: 98 %

## 2020-12-04 DIAGNOSIS — I25.10 ATHEROSCLEROSIS OF NATIVE CORONARY ARTERY OF NATIVE HEART WITHOUT ANGINA PECTORIS: Primary | ICD-10-CM

## 2020-12-04 DIAGNOSIS — I10 ESSENTIAL HYPERTENSION, BENIGN: Primary | ICD-10-CM

## 2020-12-04 PROCEDURE — G8753 SYS BP > OR = 140: HCPCS | Performed by: SPECIALIST

## 2020-12-04 PROCEDURE — G8417 CALC BMI ABV UP PARAM F/U: HCPCS | Performed by: SPECIALIST

## 2020-12-04 PROCEDURE — 3288F FALL RISK ASSESSMENT DOCD: CPT | Performed by: SPECIALIST

## 2020-12-04 PROCEDURE — G8432 DEP SCR NOT DOC, RNG: HCPCS | Performed by: SPECIALIST

## 2020-12-04 PROCEDURE — G0463 HOSPITAL OUTPT CLINIC VISIT: HCPCS | Performed by: SPECIALIST

## 2020-12-04 PROCEDURE — G8427 DOCREV CUR MEDS BY ELIG CLIN: HCPCS | Performed by: SPECIALIST

## 2020-12-04 PROCEDURE — 1100F PTFALLS ASSESS-DOCD GE2>/YR: CPT | Performed by: SPECIALIST

## 2020-12-04 PROCEDURE — G8755 DIAS BP > OR = 90: HCPCS | Performed by: SPECIALIST

## 2020-12-04 PROCEDURE — 99214 OFFICE O/P EST MOD 30 MIN: CPT | Performed by: SPECIALIST

## 2020-12-04 PROCEDURE — 93010 ELECTROCARDIOGRAM REPORT: CPT | Performed by: SPECIALIST

## 2020-12-04 PROCEDURE — 93005 ELECTROCARDIOGRAM TRACING: CPT | Performed by: SPECIALIST

## 2020-12-04 PROCEDURE — G8536 NO DOC ELDER MAL SCRN: HCPCS | Performed by: SPECIALIST

## 2020-12-04 RX ORDER — SODIUM CHLORIDE 0.9 % (FLUSH) 0.9 %
5-40 SYRINGE (ML) INJECTION AS NEEDED
Status: CANCELLED | OUTPATIENT
Start: 2020-12-07

## 2020-12-04 RX ORDER — SODIUM CHLORIDE 0.9 % (FLUSH) 0.9 %
5-40 SYRINGE (ML) INJECTION EVERY 8 HOURS
Status: CANCELLED | OUTPATIENT
Start: 2020-12-07

## 2020-12-04 RX ORDER — SODIUM CHLORIDE 9 MG/ML
100 INJECTION, SOLUTION INTRAVENOUS CONTINUOUS
Status: CANCELLED | OUTPATIENT
Start: 2020-12-07

## 2020-12-04 RX ORDER — CARVEDILOL 6.25 MG/1
12.5 TABLET ORAL 2 TIMES DAILY
Qty: 180 TAB | Refills: 4 | Status: SHIPPED | OUTPATIENT
Start: 2020-12-04 | End: 2021-12-09 | Stop reason: DRUGHIGH

## 2020-12-04 NOTE — TELEPHONE ENCOUNTER
Spoke with pt concerning R/LHC procedure. Instructions given to pt per VO Dr. Joseph Gregg. Instructed patient to remain NPO after midnight; Informed patient to hold the Losartan & Spironolactone the morning of the procedure and hold the Metformin the day before and day of procedure as well. Have someone available to drive pt to and from procedure; pack a bag in case an overnight stay is warranted. R/LHC scheduled for 11:45 am on 12/7/20. Patient advised to arrive 2 hrs prior to procedure for prep. Patient verbalized understanding. Also emailed patient's directions to [email or web address]     Opportunities for questions, clarifications, and concerns provided.

## 2020-12-04 NOTE — PROGRESS NOTES
CARDIOLOGY OFFICE NOTE    Shimon Pérez MD, [address], [place], [address] [facility]  Phone [phone]; Fax [phone]  Mobile [phone]   Voice Mail [phone]      Abigail Prakash MD       ATTENTION:   This medical record was transcribed using an electronic medical records/speech recognition system. Although proofread, it may and can contain electronic, spelling and other errors. Corrections may be executed at a later time. Please feel free to contact us for any clarifications as needed. Mallory Boeck is a 79 y.o. male with  referred for dyspnea. Cardiac risk factors: diabetes mellitus, sedentary life style, male gender, hypertension  I have personally obtained the history from the patient. HISTORY OF PRESENTING ILLNESS        Mr. Polanco's cardiac history includes CAD with multiple PCI's, PAF status post watchman with a history of GI bleeding he has been followed by Dr. Claudio Montague in the past.  He has class III dyspnea on exertion he does not have any exertional chest discomfort or PND or orthopnea  . He even uses a motorized scooter secondary to his inability to ambulate and shortness of breath. Dr. Angelito Walker from pulmonary has been treating him with inhalers but he states it has not helped the patient denies chest pain/ shortness of breath, orthopnea, PND, LE edema, palpitations, syncope, presyncope or fatigue.        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 ([facility]- @ 25 yo-weak on right arm>leg 04/12/2016    TOPHER (obstructive sleep apnea) 01/23/2015    Atrial fibrillation ([facility] 08/16/2013    Dyslipidemia, goal LDL below 70 12/20/2010    Essential hypertension, benign 02/25/2010    Reflux esophagitis 02/25/2010    CAD (coronary artery disease) 02/25/2010           PAST MEDICAL HISTORY     Past Medical History:   Diagnosis Date    Arrhythmia     a fib    Atrial fibrillation ([facility] 8/16/2013    Basal cell carcinoma of anterior chest 2/25/2010    EARS AND NOSE    CAD (coronary artery disease) 2/25/2010    CVA (cerebral infarction) 2/25/2010    patient denies 12/13/13    Diverticula of colon 2/25/2010    Dyslipidemia 12/20/2010    Essential hypertension, benign 2/25/2010    Hypertension     TOPHER (obstructive sleep apnea) 1/23/2015    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  12/13/2013         HC CAPSULE ENDOSCOPE M2A  4/4/2014         HX COLONOSCOPY      HX CORONARY STENT PLACEMENT      HX HEART ASSIST DEVICE Left 09/24/2019    HX KNEE REPLACEMENT Left 2010    HX ORTHOPAEDIC Right     FUSION IN RIGHT WRIST    HX OTHER SURGICAL      watchman procedure.  HX OTHER SURGICAL      shoulder surgery.      HX VASECTOMY  1983    [place] CARDIOVERSION ELECTIVE ARRHYTHMIA EXTERNAL N/A 9/24/2019    Ep Cardioversion performed by Colette Pepper MD PAMELA at [address], [facility] [address] [facility]    UPPER GI ENDOSCOPY,BIOPSY  12/13/2013               ALLERGIES     Allergies   Allergen Reactions    Adhesive Tape Rash    Baycol Nausea and Vomiting    Clonidine Other (comments)     Headache and sleepiness      Pravachol [Pravastatin] Nausea and Vomiting          FAMILY HISTORY     Family History   Problem Relation Age of Onset    Heart Disease Mother     Cancer Father         colon    Heart Disease Maternal Grandmother     Heart Disease Maternal Grandfather     Cancer Paternal Grandmother     Cancer Paternal Grandfather     Anesth Problems Neg Hx     negative for cardiac disease       SOCIAL HISTORY     Social History     Socioeconomic History    Marital status:      Spouse name: Not on file    Number of children: Not on file    Years of education: Not on file    Highest education level: Not on file   Tobacco Use    Smoking status: Never Smoker    Smokeless tobacco: Former User     Types: Chew    Tobacco comment: patient reports quitting over 20 yeaers ago. Substance and Sexual Activity    Alcohol use: Yes     Frequency: Never     Comment: once a year    Drug use: No    Sexual activity: Not Currently   Other Topics Concern     Service Yes    Blood Transfusions Yes    Caffeine Concern Yes     Comment: 2-3 cups of coffee daily    Occupational Exposure No    Hobby Hazards No    Sleep Concern No    Stress Concern No    Weight Concern No    Special Diet No    Back Care No    Exercise Yes    Bike Helmet No     Comment: pt doesn't ride a bike   [address] [facility] Yes    Self-Exams No         MEDICATIONS     Current Outpatient Medications   Medication Sig    ferrous sulfate 325 mg (65 mg iron) tablet Take  by mouth Daily (before breakfast).  revefenacin (Yupelri) 175 mcg/3 mL nebu nebulizer solution 175 mcg by Nebulization route daily.     amLODIPine (NORVASC) 10 mg tablet Take 1 tablet by mouth once daily    metFORMIN ER (GLUCOPHAGE XR) 500 mg tablet TAKE 4 TABLETS BY MOUTH ONCE DAILY WITH BREAKFAST    dulaglutide (Trulicity) 8.10 FR/2.9 mL sub-q pen 0.5 mL by SubCUTAneous route every seven (7) days.  carvediloL (COREG) 6.25 mg tablet TAKE 1 TABLET BY MOUTH TWICE DAILY WITH MEALS    atorvastatin (LIPITOR) 40 mg tablet Take 1 tablet by mouth once daily    spironolactone (ALDACTONE) 25 mg tablet 25 mg daily.  losartan (COZAAR) 100 mg tablet TAKE 1 TABLET BY MOUTH ONCE DAILY FOR HIGH BLOOD PRESSURE    aspirin delayed-release 81 mg tablet Take 81 mg by mouth daily. Indications: MYOCARDIAL REINFARCTION PREVENTION    albuterol (PROVENTIL HFA, VENTOLIN HFA, PROAIR HFA) 90 mcg/actuation inhaler Take  by inhalation every six (6) hours as needed for Wheezing.  clopidogreL (PLAVIX) 75 mg tab Take 1 tablet by mouth once daily     No current facility-administered medications for this visit. I have reviewed the nurses notes, vitals, problem list, allergy list, medical history, family, social history and medications. REVIEW OF SYMPTOMS      General: Pt denies excessive weight gain or loss. Pt is able to conduct ADL's  HEENT: Denies blurred vision, headaches, hearing loss, epistaxis and difficulty swallowing. Respiratory: Denies cough, congestion, shortness of breath, HURD, wheezing or stridor. Cardiovascular: Denies precordial pain, palpitations, edema or PND  Gastrointestinal: Denies poor appetite, indigestion, abdominal pain or blood in stool  Genitourinary: Denies hematuria, dysuria, increased urinary frequency  Musculoskeletal: Denies joint pain or swelling from muscles or joints  Neurologic: Denies tremor, paresthesias, headache, or sensory motor disturbance  Psychiatric: Denies confusion, insomnia, depression  Integumentray: Denies rash, itching or ulcers.   Hematologic: Denies easy bruising, bleeding     PHYSICAL EXAMINATION      Vitals:    12/04/20 1011   BP: (!) 150/90   Pulse: 86   SpO2: 98%   Weight: 262 lb (118.8 kg) Height: 6' 4\" (1.93 m)     General: Well developed, in no acute distress. HEENT: No jaundice, oral mucosa moist, no oral ulcers  Neck: Supple, no stiffness, no lymphadenopathy, supple  Heart:  Normal S1/S2 negative S3 or S4. Regular, no murmur, gallop or rub, no jugular venous distention  Respiratory: Clear bilaterally x 4, no wheezing or rales  Abdomen:   Soft, non-tender, bowel sounds are active. Extremities:  No edema, normal cap refill, no cyanosis. Musculoskeletal: No clubbing, no deformities  Neuro: A&Ox3, speech clear, gait stable, cooperative, no focal neurologic deficits  Skin: Skin color is normal. No rashes or lesions. Non diaphoretic, moist.  Vascular: 2+ pulses symmetric in all extremities        EKG: Atrial fibrillation well controlled rate     DIAGNOSTIC DATA     1. Cath   (8/27/8): LAD p90, m70. OM1 70, OM2 90 (small). RCA m40. EF 55%. No AVG/MR. Patent L SC.  PCI (11/13/8): mLAD 2.75x18 Xience, pLAD 2.75x23 Xience. OM1 3.0x15 Xience. 4/28/17:Cath (4/28/17): LAD patent with patent prox and mid stent. LCx patent with patent OM1 stent. RCA p90 (ISR) and p90 distal to stent. EF 65%. No AVG/MR. PCI osteal/prox RCA (4/28/17): 3.0x28 Xience. 4/24/18: RCA - prior stents present; Prox ISR 60%; Mid to Distal - 60%  Difficulty engaging guide secondary to stent at ostium  JR4/AR1 guide would not engage ; [facility] guide used to engage RCA  FFR - not siginificant 0.89    1. Stress Test  Lexiscan Cardiolite (8/11/9): Mild fixed inf defect. No reversible defects. EF 51% with mild inf HK. Cath (8/29/13): LAD patent prox and mid stents. LCx m40; patent OM1 stent. RCA p80. EF 55%. No AVG/MR. PCI ost/proxRCA (8/29/13): 3.0x18 Xience. Echo (8/22/13): EF 55%. Mod dil LA. Mild dil RA. Mild MR/TR/NJ. PASP 24. Lexiscan Cardiolite 4/15/14 - normal perfusion imaging, EF 52%    DCCV 12/18/14    Echo 6/18/18 - EF 55%. No WMA. Mild asymmetric hypertrophy of the septum. MIld to mod LAE.  AoV calcification and sclerosis. No significant change c/t study of 22-Aug-2013. Echo 4/11/19 - EF 55-60%. Mod LAE. AoV sclerosis. Mild MR. Mild TR. Severely elevated CVP (>15 mmHg)    Echo-3/19/20- EF 61%, Mild concentric hypertrophy, LAE, severe AV sclerosis, Mild TR, PAP 28.6 mmHg    9/24/2019:Endocardial left atrial appendage occlusion was performed utilizing a 30 mm WATCHMAN device per Dr. Justin Brantley    YESENIA 11/9/19 - 30mm Watchman device present. Well seated. No evidence of carlton-device leak. No overlying thrombus noted on the device. Very small right to left shunting likely related to recent trans-septal puncture for Watchman implantation. LVEF 56-60%. Echo 3/19/20- EF 61%, mild LVH, PASP nl    Lipids  8/17/20- , HDL 50, LDL 74,          LABORATORY DATA            Lab Results   Component Value Date/Time    WBC 7.8 12/01/2020 12:00 AM    Hemoglobin (POC) 9.9 03/19/2014 09:14 AM    HGB 12.2 (L) 12/01/2020 12:00 AM    HCT 38.6 12/01/2020 12:00 AM    PLATELET 495 18/16/3663 12:00 AM    MCV 85 12/01/2020 12:00 AM      Lab Results   Component Value Date/Time    Sodium 140 10/05/2020 11:54 AM    Potassium 4.5 10/05/2020 11:54 AM    Chloride 110 (H) 10/05/2020 11:54 AM    CO2 22 10/05/2020 11:54 AM    Anion gap 8 10/05/2020 11:54 AM    Glucose 150 (H) 10/05/2020 11:54 AM    BUN 22 (H) 10/05/2020 11:54 AM    Creatinine 1.56 (H) 10/05/2020 11:54 AM    BUN/Creatinine ratio 14 10/05/2020 11:54 AM    GFR est AA 52 (L) 10/05/2020 11:54 AM    GFR est non-AA 43 (L) 10/05/2020 11:54 AM    Calcium 9.1 10/05/2020 11:54 AM    Bilirubin, total 0.2 08/17/2020 02:37 PM    Alk. phosphatase 136 (H) 08/17/2020 02:37 PM    Protein, total 7.1 08/17/2020 02:37 PM    Albumin 4.2 08/17/2020 02:37 PM    Globulin 4.8 (H) 05/24/2019 01:35 PM    A-G Ratio 1.4 08/17/2020 02:37 PM    ALT (SGPT) 11 08/17/2020 02:37 PM           ASSESSMENT/RECOMMENDATIONS:.         HTN - normotensive on combination therapy. He is followed by nephrology as well.   1. Class III dyspnea on exertion  2. PCI's with a PCI and stenting with a drug-eluting stent of the RCA  -Last cath in 2018 revealed stent was patent and distal LAD had 80% lesion  -We will go forward with a cardiac catheterization determine if there is any progression of disease  -ASA 3 airway 3  3. PAF on amiodarone  -Shortness of breath most likely not related amiodarone toxicity since his PFTs were okay and a CT of his chest did not demonstrate any abnormalities. -Status post watchman for GI bleed  -Cath was scheduled in October but had to be delayed because of a low hemoglobin that work-up was negative  4. Dyslipidemia LDL goal below 70  5. Type 2 diabetes  6. Hypertension blood pressure is elevated I noticed crease his Coreg to 6.25 mg twice daily    Return after cardiac catheterization. Orders Placed This Encounter    AMB POC EKG ROUTINE W/ 12 LEADS, INTER & REP     Order Specific Question:   Reason for Exam:     Answer:   HURD       We discussed the expected course, resolution and complications of the diagnosis(es) in detail. Medication risks, benefits, costs, interactions, and alternatives were discussed as indicated. I advised him to contact the office if his condition worsens, changes or fails to improve as anticipated. He expressed understanding with the diagnosis(es) and plan              I have discussed the diagnosis with  Sherry Espana and the intended plan as seen in the above orders. Questions were answered concerning future plans. I have discussed medication side effects and warnings with the patient as well. Thank you,  Bob Sauer MD for involving me in the care of  Sherry Espana. Please do not hesitate to contact me for further questions/concerns. Shimon Cochran MD, [address]      [facility], [address] [place] [address]      [phone] / [phone] Fax

## 2020-12-04 NOTE — PROGRESS NOTES
Michelle Malone is a 79 y.o. male    Visit Vitals  BP (!) 150/90 (BP 1 Location: Left arm, BP Patient Position: Sitting)   Pulse 86   Ht 6' 4\" (1.93 m)   Wt 262 lb (118.8 kg)   SpO2 98%   BMI 31.89 kg/m²       Chief Complaint   Patient presents with    Other     FATIGUE    Other     HURD       Chest pain NO  SOB YES WHEN WALKING OR STANDING UP    Dizziness NO  Swelling YES, FEET RIGHT MORE.    Recent hospital visit NO  Refills NO

## 2020-12-07 ENCOUNTER — HOSPITAL ENCOUNTER (OUTPATIENT)
Age: 80
Setting detail: OUTPATIENT SURGERY
Discharge: HOME | End: 2020-12-07
Attending: STUDENT IN AN ORGANIZED HEALTH CARE EDUCATION/TRAINING PROGRAM | Admitting: STUDENT IN AN ORGANIZED HEALTH CARE EDUCATION/TRAINING PROGRAM
Payer: MEDICARE

## 2020-12-07 VITALS
SYSTOLIC BLOOD PRESSURE: 139 MMHG | HEART RATE: 73 BPM | OXYGEN SATURATION: 95 % | DIASTOLIC BLOOD PRESSURE: 72 MMHG | RESPIRATION RATE: 20 BRPM

## 2020-12-07 DIAGNOSIS — I25.10 ATHEROSCLEROSIS OF NATIVE CORONARY ARTERY OF NATIVE HEART WITHOUT ANGINA PECTORIS: ICD-10-CM

## 2020-12-07 DIAGNOSIS — I25.10 CVD (CARDIOVASCULAR DISEASE): ICD-10-CM

## 2020-12-07 LAB
ACT BLD: 164 SECS (ref 79–138)
ANION GAP SERPL CALC-SCNC: 4 MMOL/L (ref 5–15)
BUN SERPL-MCNC: 20 MG/DL (ref 6–20)
BUN/CREAT SERPL: 16 (ref 12–20)
CALCIUM SERPL-MCNC: 8.7 MG/DL (ref 8.5–10.1)
CHLORIDE SERPL-SCNC: 111 MMOL/L (ref 97–108)
CO2 SERPL-SCNC: 26 MMOL/L (ref 21–32)
CREAT SERPL-MCNC: 1.29 MG/DL (ref 0.7–1.3)
GLUCOSE SERPL-MCNC: 163 MG/DL (ref 65–100)
POTASSIUM SERPL-SCNC: 4.1 MMOL/L (ref 3.5–5.1)
SODIUM SERPL-SCNC: 141 MMOL/L (ref 136–145)

## 2020-12-07 PROCEDURE — 99153 MOD SED SAME PHYS/QHP EA: CPT | Performed by: STUDENT IN AN ORGANIZED HEALTH CARE EDUCATION/TRAINING PROGRAM

## 2020-12-07 PROCEDURE — C1894 INTRO/SHEATH, NON-LASER: HCPCS | Performed by: STUDENT IN AN ORGANIZED HEALTH CARE EDUCATION/TRAINING PROGRAM

## 2020-12-07 PROCEDURE — 93458 L HRT ARTERY/VENTRICLE ANGIO: CPT | Performed by: STUDENT IN AN ORGANIZED HEALTH CARE EDUCATION/TRAINING PROGRAM

## 2020-12-07 PROCEDURE — 74011000636 HC RX REV CODE- 636: Performed by: STUDENT IN AN ORGANIZED HEALTH CARE EDUCATION/TRAINING PROGRAM

## 2020-12-07 PROCEDURE — 93571 IV DOP VEL&/PRESS C FLO 1ST: CPT | Performed by: STUDENT IN AN ORGANIZED HEALTH CARE EDUCATION/TRAINING PROGRAM

## 2020-12-07 PROCEDURE — 77030029065 HC DRSG HEMO QCLOT ZMED -B

## 2020-12-07 PROCEDURE — 74011250636 HC RX REV CODE- 250/636: Performed by: STUDENT IN AN ORGANIZED HEALTH CARE EDUCATION/TRAINING PROGRAM

## 2020-12-07 PROCEDURE — 77030004532 HC CATH ANGI DX IMP BSC -A: Performed by: STUDENT IN AN ORGANIZED HEALTH CARE EDUCATION/TRAINING PROGRAM

## 2020-12-07 PROCEDURE — 80048 BASIC METABOLIC PNL TOTAL CA: CPT

## 2020-12-07 PROCEDURE — 85347 COAGULATION TIME ACTIVATED: CPT

## 2020-12-07 PROCEDURE — C1769 GUIDE WIRE: HCPCS | Performed by: STUDENT IN AN ORGANIZED HEALTH CARE EDUCATION/TRAINING PROGRAM

## 2020-12-07 PROCEDURE — 77030008543 HC TBNG MON PRSS MRTM -A: Performed by: STUDENT IN AN ORGANIZED HEALTH CARE EDUCATION/TRAINING PROGRAM

## 2020-12-07 PROCEDURE — C1751 CATH, INF, PER/CENT/MIDLINE: HCPCS | Performed by: STUDENT IN AN ORGANIZED HEALTH CARE EDUCATION/TRAINING PROGRAM

## 2020-12-07 PROCEDURE — 77030013797 HC KT TRNSDUC PRSSR EDWD -A: Performed by: STUDENT IN AN ORGANIZED HEALTH CARE EDUCATION/TRAINING PROGRAM

## 2020-12-07 PROCEDURE — 36415 COLL VENOUS BLD VENIPUNCTURE: CPT

## 2020-12-07 PROCEDURE — C1887 CATHETER, GUIDING: HCPCS | Performed by: STUDENT IN AN ORGANIZED HEALTH CARE EDUCATION/TRAINING PROGRAM

## 2020-12-07 PROCEDURE — 74011000250 HC RX REV CODE- 250: Performed by: STUDENT IN AN ORGANIZED HEALTH CARE EDUCATION/TRAINING PROGRAM

## 2020-12-07 PROCEDURE — 92978 ENDOLUMINL IVUS OCT C 1ST: CPT | Performed by: STUDENT IN AN ORGANIZED HEALTH CARE EDUCATION/TRAINING PROGRAM

## 2020-12-07 PROCEDURE — 99152 MOD SED SAME PHYS/QHP 5/>YRS: CPT | Performed by: STUDENT IN AN ORGANIZED HEALTH CARE EDUCATION/TRAINING PROGRAM

## 2020-12-07 RX ORDER — FENTANYL CITRATE 50 UG/ML
INJECTION, SOLUTION INTRAMUSCULAR; INTRAVENOUS AS NEEDED
Status: DISCONTINUED | OUTPATIENT
Start: 2020-12-07 | End: 2020-12-07 | Stop reason: HOSPADM

## 2020-12-07 RX ORDER — LIDOCAINE HYDROCHLORIDE 10 MG/ML
INJECTION INFILTRATION; PERINEURAL AS NEEDED
Status: DISCONTINUED | OUTPATIENT
Start: 2020-12-07 | End: 2020-12-07 | Stop reason: HOSPADM

## 2020-12-07 RX ORDER — SODIUM CHLORIDE 0.9 % (FLUSH) 0.9 %
5-40 SYRINGE (ML) INJECTION EVERY 8 HOURS
Status: DISCONTINUED | OUTPATIENT
Start: 2020-12-07 | End: 2020-12-07 | Stop reason: HOSPADM

## 2020-12-07 RX ORDER — SODIUM CHLORIDE 0.9 % (FLUSH) 0.9 %
5-40 SYRINGE (ML) INJECTION AS NEEDED
Status: DISCONTINUED | OUTPATIENT
Start: 2020-12-07 | End: 2020-12-07 | Stop reason: HOSPADM

## 2020-12-07 RX ORDER — HEPARIN SODIUM 1000 [USP'U]/ML
INJECTION, SOLUTION INTRAVENOUS; SUBCUTANEOUS AS NEEDED
Status: DISCONTINUED | OUTPATIENT
Start: 2020-12-07 | End: 2020-12-07 | Stop reason: HOSPADM

## 2020-12-07 RX ORDER — SODIUM CHLORIDE 9 MG/ML
100 INJECTION, SOLUTION INTRAVENOUS CONTINUOUS
Status: DISCONTINUED | OUTPATIENT
Start: 2020-12-07 | End: 2020-12-07 | Stop reason: HOSPADM

## 2020-12-07 RX ORDER — MIDAZOLAM HYDROCHLORIDE 1 MG/ML
INJECTION, SOLUTION INTRAMUSCULAR; INTRAVENOUS AS NEEDED
Status: DISCONTINUED | OUTPATIENT
Start: 2020-12-07 | End: 2020-12-07 | Stop reason: HOSPADM

## 2020-12-07 RX ORDER — HEPARIN SODIUM 200 [USP'U]/100ML
INJECTION, SOLUTION INTRAVENOUS
Status: COMPLETED | OUTPATIENT
Start: 2020-12-07 | End: 2020-12-07

## 2020-12-07 NOTE — Clinical Note
Sheath #1: Sheath: left in place. Site secured by Tegaderm and transparent dressing. Sheath connected to heparin pressure bag. Hemostasis achieved.

## 2020-12-07 NOTE — PROGRESS NOTES
1600: Verbal report received from [facility], [address] [facility].    1726: Patient's right groin site is dry and intact with no signs of hematoma. Removed peripheral line and telemetry. V/S stable. Patient collected all belongings. Patient discharged to wife via wheelchair.

## 2020-12-07 NOTE — H&P
Tonia Pineda DO  [facility] [address] [place] [address], [address] [place] [address], [address] [place] [address]                                       Office [phone],C [phone]    Pre- Cardiac Catheterization Procedure Note    Procedure:  L/RHC  Preprocedure diagnosis:  Cad, worsening dsypnea      History of Presenting Illness:  Pt seen by Dr. Archana Junior last week. Worsening dyspnea. Hx of prior pci    Review of System:  Constitutional: neg  Resp: neg  Card: neg  Neuro: neg      No current facility-administered medications on file prior to encounter. Current Outpatient Medications on File Prior to Encounter   Medication Sig Dispense Refill    carvediloL (COREG) 6.25 mg tablet Take 2 Tabs by mouth two (2) times a day. 180 Tab 4    ferrous sulfate 325 mg (65 mg iron) tablet Take  by mouth Daily (before breakfast).  revefenacin (Yupelri) 175 mcg/3 mL nebu nebulizer solution 175 mcg by Nebulization route daily.  albuterol (PROVENTIL HFA, VENTOLIN HFA, PROAIR HFA) 90 mcg/actuation inhaler Take  by inhalation every six (6) hours as needed for Wheezing.  amLODIPine (NORVASC) 10 mg tablet Take 1 tablet by mouth once daily 90 Tab 0    metFORMIN ER (GLUCOPHAGE XR) 500 mg tablet TAKE 4 TABLETS BY MOUTH ONCE DAILY WITH BREAKFAST 360 Tab 0    dulaglutide (Trulicity) 0.23 YF/1.8 mL sub-q pen 0.5 mL by SubCUTAneous route every seven (7) days. 13 Pen 3    atorvastatin (LIPITOR) 40 mg tablet Take 1 tablet by mouth once daily 90 Tab 1    spironolactone (ALDACTONE) 25 mg tablet 25 mg daily. 3    losartan (COZAAR) 100 mg tablet TAKE 1 TABLET BY MOUTH ONCE DAILY FOR HIGH BLOOD PRESSURE 90 Tab 3    aspirin delayed-release 81 mg tablet Take 81 mg by mouth daily.  Indications: MYOCARDIAL REINFARCTION PREVENTION         Allergies   Allergen Reactions    Adhesive Tape Rash    Baycol Nausea and Vomiting    Clonidine Other (comments)     Headache and sleepiness      Pravachol [Pravastatin] Nausea and Vomiting       Physican Exam:    There were no vitals taken for this visit. Constitutional:  well-developed and well-nourished. No distress. Pulmonary/Chest: Effort normal and breath sounds normal. No respiratory distress, wheezes or rales. Cardiovascular: Normal rate, regular rhythm, S1 S2 .   Neurological:  Alert and oriented. Coordination seems grossly normal.   Psychiatric:  Good insight into underlying medical condition. ASA: 3  Mallampati: 3    Plan:    Risk and benefit of cardiac catheterization/PCI was described in detail to patient.  (risk of vascular access complication with potential surgical intervention for management, stroke, myocardial infarction, emergent open heart surgery and death)    Informed consent was obtained. Patient wishes to proceed with invasive study with potential percutaneous treatment.

## 2020-12-07 NOTE — PROGRESS NOTES
11:29 AM    TRANSFER - OUT REPORT:    Verbal report given to Yady Sesay RN (name) on Adilson Goodwin  being transferred to CATH LAB (unit) for ordered procedure       Report consisted of patients Situation, Background, Assessment and   Recommendations(SBAR). Information from the following report(s) SBAR was reviewed with the receiving nurse. Lines:   Peripheral IV 12/07/20 Left Forearm (Active)   Site Assessment Clean, dry, & intact 12/07/20 1045   Phlebitis Assessment 0 12/07/20 1045   Infiltration Assessment 0 12/07/20 1045   Dressing Status Clean, dry, & intact 12/07/20 1045   Dressing Type Transparent 12/07/20 1045        Opportunity for questions and clarification was provided.       Patient transported with:   Registered Nurse

## 2020-12-07 NOTE — PROCEDURES
BRIEF PROCEDURE NOTE    Date of Procedure: 12/7/2020   Preoperative Diagnosis: ham, cad  Postoperative Diagnosis: same    Procedure: Left heart cath, coronary angiography  Interventional Cardiologist: Edilberto Dillon DO  Assistant: None  Anesthesia: local + IV moderate sedation   I administered moderate sedation throughout this procedure. An independent trained observer pushed medications at my direction, and monitored the patients level of consciousness and physiological status throughout. Estimated Blood Loss: Minimal    Access: right common femoral artery, 6F. High bifurcation. Access at origin of SFA, deferred closure device  Catheters:  Left coronary:JL 4, 5F  Right coronary: JR4, 5F      Findings:   L Main: large caliber, distal left main tapering  LAD: large caliber, diffuse proximal 50% stenosis with apical 80% stenosis, iFR across LAD until just proximal of apical stenosis 0.92, several very small caliber diagonals with diffuse disease  LCx:Large caliber, high branching AV groove Lcx, large OM with distal bifrucation tortuous diffuse mild to moderate disease, AV groove Lcx just distal to OM1 with focal 60-70% stenosis, distal OM2 moderate with distal aspect of vessel with 80% stenosis, OM3 very small caliber  RCA: small to moderate caliber, previously placed stents with diffuse moderate disease, high branching PDA and small RPLV    LVEDP:  12-14 mmhg    PCI:  EBU 4, heparin 8000 units    verrata wire normalized in left main. iFR across lad with wire just proximal to LAD stenosis, 0.92. Returned to 0.99 in left main. No dissection or perforation      RHC findings:    Not performed. Femoral vein overlaying femoral artery, unable to successfully acces femoral vein      Specimens Removed: None    Implants: N/A    Closure Device:manual pressure    See full cath note. Complications: none      Findings:  1. Multivessel CAD. Moderate diffuse LAD disease with iFR of 0.92.   Distal Lcx with severe disease not amendable to PCI. RCA with diffuse moderate ISR. Long term durable result of RCA will be difficult due to caliber of vessel, furthermore with his hx of GI bleeding I do not think it is worth the risk to expose patient to DAPT again. Recent Hgb of 8 due to GI bleed.   2.  Minimal elevated lvedp    Plan:  Cont medical therapy for CAD        George Coy, DO Isabel Payne, DO  [facility] [address] [place] [address], [address] [place] [address], [address] [place] [address]                                              Office [phone],[facility] [phone]

## 2020-12-07 NOTE — Clinical Note
TRANSFER - IN REPORT:     Verbal report received from: endy. Report consisted of patient's Situation, Background, Assessment and   Recommendations(SBAR). Opportunity for questions and clarification was provided. Assessment completed upon patient's arrival to unit and care assumed. Patient transported with a Registered Nurse.  audrey

## 2020-12-07 NOTE — DISCHARGE INSTRUCTIONS
Cardiac Catheterization/Angiography Discharge Instructions    *Check the puncture site frequently for swelling or bleeding. If you see any bleeding, lie down and apply pressure over the area and call 911. Notify your doctor for any redness, swelling, drainage or oozing from the puncture site. Notify your doctor for any fever or chills. *If the leg or arm with the puncture becomes cold, numb or painful, call Dr Mary Kate Franks    *Activity should be limited for the next 48 hours. Climb stairs as little as possible and avoid any stooping, bending or strenuous activity for 48 hours. No heavy lifting (anything over 10 pounds) for three days. *Do not drive for 24 hours. *You may resume your usual diet. Drink more fluids than usual.    *Have a responsible person drive you home and stay with you for at least 24 hours after your heart catheterization/angiography. *You may remove the bandage from your groin in 24 hours. You may shower in 24 hours. No tub baths, hot tubs or swimming for one week. Do not place any lotions, creams, powders, ointments over the puncture site for one week. You may place a clean band-aid over the puncture site each day for 5 days. Change this daily.

## 2020-12-07 NOTE — PROGRESS NOTES
10:13 AM    Patient arrived. ID and allergies verified verbally with patient. Pt voices understanding of procedure to be performed. Consent obtained. Pt prepped for procedure. 12:39 PM    TRANSFER - IN REPORT:    Verbal report received from Peter Upton (name) on Michelle Malone  being received from [address] [place] [address] (unit) for routine post - op      Report consisted of patients Situation, Background, Assessment and   Recommendations(SBAR). Information from the following report(s) Procedure Summary was reviewed with the receiving nurse. Opportunity for questions and clarification was provided. Assessment completed upon patients arrival to unit and care assumed. 1:09 PM       Blood aspirated from sheath then arterial sheath pulled 6 Fr R Groin. Katelyn Kori applied. Manual pressure held by Henry Cedeño RN .    1:25 PM    Hemostasis achieved at 1325. Dressing applied. Pt voices understanding of post procedure bedrest instructions. 3:12 PM    Discharge instructions reviewed with patient and family. Voiced understanding. Patient given copy of discharge instructions to take home. 3:58 PM    Christoph [place] RN to resume care. Site assessed, procedure results reviewed, vitals reviewed, and  pulses checked.

## 2020-12-14 DIAGNOSIS — E11.9 TYPE 2 DIABETES MELLITUS WITHOUT COMPLICATION (HCC): ICD-10-CM

## 2020-12-14 RX ORDER — METFORMIN HYDROCHLORIDE 500 MG/1
TABLET, EXTENDED RELEASE ORAL
Qty: 360 TAB | Refills: 0 | Status: SHIPPED | OUTPATIENT
Start: 2020-12-14 | End: 2020-12-23

## 2020-12-14 RX ORDER — LOSARTAN POTASSIUM 100 MG/1
TABLET ORAL
Qty: 90 TAB | Refills: 0 | Status: SHIPPED | OUTPATIENT
Start: 2020-12-14 | End: 2021-05-17

## 2020-12-22 DIAGNOSIS — E11.9 TYPE 2 DIABETES MELLITUS WITHOUT COMPLICATION (HCC): ICD-10-CM

## 2020-12-22 NOTE — TELEPHONE ENCOUNTER
PCP: Ofelia Ruiz MD    Last appt: 08/17/20  No future appointments.     Requested Prescriptions     Pending Prescriptions Disp Refills    metFORMIN ER (GLUCOPHAGE XR) 500 mg tablet [Pharmacy Med Name: metFORMIN HCl  MG Oral Tablet Extended Release 24 Hour] 360 Tab 0     Sig: TAKE 4 TABLETS BY MOUTH ONCE DAILY WITH BREAKFAST

## 2020-12-23 RX ORDER — METFORMIN HYDROCHLORIDE 500 MG/1
TABLET, EXTENDED RELEASE ORAL
Qty: 360 TAB | Refills: 0 | Status: SHIPPED | OUTPATIENT
Start: 2020-12-23 | End: 2020-12-29

## 2020-12-23 NOTE — TELEPHONE ENCOUNTER
Needs to schedule an  office visit in Feb. Once visit is scheduled we can refill medication until appointment.

## 2020-12-29 ENCOUNTER — TELEPHONE (OUTPATIENT)
Dept: FAMILY MEDICINE CLINIC | Age: 80
End: 2020-12-29

## 2020-12-29 DIAGNOSIS — E11.9 TYPE 2 DIABETES MELLITUS WITHOUT COMPLICATION (HCC): ICD-10-CM

## 2020-12-29 RX ORDER — METFORMIN HYDROCHLORIDE 500 MG/1
TABLET, EXTENDED RELEASE ORAL
Qty: 360 TAB | Refills: 0 | Status: SHIPPED | OUTPATIENT
Start: 2020-12-29 | End: 2020-12-30

## 2020-12-29 NOTE — TELEPHONE ENCOUNTER
----- Message from Jarett Vaughn sent at 12/29/2020  2:19 PM EST -----  Regarding: Dr. Asif Ritter of caller (if not patient):      Best contact number(s):  [phone]     Name of medication and dosage if known:  Metformin     Is patient out of this medication (yes/no):    Yes been out for a week   Pharmacy name:[facility] on [address] [place] [address] listed in chart? (yes/no):yes  Pharmacy phone number:  N/A     Details to clarify the request:  Patient was told it would be sent a while ago but it's been over a week they need it today

## 2020-12-30 DIAGNOSIS — E11.9 TYPE 2 DIABETES MELLITUS WITHOUT COMPLICATION (HCC): Primary | ICD-10-CM

## 2020-12-30 RX ORDER — METFORMIN HYDROCHLORIDE 500 MG/1
TABLET, EXTENDED RELEASE ORAL
Qty: 360 TAB | Refills: 0 | Status: SHIPPED | OUTPATIENT
Start: 2020-12-30 | End: 2021-05-03

## 2020-12-30 RX ORDER — METFORMIN HYDROCHLORIDE 500 MG/1
TABLET, EXTENDED RELEASE ORAL
Qty: 360 TAB | Refills: 0 | Status: SHIPPED | OUTPATIENT
Start: 2020-12-30 | End: 2020-12-30 | Stop reason: SDUPTHER

## 2020-12-30 NOTE — TELEPHONE ENCOUNTER
----- Message from Magali Ramirez sent at 12/30/2020 11:13 AM EST -----  Regarding: Dr Donny De Souza (if not patient): n/a      Relationship of caller (if not patient): n/a      Best contact number(s): [phone]      Name of medication and dosage if known: metformin      Is patient out of this medication (yes/no):  yes      Pharmacy name: [address] [place] [address] listed in chart? (yes/no): yes  Pharmacy phone number: n/a      Details to clarify the request: Pt as been trying to fill for two weeks. It has been faxed over from [facility] 3 times. Pt has been without his medication for 1 week.          Magali Ramirez

## 2020-12-30 NOTE — TELEPHONE ENCOUNTER
Call placed to patient name and  verified. Advised patient that his medication was sent to his pharmacy on file. Patient was upset that it takes so long. Advised we had attempted a few times and it is failing. He expressed understanding.

## 2021-01-21 NOTE — Clinical Note
Catheter used: Right 4. Catheter inserted. PSYCHIATRY FOLLOW UP    Patient: Cyn Mobley  Date: 2021    :     1980       SOURCE OF INFORMATION  I based this report upon my review of information from written and electronic medical records and upon my interview and assessment of the patient's condition.    CHIEF COMPLAINT  Follow up for ADHD    HISTORY OF PRESENTING ILLNESS  Taking medications as directed. No side effects.    ADHD: patient states that she does not have any difficulty with attention or concentration when she takes Adderall.      PAST PSYCHIATRIC HISTORY  Prior diagnoses: ADHD    Past psych medications:   Wellbutrin - caused diarrhea    Suicide attempts: none    Inpatient: none    Outpatient: saw psychiatrist Ronal Dawson for 10 years, last visit 21      ALLERGIES  None    PAST MEDICAL HISTORY  Hypothyroid  Cervical intraepithelial neoplasia II  Fibroadenoma  Sensorineural hearing loss    PAST SURGICAL HISTORY  Breast bipopsy    FAMILY PSYCHIATRIC HISTORY  Sister - cocaine use disorder    SOCIAL HISTORY  Living with: lives with vane. No children, never     Work: sales    Caffeine: has 2 cups of coffee a day    Tobacco: former smoker, quit 2017    Alcohol: has 4 drinks about 3 times a week    Illicit substances: none      ROS      MENTAL STATUS EXAM  Appearance: Good hygiene, appropriately dressed, no acute distress  Behaivor: Cooperative with interview  Orientation: Oriented to person, place, date, and time  Level of consciousness: Alert  Speech: Normal rate, rhythm, volume, and tone  Language: Fluent in English  Attention/concentration: Grossly unimpaired  Psychomotor: Normal  Fund of knowledge: Appropriate for age and education level  Memory: No impairment in short term or long term memory  Mood: \"good\"  Affect: Congruent with stated mood  Associations: Intact  Thought process: Linear, intact  Thought content: No SI/HI, no paranoia or delusions  Perceptual disorders/hallucinations: No AH/VH  Insight:  Good  Judgement: Good      ASSESSMENT/PLAN  1. ADHD - continue Adderall 20 mg BID.    This encounter was done through a video visit due to the COVID-19 emergency. The patient gave verbal consent to do a video visit.  Patient location at time of visit: patient's home  Clinician location at time of visit: clinician's home  Length of visit: 15 minutes  E&M code for this visit is based on complexity.    Discussed risks, benefits, potential side effects, and alteratives to treatment for all medication changes. Obtained informed consent for all medication changes. Instructed patient that if they have any questions to call provider's office during regular work hours. Instructed patient that if they experience any significant worsening of symptoms, severe side effects to medication, or intent or plan to harm themselves or others to call 911 or visit emergency department.  Return for follow up appointment in: 3 months    Joaquim Mcgraw MD    Chief Complaint   Patient presents with   • Medication Management   • Video Visit     Current Outpatient Medications   Medication Sig   • amphetamine-dextroamphetamine (ADDERALL) 20 MG tablet Take 1 tablet by mouth 2 times daily.   • cholecalciferol (VITAMIN D) 10 mcg (400 units)/mL oral liquid Take by mouth daily.   • levothyroxine (SYNTHROID, LEVOTHROID) 75 MCG tablet TAKE 1 TABLET BY MOUTH DAILY. Last seen 3/2018. Please schedule appt     No current facility-administered medications for this visit.      ALLERGIES:  No Known Allergies  Past Medical History:   Diagnosis Date   • AJAY II (cervical intraepithelial neoplasia II)      Past Surgical History:   Procedure Laterality Date   • Breast biopsy      multiple breast biopsys,  all benign   • Colposcopy  07/30/2013    benign   • Colposcopy,loop electrd cervix excis  05/31/2012    AJAY 1,2      Family History   Problem Relation Age of Onset   • Cancer, Breast Mother    • Cancer, Kidney Mother    • Cancer, Lung Mother    • Diabetes  Father    • Hyperlipidemia Father    • Substance Abuse Sister       Social History     Socioeconomic History   • Marital status:      Spouse name: Not on file   • Number of children: Not on file   • Years of education: Not on file   • Highest education level: Not on file   Occupational History   • Occupation: sales   Social Needs   • Financial resource strain: Not on file   • Food insecurity     Worry: Not on file     Inability: Not on file   • Transportation needs     Medical: Not on file     Non-medical: Not on file   Tobacco Use   • Smoking status: Former Smoker     Packs/day: 0.00     Years: 2.00     Pack years: 0.00     Types: Cigarettes     Quit date: 2017     Years since quittin.0   • Smokeless tobacco: Never Used   Substance and Sexual Activity   • Alcohol use: Yes     Comment: social   • Drug use: Never   • Sexual activity: Yes     Partners: Male     Birth control/protection: None   Lifestyle   • Physical activity     Days per week: 3 days     Minutes per session: 60 min   • Stress: Not at all   Relationships   • Social connections     Talks on phone: Not on file     Gets together: Not on file     Attends Islam service: Not on file     Active member of club or organization: Not on file     Attends meetings of clubs or organizations: Not on file     Relationship status: Not on file   • Intimate partner violence     Fear of current or ex partner: No     Emotionally abused: No     Physically abused: No     Forced sexual activity: No   Other Topics Concern   • Not on file   Social History Narrative   • Not on file     The encounter diagnosis was Attention deficit hyperactivity disorder (ADHD), predominantly inattentive type.

## 2021-02-03 DIAGNOSIS — I25.10 CORONARY ARTERY DISEASE INVOLVING NATIVE CORONARY ARTERY OF NATIVE HEART WITHOUT ANGINA PECTORIS: ICD-10-CM

## 2021-02-03 RX ORDER — ATORVASTATIN CALCIUM 40 MG/1
TABLET, FILM COATED ORAL
Qty: 90 TAB | Refills: 0 | Status: SHIPPED | OUTPATIENT
Start: 2021-02-03 | End: 2021-05-06

## 2021-02-04 RX ORDER — AMLODIPINE BESYLATE 10 MG/1
TABLET ORAL
Qty: 90 TAB | Refills: 0 | Status: SHIPPED | OUTPATIENT
Start: 2021-02-04 | End: 2021-06-17

## 2021-02-24 ENCOUNTER — OFFICE VISIT (OUTPATIENT)
Dept: FAMILY MEDICINE CLINIC | Age: 81
End: 2021-02-24
Payer: MEDICARE

## 2021-02-24 DIAGNOSIS — K21.00 GASTROESOPHAGEAL REFLUX DISEASE WITH ESOPHAGITIS WITHOUT HEMORRHAGE: ICD-10-CM

## 2021-02-24 DIAGNOSIS — E11.21 TYPE 2 DIABETES MELLITUS WITH DIABETIC NEPHROPATHY, WITHOUT LONG-TERM CURRENT USE OF INSULIN (HCC): ICD-10-CM

## 2021-02-24 DIAGNOSIS — N18.31 STAGE 3A CHRONIC KIDNEY DISEASE (HCC): ICD-10-CM

## 2021-02-24 DIAGNOSIS — I10 ESSENTIAL HYPERTENSION, BENIGN: Primary | ICD-10-CM

## 2021-02-24 DIAGNOSIS — E61.1 IRON DEFICIENCY: ICD-10-CM

## 2021-02-24 DIAGNOSIS — Z79.01 CHRONIC ANTICOAGULATION: ICD-10-CM

## 2021-02-24 DIAGNOSIS — I25.10 CORONARY ARTERY DISEASE INVOLVING NATIVE CORONARY ARTERY OF NATIVE HEART WITHOUT ANGINA PECTORIS: ICD-10-CM

## 2021-02-24 DIAGNOSIS — G47.33 OSA (OBSTRUCTIVE SLEEP APNEA): ICD-10-CM

## 2021-02-24 DIAGNOSIS — E78.5 DYSLIPIDEMIA, GOAL LDL BELOW 70: ICD-10-CM

## 2021-02-24 DIAGNOSIS — Z00.00 MEDICARE ANNUAL WELLNESS VISIT, SUBSEQUENT: ICD-10-CM

## 2021-02-24 DIAGNOSIS — J45.20 MILD INTERMITTENT REACTIVE AIRWAY DISEASE WITH WHEEZING WITHOUT COMPLICATION: ICD-10-CM

## 2021-02-24 DIAGNOSIS — Z71.89 ACP (ADVANCE CARE PLANNING): ICD-10-CM

## 2021-02-24 DIAGNOSIS — I48.19 PERSISTENT ATRIAL FIBRILLATION (HCC): ICD-10-CM

## 2021-02-24 DIAGNOSIS — R53.82 CHRONIC FATIGUE: ICD-10-CM

## 2021-02-24 PROCEDURE — G8427 DOCREV CUR MEDS BY ELIG CLIN: HCPCS | Performed by: FAMILY MEDICINE

## 2021-02-24 PROCEDURE — G8752 SYS BP LESS 140: HCPCS | Performed by: FAMILY MEDICINE

## 2021-02-24 PROCEDURE — 3052F HG A1C>EQUAL 8.0%<EQUAL 9.0%: CPT | Performed by: FAMILY MEDICINE

## 2021-02-24 PROCEDURE — G0463 HOSPITAL OUTPT CLINIC VISIT: HCPCS | Performed by: FAMILY MEDICINE

## 2021-02-24 PROCEDURE — G8510 SCR DEP NEG, NO PLAN REQD: HCPCS | Performed by: FAMILY MEDICINE

## 2021-02-24 PROCEDURE — G8417 CALC BMI ABV UP PARAM F/U: HCPCS | Performed by: FAMILY MEDICINE

## 2021-02-24 PROCEDURE — G8754 DIAS BP LESS 90: HCPCS | Performed by: FAMILY MEDICINE

## 2021-02-24 PROCEDURE — 99215 OFFICE O/P EST HI 40 MIN: CPT | Performed by: FAMILY MEDICINE

## 2021-02-24 PROCEDURE — 1100F PTFALLS ASSESS-DOCD GE2>/YR: CPT | Performed by: FAMILY MEDICINE

## 2021-02-24 PROCEDURE — G8536 NO DOC ELDER MAL SCRN: HCPCS | Performed by: FAMILY MEDICINE

## 2021-02-24 PROCEDURE — 3288F FALL RISK ASSESSMENT DOCD: CPT | Performed by: FAMILY MEDICINE

## 2021-02-24 PROCEDURE — G0439 PPPS, SUBSEQ VISIT: HCPCS | Performed by: FAMILY MEDICINE

## 2021-02-24 NOTE — PROGRESS NOTES
HISTORY OF PRESENT ILLNESS  HPI  Donny Polanco is a 54 H. o. male with a history of HTN, CAD, A-fib, CHF with preserved ejection fraction, reflux esophagitis, DM-II with nephropathy, BCC, cerebral infarction, TOPHER, fatigue, anemia and hyperlipidemia with LDL goal <70, who presents today for f/u of these health problems. He stopped his Trulicity due to cost.    Pt plans to receive the COVID-19 vaccine. He says that he started physical therapy to better help him walk. He explains that he falls and has trouble getting up from sitting. Pt states that he is taking 81 mg of aspirin and has stopped taking Plavix. He notes that he did not  his Trulicity due to cost, so he has not been taking it. He remarks that he uses his albuterol inhaler prn up to 4 times a day. Pt estimates that he used this twice in the past week. Pt denies unusual SOB, chest pain, and any recent ER visits or hospitalizations.        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  12/13/2013  HC CAPSULE ENDOSCOPE M2A  4/4/2014         HX COLONOSCOPY      HX CORONARY STENT PLACEMENT      HX HEART ASSIST DEVICE Left 09/24/2019    HX KNEE REPLACEMENT Left 2010    HX ORTHOPAEDIC Right     FUSION IN RIGHT WRIST    HX OTHER SURGICAL      watchman procedure.  HX OTHER SURGICAL      shoulder surgery.  HX VASECTOMY  1983    [place] CARDIAC SURG PROCEDURE UNLIST      X4 STENTS    [place] CARDIOVERSION ELECTIVE ARRHYTHMIA EXTERNAL N/A 9/24/2019    Ep Cardioversion performed [place] Marko Linder [place] [address], [facility]/s Dr CATH LAB    UPPER GI ENDOSCOPY,BIOPSY  12/13/2013          Current Outpatient Medications on File Prior to Visit   Medication Sig Dispense Refill    amLODIPine (NORVASC) 10 mg tablet Take 1 tablet by mouth once daily 90 Tab 0    atorvastatin (LIPITOR) 40 mg tablet Take 1 tablet by mouth once daily 90 Tab 0    metFORMIN ER (GLUCOPHAGE XR) 500 mg tablet TAKE 4 TABLETS BY MOUTH ONCE DAILY WITH BREAKFAST 360 Tab 0    losartan (COZAAR) 100 mg tablet TAKE 1 TABLET BY MOUTH ONCE DAILY FOR HIGH BLOOD PRESSURE 90 Tab 0    carvediloL (COREG) 6.25 mg tablet Take 2 Tabs by mouth two (2) times a day. 180 Tab 4    ferrous sulfate 325 mg (65 mg iron) tablet Take  by mouth Daily (before breakfast).  spironolactone (ALDACTONE) 25 mg tablet 25 mg daily. 3    aspirin delayed-release 81 mg tablet Take 81 mg by mouth daily. Indications: MYOCARDIAL REINFARCTION PREVENTION      revefenacin (Yupelri) 175 mcg/3 mL nebu nebulizer solution 175 mcg by Nebulization route daily.  [DISCONTINUED] albuterol (PROVENTIL HFA, VENTOLIN HFA, PROAIR HFA) 90 mcg/actuation inhaler Take  by inhalation every six (6) hours as needed for Wheezing.  [DISCONTINUED] dulaglutide (Trulicity) 1.24 XW/5.5 mL sub-q pen 0.5 mL by SubCUTAneous route every seven (7) days. 13 Pen 3     No current facility-administered medications on file prior to visit.       Allergies   Allergen Reactions    Adhesive Tape Rash    Baycol Nausea and Vomiting    Clonidine Other (comments)     Headache and sleepiness      Pravachol [Pravastatin] Nausea and Vomiting     Family History   Problem Relation Age of Onset    Heart Disease Mother     Cancer Father         colon    Heart Disease Maternal Grandmother     Heart Disease Maternal Grandfather     Cancer Paternal Grandmother     Cancer Paternal Grandfather     Anesth Problems Neg Hx      Social History     Socioeconomic History    Marital status:      Spouse name: Not on file    Number of children: Not on file    Years of education: Not on file    Highest education level: Not on file   Tobacco Use    Smoking status: Never Smoker    Smokeless tobacco: Former User     Types: Chew    Tobacco comment: patient reports quitting over 20 yeaers ago. Substance and Sexual Activity    Alcohol use: Yes     Frequency: Never     Comment: once a year    Drug use: No    Sexual activity: Not Currently   Other Topics Concern     Service Yes    Blood Transfusions Yes    Caffeine Concern Yes     Comment: 2-3 cups of coffee daily    Occupational Exposure No    Hobby Hazards No    Sleep Concern No    Stress Concern No    Weight Concern No    Special Diet No    Back Care No    Exercise Yes    Bike Helmet No     Comment: pt doesn't ride a bike   [address] Yes    Self-Exams No               Review of Systems   Constitutional: Negative for chills, diaphoresis, fever, malaise/fatigue and weight loss. Eyes: Negative for blurred vision, double vision, pain and redness. Respiratory: Negative for cough, shortness of breath and wheezing. Cardiovascular: Negative for chest pain, palpitations, orthopnea, claudication, leg swelling and PND. Genitourinary: Negative for frequency. Skin: Negative for itching and rash. Neurological: Negative for dizziness, tingling, tremors, sensory change, speech change, focal weakness, seizures, loss of consciousness, weakness and headaches. Endo/Heme/Allergies: Negative for environmental allergies and polydipsia. Does not bruise/bleed easily. Results for orders placed or performed in visit on 02/24/21   IRON PROFILE   Result Value Ref Range    Iron 128 35 - 150 ug/dL    TIBC 373 250 - 450 ug/dL    Iron % saturation 34 20 - 50 %   CBC W/O DIFF   Result Value Ref Range    WBC 7.1 4.1 - 11.1 K/uL    RBC 4.41 4.10 - 5.70 M/uL    HGB 13.0 12.1 - 17.0 g/dL    HCT 41.3 36.6 - 50.3 %    MCV 93.7 80.0 - 99.0 FL    MCH 29.5 26.0 - 34.0 PG    MCHC 31.5 30.0 - 36.5 g/dL    RDW 17.0 (H) 11.5 - 14.5 %    PLATELET 123 915 - 366 K/uL    MPV 12.0 8.9 - 12.9 FL    NRBC 0.3 (H) 0  WBC    ABSOLUTE NRBC 0.02 (H) 0.00 - 0.01 K/uL   HEMOGLOBIN A1C WITH EAG   Result Value Ref Range    Hemoglobin A1c 8.2 (H) 4.0 - 5.6 %    Est. average glucose 189 mg/dL   MICROALBUMIN, UR, RAND W/ MICROALB/CREAT RATIO   Result Value Ref Range    Microalbumin,urine random 14.40 MG/DL    Creatinine, urine 324.00 mg/dL    Microalbumin/Creat ratio (mg/g creat) 44 (H) 0 - 30 mg/g   METABOLIC PANEL, COMPREHENSIVE   Result Value Ref Range    Sodium 142 136 - 145 mmol/L    Potassium 4.4 3.5 - 5.1 mmol/L    Chloride 108 97 - 108 mmol/L    CO2 25 21 - 32 mmol/L    Anion gap 9 5 - 15 mmol/L    Glucose 170 (H) 65 - 100 mg/dL    BUN 23 (H) 6 - 20 MG/DL    Creatinine 1.38 (H) 0.70 - 1.30 MG/DL    BUN/Creatinine ratio 17 12 - 20      GFR est AA >60 >60 ml/min/1.73m2    GFR est non-AA 50 (L) >60 ml/min/1.73m2    Calcium 9.2 8.5 - 10.1 MG/DL    Bilirubin, total 0.5 0.2 - 1.0 MG/DL    ALT (SGPT) 20 12 - 78 U/L    AST (SGOT) 10 (L) 15 - 37 U/L    Alk.  phosphatase 145 (H) 45 - 117 U/L    Protein, total 7.2 6.4 - 8.2 g/dL    Albumin 3.6 3.5 - 5.0 g/dL    Globulin 3.6 2.0 - 4.0 g/dL    A-G Ratio 1.0 (L) 1.1 - 2.2     LIPID PANEL   Result Value Ref Range    LIPID PROFILE          Cholesterol, total 152 <200 MG/DL    Triglyceride 138 <150 MG/DL    HDL Cholesterol 42 MG/DL    LDL, calculated 82.4 0 - 100 MG/DL    VLDL, calculated 27.6 MG/DL    CHOL/HDL Ratio 3.6 0.0 - 5.0             Physical Exam  Visit Vitals  /78   Pulse 72   Temp 98.6 °F (37 °C)   Resp 16   SpO2 99%         Head: Normocephalic, without obvious abnormality, atraumatic  Eyes: conjunctivae/corneas clear. PERRL, EOM's intact. Neck: supple, symmetrical, trachea midline, no adenopathy, thyroid: not enlarged, symmetric, no tenderness/mass/nodules, no carotid bruit and no JVD  Lungs: clear to auscultation bilaterally  Heart: regular rate and rhythm, S1, S2 normal, no murmur, click, rub or gallop  Extremities: extremities normal, atraumatic, no cyanosis. He has trace to 1+ edema  Pulses: 2+ and symmetric  Lymph nodes: Cervical, supraclavicular, and axillary nodes normal.  Neurologic: Grossly unchanged- chronic weakness on right side, arm> leg  Diabetic foot exam:     Left: Reflexes 2+     Proprioception normal    Pulse DP: 2+ (normal)   Pulse PT: 2+ (normal)   Deformities: None  Right: Reflexes 2+              Proprioception normal   Pulse DP: 2+ (normal)   Pulse PT: 2+ (normal)   Deformities: None   He has some slight decreased sensation in the left leg versus to right to pin prick. ASSESSMENT and PLAN    ICD-10-CM ICD-9-CM    1. Essential hypertension, benign  J98 602.2 METABOLIC PANEL, COMPREHENSIVE      METABOLIC PANEL, COMPREHENSIVE   2. Dyslipidemia, goal LDL below 70  E78.5 272.4 LIPID PANEL      METABOLIC PANEL, COMPREHENSIVE      METABOLIC PANEL, COMPREHENSIVE      LIPID PANEL   3. Type 2 diabetes mellitus with diabetic nephropathy, without long-term current use of insulin ([facility])  E11.21 250.40 MICROALBUMIN, UR, RAND W/ MICROALB/CREAT RATIO     583.81 HEMOGLOBIN A1C WITH EAG      HEMOGLOBIN A1C WITH EAG      MICROALBUMIN, UR, RAND W/ MICROALB/CREAT RATIO       DIABETES FOOT EXAM   4. Iron deficiency  E61.1 280.9 CBC W/O DIFF      IRON PROFILE      IRON PROFILE      CBC W/O DIFF   5.  Coronary artery disease involving native coronary artery of native heart without angina pectoris  I25.10 414.01    6. TOPHER (obstructive sleep apnea)  G47.33 327.23    7. Gastroesophageal reflux disease with esophagitis without hemorrhage  K21.00 530.81      530.10    8. Chronic fatigue  R53.82 780.79    9. Stage 3a chronic kidney disease  N18.31 585.3    10. Chronic anticoagulation  Z79.01 V58.61    11. Persistent atrial fibrillation (HCC)  I48.19 427.31    12. ACP (advance care planning)  Z71.89 V65.49    13. Medicare annual wellness visit, subsequent  Z00.00 V70.0    14. Mild intermittent reactive airway disease with wheezing without complication  Z33.91 820.23 albuterol (PROVENTIL HFA, VENTOLIN HFA, PROAIR HFA) 90 mcg/actuation inhaler     Diagnoses and all orders for this visit:    1. Essential hypertension, benign  -     METABOLIC PANEL, COMPREHENSIVE; Future    2. Dyslipidemia, goal LDL below 70  -     LIPID PANEL; Future  -     METABOLIC PANEL, COMPREHENSIVE; Future    3. Type 2 diabetes mellitus with diabetic nephropathy, without long-term current use of insulin ([facility])  -     MICROALBUMIN, UR, RAND W/ MICROALB/CREAT RATIO; Future  -     HEMOGLOBIN A1C WITH EAG; Future  -      DIABETES FOOT EXAM    4. Iron deficiency  -     CBC W/O DIFF; Future  -     IRON PROFILE; Future    5. Coronary artery disease involving native coronary artery of native heart without angina pectoris    6. TOPHER (obstructive sleep apnea)    7. Gastroesophageal reflux disease with esophagitis without hemorrhage    8. Chronic fatigue    9. Stage 3a chronic kidney disease    10. Chronic anticoagulation    11. Persistent atrial fibrillation ([address]    12. ACP (advance care planning)    13. Medicare annual wellness visit, subsequent    14. Mild intermittent reactive airway disease with wheezing without complication  -     albuterol (PROVENTIL HFA, VENTOLIN HFA, PROAIR HFA) 90 mcg/actuation inhaler; Take 1-2 Puffs by inhalation every six (6) hours as needed for Wheezing.  Indications: bronchospasm      Follow-up and Dispositions    · Return in about 6 months (around 8/24/2021) for F/U HTN,CHOL,DM, F/U of obesity, atrial fibrillation. lab results and schedule of future lab studies reviewed with patient  reviewed diet, exercise and weight control  cardiovascular risk and specific lipid/LDL goals reviewed  reviewed medications and side effects in detail  Please call my office if there are any questions- [phone]. I will arrange for follow up after the lab tests done from today return  Recommended a weekly \"heart check. \" I went into detail how to do this. Call for refills on medications as needed. Discussed expected course/resolution/complications of diagnosis in detail with patient. Medication risks/benefits/costs/interactions/alternatives discussed with patient. Pt was given an after visit summary which includes diagnoses, current medications & vitals. Pt expressed understanding with the diagnosis and plan      BMI is significantly elevated- in the obese range. I reviewed diet, exercise and weight control. Discussed weight control in detail, the importance of mainly decreased carbs, and for weight maintenance, exercise; discussed different diets and that it isn't as important to watch the type of foods as it is to decrease calorie intake no matter what type of diet you do, etc.       Total 30 minutes  re: Recommended a weekly \"heart check. \" I went into detail how to do this. Regular exercise is very important to your health; it helps mentally, physically, socially; it prevents injuries if done properly. Exercise, even as simple as walking 20-30 minutes daily has major benefits to your health even though your \"numbers\" are the same in the lab. See if you can add this into your daily regimen and after a few months it will become a regular habit-\"just something you do,\" like brushing your teeth.      A combination of aerobic exercise and strengthening and stretching is felt to be the best for you, so this should be your ultimate goal.   This can be done in the privacy of your home or in a group setting as at the gym  Some prefer having a , others prefer to do exercise in groups or individually. Do what \"works\" for you. You need to make it simple and \"fun,\" or you most likely will not continue it. Reviewed symptoms, or lack thereof, of hypertension and elevated cholesterol. Review of  the proper technique of checking the blood pressure- check it on an average day only, not on a stressful day, sitting, no exercise for at least 1 hour and not experiencing any new pain( chronic pain is OK). Patient encouraged to check BP sitting and standing at least once a month and to report these readings to me if > 140/ 90 on average , or if the standing BP is >  15 points lower than the sitting. Always check it twice and if there is > 5 points decrease from the previous reading( top reading or systolic) keep checking it until it does not drop 5 points. Write only this final reading down, not the preceding readings. If out of these readings there is only 1 out of 4 or less > 320, or > 90 diastolic then your blood pressure is OK; it needs further treatment if it is above this. Also, don't forget,  as noted above, to check your blood pressure standing once a month; this is to detect a drop in your BP that might lead to fainting and serious injury; you check it standing with your arm hanging straight down and relaxed. Check it twice waiting 1 minute between the two readings. If, with either one of these 2 readings there is a > 15 point drop of the systolic compared to your sitting pressure( done before the standing BP), then let me know. Following these guidelines, continue to check your BP and write down only the ones described above and it will help me to effectively treat your blood pressure. Reviewed BP, cholesterol and diabetic goals.  LDL goal<100,HDL goal>45, Triglyceride goal<150, A1C< 7.0, BP<140/90. Discussed specific diabetic recommendations: low cholesterol diet, weight control and daily exercise discussed, home glucose monitoring emphasized, all medications, side effects and compliance discussed carefully, foot care discussed and Podiatry visits discussed, annual eye examinations at Ophthalmology discussed, glycohemoglobin and other lab monitoring discussed and long term diabetic complications discussed. Also, discussed symptoms of concern that were noted today in the note above, treatment options( including doing nothing), when to follow up before recommended time frame. Also, answered all questions. I talked to him about the importance of trying to get the vaccine. Long discussion re: Covid 19 infection, prevention, treatment limitations, importance of masks and distancing, and the upcoming vaccines. At this point there are no plans to give it in the office- pull up the [address] on your computer and go to [employer] and update everyday. They are directng where it is to be given. Presently it is being given at all of the hospitals in non patient areas- classrooms, etc. It is also going to be given at the [facility] and is presently beng given at the [facility]. When it is time for you to get your vaccine( based mainly on age and possibly on health risk), they will have it all over the news as well as their website as where to go. We will probably send out an email when it becomes time for us to start giving it- I don't see that happening until into April or later. I would get whatever vaccine is available; the Jack Judd and Agustin Forrest are very similar in efficacy and side effects. Because he does have some decreased sensation, I suggested he start checking his feet at home with a sharp needle to confirm that he does have decreased sensation.  If he does, he should start checking his feet for signs of infection or wounds on a daily basis. We did check his hemoglobin as he does have a hx of it dropping suddenly from some sort of unrecognized GI loss( negative workup several times). He notes that his palms are not pale like they were in the past when he had anemia. Hemoglobin was up to 12 back in December. Pt did stop his Trulicity because of cost. I suggested he obtain a formulary, so we can obtain a medication that is affordable. This document was written by Lb Brooks, as dictated by Luan Don MD.  I have reviewed and agree with the above note and have made corrections where appropriate Shimon Milner M.D. This is the Subsequent Medicare Annual Wellness Exam, performed 12 months or more after the Initial AWV or the last Subsequent AWV    I have reviewed the patient's medical history in detail and updated the computerized patient record. Depression Risk Factor Screening:     3 most recent PHQ Screens 2/24/2021   Little interest or pleasure in doing things Not at all   Feeling down, depressed, irritable, or hopeless Not at all   Total Score PHQ 2 0       Alcohol Risk Screen    Do you average more than 1 drink per night or more than 7 drinks a week: No    In the past three months have you have had more than 4 drinks containing alcohol on one occasion: No        Functional Ability and Level of Safety:    Hearing: Hearing is good. Activities of Daily Living: The home contains: grab bars  Patient does total self care      Ambulation: with difficulty, uses a cane and walker     Fall Risk:  Fall Risk Assessment, last 12 mths 2/24/2021   Able to walk? No   Fall in past 12 months? 1   Do you feel unsteady? 0   Are you worried about falling 0   Is TUG test greater than 12 seconds? 0   Is the gait abnormal? 0   Number of falls in past 12 months 1   Fall with injury?  0      Abuse Screen:  Patient is not abused       Cognitive Screening    Has your family/caregiver stated any concerns about your memory: no     Cognitive Screening: Normal - Mini Cog Test    Assessment/Plan   Education and counseling provided:  Are appropriate based on today's review and evaluation    Diagnoses and all orders for this visit:    1. Essential hypertension, benign  -     METABOLIC PANEL, COMPREHENSIVE; Future    2. Dyslipidemia, goal LDL below 70  -     LIPID PANEL; Future  -     METABOLIC PANEL, COMPREHENSIVE; Future    3. Type 2 diabetes mellitus with diabetic nephropathy, without long-term current use of insulin (HCC)  -     MICROALBUMIN, UR, RAND W/ MICROALB/CREAT RATIO; Future  -     HEMOGLOBIN A1C WITH EAG; Future  -      DIABETES FOOT EXAM    4. Iron deficiency  -     CBC W/O DIFF; Future  -     IRON PROFILE; Future    5. Coronary artery disease involving native coronary artery of native heart without angina pectoris    6. TOPHER (obstructive sleep apnea)    7. Gastroesophageal reflux disease with esophagitis without hemorrhage    8. Chronic fatigue    9. Stage 3a chronic kidney disease    10. Chronic anticoagulation    11. Persistent atrial fibrillation ([facility] [address]    12. ACP (advance care planning)    13. Medicare annual wellness visit, subsequent    14. Mild intermittent reactive airway disease with wheezing without complication  -     albuterol (PROVENTIL HFA, VENTOLIN HFA, PROAIR HFA) 90 mcg/actuation inhaler; Take 1-2 Puffs by inhalation every six (6) hours as needed for Wheezing.  Indications: bronchospasm        Health Maintenance Due     Health Maintenance Due   Topic Date Due    Hepatitis C Screening  1940    COVID-19 Vaccine (1 of 2) 04/07/1957    Shingrix Vaccine Age 50> (1 of 2) 04/07/1991    GLAUCOMA SCREENING Q2Y  01/19/2017    Flu Vaccine (1) 09/01/2020       Patient Care Team   Patient Care Team:  Ashely Zhou MD as PCP - Vishnu Arnold MD as PCP - [facility] Empaneled Provider  Brenden Landrum MD as Consulting Provider (Cardiology)  Marisol Kline MD (Pulmonary Disease)  Autumn Cross MD (Gastroenterology)  Sourav Yu MD (Orthopedic Surgery)  Rosalba Roman MD (Optometry)  Francesco Yuan MD as Physician (Sleep Medicine)  Mable Martinez MD as Physician (Nephrology)  Francesco Yuan MD as Physician (Sleep Medicine)  Puneet Steve MD as Physician (Pulmonary Disease)  Yaa Lyn MD as Physician (Pulmonary Disease)    History     Patient Active Problem List   Diagnosis Code    Essential hypertension, benign I10    Reflux esophagitis K21.00    CAD (coronary artery disease) I25.10    Dyslipidemia, goal LDL below 70 E78.5    Atrial fibrillation ([address] I48.91    TOPHER (obstructive sleep apnea) G47.33    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 ([address]- @ 22 yo-weak on right arm>leg I63.40    Type 2 diabetes mellitus with diabetic nephropathy, without long-term current use of insulin (HCC) E11.21    Chronic fatigue R53.82    Chronic heart failure with preserved ejection fraction ([facility]) I50.32    CKD (chronic kidney disease) stage 3, GFR 30-59 ml/min N18.30    Chronic anticoagulation Z79.01    Presence of Watchman left atrial appendage closure device Z95.818    Dysarthria R47.1     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 2/25/2010    Other and unspecified hyperlipidemia 2/25/2010    Positive PPD 2/25/2010    Reflux esophagitis 2/25/2010    NO LONGER A PROBLEM    Stroke [facility])     in 1959 at age of 26    Type II or unspecified type diabetes mellitus without mention of complication, not stated as uncontrolled 2/25/2010      Past Surgical History:   Procedure Laterality Date    COLONOSCOPY N/A 11/11/2020    COLONOSCOPY/EGD performed by David Muñoz MD at [address]  12/13/2013         HC CAPSULE ENDOSCOPE M2A  4/4/2014         HX COLONOSCOPY      HX CORONARY STENT PLACEMENT      HX HEART ASSIST DEVICE Left 09/24/2019    HX KNEE REPLACEMENT Left 2010    HX ORTHOPAEDIC Right     FUSION IN RIGHT WRIST    HX OTHER SURGICAL      watchman procedure.  HX OTHER SURGICAL      shoulder surgery.  HX VASECTOMY  1983    MD CARDIAC SURG PROCEDURE UNLIST      X4 STENTS    MD CARDIOVERSION ELECTIVE ARRHYTHMIA EXTERNAL N/A 9/24/2019    Ep Cardioversion performed by Rudi Giraldo MD at [address] CATH LAB    UPPER GI ENDOSCOPY,BIOPSY  12/13/2013          Current Outpatient Medications   Medication Sig Dispense Refill    albuterol (PROVENTIL HFA, VENTOLIN HFA, PROAIR HFA) 90 mcg/actuation inhaler Take 1-2 Puffs by inhalation every six (6) hours as needed for Wheezing. Indications: bronchospasm 1 Inhaler 3    amLODIPine (NORVASC) 10 mg tablet Take 1 tablet by mouth once daily 90 Tab 0    atorvastatin (LIPITOR) 40 mg tablet Take 1 tablet by mouth once daily 90 Tab 0    metFORMIN ER (GLUCOPHAGE XR) 500 mg tablet TAKE 4 TABLETS BY MOUTH ONCE DAILY WITH BREAKFAST 360 Tab 0    losartan (COZAAR) 100 mg tablet TAKE 1 TABLET BY MOUTH ONCE DAILY FOR HIGH BLOOD PRESSURE 90 Tab 0    carvediloL (COREG) 6.25 mg tablet Take 2 Tabs by mouth two (2) times a day. 180 Tab 4    ferrous sulfate 325 mg (65 mg iron) tablet Take  by mouth Daily (before breakfast).  spironolactone (ALDACTONE) 25 mg tablet 25 mg daily. 3    aspirin delayed-release 81 mg tablet Take 81 mg by mouth daily. Indications: MYOCARDIAL REINFARCTION PREVENTION      revefenacin (Yupelri) 175 mcg/3 mL nebu nebulizer solution 175 mcg by Nebulization route daily.        Allergies   Allergen Reactions    Adhesive Tape Rash    Baycol Nausea and Vomiting    Clonidine Other (comments)     Headache and sleepiness      Pravachol [Pravastatin] Nausea and Vomiting       Family History   Problem Relation Age of Onset    Heart Disease Mother     Cancer Father         colon    Heart Disease Maternal Grandmother     Heart Disease Maternal Grandfather     Cancer Paternal Grandmother     Cancer Paternal Grandfather     Anesth Problems Neg Hx      Social History     Tobacco Use    Smoking status: Never Smoker    Smokeless tobacco: Former User     Types: Chew    Tobacco comment: patient reports quitting over 20 yeaers ago.     Substance Use Topics    Alcohol use: Yes     Frequency: Never     Comment: once a year

## 2021-02-24 NOTE — PROGRESS NOTES
Chief Complaint   Patient presents with    Hypertension    Diabetes     stopped trulicity due to cost    Cholesterol Problem   1. Have you been to the ER, urgent care clinic since your last visit? Hospitalized since your last visit? No    2. Have you seen or consulted any other health care providers outside of the [address] [place] [address] since your last visit? Include any pap smears or colon screening. Yes Reason for visit: cardiology Dr Martín Fowler, Dr Matthew Milder pulmonary.  Dr Jevon Clancy derm

## 2021-02-25 LAB
ALBUMIN SERPL-MCNC: 3.6 G/DL (ref 3.5–5)
ALBUMIN/GLOB SERPL: 1 {RATIO} (ref 1.1–2.2)
ALP SERPL-CCNC: 145 U/L (ref 45–117)
ALT SERPL-CCNC: 20 U/L (ref 12–78)
ANION GAP SERPL CALC-SCNC: 9 MMOL/L (ref 5–15)
AST SERPL-CCNC: 10 U/L (ref 15–37)
BILIRUB SERPL-MCNC: 0.5 MG/DL (ref 0.2–1)
BUN SERPL-MCNC: 23 MG/DL (ref 6–20)
BUN/CREAT SERPL: 17 (ref 12–20)
CALCIUM SERPL-MCNC: 9.2 MG/DL (ref 8.5–10.1)
CHLORIDE SERPL-SCNC: 108 MMOL/L (ref 97–108)
CHOLEST SERPL-MCNC: 152 MG/DL
CO2 SERPL-SCNC: 25 MMOL/L (ref 21–32)
CREAT SERPL-MCNC: 1.38 MG/DL (ref 0.7–1.3)
CREAT UR-MCNC: 324 MG/DL
ERYTHROCYTE [DISTWIDTH] IN BLOOD BY AUTOMATED COUNT: 17 % (ref 11.5–14.5)
EST. AVERAGE GLUCOSE BLD GHB EST-MCNC: 189 MG/DL
GLOBULIN SER CALC-MCNC: 3.6 G/DL (ref 2–4)
GLUCOSE SERPL-MCNC: 170 MG/DL (ref 65–100)
HBA1C MFR BLD: 8.2 % (ref 4–5.6)
HCT VFR BLD AUTO: 41.3 % (ref 36.6–50.3)
HDLC SERPL-MCNC: 42 MG/DL
HDLC SERPL: 3.6 {RATIO} (ref 0–5)
HGB BLD-MCNC: 13 G/DL (ref 12.1–17)
IRON SATN MFR SERPL: 34 % (ref 20–50)
IRON SERPL-MCNC: 128 UG/DL (ref 35–150)
LDLC SERPL CALC-MCNC: 82.4 MG/DL (ref 0–100)
LIPID PROFILE,FLP: NORMAL
MCH RBC QN AUTO: 29.5 PG (ref 26–34)
MCHC RBC AUTO-ENTMCNC: 31.5 G/DL (ref 30–36.5)
MCV RBC AUTO: 93.7 FL (ref 80–99)
MICROALBUMIN UR-MCNC: 14.4 MG/DL
MICROALBUMIN/CREAT UR-RTO: 44 MG/G (ref 0–30)
NRBC # BLD: 0.02 K/UL (ref 0–0.01)
NRBC BLD-RTO: 0.3 PER 100 WBC
PLATELET # BLD AUTO: 241 K/UL (ref 150–400)
PMV BLD AUTO: 12 FL (ref 8.9–12.9)
POTASSIUM SERPL-SCNC: 4.4 MMOL/L (ref 3.5–5.1)
PROT SERPL-MCNC: 7.2 G/DL (ref 6.4–8.2)
RBC # BLD AUTO: 4.41 M/UL (ref 4.1–5.7)
SODIUM SERPL-SCNC: 142 MMOL/L (ref 136–145)
TIBC SERPL-MCNC: 373 UG/DL (ref 250–450)
TRIGL SERPL-MCNC: 138 MG/DL (ref ?–150)
VLDLC SERPL CALC-MCNC: 27.6 MG/DL
WBC # BLD AUTO: 7.1 K/UL (ref 4.1–11.1)

## 2021-02-28 VITALS
RESPIRATION RATE: 16 BRPM | HEART RATE: 72 BPM | OXYGEN SATURATION: 99 % | SYSTOLIC BLOOD PRESSURE: 136 MMHG | DIASTOLIC BLOOD PRESSURE: 78 MMHG | TEMPERATURE: 98.6 F

## 2021-02-28 RX ORDER — ALBUTEROL SULFATE 90 UG/1
1-2 AEROSOL, METERED RESPIRATORY (INHALATION)
Qty: 1 INHALER | Refills: 3 | Status: SHIPPED | OUTPATIENT
Start: 2021-02-28

## 2021-02-28 NOTE — PATIENT INSTRUCTIONS
Medicare Wellness Visit, Male The best way to live healthy is to have a lifestyle where you eat a well-balanced diet, exercise regularly, limit alcohol use, and quit all forms of tobacco/nicotine, if applicable. Regular preventive services are another way to keep healthy. Preventive services (vaccines, screening tests, monitoring & exams) can help personalize your care plan, which helps you manage your own care. Screening tests can find health problems at the earliest stages, when they are easiest to treat. [facility] follows the current, evidence-based guidelines published by the [facility] ([facility]) when recommending preventive services for our patients. Because we follow these guidelines, sometimes recommendations change over time as research supports it. (For example, a prostate screening blood test is no longer routinely recommended for men with no symptoms). Of course, you and your doctor may decide to screen more often for some diseases, based on your risk and co-morbidities (chronic disease you are already diagnosed with). Preventive services for you include: - Medicare offers their members a free annual wellness visit, which is time for you and your primary care provider to discuss and plan for your preventive service needs. Take advantage of this benefit every year! 
-All adults over age 72 should receive the recommended pneumonia vaccines. Current USPSTF guidelines recommend a series of two vaccines for the best pneumonia protection.  
-All adults should have a flu vaccine yearly and tetanus vaccine every 10 years. 
-All adults age 48 and older should receive the shingles vaccines (series of two vaccines). -All adults age 38-68 who are overweight should have a diabetes screening test once every three years. -Other screening tests & preventive services for persons with diabetes include: an eye exam to screen for diabetic retinopathy, a kidney function test, a foot exam, and stricter control over your cholesterol.  
-Cardiovascular screening for adults with routine risk involves an electrocardiogram (ECG) at intervals determined by the provider.  
-Colorectal cancer screening should be done for adults age 54-65 with no increased risk factors for colorectal cancer. There are a number of acceptable methods of screening for this type of cancer. Each test has its own benefits and drawbacks. Discuss with your provider what is most appropriate for you during your annual wellness visit. The different tests include: colonoscopy (considered the best screening method), a fecal occult blood test, a fecal DNA test, and sigmoidoscopy. 
-All adults born between [facility] should be screened once for Hepatitis C. 
-An Abdominal Aortic Aneurysm (AAA) Screening is recommended for men age 73-68 who has ever smoked in their lifetime. Here is a list of your current Health Maintenance items (your personalized list of preventive services) with a due date: 
Health Maintenance Due Topic Date Due  
 Hepatitis C Test  1940  COVID-19 Vaccine (1 of 2) 04/07/1957  Shingles Vaccine (1 of 2) 04/07/1991  Glaucoma Screening   01/19/2017  Yearly Flu Vaccine (1) 09/01/2020

## 2021-03-19 NOTE — PROGRESS NOTES
Your diabetes is better with A1C of 8. 2( was 8.6). Normal liver and improved kidney tests. Great cholesterol numbers. Normal CBC( red and white blood cells and platelets). No anemia and normal iron level. Continue to work on weight reduction so we can control the diabetes without adding another medication. Let's recheck in 3-4 months. non fasting is OK.

## 2021-04-01 ENCOUNTER — OFFICE VISIT (OUTPATIENT)
Dept: CARDIOLOGY CLINIC | Age: 81
End: 2021-04-01

## 2021-04-01 DIAGNOSIS — G47.33 OSA (OBSTRUCTIVE SLEEP APNEA): ICD-10-CM

## 2021-04-01 DIAGNOSIS — E11.21 TYPE 2 DIABETES MELLITUS WITH DIABETIC NEPHROPATHY, WITHOUT LONG-TERM CURRENT USE OF INSULIN (HCC): ICD-10-CM

## 2021-04-01 DIAGNOSIS — I25.10 CORONARY ARTERY DISEASE INVOLVING NATIVE CORONARY ARTERY OF NATIVE HEART WITHOUT ANGINA PECTORIS: Primary | ICD-10-CM

## 2021-04-01 DIAGNOSIS — I48.0 PAROXYSMAL ATRIAL FIBRILLATION (HCC): ICD-10-CM

## 2021-04-01 DIAGNOSIS — Z79.01 CHRONIC ANTICOAGULATION: ICD-10-CM

## 2021-04-01 DIAGNOSIS — I10 ESSENTIAL HYPERTENSION, BENIGN: ICD-10-CM

## 2021-04-01 DIAGNOSIS — N18.30 STAGE 3 CHRONIC KIDNEY DISEASE, UNSPECIFIED WHETHER STAGE 3A OR 3B CKD (HCC): ICD-10-CM

## 2021-04-01 DIAGNOSIS — Z86.73 HISTORY OF CVA (CEREBROVASCULAR ACCIDENT): ICD-10-CM

## 2021-04-01 DIAGNOSIS — E78.5 DYSLIPIDEMIA, GOAL LDL BELOW 70: ICD-10-CM

## 2021-04-01 NOTE — PROGRESS NOTES
CARDIOLOGY OFFICE NOTE Shimon Cochran MD, [address], [place], [address] [facility] Phone [phone]; Fax [phone] Mobile [phone]   Voice Mail [phone] Roxana Sheikh MD 
 
  
ATTENTION:  
This medical record was transcribed using an electronic medical records/speech recognition system. Although proofread, it may and can contain electronic, spelling and other errors. Corrections may be executed at a later time. Please feel free to contact us for any clarifications as needed. Luma Stone is a 79 y.o. male with  referred for dyspnea. Cardiac risk factors: diabetes mellitus, sedentary life style, male gender, hypertension I have personally obtained the history from the patient. HISTORY OF PRESENTING ILLNESS  
  
 
Mr. Polanco's cardiac history includes CAD with multiple PCI's, PAF status post watchman with a history of GI bleeding he has been followed by Dr. Gerald Boyle in the past.  He has class III dyspnea on exertion he does not have any exertional chest discomfort or PND or orthopnea  . He even uses a motorized scooter secondary to his inability to ambulate and shortness of breath. Dr. Robby Melchor from pulmonary has been treating him with inhalers but he states it has not helped the patient denies chest pain/ shortness of breath, orthopnea, PND, LE edema, palpitations, syncope, presyncope or fatigue. ACTIVE PROBLEM LIST Patient Active Problem List  
 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  
Diagnosis Date  Arrhythmia   
 a fib  Atrial fibrillation ([facility] [address] 8/16/2013  Basal cell carcinoma of anterior chest 2/25/2010 EARS AND NOSE  CAD (coronary artery disease) 2/25/2010  CVA (cerebral infarction) 2/25/2010  
 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  
 Stroke ([facility] [address]   
 in 66 at age of 26  Type II or unspecified type diabetes mellitus without mention of complication, not stated as uncontrolled 2/25/2010 PAST SURGICAL HISTORY Past Surgical History:  
Procedure Laterality Date  COLONOSCOPY N/A 11/11/2020 COLONOSCOPY/EGD performed by Laura Stratton MD at [facility] [address]  COLONOSCOPY,CHARMAINE MALDONADO,SNARE  12/13/2013   CAPSULE ENDOSCOPE M2A  4/4/2014  HX COLONOSCOPY    
 HX CORONARY STENT PLACEMENT    
 HX HEART ASSIST DEVICE Left 09/24/2019  HX KNEE REPLACEMENT Left 2010  HX ORTHOPAEDIC Right FUSION IN RIGHT WRIST  
 HX OTHER SURGICAL    
 watchman procedure.  HX OTHER SURGICAL    
 shoulder surgery. [address]  ID CARDIAC SURG PROCEDURE UNLIST X4 STENTS  
 ID CARDIOVERSION ELECTIVE ARRHYTHMIA EXTERNAL N/A 9/24/2019  Ep Cardioversion performed by Gorge Babinski, Merline Bolls, MD at [facility]  UPPER GI ENDOSCOPY,BIOPSY  12/13/2013 ALLERGIES Allergies Allergen Reactions  Adhesive Tape Rash  Baycol Nausea and Vomiting  Clonidine Other (comments) Headache and sleepiness  Pravachol [Pravastatin] Nausea and Vomiting FAMILY HISTORY Family History Problem Relation Age of Onset  Heart Disease Mother  Cancer Father   
     colon  Heart Disease Maternal Grandmother  Heart Disease Maternal Grandfather  Cancer Paternal Grandmother  Cancer Paternal Grandfather  Anesth Problems Neg Hx   
 negative for cardiac disease SOCIAL HISTORY Social History Socioeconomic History  Marital status:  Spouse name: Not on file  Number of children: Not on file  Years of education: Not on file  Highest education level: Not on file Tobacco Use  Smoking status: Never Smoker  Smokeless tobacco: Former User Types: Chew  Tobacco comment: patient reports quitting over 20 yeaers ago. Substance and Sexual Activity  Alcohol use: Yes Frequency: Never Comment: once a year  Drug use: No  
 Sexual activity: Not Currently Other Topics Concern [address] Yes  Blood Transfusions Yes  Caffeine Concern Yes Comment: 2-3 cups of coffee daily  Occupational Exposure No  
 Hobby Hazards No  
 Sleep Concern No  
 Stress Concern No  
 Weight Concern No  
 Special Diet No  
 Back Care No  
 Exercise Yes  Bike Helmet No  
  Comment: pt doesn't ride a bike [address] [facility] Yes  Self-Exams No  
 
 
 
MEDICATIONS Current Outpatient Medications Medication Sig  
 albuterol (PROVENTIL HFA, VENTOLIN HFA, PROAIR HFA) 90 mcg/actuation inhaler Take 1-2 Puffs by inhalation every six (6) hours as needed for Wheezing. Indications: bronchospasm  amLODIPine (NORVASC) 10 mg tablet Take 1 tablet by mouth once daily  atorvastatin (LIPITOR) 40 mg tablet Take 1 tablet by mouth once daily  metFORMIN ER (GLUCOPHAGE XR) 500 mg tablet TAKE 4 TABLETS BY MOUTH ONCE DAILY WITH BREAKFAST  losartan (COZAAR) 100 mg tablet TAKE 1 TABLET BY MOUTH ONCE DAILY FOR HIGH BLOOD PRESSURE  carvediloL (COREG) 6.25 mg tablet Take 2 Tabs by mouth two (2) times a day.  ferrous sulfate 325 mg (65 mg iron) tablet Take  by mouth Daily (before breakfast).  revefenacin (Yupelri) 175 mcg/3 mL nebu nebulizer solution 175 mcg by Nebulization route daily.  spironolactone (ALDACTONE) 25 mg tablet 25 mg daily.  aspirin delayed-release 81 mg tablet Take 81 mg by mouth daily. Indications: MYOCARDIAL REINFARCTION PREVENTION No current facility-administered medications for this visit. I have reviewed the nurses notes, vitals, problem list, allergy list, medical history, family, social history and medications. REVIEW OF SYMPTOMS General: Pt denies excessive weight gain or loss. Pt is able to conduct ADL's HEENT: Denies blurred vision, headaches, hearing loss, epistaxis and difficulty swallowing. Respiratory: Denies cough, congestion, shortness of breath, HURD, wheezing or stridor. Cardiovascular: Denies precordial pain, palpitations, edema or PND Gastrointestinal: Denies poor appetite, indigestion, abdominal pain or blood in stool Genitourinary: Denies hematuria, dysuria, increased urinary frequency Musculoskeletal: Denies joint pain or swelling from muscles or joints Neurologic: Denies tremor, paresthesias, headache, or sensory motor disturbance Psychiatric: Denies confusion, insomnia, depression Integumentray: Denies rash, itching or ulcers. Hematologic: Denies easy bruising, bleeding PHYSICAL EXAMINATION There were no vitals filed for this visit. General: Well developed, in no acute distress. HEENT: No jaundice, oral mucosa moist, no oral ulcers Neck: Supple, no stiffness, no lymphadenopathy, supple Heart:  Normal S1/S2 negative S3 or S4. Regular, no murmur, gallop or rub, no jugular venous distention Respiratory: Clear bilaterally x 4, no wheezing or rales Abdomen:   Soft, non-tender, bowel sounds are active. Extremities:  No edema, normal cap refill, no cyanosis. Musculoskeletal: No clubbing, no deformities Neuro: A&Ox3, speech clear, gait stable, cooperative, no focal neurologic deficits Skin: Skin color is normal. No rashes or lesions. Non diaphoretic, moist. 
Vascular: 2+ pulses symmetric in all extremities EKG: Atrial fibrillation well controlled rate DIAGNOSTIC DATA 1. Cath  
(8/27/8): LAD p90, m70. OM1 70, OM2 90 (small). RCA m40. EF 55%. No AVG/MR. Patent L SC. 
PCI (11/13/8): mLAD 2.75x18 Xience, pLAD 2.75x23 Xience. OM1 3.0x15 Xience. 4/28/17:Cath (4/28/17): LAD patent with patent prox and mid stent. LCx patent with patent OM1 stent. RCA p90 (ISR) and p90 distal to stent. EF 65%. No AVG/MR. PCI osteal/prox RCA (4/28/17): 3.0x28 Xience. 4/24/18: RCA - prior stents present; Prox ISR 60%; Mid to Distal - 60% Difficulty engaging guide secondary to stent at ostium JR4/AR1 guide would not engage ; [address] [place] [address] guide used to engage RCA 
FFR - not siginificant 0.89 1. Stress Test 
Lexiscan Cardiolite (8/11/9): Mild fixed inf defect. No reversible defects. EF 51% with mild inf HK. Cath (8/29/13): LAD patent prox and mid stents. LCx m40; patent OM1 stent. RCA p80. EF 55%. No AVG/MR. PCI ost/proxRCA (8/29/13): 3.0x18 Xience. Echo (8/22/13): EF 55%. Mod dil LA. Mild dil RA. Mild MR/TR/ME. PASP 24. Lexiscan Cardiolite 4/15/14 - normal perfusion imaging, EF 52% DCCV 12/18/14 Echo 6/18/18 - EF 55%. No WMA. Mild asymmetric hypertrophy of the septum. MIld to mod LAE. AoV calcification and sclerosis. No significant change c/t study of 22-Aug-2013. Echo 4/11/19 - EF 55-60%. Mod LAE. AoV sclerosis. Mild MR. Mild TR. Severely elevated CVP (>15 mmHg) Echo-3/19/20- EF 61%, Mild concentric hypertrophy, LAE, severe AV sclerosis, Mild TR, PAP 28.6 mmHg 9/24/2019:Endocardial left atrial appendage occlusion was performed utilizing a 30 mm WATCHMAN device per Dr. Jeni Hatfield YESENIA 11/9/19 - 30mm Watchman device present. Well seated. No evidence of carlton-device leak. No overlying thrombus noted on the device. Very small right to left shunting likely related to recent trans-septal puncture for Watchman implantation. LVEF 56-60%. Echo 3/19/20- EF 61%, mild LVH, PASP nl 
 
Lipids 8/17/20- , HDL 50, LDL 74,  LABORATORY DATA Lab Results Component Value Date/Time WBC 7.1 02/24/2021 12:42 PM  
 Hemoglobin (POC) 9.9 03/19/2014 09:14 AM  
 HGB 13.0 02/24/2021 12:42 PM  
 HCT 41.3 02/24/2021 12:42 PM  
 PLATELET 237 89/54/2213 12:42 PM  
 MCV 93.7 02/24/2021 12:42 PM  
  
Lab Results Component Value Date/Time Sodium 142 02/24/2021 12:42 PM  
 Potassium 4.4 02/24/2021 12:42 PM  
 Chloride 108 02/24/2021 12:42 PM  
 CO2 25 02/24/2021 12:42 PM  
 Anion gap 9 02/24/2021 12:42 PM  
 Glucose 170 (H) 02/24/2021 12:42 PM  
 BUN 23 (H) 02/24/2021 12:42 PM  
 Creatinine 1.38 (H) 02/24/2021 12:42 PM  
 BUN/Creatinine ratio 17 02/24/2021 12:42 PM  
 GFR est AA >60 02/24/2021 12:42 PM  
 GFR est non-AA 50 (L) 02/24/2021 12:42 PM  
 Calcium 9.2 02/24/2021 12:42 PM  
 Bilirubin, total 0.5 02/24/2021 12:42 PM  
 Alk. phosphatase 145 (H) 02/24/2021 12:42 PM  
 Protein, total 7.2 02/24/2021 12:42 PM  
 Albumin 3.6 02/24/2021 12:42 PM  
 Globulin 3.6 02/24/2021 12:42 PM  
 A-G Ratio 1.0 (L) 02/24/2021 12:42 PM  
 ALT (SGPT) 20 02/24/2021 12:42 PM  
  
 
 
 ASSESSMENT/RECOMMENDATIONS:.  
  
  
HTN - normotensive on combination therapy. He is followed by nephrology as well. 1.  Class III dyspnea on exertion 2.   PCI's with a PCI and stenting with a drug-eluting stent of the RCA 
-Last cath in 2018 revealed stent was patent and distal LAD had 80% lesion 
-We will go forward with a cardiac catheterization determine if there is any progression of disease 
-ASA 3 airway 3 3. PAF on amiodarone 
-Shortness of breath most likely not related amiodarone toxicity since his PFTs were okay and a CT of his chest did not demonstrate any abnormalities. -Status post watchman for GI bleed 
-Cath was scheduled in October but had to be delayed because of a low hemoglobin that work-up was negative 4. Dyslipidemia LDL goal below 70 
5. Type 2 diabetes 6. Hypertension blood pressure is elevated I noticed crease his Coreg to 6.25 mg twice daily Return after cardiac catheterization. No orders of the defined types were placed in this encounter. We discussed the expected course, resolution and complications of the diagnosis(es) in detail. Medication risks, benefits, costs, interactions, and alternatives were discussed as indicated. I advised him to contact the office if his condition worsens, changes or fails to improve as anticipated. He expressed understanding with the diagnosis(es) and plan Follow-up and Dispositions  · Return in about 6 months (around 10/1/2021). I have discussed the diagnosis with  Bell Yang and the intended plan as seen in the above orders. Questions were answered concerning future plans. I have discussed medication side effects and warnings with the patient as well. Thank you,  Francheska Zaidi MD for involving me in the care of  Bell Yang. Please do not hesitate to contact me for further questions/concerns. Shimon Huerta MD, [facility] - [address], [address]     
[phone] / [phone] Fax

## 2021-05-02 DIAGNOSIS — E11.9 TYPE 2 DIABETES MELLITUS WITHOUT COMPLICATION (HCC): ICD-10-CM

## 2021-05-03 RX ORDER — METFORMIN HYDROCHLORIDE 500 MG/1
TABLET, EXTENDED RELEASE ORAL
Qty: 360 TAB | Refills: 1 | Status: SHIPPED | OUTPATIENT
Start: 2021-05-03 | End: 2021-11-29

## 2021-05-03 NOTE — TELEPHONE ENCOUNTER
Needs to schedule an  office visit at least every 6 months. Once visit is scheduled we can refill medication until appointment.

## 2021-05-06 ENCOUNTER — OFFICE VISIT (OUTPATIENT)
Dept: CARDIOLOGY CLINIC | Age: 81
End: 2021-05-06
Payer: MEDICARE

## 2021-05-06 VITALS
BODY MASS INDEX: 31.05 KG/M2 | SYSTOLIC BLOOD PRESSURE: 120 MMHG | HEART RATE: 88 BPM | HEIGHT: 76 IN | DIASTOLIC BLOOD PRESSURE: 60 MMHG | WEIGHT: 255 LBS

## 2021-05-06 DIAGNOSIS — I48.0 PAROXYSMAL ATRIAL FIBRILLATION (HCC): ICD-10-CM

## 2021-05-06 DIAGNOSIS — I10 ESSENTIAL HYPERTENSION, BENIGN: Primary | ICD-10-CM

## 2021-05-06 DIAGNOSIS — Z86.73 HISTORY OF CVA (CEREBROVASCULAR ACCIDENT): ICD-10-CM

## 2021-05-06 DIAGNOSIS — E78.5 DYSLIPIDEMIA, GOAL LDL BELOW 70: ICD-10-CM

## 2021-05-06 DIAGNOSIS — E11.21 TYPE 2 DIABETES MELLITUS WITH DIABETIC NEPHROPATHY, WITHOUT LONG-TERM CURRENT USE OF INSULIN (HCC): ICD-10-CM

## 2021-05-06 DIAGNOSIS — N18.30 STAGE 3 CHRONIC KIDNEY DISEASE, UNSPECIFIED WHETHER STAGE 3A OR 3B CKD (HCC): ICD-10-CM

## 2021-05-06 DIAGNOSIS — G47.33 OSA (OBSTRUCTIVE SLEEP APNEA): ICD-10-CM

## 2021-05-06 PROCEDURE — 3052F HG A1C>EQUAL 8.0%<EQUAL 9.0%: CPT | Performed by: SPECIALIST

## 2021-05-06 PROCEDURE — G8417 CALC BMI ABV UP PARAM F/U: HCPCS | Performed by: SPECIALIST

## 2021-05-06 PROCEDURE — G8754 DIAS BP LESS 90: HCPCS | Performed by: SPECIALIST

## 2021-05-06 PROCEDURE — G8536 NO DOC ELDER MAL SCRN: HCPCS | Performed by: SPECIALIST

## 2021-05-06 PROCEDURE — 3288F FALL RISK ASSESSMENT DOCD: CPT | Performed by: SPECIALIST

## 2021-05-06 PROCEDURE — G8432 DEP SCR NOT DOC, RNG: HCPCS | Performed by: SPECIALIST

## 2021-05-06 PROCEDURE — 99214 OFFICE O/P EST MOD 30 MIN: CPT | Performed by: SPECIALIST

## 2021-05-06 PROCEDURE — G0463 HOSPITAL OUTPT CLINIC VISIT: HCPCS | Performed by: SPECIALIST

## 2021-05-06 PROCEDURE — G8427 DOCREV CUR MEDS BY ELIG CLIN: HCPCS | Performed by: SPECIALIST

## 2021-05-06 PROCEDURE — G8752 SYS BP LESS 140: HCPCS | Performed by: SPECIALIST

## 2021-05-06 PROCEDURE — 1100F PTFALLS ASSESS-DOCD GE2>/YR: CPT | Performed by: SPECIALIST

## 2021-05-06 RX ORDER — ATORVASTATIN CALCIUM 40 MG/1
80 TABLET, FILM COATED ORAL DAILY
Qty: 90 TAB | Refills: 5 | Status: SHIPPED | OUTPATIENT
Start: 2021-05-06 | End: 2021-05-17 | Stop reason: DRUGHIGH

## 2021-05-06 NOTE — PROGRESS NOTES
CARDIOLOGY OFFICE NOTE    Shimon Husain MD, [address], [place], [address] [facility]  Phone [phone]; Fax [phone]  Mobile [phone]   Voice Mail [phone]      Griselda Freiberg, MD       ATTENTION:   This medical record was transcribed using an electronic medical records/speech recognition system. Although proofread, it may and can contain electronic, spelling and other errors. Corrections may be executed at a later time. Please feel free to contact us for any clarifications as needed. Kentrell Fowler is a [de-identified] y.o. male with  referred for dyspnea. Cardiac risk factors: diabetes mellitus, sedentary life style, male gender, hypertension  I have personally obtained the history from the patient. HISTORY OF PRESENTING ILLNESS      From previous note  Mr. Polanco's cardiac history includes CAD with multiple PCI's, PAF status post watchman with a history of GI bleeding he has been followed by Dr. Waldo Davenport in the past.  He has class III dyspnea on exertion he does not have any exertional chest discomfort or PND or orthopnea  . He even uses a motorized scooter secondary to his inability to ambulate and shortness of breath. Dr. Banegas  from pulmonary has been treating him with inhalers . In the interim he has had a heart catheterization is for distal disease in his coronary anatomy RCA disease but none of the pictures amenable to intervention. 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 He is here with his wife today.      ACTIVE PROBLEM LIST     Patient Active Problem List    Diagnosis Date Noted    Presence of Watchman left atrial appendage closure device 09/24/2019     Priority: 1 - One    Dysarthria 11/07/2019    Chronic anticoagulation 09/04/2019    CKD (chronic kidney disease) stage 3, GFR 30-59 ml/min ([address] 07/31/2019    [facility] heart failure with preserved ejection fraction [facility] 05/22/2019    Chronic fatigue 04/23/2018    Type 2 diabetes mellitus with diabetic nephropathy, without long-term current use of insulin ([facility] 04/18/2018    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 ([facility]- @ 27 yo-weak on right arm>leg 04/12/2016    TOPHER (obstructive sleep apnea) 01/23/2015    Atrial fibrillation ([facility] 08/16/2013    Dyslipidemia, goal LDL below 70 12/20/2010    Essential hypertension, benign 02/25/2010    Reflux esophagitis 02/25/2010    CAD (coronary artery disease) 02/25/2010           PAST MEDICAL HISTORY     Past Medical History:   Diagnosis Date    Arrhythmia     a fib    Atrial fibrillation ([facility] 8/16/2013    Basal cell carcinoma of anterior chest 2/25/2010    EARS AND NOSE    CAD (coronary artery disease) 2/25/2010    CVA (cerebral infarction) 2/25/2010    patient denies 12/13/13    Diverticula of colon 2/25/2010    Dyslipidemia 12/20/2010    Essential hypertension, benign 2/25/2010    Hypertension     TOPHER (obstructive sleep apnea) 1/23/2015    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 2/25/2010    Other and unspecified hyperlipidemia 2/25/2010    Positive PPD 2/25/2010    Reflux esophagitis 2/25/2010    NO LONGER A PROBLEM    Stroke [facility])     in 1959 at age of 26    Type II or unspecified type diabetes mellitus without mention of complication, not stated as uncontrolled 2/25/2010           PAST SURGICAL HISTORY     Past Surgical History:   Procedure Laterality Date    COLONOSCOPY N/A 11/11/2020    COLONOSCOPY/EGD performed by Delisa Arboleda MD at [address] [place] [address]  12/13/2013          CAPSULE ENDOSCOPE M2A  4/4/2014         HX COLONOSCOPY      HX CORONARY STENT PLACEMENT      HX HEART ASSIST DEVICE Left 09/24/2019    HX KNEE REPLACEMENT Left 2010    HX ORTHOPAEDIC Right     FUSION IN RIGHT WRIST    HX OTHER SURGICAL      watchman procedure.      HX OTHER SURGICAL      shoulder surgery.  HX VASECTOMY  1983    OK CARDIAC SURG PROCEDURE UNLIST      X4 STENTS    [place] CARDIOVERSION ELECTIVE ARRHYTHMIA EXTERNAL N/A 9/24/2019    Ep Cardioversion performed by Karl Rodriguez MD at [facility] Dr CATH LAB    UPPER GI ENDOSCOPY,BIOPSY  12/13/2013               ALLERGIES     Allergies   Allergen Reactions    Adhesive Tape Rash    Baycol Nausea and Vomiting    Clonidine Other (comments)     Headache and sleepiness      Pravachol [Pravastatin] Nausea and Vomiting          FAMILY HISTORY     Family History   Problem Relation Age of Onset    Heart Disease Mother     Cancer Father         colon    Heart Disease Maternal Grandmother     Heart Disease Maternal Grandfather     Cancer Paternal Grandmother     Cancer Paternal Grandfather     Anesth Problems Neg Hx     negative for cardiac disease       SOCIAL HISTORY     Social History     Socioeconomic History    Marital status:      Spouse name: Not on file    Number of children: Not on file    Years of education: Not on file    Highest education level: Not on file   Tobacco Use    Smoking status: Never Smoker    Smokeless tobacco: Former User     Types: Chew    Tobacco comment: patient reports quitting over 20 yeaers ago. Substance and Sexual Activity    Alcohol use: Yes     Frequency: Never     Comment: once a year    Drug use: No    Sexual activity: Not Currently   Other Topics Concern     Service Yes    Blood Transfusions Yes    Caffeine Concern Yes     Comment: 2-3 cups of coffee daily    Occupational Exposure No    Hobby Hazards No    Sleep Concern No    Stress Concern No    Weight Concern No    Special Diet No    Back Care No    Exercise Yes    Bike Helmet No     Comment: pt doesn't ride a bike   [place] Yes    Self-Exams No         MEDICATIONS     Current Outpatient Medications   Medication Sig    atorvastatin (LIPITOR) 40 mg tablet Take 2 Tabs by mouth daily.  metFORMIN ER (GLUCOPHAGE XR) 500 mg tablet TAKE 4 TABLETS BY MOUTH ONCE DAILY WITH BREAKFAST    albuterol (PROVENTIL HFA, VENTOLIN HFA, PROAIR HFA) 90 mcg/actuation inhaler Take 1-2 Puffs by inhalation every six (6) hours as needed for Wheezing. Indications: bronchospasm    amLODIPine (NORVASC) 10 mg tablet Take 1 tablet by mouth once daily    losartan (COZAAR) 100 mg tablet TAKE 1 TABLET BY MOUTH ONCE DAILY FOR HIGH BLOOD PRESSURE    carvediloL (COREG) 6.25 mg tablet Take 2 Tabs by mouth two (2) times a day.  ferrous sulfate 325 mg (65 mg iron) tablet Take  by mouth Daily (before breakfast).  revefenacin (Yupelri) 175 mcg/3 mL nebu nebulizer solution 175 mcg by Nebulization route daily.  spironolactone (ALDACTONE) 25 mg tablet 25 mg daily.  aspirin delayed-release 81 mg tablet Take 81 mg by mouth daily. Indications: MYOCARDIAL REINFARCTION PREVENTION     No current facility-administered medications for this visit. I have reviewed the nurses notes, vitals, problem list, allergy list, medical history, family, social history and medications. REVIEW OF SYMPTOMS   For positive per HPI  General: Pt denies excessive weight gain or loss. Pt is able to conduct ADL's  HEENT: Denies blurred vision, headaches, hearing loss, epistaxis and difficulty swallowing. Respiratory: Denies cough, congestion, shortness of breath, HURD, wheezing or stridor. Cardiovascular: Denies precordial pain, palpitations, edema or PND  Gastrointestinal: Denies poor appetite, indigestion, abdominal pain or blood in stool  Genitourinary: Denies hematuria, dysuria, increased urinary frequency  Musculoskeletal: Denies joint pain or swelling from muscles or joints  Neurologic: Denies tremor, paresthesias, headache, or sensory motor disturbance  Psychiatric: Denies confusion, insomnia, depression  Integumentray: Denies rash, itching or ulcers.   Hematologic: Denies easy bruising, bleeding     PHYSICAL EXAMINATION Vitals:    05/06/21 1357   BP: 120/60   Pulse: 88   Weight: 255 lb (115.7 kg)   Height: 6' 4\" (1.93 m)     General: Well developed, in no acute distress. In motorized vehicle and appears deconditioned  HEENT: No jaundice, oral mucosa moist, no oral ulcers  Neck: Supple, no stiffness, no lymphadenopathy, supple  Heart:   Irregular  Respiratory: Clear bilaterally x 4, no wheezing or rales  Extremities:  No edema, normal cap refill, no cyanosis. Musculoskeletal: No clubbing, no deformities  Neuro: A&Ox3, speech clear, gait stable, cooperative, no focal neurologic deficits  Skin: Skin color is normal. No rashes or lesions. Non diaphoretic, moist.        EKG: Atrial fibrillation well controlled rate     DIAGNOSTIC DATA     1. Cath   (8/27/8): LAD p90, m70. OM1 70, OM2 90 (small). RCA m40. EF 55%. No AVG/MR. Patent L SC.   PCI (11/13/8): mLAD 2.75x18 Xience, pLAD 2.75x23 Xience. OM1 3.0x15 Xience. Cath (8/29/13): LAD patent prox and mid stents. LCx m40; patent OM1 stent. RCA p80. EF 55%. No AVG/MR. PCI ost/proxRCA (8/29/13): 3.0x18 Xience. 4/28/17:Cath (4/28/17): LAD patent with patent prox and mid stent. LCx patent with patent OM1 stent. RCA p90 (ISR) and p90 distal to stent. EF 65%. No AVG/MR. PCI osteal/prox RCA (4/28/17): 3.0x28 Xience. 4/24/18: RCA - prior stents present; Prox ISR 60%; Mid to Distal - 60%   Difficulty engaging guide secondary to stent at ostium   JR4/AR1 guide would not engage ; THE [facility] guide used to engage RCA   FFR - not siginificant 0.89   12/7/21-Multivessel CAD. Moderate diffuse LAD disease with iFR of 0.92. Distal Lcx with severe disease not amendable to PCI. RCA with diffuse moderate ISR. Long term durable result of RCA will be difficult due to caliber of vessel, furthermore with his hx of GI bleeding I do not think it is worth the risk to expose patient to DAPT again. Recent Hgb of 8 due to GI bleed. Mildly elevated lvedp    2.  Stress Test   Lexiscan Cardiolite (8/11/9): Mild fixed inf defect. No reversible defects. EF 51% with mild inf HK. Lexiscan Cardiolite 4/15/14 - normal perfusion imaging, EF 52%     3. Echo   (8/22/13): EF 55%. Mod dil LA. Mild dil RA. Mild MR/TR/NY. PASP 24.   6/18/18 - EF 55%. No WMA. Mild asymmetric hypertrophy of the septum. MIld to mod LAE. AoV calcification and sclerosis. No significant change c/t study of 22-Aug-2013.   4/11/19 - EF 55-60%. Mod LAE. AoV sclerosis. Mild MR. Mild TR. Severely elevated CVP (>15 mmHg)   YESENIA 11/9/19 - 30mm Watchman device present. Well seated. No evidence of carlton-device leak. No overlying thrombus noted on the device. Very small right to left shunting likely related to recent trans-septal puncture for Watchman implantation. LVEF 56-60%. 3/19/20- EF 61%, Mild concentric hypertrophy, LAE, severe AV sclerosis, Mild TR, PAP 28.6 mmHg       4. DCCV 12/18/14     5. 9/24/2019:Endocardial left atrial appendage occlusion was performed utilizing a 30 mm WATCHMAN device per Dr. Imelda Santoyo     6. Lipids   8/17/20- , HDL 50, LDL 74,    2/24/21- , HDL 42, LDL 82.4,          LABORATORY DATA            Lab Results   Component Value Date/Time    WBC 7.1 02/24/2021 12:42 PM    Hemoglobin (POC) 9.9 03/19/2014 09:14 AM    HGB 13.0 02/24/2021 12:42 PM    HCT 41.3 02/24/2021 12:42 PM    PLATELET 726 72/67/1481 12:42 PM    MCV 93.7 02/24/2021 12:42 PM      Lab Results   Component Value Date/Time    Sodium 142 02/24/2021 12:42 PM    Potassium 4.4 02/24/2021 12:42 PM    Chloride 108 02/24/2021 12:42 PM    CO2 25 02/24/2021 12:42 PM    Anion gap 9 02/24/2021 12:42 PM    Glucose 170 (H) 02/24/2021 12:42 PM    BUN 23 (H) 02/24/2021 12:42 PM    Creatinine 1.38 (H) 02/24/2021 12:42 PM    BUN/Creatinine ratio 17 02/24/2021 12:42 PM    GFR est AA >60 02/24/2021 12:42 PM    GFR est non-AA 50 (L) 02/24/2021 12:42 PM    Calcium 9.2 02/24/2021 12:42 PM    Bilirubin, total 0.5 02/24/2021 12:42 PM    Alk.  phosphatase 145 (H) 02/24/2021 12:42 PM Protein, total 7.2 02/24/2021 12:42 PM    Albumin 3.6 02/24/2021 12:42 PM    Globulin 3.6 02/24/2021 12:42 PM    A-G Ratio 1.0 (L) 02/24/2021 12:42 PM    ALT (SGPT) 20 02/24/2021 12:42 PM           ASSESSMENT/RECOMMENDATIONS:.         1.  Class III dyspnea on exertion  -Multifactorial.  Including being deconditioned is a normal EF with some LVH. He is also got COPD and is not using inhalers as he should  -I will check a proBNP  2. PCI's with a PCI and stenting with a drug-eluting stent of the RCA  -No intervention was performed on the last cardiac catheterization December 2020 is good to be medical management at this time. His EF was normal  -ASA 3 airway 3  3. PAF -no longer on amiodarone  -Has a watchman devic  4. Dyslipidemia LDL goal below 70  5. Type 2 diabetes  -Last hemoglobin A1c was 8.3 and if she does get this under better control he will have progression of disease. 6.  Hypertension   -He increased his Coreg dose last time he was here and had made an improvement his blood pressure. Return after cardiac catheterization. Orders Placed This Encounter    NT-PRO BNP     Standing Status:   Future     Standing Expiration Date:   5/6/2022    LIPID PANEL     Standing Status:   Future     Standing Expiration Date:   5/6/2022    HEPATIC FUNCTION PANEL     Standing Status:   Future     Standing Expiration Date:   5/6/2022    atorvastatin (LIPITOR) 40 mg tablet     Sig: Take 2 Tabs by mouth daily. Dispense:  90 Tab     Refill:  5       We discussed the expected course, resolution and complications of the diagnosis(es) in detail. Medication risks, benefits, costs, interactions, and alternatives were discussed as indicated. I advised him to contact the office if his condition worsens, changes or fails to improve as anticipated. He expressed understanding with the diagnosis(es) and plan          Follow-up and Dispositions  ·   Return in about 6 months (around 11/6/2021).            I have discussed the diagnosis with  Dorena Heimlich and the intended plan as seen in the above orders. Questions were answered concerning future plans. I have discussed medication side effects and warnings with the patient as well. Thank you,  Alaina Ricks MD for involving me in the care of  Dorena Heimlich. Please do not hesitate to contact me for further questions/concerns. Shimon Cochran MD, [address] [place] [address] Rd[address]      [address] [place] [address], [address] [place] [address] [facility]      [phone] / [phone] Fax

## 2021-05-06 NOTE — PATIENT INSTRUCTIONS
1) increase the Lipitor to 80 mg and at this time you can take two until run out then get the prescription I sent in 2) in 2 mo get fasting cholesterol checked 3) in next several weeks get pBNP checked 4) follow up with me in 4 mo.

## 2021-05-17 RX ORDER — ATORVASTATIN CALCIUM 80 MG/1
80 TABLET, FILM COATED ORAL DAILY
Qty: 90 TAB | Refills: 3 | Status: SHIPPED | OUTPATIENT
Start: 2021-05-17 | End: 2021-06-08 | Stop reason: ALTCHOICE

## 2021-05-17 RX ORDER — ATORVASTATIN CALCIUM 80 MG/1
80 TABLET, FILM COATED ORAL DAILY
COMMUNITY
End: 2021-05-17 | Stop reason: SDUPTHER

## 2021-06-08 ENCOUNTER — TELEPHONE (OUTPATIENT)
Dept: CARDIOLOGY CLINIC | Age: 81
End: 2021-06-08

## 2021-06-08 RX ORDER — ROSUVASTATIN CALCIUM 20 MG/1
20 TABLET, COATED ORAL
COMMUNITY
End: 2021-06-08 | Stop reason: SDUPTHER

## 2021-06-08 RX ORDER — ROSUVASTATIN CALCIUM 20 MG/1
20 TABLET, COATED ORAL
Qty: 30 TABLET | Refills: 5 | Status: SHIPPED | OUTPATIENT
Start: 2021-06-08 | End: 2021-12-31 | Stop reason: SDDI

## 2021-06-08 NOTE — TELEPHONE ENCOUNTER
Patient calling to speak to the nurse as his dosage was increased for lipitor during his last office visit on 5/6/21, he was taking liputor 40mg and it was increased to 80 mg, patient states that every time he take the medication it makes him really sick,  Feeling nauseous and also vomiting,  please advise      [phone]

## 2021-06-16 DIAGNOSIS — I25.10 CORONARY ARTERY DISEASE INVOLVING NATIVE CORONARY ARTERY OF NATIVE HEART WITHOUT ANGINA PECTORIS: ICD-10-CM

## 2021-06-17 RX ORDER — AMLODIPINE BESYLATE 10 MG/1
TABLET ORAL
Qty: 90 TABLET | Refills: 0 | Status: SHIPPED | OUTPATIENT
Start: 2021-06-17 | End: 2021-10-12

## 2021-08-04 ENCOUNTER — TELEPHONE (OUTPATIENT)
Dept: FAMILY MEDICINE CLINIC | Age: 81
End: 2021-08-04

## 2021-08-04 NOTE — TELEPHONE ENCOUNTER
----- Message from Diann Bermudez sent at 8/4/2021  8:22 AM EDT -----  Regarding: Dr. Kitty Vasquez  Appointment not available    Caller's first and last name and relationship to patient (if not the patient):n/a      Best contact number:[phone]      Preferred date and time: as soon as possible       Scheduled appointment date and time: none available       Reason for appointment: bp, check A1c       Details to clarify the request: n/a      Diann Bermudez

## 2021-08-26 ENCOUNTER — TRANSCRIBE ORDER (OUTPATIENT)
Dept: REGISTRATION | Age: 81
End: 2021-08-26

## 2021-08-26 ENCOUNTER — HOSPITAL ENCOUNTER (OUTPATIENT)
Dept: GENERAL RADIOLOGY | Age: 81
Discharge: HOME | End: 2021-08-26
Payer: MEDICARE

## 2021-08-26 DIAGNOSIS — J44.9 CHRONIC OBSTRUCTIVE PULMONARY DISEASE (HCC): Primary | ICD-10-CM

## 2021-08-26 DIAGNOSIS — J44.9 CHRONIC OBSTRUCTIVE PULMONARY DISEASE (HCC): ICD-10-CM

## 2021-08-26 PROCEDURE — 71046 X-RAY EXAM CHEST 2 VIEWS: CPT

## 2021-08-27 ENCOUNTER — OFFICE VISIT (OUTPATIENT)
Dept: FAMILY MEDICINE CLINIC | Age: 81
End: 2021-08-27
Payer: MEDICARE

## 2021-08-27 VITALS
DIASTOLIC BLOOD PRESSURE: 78 MMHG | OXYGEN SATURATION: 97 % | RESPIRATION RATE: 16 BRPM | HEART RATE: 78 BPM | SYSTOLIC BLOOD PRESSURE: 138 MMHG | TEMPERATURE: 98.3 F

## 2021-08-27 DIAGNOSIS — Z79.01 CHRONIC ANTICOAGULATION: ICD-10-CM

## 2021-08-27 DIAGNOSIS — G47.33 OSA (OBSTRUCTIVE SLEEP APNEA): ICD-10-CM

## 2021-08-27 DIAGNOSIS — K21.00 GASTROESOPHAGEAL REFLUX DISEASE WITH ESOPHAGITIS WITHOUT HEMORRHAGE: ICD-10-CM

## 2021-08-27 DIAGNOSIS — E11.21 TYPE 2 DIABETES MELLITUS WITH DIABETIC NEPHROPATHY, WITHOUT LONG-TERM CURRENT USE OF INSULIN (HCC): ICD-10-CM

## 2021-08-27 DIAGNOSIS — N18.31 STAGE 3A CHRONIC KIDNEY DISEASE (HCC): ICD-10-CM

## 2021-08-27 DIAGNOSIS — I10 ESSENTIAL HYPERTENSION, BENIGN: Primary | ICD-10-CM

## 2021-08-27 DIAGNOSIS — I48.19 PERSISTENT ATRIAL FIBRILLATION (HCC): ICD-10-CM

## 2021-08-27 DIAGNOSIS — I63.40 CEREBRAL INFARCTION DUE TO EMBOLISM OF CEREBRAL ARTERY (HCC): ICD-10-CM

## 2021-08-27 DIAGNOSIS — R06.09 DOE (DYSPNEA ON EXERTION): ICD-10-CM

## 2021-08-27 DIAGNOSIS — E78.5 DYSLIPIDEMIA, GOAL LDL BELOW 70: ICD-10-CM

## 2021-08-27 DIAGNOSIS — I25.10 CORONARY ARTERY DISEASE INVOLVING NATIVE CORONARY ARTERY OF NATIVE HEART WITHOUT ANGINA PECTORIS: ICD-10-CM

## 2021-08-27 LAB
ALBUMIN SERPL-MCNC: 3.5 G/DL (ref 3.5–5)
ALBUMIN/GLOB SERPL: 1 {RATIO} (ref 1.1–2.2)
ALP SERPL-CCNC: 118 U/L (ref 45–117)
ALT SERPL-CCNC: 21 U/L (ref 12–78)
ANION GAP SERPL CALC-SCNC: 7 MMOL/L (ref 5–15)
AST SERPL-CCNC: 11 U/L (ref 15–37)
BILIRUB SERPL-MCNC: 0.5 MG/DL (ref 0.2–1)
BUN SERPL-MCNC: 18 MG/DL (ref 6–20)
BUN/CREAT SERPL: 16 (ref 12–20)
CALCIUM SERPL-MCNC: 9 MG/DL (ref 8.5–10.1)
CHLORIDE SERPL-SCNC: 108 MMOL/L (ref 97–108)
CHOLEST SERPL-MCNC: 168 MG/DL
CO2 SERPL-SCNC: 25 MMOL/L (ref 21–32)
CREAT SERPL-MCNC: 1.16 MG/DL (ref 0.7–1.3)
EST. AVERAGE GLUCOSE BLD GHB EST-MCNC: 209 MG/DL
GLOBULIN SER CALC-MCNC: 3.6 G/DL (ref 2–4)
GLUCOSE SERPL-MCNC: 212 MG/DL (ref 65–100)
HBA1C MFR BLD: 8.9 % (ref 4–5.6)
HDLC SERPL-MCNC: 43 MG/DL
HDLC SERPL: 3.9 {RATIO} (ref 0–5)
LDLC SERPL CALC-MCNC: 89.8 MG/DL (ref 0–100)
POTASSIUM SERPL-SCNC: 4.2 MMOL/L (ref 3.5–5.1)
PROT SERPL-MCNC: 7.1 G/DL (ref 6.4–8.2)
SODIUM SERPL-SCNC: 140 MMOL/L (ref 136–145)
TRIGL SERPL-MCNC: 176 MG/DL (ref ?–150)
VLDLC SERPL CALC-MCNC: 35.2 MG/DL

## 2021-08-27 PROCEDURE — G8417 CALC BMI ABV UP PARAM F/U: HCPCS | Performed by: FAMILY MEDICINE

## 2021-08-27 PROCEDURE — 3288F FALL RISK ASSESSMENT DOCD: CPT | Performed by: FAMILY MEDICINE

## 2021-08-27 PROCEDURE — 1100F PTFALLS ASSESS-DOCD GE2>/YR: CPT | Performed by: FAMILY MEDICINE

## 2021-08-27 PROCEDURE — G8427 DOCREV CUR MEDS BY ELIG CLIN: HCPCS | Performed by: FAMILY MEDICINE

## 2021-08-27 PROCEDURE — G8752 SYS BP LESS 140: HCPCS | Performed by: FAMILY MEDICINE

## 2021-08-27 PROCEDURE — G8754 DIAS BP LESS 90: HCPCS | Performed by: FAMILY MEDICINE

## 2021-08-27 PROCEDURE — 3052F HG A1C>EQUAL 8.0%<EQUAL 9.0%: CPT | Performed by: FAMILY MEDICINE

## 2021-08-27 PROCEDURE — G8510 SCR DEP NEG, NO PLAN REQD: HCPCS | Performed by: FAMILY MEDICINE

## 2021-08-27 PROCEDURE — 99215 OFFICE O/P EST HI 40 MIN: CPT | Performed by: FAMILY MEDICINE

## 2021-08-27 PROCEDURE — G0463 HOSPITAL OUTPT CLINIC VISIT: HCPCS | Performed by: FAMILY MEDICINE

## 2021-08-27 PROCEDURE — G8536 NO DOC ELDER MAL SCRN: HCPCS | Performed by: FAMILY MEDICINE

## 2021-08-27 RX ORDER — MINOCYCLINE HYDROCHLORIDE 100 MG/1
100 CAPSULE ORAL DAILY
COMMUNITY
Start: 2021-08-02

## 2021-08-27 NOTE — PROGRESS NOTES
HISTORY OF PRESENT ILLNESS  HPI  Marily Polanco is a [de-identified] y. o. male with a history of HTN, CAD, A-fib, CHF with preserved ejection fraction, reflux esophagitis, DM-II with nephropathy, BCC, cerebral infarction, TOPHER, fatigue, anemia and hyperlipidemia with LDL goal <70, who presents today with wife c/o intermittent diarrhea possibly related to stress( no constipation) and for f/u of these health problems. In , he was 239 lbs. Pt was 262 lbs in 2020. Pt reports he went to the [employer] and could not remember his debit card number. When someone came over to help and asking for his phone number, he could not remember his phone number until some time after driving away from the gas station. Pt's father  of colon CA and paternal grandfather  from colon CA. Pt adds that his paternal uncle  from bladder CA. He notes that the maternal side of his family  from stroke and heart related issues. Colonoscopy was done recently with the finding of polyps but no cancer. Pt will have diarrhea for 3-4 days, especially when he has somewhere important to go. After this, he will have a few days of no diarrhea. Pt does not drink milk but eats ice cream but it is all lactose free, so that is not the problem. Pt does not check his glucose at home. Pt denies unusual SOB, chest pain, and any recent ER visits or hospitalizations.          Past Medical History:   Diagnosis Date    Arrhythmia     a fib    Atrial fibrillation (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 2010    Other and unspecified hyperlipidemia 2/25/2010    Positive PPD 2/25/2010    Reflux esophagitis 2/25/2010    NO LONGER A PROBLEM    Stroke [facility])     in 1959 at age of 26    Type II or unspecified type diabetes mellitus without mention of complication, not stated as uncontrolled 2/25/2010     Past Surgical History:   Procedure Laterality Date    COLONOSCOPY N/A 11/11/2020    COLONOSCOPY/EGD performed by Po Narvaez MD at [address]  12/13/2013         HC CAPSULE ENDOSCOPE M2A  4/4/2014         HX COLONOSCOPY      HX CORONARY STENT PLACEMENT      HX HEART ASSIST DEVICE Left 09/24/2019    HX KNEE REPLACEMENT Left 2010    HX ORTHOPAEDIC Right     FUSION IN RIGHT WRIST    HX OTHER SURGICAL      watchman procedure.  HX OTHER SURGICAL      shoulder surgery.  HX VASECTOMY  1983    MS CARDIAC SURG PROCEDURE UNLIST      X4 STENTS    MS CARDIOVERSION ELECTIVE ARRHYTHMIA EXTERNAL N/A 9/24/2019    Ep Cardioversion performed by Akbar Russell MD at [facility]  CATH LAB    UPPER GI ENDOSCOPY,BIOPSY  12/13/2013          Current Outpatient Medications on File Prior to Visit   Medication Sig Dispense Refill    amLODIPine (NORVASC) 10 mg tablet Take 1 tablet by mouth once daily 90 Tablet 0    rosuvastatin (Crestor) 20 mg tablet Take 1 Tablet by mouth nightly. 30 Tablet 5    losartan (COZAAR) 100 mg tablet TAKE 1 TABLET BY MOUTH ONCE DAILY FOR HIGH BLOOD PRESSURE 90 Tab 3    metFORMIN ER (GLUCOPHAGE XR) 500 mg tablet TAKE 4 TABLETS BY MOUTH ONCE DAILY WITH BREAKFAST 360 Tab 1    albuterol (PROVENTIL HFA, VENTOLIN HFA, PROAIR HFA) 90 mcg/actuation inhaler Take 1-2 Puffs by inhalation every six (6) hours as needed for Wheezing. Indications: bronchospasm 1 Inhaler 3    carvediloL (COREG) 6.25 mg tablet Take 2 Tabs by mouth two (2) times a day. 180 Tab 4    ferrous sulfate 325 mg (65 mg iron) tablet Take  by mouth Daily (before breakfast).       revefenacin (Yupelri) 175 mcg/3 mL nebu nebulizer solution 175 mcg by Nebulization route daily.  spironolactone (ALDACTONE) 25 mg tablet 25 mg daily. 3    aspirin delayed-release 81 mg tablet Take 81 mg by mouth daily. Indications: MYOCARDIAL REINFARCTION PREVENTION      minocycline (MINOCIN, DYNACIN) 100 mg capsule 100 mg daily. No current facility-administered medications on file prior to visit. Allergies   Allergen Reactions    Adhesive Tape Rash    Baycol Nausea and Vomiting    Clonidine Other (comments)     Headache and sleepiness      Pravachol [Pravastatin] Nausea and Vomiting     Family History   Problem Relation Age of Onset    Heart Disease Mother     Cancer Father         colon    Heart Disease Maternal Grandmother     Heart Disease Maternal Grandfather     Cancer Paternal Grandmother     Cancer Paternal Grandfather     Anesth Problems Neg Hx      Social History     Socioeconomic History    Marital status:      Spouse name: Not on file    Number of children: Not on file    Years of education: Not on file    Highest education level: Not on file   Tobacco Use    Smoking status: Never Smoker    Smokeless tobacco: Former User     Types: Chew    Tobacco comment: patient reports quitting over 20 yeaers ago.     Vaping Use    Vaping Use: Never used   Substance and Sexual Activity    Alcohol use: Yes     Comment: once a year    Drug use: No    Sexual activity: Not Currently   Other Topics Concern     Service Yes    Blood Transfusions Yes    Caffeine Concern Yes     Comment: 2-3 cups of coffee daily    Occupational Exposure No    Hobby Hazards No    Sleep Concern No    Stress Concern No    Weight Concern No    Special Diet No    Back Care No    Exercise Yes    Bike Helmet No     Comment: pt doesn't ride a bike   [address] [facility] [facility] Yes    Self-Exams No     Social Determinants of Health     Financial Resource Strain:     Difficulty of Paying Living Expenses:    Food Insecurity:     Worried About Running Out of Food in the Last Year:    [address] in the Last Year:    Transportation Needs:     Lack of Transportation (Medical):  Lack of Transportation (Non-Medical):    Physical Activity:     Days of Exercise per Week:     Minutes of Exercise per Session:    Stress:     Feeling of Stress :    Social Connections:     Frequency of Communication with Friends and Family:     Frequency of Social Gatherings with Friends and Family:     Attends Nondenominational Services:     Active Member of Clubs or Organizations:     Attends Club or Organization Meetings:     Marital Status:                  Review of Systems   Constitutional: Negative for chills, diaphoresis, fever, malaise/fatigue and weight loss. Eyes: Negative for blurred vision, double vision, pain and redness. Respiratory: Negative for cough, shortness of breath and wheezing. Cardiovascular: Negative for chest pain, palpitations, orthopnea, claudication, leg swelling and PND. Skin: Negative for itching and rash. Neurological: Negative for dizziness, tingling, tremors, sensory change, speech change, focal weakness, seizures, loss of consciousness, weakness and headaches. Results for orders placed or performed in visit on 08/27/21   HEMOGLOBIN A1C WITH EAG   Result Value Ref Range    Hemoglobin A1c 8.9 (H) 4.0 - 5.6 %    Est. average glucose 519 mg/dL   METABOLIC PANEL, COMPREHENSIVE   Result Value Ref Range    Sodium 140 136 - 145 mmol/L    Potassium 4.2 3.5 - 5.1 mmol/L    Chloride 108 97 - 108 mmol/L    CO2 25 21 - 32 mmol/L    Anion gap 7 5 - 15 mmol/L    Glucose 212 (H) 65 - 100 mg/dL    BUN 18 6 - 20 MG/DL    Creatinine 1.16 0.70 - 1.30 MG/DL    BUN/Creatinine ratio 16 12 - 20      GFR est AA >60 >60 ml/min/1.73m2    GFR est non-AA >60 >60 ml/min/1.73m2    Calcium 9.0 8.5 - 10.1 MG/DL    Bilirubin, total 0.5 0.2 - 1.0 MG/DL    ALT (SGPT) 21 12 - 78 U/L    AST (SGOT) 11 (L) 15 - 37 U/L    Alk.  phosphatase 118 (H) 45 - 117 U/L    Protein, total 7.1 6.4 - 8.2 g/dL    Albumin 3.5 3.5 - 5.0 g/dL    Globulin 3.6 2.0 - 4.0 g/dL    A-G Ratio 1.0 (L) 1.1 - 2.2     LIPID PANEL   Result Value Ref Range    Cholesterol, total 168 <200 MG/DL    Triglyceride 176 (H) <150 MG/DL    HDL Cholesterol 43 MG/DL    LDL, calculated 89.8 0 - 100 MG/DL    VLDL, calculated 35.2 MG/DL    CHOL/HDL Ratio 3.9 0.0 - 5.0               Physical Exam  Visit Vitals  /78 (BP 1 Location: Left arm, BP Patient Position: Sitting, BP Cuff Size: Large adult)   Pulse 78   Temp 98.3 °F (36.8 °C)   Resp 16   SpO2 97%         Head: Normocephalic, without obvious abnormality, atraumatic  Eyes: conjunctivae/corneas clear. PERRL, EOM's intact. Neck: supple, symmetrical, trachea midline, no adenopathy, thyroid: not enlarged, symmetric, no tenderness/mass/nodules, no carotid bruit and no JVD  Lungs: clear to auscultation bilaterally  Heart: irregular irregular rate and rhythm, S1, S2 normal, no murmur, click, rub or gallop  Extremities: extremities normal, atraumatic, no cyanosis or edema  Pulses: 2+ and symmetric  Lymph nodes: Cervical, supraclavicular, and axillary nodes normal.  Neurologic: Grossly normal. Baseline weakness on the right side. No change. He had short term memory of 2/3. He knew the date including day of the week and he remembered clearly what he had for supper last night        ASSESSMENT and PLAN    ICD-10-CM ICD-9-CM    1. Essential hypertension, benign  M99 514.1 METABOLIC PANEL, COMPREHENSIVE      METABOLIC PANEL, COMPREHENSIVE   2. Dyslipidemia, goal LDL below 70  E78.5 272.4 LIPID PANEL      METABOLIC PANEL, COMPREHENSIVE      METABOLIC PANEL, COMPREHENSIVE      LIPID PANEL   3. Type 2 diabetes mellitus with diabetic nephropathy, without long-term current use of insulin (HCC)  E11.21 250.40 LIPID PANEL     164.33 METABOLIC PANEL, COMPREHENSIVE      HEMOGLOBIN A1C WITH EAG      HEMOGLOBIN A1C WITH EAG      METABOLIC PANEL, COMPREHENSIVE      LIPID PANEL   4.  TOPHER (obstructive sleep apnea)  G47.33 327.23    5. Gastroesophageal reflux disease with esophagitis without hemorrhage  K21.00 530.81      530.10    6. Stage 3a chronic kidney disease (HCC)  N18.31 585.3    7. Coronary artery disease involving native coronary artery of native heart without angina pectoris  I25.10 414.01    8. Chronic anticoagulation  Z79.01 V58.61    9. Persistent atrial fibrillation (HCC)  I48.19 427.31    10. 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 HURD (dyspnea on exertion)  R06.00 786.09      Diagnoses and all orders for this visit:    1. Essential hypertension, benign  -     METABOLIC PANEL, COMPREHENSIVE; Future    2. Dyslipidemia, goal LDL below 70  -     LIPID PANEL; Future  -     METABOLIC PANEL, COMPREHENSIVE; Future    3. Type 2 diabetes mellitus with diabetic nephropathy, without long-term current use of insulin ([facility])  -     LIPID PANEL; Future  -     METABOLIC PANEL, COMPREHENSIVE; Future  -     HEMOGLOBIN A1C WITH EAG; Future    4. TOPHER (obstructive sleep apnea)    5. Gastroesophageal reflux disease with esophagitis without hemorrhage    6. Stage 3a chronic kidney disease ([address]    7. Coronary artery disease involving native coronary artery of native heart without angina pectoris    8. Chronic anticoagulation    9. Persistent atrial fibrillation ([address]    10. 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 HURD (dyspnea on exertion)      Follow-up and Dispositions    · Return in about 3 months (around 11/27/2021), or if intermittent diarrhea does not improve, for F/U HTN,CHOL,DM, memory concerns.          lab results and schedule of future lab studies reviewed with patient  reviewed diet, exercise and weight control  cardiovascular risk and specific lipid/LDL goals reviewed  reviewed medications and side effects in detail  Please call my office if there are any questions- [phone]. I will arrange for follow up after the lab tests done from today return  Recommended a weekly \"heart check. \" I went into detail how to do this. Call for refills on medications as needed. Discussed expected course/resolution/complications of diagnosis in detail with patient. Medication risks/benefits/costs/interactions/alternatives discussed with patient. Pt was given an after visit summary which includes diagnoses, current medications & vitals. Pt expressed understanding with the diagnosis and plan      BMI is significantly elevated- in the obese range. I reviewed diet, exercise and weight control. Discussed weight control in detail, the importance of mainly decreased carbs, and for weight maintenance, exercise; discussed different diets and that it isn't as important to watch the type of foods as it is to decrease calorie intake no matter what type of diet you do, etc.       Total 45 minutes  re: Recommended a weekly \"heart check. \" I went into detail how to do this. Regular exercise is very important to your health; it helps mentally, physically, socially; it prevents injuries if done properly. Exercise, even as simple as walking 20-30 minutes daily has major benefits to your health even though your \"numbers\" are the same in the lab. See if you can add this into your daily regimen and after a few months it will become a regular habit-\"just something you do,\" like brushing your teeth. A combination of aerobic exercise and strengthening and stretching is felt to be the best for you, so this should be your ultimate goal.   This can be done in the privacy of your home or in a group setting as at the gym  Some prefer having a , others prefer to do exercise in groups or individually. Do what \"works\" for you. You need to make it simple and \"fun,\" or you most likely will not continue it. Reviewed symptoms, or lack thereof, of hypertension and elevated cholesterol.      Review of  the proper technique of checking the blood pressure- check it on an average day only, not on a stressful day, sitting, no exercise for at least 1 hour and not experiencing any new pain( chronic pain is OK). Patient encouraged to check BP sitting and standing at least once a month and to report these readings to me if > 140/ 90 on average , or if the standing BP is >  15 points lower than the sitting. Always check it twice and if there is > 5 points decrease from the previous reading( top reading or systolic) keep checking it until it does not drop 5 points. Write only this final reading down, not the preceding readings. If out of these readings there is only 1 out of 4 or less > 828, or > 90 diastolic then your blood pressure is OK; it needs further treatment if it is above this. Also, don't forget,  as noted above, to check your blood pressure standing once a month; this is to detect a drop in your BP that might lead to fainting and serious injury; you check it standing with your arm hanging straight down and relaxed. Check it twice waiting 1 minute between the two readings. If, with either one of these 2 readings there is a > 15 point drop of the systolic compared to your sitting pressure( done before the standing BP), then let me know. Following these guidelines, continue to check your BP and write down only the ones described above and it will help me to effectively treat your blood pressure. Reviewed BP, cholesterol and diabetic goals. LDL goal<100,HDL goal>45, Triglyceride goal<150, A1C< 7.0, BP<140/90.      Discussed specific diabetic recommendations: low cholesterol diet, weight control and daily exercise discussed, home glucose monitoring emphasized, all medications, side effects and compliance discussed carefully, foot care discussed and Podiatry visits discussed, annual eye examinations at Ophthalmology discussed, glycohemoglobin and other lab monitoring discussed and long term diabetic complications discussed. Also, discussed symptoms of concern that were noted today in the note above, treatment options( including doing nothing), when to follow up before recommended time frame. Also, answered all questions. We talked about memory, the importance of measuring it, and letting me know when he is to the point of occasionally forgetting 2/3 objects. I talked to him about the importance of weight loss and congratulated him doing that. I encouraged him to continue working on that each time we see him. He is not checking his glucose at home regular and as long as his glucose and A1C are coming down he does not have to do that. He should continue follow up with his specialist like he has been doing recently. He should avoid NSAID's, especially with his hx of renal failure. I reminded him to do an eye exam on a yearly basis due to the DM. Last A1C went from 8.6 in August of last year to 8.2 in February. Imodium AD titrated to his diarrhea. This document was written by Blaise Suarez, as dictated by Rosi Kirkland MD.  I have reviewed and agree with the above note and have made corrections where appropriate Shimon Molina M.D.

## 2021-08-27 NOTE — PROGRESS NOTES
Chief Complaint   Patient presents with    Hypertension    Diabetes    Cholesterol Problem   1. Have you been to the ER, urgent care clinic since your last visit? Hospitalized since your last visit? No    2. Have you seen or consulted any other health care providers outside of the [address] [place] [address] since your last visit? Include any pap smears or colon screening.  Yes Yany,[facility] cardiology nephrology

## 2021-09-06 NOTE — PROGRESS NOTES
CARDIOLOGY OFFICE NOTE    Shimon Reddy MD, [address], [place], [address] [facility]  Phone [phone]; Fax [phone]  Mobile [phone]   Voice Mail [phone]      Nicanor Myles MD       ATTENTION:   This medical record was transcribed using an electronic medical records/speech recognition system. Although proofread, it may and can contain electronic, spelling and other errors. Corrections may be executed at a later time. Please feel free to contact us for any clarifications as needed. Ginger Tomlinson is a [de-identified] y.o. male with  referred for dyspnea. Cardiac risk factors: diabetes mellitus, sedentary life style, male gender, hypertension  I have personally obtained the history from the patient. HISTORY OF PRESENTING ILLNESS      From previous note:  Mr. Jaci Rico cardiac history includes CAD with multiple PCI's, PAF status post watchman with a history of GI bleeding he has been followed by Dr. Isabel Connors in the past.  He has class III-IV dyspnea on exertion he does not have any exertional chest discomfort or PND or orthopnea  . He even uses a motorized scooter secondary to his inability to ambulate and shortness of breath. Dr. Maurice Mireles from pulmonary has been treating him with inhalers . In the interim he has had a heart catheterization is for distal disease in his coronary anatomy RCA disease but none of the pictures amenable to intervention. 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 He is here with his wife today. He is still feeling SOB. In the past I checked a proBNP which was in the 300s chest x-ray showed no evidence of heart failure. He notices shortness of breath even with no activity just in his motorized deconditioned and overweight wheelchair.      ACTIVE PROBLEM LIST     Patient Active Problem List    Diagnosis Date Noted    Presence of Watchman left atrial appendage closure device 09/24/2019    Dysarthria 11/07/2019    Chronic anticoagulation 09/04/2019    CKD (chronic kidney disease) stage 3, GFR 30-59 ml/min ([facility]) 07/31/2019    Chronic heart failure with preserved ejection fraction ([facility] 05/22/2019    Chronic fatigue 04/23/2018    Type 2 diabetes mellitus with diabetic nephropathy, without long-term current use of insulin ([facility] 04/18/2018    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 ([facility]- @ 27 yo-weak on right arm>leg 04/12/2016    TOPHER (obstructive sleep apnea) 01/23/2015    Atrial fibrillation ([facility] 08/16/2013    Dyslipidemia, goal LDL below 70 12/20/2010    Essential hypertension, benign 02/25/2010    Reflux esophagitis 02/25/2010    CAD (coronary artery disease) 02/25/2010           PAST MEDICAL HISTORY     Past Medical History:   Diagnosis Date    Arrhythmia     a fib    Atrial fibrillation ([facility] 8/16/2013    Basal cell carcinoma of anterior chest 2/25/2010    EARS AND NOSE    CAD (coronary artery disease) 2/25/2010    CVA (cerebral infarction) 2/25/2010    patient denies 12/13/13    Diverticula of colon 2/25/2010    Dyslipidemia 12/20/2010    Essential hypertension, benign 2/25/2010    Hypertension     OTPHER (obstructive sleep apnea) 1/23/2015    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)     in 1959 at age of 26    Type II or unspecified type diabetes mellitus without mention of complication, not stated as uncontrolled 2/25/2010           PAST SURGICAL HISTORY     Past Surgical History:   Procedure Laterality Date    COLONOSCOPY N/A 11/11/2020    COLONOSCOPY/EGD performed by Samara Fox MD at [address] [place] [address]  12/13/2013          CAPSULE ENDOSCOPE M2A  4/4/2014         HX COLONOSCOPY      HX CORONARY STENT PLACEMENT      HX HEART ASSIST DEVICE Left 09/24/2019    HX KNEE REPLACEMENT Left 2010    HX ORTHOPAEDIC Right     FUSION IN RIGHT WRIST    HX OTHER SURGICAL      watchman procedure.  HX OTHER SURGICAL      shoulder surgery.  HX VASECTOMY  1983    PA CARDIAC SURG PROCEDURE UNLIST      X4 STENTS    PA CARDIOVERSION ELECTIVE ARRHYTHMIA EXTERNAL N/A 9/24/2019    Ep Cardioversion performed by Chay Quevedo MD at [address] CATH LAB    UPPER GI ENDOSCOPY,BIOPSY  12/13/2013               ALLERGIES     Allergies   Allergen Reactions    Adhesive Tape Rash    Baycol Nausea and Vomiting    Clonidine Other (comments)     Headache and sleepiness      Pravachol [Pravastatin] Nausea and Vomiting          FAMILY HISTORY     Family History   Problem Relation Age of Onset    Heart Disease Mother     Cancer Father         colon    Heart Disease Maternal Grandmother     Heart Disease Maternal Grandfather     Cancer Paternal Grandmother     Cancer Paternal Grandfather     Anesth Problems Neg Hx     negative for cardiac disease       SOCIAL HISTORY     Social History     Socioeconomic History    Marital status:      Spouse name: Not on file    Number of children: Not on file    Years of education: Not on file    Highest education level: Not on file   Tobacco Use    Smoking status: Never Smoker    Smokeless tobacco: Former User     Types: Chew    Tobacco comment: patient reports quitting over 20 yeaers ago.     Vaping Use    Vaping Use: Never used   Substance and Sexual Activity    Alcohol use: Yes     Comment: once a year    Drug use: No    Sexual activity: Not Currently   Other Topics Concern     Service Yes    Blood Transfusions Yes    Caffeine Concern Yes     Comment: 2-3 cups of coffee daily    Occupational Exposure No    Hobby Hazards No    Sleep Concern No    Stress Concern No    Weight Concern No    Special Diet No    Back Care No  Exercise Yes    Bike Helmet No     Comment: pt doesn't ride a bike   [address] Yes    Self-Exams No     Social Determinants of Health     Financial Resource Strain:     Difficulty of Paying Living Expenses:    Food Insecurity:     Worried About Running Out of Food in the Last Year:     [address] in the Last Year:    Transportation Needs:     Lack of Transportation (Medical):  Lack of Transportation (Non-Medical):    Physical Activity:     Days of Exercise per Week:     Minutes of Exercise per Session:    Stress:     Feeling of Stress :    Social Connections:     Frequency of Communication with Friends and Family:     Frequency of Social Gatherings with Friends and Family:     Attends [employer] Services:     Active Member of Clubs or Organizations:     Attends Club or Organization Meetings:     Marital Status:          MEDICATIONS     Current Outpatient Medications   Medication Sig    minocycline (MINOCIN, DYNACIN) 100 mg capsule 100 mg daily.  amLODIPine (NORVASC) 10 mg tablet Take 1 tablet by mouth once daily    rosuvastatin (Crestor) 20 mg tablet Take 1 Tablet by mouth nightly.  losartan (COZAAR) 100 mg tablet TAKE 1 TABLET BY MOUTH ONCE DAILY FOR HIGH BLOOD PRESSURE    metFORMIN ER (GLUCOPHAGE XR) 500 mg tablet TAKE 4 TABLETS BY MOUTH ONCE DAILY WITH BREAKFAST    albuterol (PROVENTIL HFA, VENTOLIN HFA, PROAIR HFA) 90 mcg/actuation inhaler Take 1-2 Puffs by inhalation every six (6) hours as needed for Wheezing. Indications: bronchospasm    carvediloL (COREG) 6.25 mg tablet Take 2 Tabs by mouth two (2) times a day.  ferrous sulfate 325 mg (65 mg iron) tablet Take  by mouth Daily (before breakfast).  revefenacin (Yupelri) 175 mcg/3 mL nebu nebulizer solution 175 mcg by Nebulization route daily.  spironolactone (ALDACTONE) 25 mg tablet 25 mg daily.  aspirin delayed-release 81 mg tablet Take 81 mg by mouth daily.  Indications: MYOCARDIAL REINFARCTION PREVENTION No current facility-administered medications for this visit. I have reviewed the nurses notes, vitals, problem list, allergy list, medical history, family, social history and medications. REVIEW OF SYMPTOMS   For positive per HPI  General: Pt denies excessive weight gain or loss. Pt is able to conduct ADL's  HEENT: Denies blurred vision, headaches, hearing loss, epistaxis and difficulty swallowing. Respiratory: Denies cough, congestion, shortness of breath, HURD, wheezing or stridor. Cardiovascular: Denies precordial pain, palpitations, edema or PND  Gastrointestinal: Denies poor appetite, indigestion, abdominal pain or blood in stool  Genitourinary: Denies hematuria, dysuria, increased urinary frequency  Musculoskeletal: Denies joint pain or swelling from muscles or joints  Neurologic: Denies tremor, paresthesias, headache, or sensory motor disturbance  Psychiatric: Denies confusion, insomnia, depression  Integumentray: Denies rash, itching or ulcers. Hematologic: Denies easy bruising, bleeding     PHYSICAL EXAMINATION      Vitals:    09/07/21 0833   BP: 118/78   Pulse: 93   SpO2: 97%   Weight: 257 lb (116.6 kg)   Height: 6' 4\" (1.93 m)     General: Well developed deconditioned and overweight in no acute distress. In  HEENT: No jaundice, oral mucosa moist, no oral ulcers  Neck: Supple, no stiffness, no lymphadenopathy, supple  Heart:   Irregular  Respiratory: Clear bilaterally x 4, no wheezing or rales  Extremities: Blood pressure is better on increased dose of Coreg+ edema, normal cap refill, no cyanosis. Musculoskeletal: No clubbing, no deformities  Neuro: A&Ox3, speech clear, in a wheelchair, cooperative, no focal neurologic deficits  Skin: Skin color is normal. No rashes or lesions. Non diaphoretic, moist.        EKG: Atrial fibrillation well controlled rate     DIAGNOSTIC DATA     1. Cath   (8/27/8): LAD p90, m70. OM1 70, OM2 90 (small). RCA m40. EF 55%. No AVG/MR.  Patent L SC.   PCI (11/13/8): mLAD 2.75x18 Xience, pLAD 2.75x23 Xience. OM1 3.0x15 Xience. Cath (8/29/13): LAD patent prox and mid stents. LCx m40; patent OM1 stent. RCA p80. EF 55%. No AVG/MR. PCI ost/proxRCA (8/29/13): 3.0x18 Xience. 4/28/17:Cath (4/28/17): LAD patent with patent prox and mid stent. LCx patent with patent OM1 stent. RCA p90 (ISR) and p90 distal to stent. EF 65%. No AVG/MR. PCI osteal/prox RCA (4/28/17): 3.0x28 Xience. 4/24/18: RCA - prior stents present; Prox ISR 60%; Mid to Distal - 60%   Difficulty engaging guide secondary to stent at ostium   JR4/AR1 guide would not engage ; [facility] guide used to engage RCA   FFR - not siginificant 0.89   12/7/20-Multivessel CAD. Moderate diffuse LAD disease with iFR of 0.92. Distal Lcx with severe disease not amendable to PCI. RCA with diffuse moderate ISR. Long term durable result of RCA will be difficult due to caliber of vessel, furthermore with his hx of GI bleeding I do not think it is worth the risk to expose patient to DAPT again. Recent Hgb of 8 due to GI bleed. Mildly elevated lvedp     2. Stress Test   Lexiscan Cardiolite (8/11/9): Mild fixed inf defect. No reversible defects. EF 51% with mild inf HK. Lexiscan Cardiolite 4/15/14 - normal perfusion imaging, EF 52%     3. Echo   (8/22/13): EF 55%. Mod dil LA. Mild dil RA. Mild MR/TR/NJ. PASP 24.   6/18/18 - EF 55%. No WMA. Mild asymmetric hypertrophy of the septum. MIld to mod LAE. AoV calcification and sclerosis. No significant change c/t study of 22-Aug-2013.   4/11/19 - EF 55-60%. Mod LAE. AoV sclerosis. Mild MR. Mild TR. Severely elevated CVP (>15 mmHg)   YESENIA 11/9/19 - 30mm Watchman device present. Well seated. No evidence of carlton-device leak. No overlying thrombus noted on the device. Very small right to left shunting likely related to recent trans-septal puncture for Watchman implantation. LVEF 56-60%.    3/19/20- EF 61%, Mild concentric hypertrophy, LAE, severe AV sclerosis, Mild TR, PAP 28.6 mmHg     4. DCCV 12/18/14     5. 9/24/2019:Endocardial left atrial appendage occlusion was performed utilizing a 30 mm WATCHMAN device per Dr. Veena Patrick     6. Lipids   8/17/20- , HDL 50, LDL 74,    2/24/21- , HDL 42, LDL 82.4,          LABORATORY DATA            Lab Results   Component Value Date/Time    WBC 7.1 02/24/2021 12:42 PM    Hemoglobin (POC) 9.9 03/19/2014 09:14 AM    HGB 13.0 02/24/2021 12:42 PM    HCT 41.3 02/24/2021 12:42 PM    PLATELET 277 91/86/2877 12:42 PM    MCV 93.7 02/24/2021 12:42 PM      Lab Results   Component Value Date/Time    Sodium 140 08/27/2021 12:06 PM    Potassium 4.2 08/27/2021 12:06 PM    Chloride 108 08/27/2021 12:06 PM    CO2 25 08/27/2021 12:06 PM    Anion gap 7 08/27/2021 12:06 PM    Glucose 212 (H) 08/27/2021 12:06 PM    BUN 18 08/27/2021 12:06 PM    Creatinine 1.16 08/27/2021 12:06 PM    BUN/Creatinine ratio 16 08/27/2021 12:06 PM    GFR est AA >60 08/27/2021 12:06 PM    GFR est non-AA >60 08/27/2021 12:06 PM    Calcium 9.0 08/27/2021 12:06 PM    Bilirubin, total 0.5 08/27/2021 12:06 PM    Alk. phosphatase 118 (H) 08/27/2021 12:06 PM    Protein, total 7.1 08/27/2021 12:06 PM    Albumin 3.5 08/27/2021 12:06 PM    Globulin 3.6 08/27/2021 12:06 PM    A-G Ratio 1.0 (L) 08/27/2021 12:06 PM    ALT (SGPT) 21 08/27/2021 12:06 PM           ASSESSMENT/RECOMMENDATIONS:.         1.  Class III dyspnea on exertion  -Multifactorial.  Including being deconditioned is a normal EF with some LVH. He is also got COPD and is not using inhalers as he should  -He states the inhalers do not necessarily help his shortness of breath  -Because of the potential for small vessel disease and put him on Ranexa 500 mg twice daily  -Also we will check a 48-hour Holter monitor to ensure that is not having periods of A. fib with tachycardia that may be provoking his shortness of breath.   2.  PCI's with a PCI and stenting with a drug-eluting stent of the RCA  -No intervention was performed on the last cardiac catheterization December 2020 is good to be medical management at this time. His EF was normal  3. PAF -no longer on amiodarone  -Has a watchman device  4. Dyslipidemia LDL goal below 70  -Last LDL was 90 if this persist would increase his dose of Crestor to 40 mg  5. Type 2 diabetes  -Hemoglobin A1c goal should be close to 7 or less  6. Hypertension   -He increased his Coreg dose last time he was here and had made an improvement his blood pressure.  -Blood pressure is better on increased dose of Coreg      No orders of the defined types were placed in this encounter. We discussed the expected course, resolution and complications of the diagnosis(es) in detail. Medication risks, benefits, costs, interactions, and alternatives were discussed as indicated. I advised him to contact the office if his condition worsens, changes or fails to improve as anticipated. He expressed understanding with the diagnosis(es) and plan          Follow-up and Dispositions  ·   Return in about 6 months (around 3/7/2022). I have discussed the diagnosis with  Lucero Vera and the intended plan as seen in the above orders. Questions were answered concerning future plans. I have discussed medication side effects and warnings with the patient as well. Thank you,  Roseanna Webster MD for involving me in the care of  Lucero Vera. Please do not hesitate to contact me for further questions/concerns. Shimon Cochran MD, [address] [place] [address] Rd[address]      [address] [place] [address], [address] [place] [address] Des Chapman.      [phone] / [phone] Fax

## 2021-09-07 ENCOUNTER — OFFICE VISIT (OUTPATIENT)
Dept: CARDIOLOGY CLINIC | Age: 81
End: 2021-09-07
Payer: MEDICARE

## 2021-09-07 VITALS
OXYGEN SATURATION: 97 % | WEIGHT: 257 LBS | HEART RATE: 93 BPM | DIASTOLIC BLOOD PRESSURE: 78 MMHG | BODY MASS INDEX: 31.29 KG/M2 | SYSTOLIC BLOOD PRESSURE: 118 MMHG | HEIGHT: 76 IN

## 2021-09-07 DIAGNOSIS — N18.30 STAGE 3 CHRONIC KIDNEY DISEASE, UNSPECIFIED WHETHER STAGE 3A OR 3B CKD (HCC): ICD-10-CM

## 2021-09-07 DIAGNOSIS — G47.33 OSA (OBSTRUCTIVE SLEEP APNEA): ICD-10-CM

## 2021-09-07 DIAGNOSIS — I25.10 CORONARY ARTERY DISEASE INVOLVING NATIVE CORONARY ARTERY OF NATIVE HEART WITHOUT ANGINA PECTORIS: Primary | ICD-10-CM

## 2021-09-07 DIAGNOSIS — Z86.73 HISTORY OF CVA (CEREBROVASCULAR ACCIDENT): ICD-10-CM

## 2021-09-07 DIAGNOSIS — E78.5 DYSLIPIDEMIA, GOAL LDL BELOW 70: ICD-10-CM

## 2021-09-07 DIAGNOSIS — I48.0 PAROXYSMAL ATRIAL FIBRILLATION (HCC): ICD-10-CM

## 2021-09-07 DIAGNOSIS — E11.21 TYPE 2 DIABETES MELLITUS WITH DIABETIC NEPHROPATHY, WITHOUT LONG-TERM CURRENT USE OF INSULIN (HCC): ICD-10-CM

## 2021-09-07 DIAGNOSIS — I10 ESSENTIAL HYPERTENSION, BENIGN: ICD-10-CM

## 2021-09-07 PROCEDURE — 3288F FALL RISK ASSESSMENT DOCD: CPT | Performed by: SPECIALIST

## 2021-09-07 PROCEDURE — G8754 DIAS BP LESS 90: HCPCS | Performed by: SPECIALIST

## 2021-09-07 PROCEDURE — 99214 OFFICE O/P EST MOD 30 MIN: CPT | Performed by: SPECIALIST

## 2021-09-07 PROCEDURE — G8427 DOCREV CUR MEDS BY ELIG CLIN: HCPCS | Performed by: SPECIALIST

## 2021-09-07 PROCEDURE — G8752 SYS BP LESS 140: HCPCS | Performed by: SPECIALIST

## 2021-09-07 PROCEDURE — 1100F PTFALLS ASSESS-DOCD GE2>/YR: CPT | Performed by: SPECIALIST

## 2021-09-07 PROCEDURE — G8536 NO DOC ELDER MAL SCRN: HCPCS | Performed by: SPECIALIST

## 2021-09-07 PROCEDURE — 3052F HG A1C>EQUAL 8.0%<EQUAL 9.0%: CPT | Performed by: SPECIALIST

## 2021-09-07 PROCEDURE — G8417 CALC BMI ABV UP PARAM F/U: HCPCS | Performed by: SPECIALIST

## 2021-09-07 PROCEDURE — G0463 HOSPITAL OUTPT CLINIC VISIT: HCPCS | Performed by: SPECIALIST

## 2021-09-07 PROCEDURE — G8432 DEP SCR NOT DOC, RNG: HCPCS | Performed by: SPECIALIST

## 2021-09-07 RX ORDER — RANOLAZINE 500 MG/1
500 TABLET, EXTENDED RELEASE ORAL 2 TIMES DAILY
Qty: 60 TABLET | Refills: 5 | Status: SHIPPED | OUTPATIENT
Start: 2021-09-07 | End: 2021-11-11

## 2021-09-07 NOTE — PROGRESS NOTES
Carlos Castañeda is a [de-identified] y.o. male    Visit Vitals  /78 (BP 1 Location: Left upper arm, BP Patient Position: Sitting, BP Cuff Size: Adult)   Pulse 93   Ht 6' 4\" (1.93 m)   Wt 257 lb (116.6 kg)   SpO2 97%   BMI 31.28 kg/m²       Chief Complaint   Patient presents with    Cholesterol Problem    Coronary Artery Disease    Other     PAF       Chest pain NO  SOB YES  Dizziness NO  Swelling FEET  Recent hospital visit NO  Refills NO  COVID VACCINE STATUS YES

## 2021-09-07 NOTE — PATIENT INSTRUCTIONS
1) we will attempt to give you ranolazine also known as Ranexa 500 mg twice a day to see if this can help your symptoms of shortness of breath    2) we will also place a 48-hour Holter monitor on you to ensure that your heart rate is not going up at times it may be contributing or shortness of breath    3) follow-up with me in approximate 8 weeks.

## 2021-09-10 ENCOUNTER — CLINICAL SUPPORT (OUTPATIENT)
Dept: CARDIOLOGY CLINIC | Age: 81
End: 2021-09-10
Payer: MEDICARE

## 2021-09-10 DIAGNOSIS — R06.02 SOB (SHORTNESS OF BREATH): Primary | ICD-10-CM

## 2021-09-10 PROCEDURE — 93227 XTRNL ECG REC<48 HR R&I: CPT | Performed by: SPECIALIST

## 2021-09-10 PROCEDURE — 93225 XTRNL ECG REC<48 HRS REC: CPT | Performed by: SPECIALIST

## 2021-09-23 ENCOUNTER — TELEPHONE (OUTPATIENT)
Dept: CARDIOLOGY CLINIC | Age: 81
End: 2021-09-23

## 2021-09-23 DIAGNOSIS — E11.21 TYPE 2 DIABETES MELLITUS WITH DIABETIC NEPHROPATHY, WITHOUT LONG-TERM CURRENT USE OF INSULIN (HCC): ICD-10-CM

## 2021-09-23 DIAGNOSIS — N18.30 STAGE 3 CHRONIC KIDNEY DISEASE, UNSPECIFIED WHETHER STAGE 3A OR 3B CKD (HCC): ICD-10-CM

## 2021-09-23 DIAGNOSIS — G47.33 OSA (OBSTRUCTIVE SLEEP APNEA): ICD-10-CM

## 2021-09-23 DIAGNOSIS — R06.02 SOB (SHORTNESS OF BREATH): Primary | ICD-10-CM

## 2021-09-23 DIAGNOSIS — I10 ESSENTIAL HYPERTENSION, BENIGN: ICD-10-CM

## 2021-09-23 DIAGNOSIS — Z86.73 HISTORY OF CVA (CEREBROVASCULAR ACCIDENT): ICD-10-CM

## 2021-09-23 DIAGNOSIS — I25.10 CORONARY ARTERY DISEASE INVOLVING NATIVE CORONARY ARTERY OF NATIVE HEART WITHOUT ANGINA PECTORIS: ICD-10-CM

## 2021-09-23 DIAGNOSIS — I48.0 PAROXYSMAL ATRIAL FIBRILLATION (HCC): ICD-10-CM

## 2021-09-23 NOTE — TELEPHONE ENCOUNTER
Informed pt that no concerning rhythms seen on recent Holter per Dr David Foley - would like to check Hgb. Pt will go to lab at [facility]. Lab order put in.

## 2021-10-04 DIAGNOSIS — I25.10 CORONARY ARTERY DISEASE INVOLVING NATIVE CORONARY ARTERY OF NATIVE HEART WITHOUT ANGINA PECTORIS: ICD-10-CM

## 2021-10-12 RX ORDER — AMLODIPINE BESYLATE 10 MG/1
TABLET ORAL
Qty: 90 TABLET | Refills: 0 | Status: SHIPPED | OUTPATIENT
Start: 2021-10-12 | End: 2022-01-18

## 2021-11-11 ENCOUNTER — OFFICE VISIT (OUTPATIENT)
Dept: CARDIOLOGY CLINIC | Age: 81
End: 2021-11-11
Payer: MEDICARE

## 2021-11-11 VITALS
HEIGHT: 76 IN | HEART RATE: 90 BPM | WEIGHT: 256 LBS | BODY MASS INDEX: 31.17 KG/M2 | OXYGEN SATURATION: 97 % | DIASTOLIC BLOOD PRESSURE: 70 MMHG | SYSTOLIC BLOOD PRESSURE: 110 MMHG

## 2021-11-11 DIAGNOSIS — I48.0 PAROXYSMAL ATRIAL FIBRILLATION (HCC): ICD-10-CM

## 2021-11-11 DIAGNOSIS — R06.02 SOB (SHORTNESS OF BREATH): ICD-10-CM

## 2021-11-11 DIAGNOSIS — I25.10 CORONARY ARTERY DISEASE INVOLVING NATIVE CORONARY ARTERY OF NATIVE HEART WITHOUT ANGINA PECTORIS: Primary | ICD-10-CM

## 2021-11-11 DIAGNOSIS — Z86.73 HISTORY OF CVA (CEREBROVASCULAR ACCIDENT): ICD-10-CM

## 2021-11-11 DIAGNOSIS — E11.21 TYPE 2 DIABETES MELLITUS WITH DIABETIC NEPHROPATHY, WITHOUT LONG-TERM CURRENT USE OF INSULIN (HCC): ICD-10-CM

## 2021-11-11 DIAGNOSIS — N18.30 STAGE 3 CHRONIC KIDNEY DISEASE, UNSPECIFIED WHETHER STAGE 3A OR 3B CKD (HCC): ICD-10-CM

## 2021-11-11 DIAGNOSIS — I10 ESSENTIAL HYPERTENSION, BENIGN: ICD-10-CM

## 2021-11-11 DIAGNOSIS — G47.33 OSA (OBSTRUCTIVE SLEEP APNEA): ICD-10-CM

## 2021-11-11 PROCEDURE — G8427 DOCREV CUR MEDS BY ELIG CLIN: HCPCS | Performed by: SPECIALIST

## 2021-11-11 PROCEDURE — G8536 NO DOC ELDER MAL SCRN: HCPCS | Performed by: SPECIALIST

## 2021-11-11 PROCEDURE — 99214 OFFICE O/P EST MOD 30 MIN: CPT | Performed by: SPECIALIST

## 2021-11-11 PROCEDURE — G0463 HOSPITAL OUTPT CLINIC VISIT: HCPCS | Performed by: SPECIALIST

## 2021-11-11 PROCEDURE — 1101F PT FALLS ASSESS-DOCD LE1/YR: CPT | Performed by: SPECIALIST

## 2021-11-11 PROCEDURE — G8752 SYS BP LESS 140: HCPCS | Performed by: SPECIALIST

## 2021-11-11 PROCEDURE — G8432 DEP SCR NOT DOC, RNG: HCPCS | Performed by: SPECIALIST

## 2021-11-11 PROCEDURE — G8417 CALC BMI ABV UP PARAM F/U: HCPCS | Performed by: SPECIALIST

## 2021-11-11 PROCEDURE — 3052F HG A1C>EQUAL 8.0%<EQUAL 9.0%: CPT | Performed by: SPECIALIST

## 2021-11-11 PROCEDURE — G8754 DIAS BP LESS 90: HCPCS | Performed by: SPECIALIST

## 2021-11-11 NOTE — PROGRESS NOTES
Cami Ruiz is a [de-identified] y.o. male    Visit Vitals  /70 (BP 1 Location: Left upper arm, BP Patient Position: Sitting, BP Cuff Size: Adult)   Pulse 90   Ht 6' 4\" (1.93 m)   Wt 256 lb (116.1 kg)   SpO2 97%   BMI 31.16 kg/m²       Chief Complaint   Patient presents with    Shortness of Breath    Follow-up       Chest pain NO  SOB YES, OFTEN FATIGUED  Dizziness NO  Swelling LEGS  Recent hospital visit NO  Refills NO  COVID VACCINE STATUS YES  HAD COVID? NO    TAKING AN ANTIBIOTIC BUT CANNOT RECALL THE NAME.

## 2021-11-11 NOTE — PATIENT INSTRUCTIONS
1) since we saw no improvement with Ranexa you can discontinue that    2) follow-up with me in 6 months.     3) consider going on [employer] and getting the hand bike

## 2021-11-11 NOTE — LETTER
11/11/2021    Patient: Richard Houser   YOB: 1940   Date of Visit: 11/11/2021     Melly Benitez MD  [address] [place] [address]    Dear Melly Benitez MD,      Thank you for referring Mr. [place] Grant to [address] [place] [address] for evaluation. My notes for this consultation are attached. If you have questions, please do not hesitate to call me. I look forward to following your patient along with you.       Sincerely,    Jillian Valencia MD

## 2021-11-11 NOTE — PROGRESS NOTES
CARDIOLOGY OFFICE NOTE    Shimon Menjivar MD, [address], [place], [address] [facility]  Phone [phone]; Fax [phone]  Mobile [phone]   Voice Mail [phone]      Carrie Mauro MD       ATTENTION:   This medical record was transcribed using an electronic medical records/speech recognition system. Although proofread, it may and can contain electronic, spelling and other errors. Corrections may be executed at a later time. Please feel free to contact us for any clarifications as needed. Becca Bean is a [de-identified] y.o. male with  referred for dyspnea. Cardiac risk factors: diabetes mellitus, sedentary life style, male gender, hypertension  I have personally obtained the history from the patient. HISTORY OF PRESENTING ILLNESS      Mr. Polanco's cardiac history includes CAD with multiple PCI's, PAF status post watchman with a history of GI bleeding he has been followed by Dr. Alejandra Jenkins in the past.  He has class III-IV dyspnea on exertion he does not have any exertional chest discomfort or PND or orthopnea . He is pretty much at baseline. He says his inhalers do not help with his shortness of breath. His shortness of breath occurs when he is out of wheelchair and starts doing things. He is very inactive and so I think it is from being deconditioned. He is overweight and this is all complicating the situation. I do not think there is any cardiac reason or anything that can be resolved from a cardiac standpoint I will improve the symptoms.   I discussed this with both him and his wife     ACTIVE PROBLEM LIST     Patient Active Problem List    Diagnosis Date Noted    Presence of Watchman left atrial appendage closure device 09/24/2019    Dysarthria 11/07/2019    Chronic anticoagulation 09/04/2019    CKD (chronic kidney disease) stage 3, GFR 30-59 ml/min ([facility]) 07/31/2019    Chronic heart failure with preserved ejection fraction (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 ([facility] [address]- @ 27 yo-weak on right arm>leg 04/12/2016    TOPHER (obstructive sleep apnea) 01/23/2015    Atrial fibrillation ([facility] [address] 08/16/2013    Dyslipidemia, goal LDL below 70 12/20/2010    Essential hypertension, benign 02/25/2010    Reflux esophagitis 02/25/2010    CAD (coronary artery disease) 02/25/2010           PAST MEDICAL HISTORY     Past Medical History:   Diagnosis Date    Arrhythmia     a fib    Atrial fibrillation ([facility] [address] 8/16/2013    Basal cell carcinoma of anterior chest 2/25/2010    EARS AND NOSE    CAD (coronary artery disease) 2/25/2010    CVA (cerebral infarction) 2/25/2010    patient denies 12/13/13    Diverticula of colon 2/25/2010    Dyslipidemia 12/20/2010    Essential hypertension, benign 2/25/2010    Hypertension     TOPHER (obstructive sleep apnea) 1/23/2015    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 2/25/2010    Other and unspecified hyperlipidemia 2/25/2010    Positive PPD 2/25/2010    Reflux esophagitis 2/25/2010    NO LONGER A PROBLEM    Stroke [facility])     in 1959 at age of 26    Type II or unspecified type diabetes mellitus without mention of complication, not stated as uncontrolled 2/25/2010           PAST SURGICAL HISTORY     Past Surgical History:   Procedure Laterality Date    COLONOSCOPY N/A 11/11/2020    COLONOSCOPY/EGD performed by Mary Alexander MD at [address] [place] [address]  12/13/2013          CAPSULE ENDOSCOPE M2A  4/4/2014         HX COLONOSCOPY      HX CORONARY STENT PLACEMENT      HX HEART ASSIST DEVICE Left 09/24/2019    HX KNEE REPLACEMENT Left 2010    HX ORTHOPAEDIC Right     FUSION IN RIGHT WRIST    HX OTHER SURGICAL      watchman procedure.  HX OTHER SURGICAL      shoulder surgery.      HX VASECTOMY  1983    NH CARDIAC SURG PROCEDURE UNLIST      X4 STENTS    NM CARDIOVERSION ELECTIVE ARRHYTHMIA EXTERNAL N/A 9/24/2019    Ep Cardioversion performed by Hansel Winslow MD at [facility] CATH LAB    UPPER GI ENDOSCOPY,BIOPSY  12/13/2013               ALLERGIES     Allergies   Allergen Reactions    Adhesive Tape Rash    Baycol Nausea and Vomiting    Clonidine Other (comments)     Headache and sleepiness      Pravachol [Pravastatin] Nausea and Vomiting          FAMILY HISTORY     Family History   Problem Relation Age of Onset    Heart Disease Mother     Cancer Father         colon    Heart Disease Maternal Grandmother     Heart Disease Maternal Grandfather     Cancer Paternal Grandmother     Cancer Paternal Grandfather     Anesth Problems Neg Hx     negative for cardiac disease       SOCIAL HISTORY     Social History     Socioeconomic History    Marital status:    Tobacco Use    Smoking status: Never Smoker    Smokeless tobacco: Former User     Types: Chew    Tobacco comment: patient reports quitting over 20 yeaers ago. Vaping Use    Vaping Use: Never used   Substance and Sexual Activity    Alcohol use: Yes     Comment: once a year    Drug use: No    Sexual activity: Not Currently   Other Topics Concern     Service Yes    Blood Transfusions Yes    Caffeine Concern Yes     Comment: 2-3 cups of coffee daily    Occupational Exposure No    Hobby Hazards No    Sleep Concern No    Stress Concern No    Weight Concern No    Special Diet No    Back Care No    Exercise Yes    Bike Helmet No     Comment: pt doesn't ride a bike   [facility] [facility] Yes    Self-Exams No         MEDICATIONS     Current Outpatient Medications   Medication Sig    amLODIPine (NORVASC) 10 mg tablet Take 1 tablet by mouth once daily    ranolazine ER (Ranexa) 500 mg SR tablet Take 1 Tablet by mouth two (2) times a day.  minocycline (MINOCIN, DYNACIN) 100 mg capsule 100 mg daily.     rosuvastatin (Crestor) 20 mg tablet Take 1 Tablet by mouth nightly.  losartan (COZAAR) 100 mg tablet TAKE 1 TABLET BY MOUTH ONCE DAILY FOR HIGH BLOOD PRESSURE    metFORMIN ER (GLUCOPHAGE XR) 500 mg tablet TAKE 4 TABLETS BY MOUTH ONCE DAILY WITH BREAKFAST    albuterol (PROVENTIL HFA, VENTOLIN HFA, PROAIR HFA) 90 mcg/actuation inhaler Take 1-2 Puffs by inhalation every six (6) hours as needed for Wheezing. Indications: bronchospasm    carvediloL (COREG) 6.25 mg tablet Take 2 Tabs by mouth two (2) times a day.  ferrous sulfate 325 mg (65 mg iron) tablet Take  by mouth Daily (before breakfast).  revefenacin (Yupelri) 175 mcg/3 mL nebu nebulizer solution 175 mcg by Nebulization route daily.  spironolactone (ALDACTONE) 25 mg tablet 25 mg daily.  aspirin delayed-release 81 mg tablet Take 81 mg by mouth daily. Indications: MYOCARDIAL REINFARCTION PREVENTION     No current facility-administered medications for this visit. I have reviewed the nurses notes, vitals, problem list, allergy list, medical history, family, social history and medications. REVIEW OF SYMPTOMS   For positive per HPI  General: Pt denies excessive weight gain or loss. Pt is able to conduct ADL's  HEENT: Denies blurred vision, headaches, hearing loss, epistaxis and difficulty swallowing. Respiratory: Denies cough, congestion, shortness of breath, HURD, wheezing or stridor. Cardiovascular: Denies precordial pain, palpitations, edema or PND  Gastrointestinal: Denies poor appetite, indigestion, abdominal pain or blood in stool  Genitourinary: Denies hematuria, dysuria, increased urinary frequency  Musculoskeletal: Denies joint pain or swelling from muscles or joints  Neurologic: Denies tremor, paresthesias, headache, or sensory motor disturbance  Psychiatric: Denies confusion, insomnia, depression  Integumentray: Denies rash, itching or ulcers.   Hematologic: Denies easy bruising, bleeding     PHYSICAL EXAMINATION      Vitals:    11/11/21 0950   BP: 110/70   Pulse: 90   SpO2: 97%   Weight: 256 lb (116.1 kg)   Height: 6' 4\" (1.93 m)     General: Well developed deconditioned and overweight in no acute distress. HEENT: No jaundice, oral mucosa moist, no oral ulcers  Neck: Supple, no stiffness, no lymphadenopathy, supple  Heart:   Irregular  Respiratory: Clear bilaterally x 4, no wheezing or rales  Extremities: Blood pressure is better on increased dose of Coreg+ edema, normal cap refill, no cyanosis. Musculoskeletal: No clubbing, no deformities  Neuro: A&Ox3, speech clear, in a wheelchair, cooperative, no focal neurologic deficits  Skin: Skin color is normal. No rashes or lesions. Non diaphoretic, moist.        EKG: Atrial fibrillation well controlled rate     DIAGNOSTIC DATA     1. Cath   (8/27/8): LAD p90, m70. OM1 70, OM2 90 (small). RCA m40. EF 55%. No AVG/MR. Patent L SC.   PCI (11/13/8): mLAD 2.75x18 Xience, pLAD 2.75x23 Xience. OM1 3.0x15 Xience. Cath (8/29/13): LAD patent prox and mid stents. LCx m40; patent OM1 stent. RCA p80. EF 55%. No AVG/MR. PCI ost/proxRCA (8/29/13): 3.0x18 Xience. 4/28/17:Cath (4/28/17): LAD patent with patent prox and mid stent. LCx patent with patent OM1 stent. RCA p90 (ISR) and p90 distal to stent. EF 65%. No AVG/MR. PCI osteal/prox RCA (4/28/17): 3.0x28 Xience. 4/24/18: RCA - prior stents present; Prox ISR 60%; Mid to Distal - 60%   Difficulty engaging guide secondary to stent at ostium   JR4/AR1 guide would not engage ; THE [facility] guide used to engage RCA   FFR - not siginificant 0.89   12/7/20-Multivessel CAD. Moderate diffuse LAD disease with iFR of 0.92. Distal Lcx with severe disease not amendable to PCI. RCA with diffuse moderate ISR. Long term durable result of RCA will be difficult due to caliber of vessel, furthermore with his hx of GI bleeding I do not think it is worth the risk to expose patient to DAPT again. Recent Hgb of 8 due to GI bleed. Mildly elevated lvedp     2.  Stress Test   Lexiscan Cardiolite (8/11/9): Mild fixed inf defect. No reversible defects. EF 51% with mild inf HK. Lexiscan Cardiolite 4/15/14 - normal perfusion imaging, EF 52%     3. Echo   (8/22/13): EF 55%. Mod dil LA. Mild dil RA. Mild MR/TR/TN. PASP 24.   6/18/18 - EF 55%. No WMA. Mild asymmetric hypertrophy of the septum. MIld to mod LAE. AoV calcification and sclerosis. No significant change c/t study of 22-Aug-2013.   4/11/19 - EF 55-60%. Mod LAE. AoV sclerosis. Mild MR. Mild TR. Severely elevated CVP (>15 mmHg)   YESENIA 11/9/19 - 30mm Watchman device present. Well seated. No evidence of carlton-device leak. No overlying thrombus noted on the device. Very small right to left shunting likely related to recent trans-septal puncture for Watchman implantation. LVEF 56-60%. 3/19/20- EF 61%, Mild concentric hypertrophy, LAE, severe AV sclerosis, Mild TR, PAP 28.6 mmHg       4. DCCV 12/18/14     5. 9/24/2019:Endocardial left atrial appendage occlusion was performed utilizing a 30 mm WATCHMAN device per Dr. Vernell Gregory     6. Lipids   8/17/20- , HDL 50, LDL 74,    2/24/21- , HDL 42, LDL 82.4,          LABORATORY DATA            Lab Results   Component Value Date/Time    WBC 7.1 02/24/2021 12:42 PM    Hemoglobin (POC) 9.9 03/19/2014 09:14 AM    HGB 13.0 09/29/2021 11:53 AM    HCT 41.2 09/29/2021 11:53 AM    PLATELET 624 92/81/7541 12:42 PM    MCV 93.7 02/24/2021 12:42 PM      Lab Results   Component Value Date/Time    Sodium 140 08/27/2021 12:06 PM    Potassium 4.2 08/27/2021 12:06 PM    Chloride 108 08/27/2021 12:06 PM    CO2 25 08/27/2021 12:06 PM    Anion gap 7 08/27/2021 12:06 PM    Glucose 212 (H) 08/27/2021 12:06 PM    BUN 18 08/27/2021 12:06 PM    Creatinine 1.16 08/27/2021 12:06 PM    BUN/Creatinine ratio 16 08/27/2021 12:06 PM    GFR est AA >60 08/27/2021 12:06 PM    GFR est non-AA >60 08/27/2021 12:06 PM    Calcium 9.0 08/27/2021 12:06 PM    Bilirubin, total 0.5 08/27/2021 12:06 PM    Alk.  phosphatase 118 (H) 08/27/2021 12:06 PM Protein, total 7.1 08/27/2021 12:06 PM    Albumin 3.5 08/27/2021 12:06 PM    Globulin 3.6 08/27/2021 12:06 PM    A-G Ratio 1.0 (L) 08/27/2021 12:06 PM    ALT (SGPT) 21 08/27/2021 12:06 PM           ASSESSMENT/RECOMMENDATIONS:.         1.  Class III dyspnea on exertion  -As stated before I believe it is multifactorial including his COPD probably microvascular coronary artery disease. There was no improvement with the Ranexa so we will discontinue that.  -His heart monitor did not demonstrate any significant sustained tachycardia and his chronic atrial fibrillation. 2.  PCI's with a PCI and stenting with a drug-eluting stent of the RCA  -No intervention was performed on the last cardiac catheterization December 2020   -He has distal coronary artery disease and I would presume some microvascular disease as well  3. PAF   -no longer on amiodarone  -Has a watchman device does not require anticoagulation  4. Dyslipidemia LDL goal below 70  -Last LDL was 90 if this persist would increase his dose of Crestor to 40 mg  -Again his LDL goal should be under 70  Lab Results   Component Value Date/Time    Cholesterol, total 168 08/27/2021 12:06 PM    HDL Cholesterol 43 08/27/2021 12:06 PM    LDL, calculated 89.8 08/27/2021 12:06 PM    VLDL, calculated 35.2 08/27/2021 12:06 PM    Triglyceride 176 (H) 08/27/2021 12:06 PM    CHOL/HDL Ratio 3.9 08/27/2021 12:06 PM   5. Type 2 diabetes  -Hemoglobin A1c goal should be close to 7 or less  6. Hypertension   -Blood pressure is excellent on current medical regimen    Follow-up with me in 6 months      No orders of the defined types were placed in this encounter. We discussed the expected course, resolution and complications of the diagnosis(es) in detail. Medication risks, benefits, costs, interactions, and alternatives were discussed as indicated. I advised him to contact the office if his condition worsens, changes or fails to improve as anticipated.  He expressed understanding with the diagnosis(es) and plan          Follow-up and Dispositions  ·   Return in about 6 months (around 5/11/2022). I have discussed the diagnosis with  Johnny Orr and the intended plan as seen in the above orders. Questions were answered concerning future plans. I have discussed medication side effects and warnings with the patient as well. Thank you,  Gonzalez Guevara MD for involving me in the care of  Johnny Orr. Please do not hesitate to contact me for further questions/concerns. Shimon Cochran MD, [facility] [address]      [address] [place] [address], [address] [place] [address] Drive      [phone] / [phone] Fax

## 2021-11-27 DIAGNOSIS — E11.9 TYPE 2 DIABETES MELLITUS WITHOUT COMPLICATION (HCC): ICD-10-CM

## 2021-11-28 NOTE — TELEPHONE ENCOUNTER
He saw his glucose and A1C on MyChart on 10/4- call him and ask him if he is checking his glucose and what, if anything is he doing differently to bring down the glucose. Because the A1C is increasig needs appt in next 4-6 weeks. Keep the April appt.

## 2021-11-29 RX ORDER — METFORMIN HYDROCHLORIDE 500 MG/1
TABLET, EXTENDED RELEASE ORAL
Qty: 360 TABLET | Refills: 0 | Status: SHIPPED | OUTPATIENT
Start: 2021-11-29 | End: 2021-12-09

## 2021-12-08 DIAGNOSIS — E11.9 TYPE 2 DIABETES MELLITUS WITHOUT COMPLICATION (HCC): ICD-10-CM

## 2021-12-08 DIAGNOSIS — I10 ESSENTIAL HYPERTENSION, BENIGN: ICD-10-CM

## 2021-12-09 RX ORDER — CARVEDILOL 6.25 MG/1
6.25 TABLET ORAL 2 TIMES DAILY WITH MEALS
Qty: 180 TABLET | Refills: 3 | Status: SHIPPED | OUTPATIENT
Start: 2021-12-09

## 2021-12-09 RX ORDER — CARVEDILOL 6.25 MG/1
6.25 TABLET ORAL 2 TIMES DAILY WITH MEALS
COMMUNITY
End: 2021-12-09 | Stop reason: SDUPTHER

## 2021-12-09 RX ORDER — CARVEDILOL 6.25 MG/1
TABLET ORAL
Qty: 180 TABLET | Refills: 0 | OUTPATIENT
Start: 2021-12-09

## 2021-12-09 RX ORDER — METFORMIN HYDROCHLORIDE 500 MG/1
TABLET, EXTENDED RELEASE ORAL
Qty: 360 TABLET | Refills: 0 | Status: SHIPPED | OUTPATIENT
Start: 2021-12-09 | End: 2022-09-16

## 2021-12-14 RX ORDER — CARVEDILOL 6.25 MG/1
TABLET ORAL
Qty: 180 TABLET | Refills: 0 | OUTPATIENT
Start: 2021-12-14

## 2021-12-30 ENCOUNTER — OFFICE VISIT (OUTPATIENT)
Dept: FAMILY MEDICINE CLINIC | Age: 81
End: 2021-12-30
Payer: MEDICARE

## 2021-12-30 VITALS
OXYGEN SATURATION: 97 % | TEMPERATURE: 98.4 F | DIASTOLIC BLOOD PRESSURE: 70 MMHG | RESPIRATION RATE: 16 BRPM | HEART RATE: 69 BPM | SYSTOLIC BLOOD PRESSURE: 132 MMHG

## 2021-12-30 DIAGNOSIS — E78.5 DYSLIPIDEMIA, GOAL LDL BELOW 70: Primary | ICD-10-CM

## 2021-12-30 DIAGNOSIS — E61.1 IRON DEFICIENCY: ICD-10-CM

## 2021-12-30 DIAGNOSIS — E11.21 TYPE 2 DIABETES MELLITUS WITH DIABETIC NEPHROPATHY, WITHOUT LONG-TERM CURRENT USE OF INSULIN (HCC): ICD-10-CM

## 2021-12-30 DIAGNOSIS — K21.00 GASTROESOPHAGEAL REFLUX DISEASE WITH ESOPHAGITIS WITHOUT HEMORRHAGE: ICD-10-CM

## 2021-12-30 DIAGNOSIS — N18.31 STAGE 3A CHRONIC KIDNEY DISEASE (HCC): ICD-10-CM

## 2021-12-30 DIAGNOSIS — I48.19 PERSISTENT ATRIAL FIBRILLATION (HCC): ICD-10-CM

## 2021-12-30 DIAGNOSIS — R06.09 DOE (DYSPNEA ON EXERTION): ICD-10-CM

## 2021-12-30 DIAGNOSIS — I10 ESSENTIAL HYPERTENSION, BENIGN: ICD-10-CM

## 2021-12-30 DIAGNOSIS — E11.21 TYPE 2 DIABETES WITH NEPHROPATHY (HCC): ICD-10-CM

## 2021-12-30 DIAGNOSIS — Z23 NEEDS FLU SHOT: ICD-10-CM

## 2021-12-30 LAB
ALBUMIN SERPL-MCNC: 3.3 G/DL (ref 3.5–5)
ALBUMIN/GLOB SERPL: 0.9 {RATIO} (ref 1.1–2.2)
ALP SERPL-CCNC: 117 U/L (ref 45–117)
ALT SERPL-CCNC: 15 U/L (ref 12–78)
ANION GAP SERPL CALC-SCNC: 7 MMOL/L (ref 5–15)
AST SERPL-CCNC: 5 U/L (ref 15–37)
BILIRUB SERPL-MCNC: 0.8 MG/DL (ref 0.2–1)
BUN SERPL-MCNC: 20 MG/DL (ref 6–20)
BUN/CREAT SERPL: 14 (ref 12–20)
CALCIUM SERPL-MCNC: 9.5 MG/DL (ref 8.5–10.1)
CHLORIDE SERPL-SCNC: 103 MMOL/L (ref 97–108)
CHOLEST SERPL-MCNC: 233 MG/DL
CO2 SERPL-SCNC: 26 MMOL/L (ref 21–32)
CREAT SERPL-MCNC: 1.43 MG/DL (ref 0.7–1.3)
CREAT UR-MCNC: 256 MG/DL
EST. AVERAGE GLUCOSE BLD GHB EST-MCNC: 174 MG/DL
GLOBULIN SER CALC-MCNC: 3.8 G/DL (ref 2–4)
GLUCOSE SERPL-MCNC: 201 MG/DL (ref 65–100)
HBA1C MFR BLD: 7.7 % (ref 4–5.6)
HDLC SERPL-MCNC: 44 MG/DL
HDLC SERPL: 5.3 {RATIO} (ref 0–5)
LDLC SERPL CALC-MCNC: 165 MG/DL (ref 0–100)
MICROALBUMIN UR-MCNC: 12.8 MG/DL
MICROALBUMIN/CREAT UR-RTO: 50 MG/G (ref 0–30)
POTASSIUM SERPL-SCNC: 4.7 MMOL/L (ref 3.5–5.1)
PROT SERPL-MCNC: 7.1 G/DL (ref 6.4–8.2)
SODIUM SERPL-SCNC: 136 MMOL/L (ref 136–145)
TRIGL SERPL-MCNC: 120 MG/DL (ref ?–150)
VLDLC SERPL CALC-MCNC: 24 MG/DL

## 2021-12-30 PROCEDURE — G8752 SYS BP LESS 140: HCPCS | Performed by: FAMILY MEDICINE

## 2021-12-30 PROCEDURE — 90662 IIV NO PRSV INCREASED AG IM: CPT | Performed by: FAMILY MEDICINE

## 2021-12-30 PROCEDURE — G8536 NO DOC ELDER MAL SCRN: HCPCS | Performed by: FAMILY MEDICINE

## 2021-12-30 PROCEDURE — 1101F PT FALLS ASSESS-DOCD LE1/YR: CPT | Performed by: FAMILY MEDICINE

## 2021-12-30 PROCEDURE — G8417 CALC BMI ABV UP PARAM F/U: HCPCS | Performed by: FAMILY MEDICINE

## 2021-12-30 PROCEDURE — 99215 OFFICE O/P EST HI 40 MIN: CPT | Performed by: FAMILY MEDICINE

## 2021-12-30 PROCEDURE — G8510 SCR DEP NEG, NO PLAN REQD: HCPCS | Performed by: FAMILY MEDICINE

## 2021-12-30 PROCEDURE — G8427 DOCREV CUR MEDS BY ELIG CLIN: HCPCS | Performed by: FAMILY MEDICINE

## 2021-12-30 PROCEDURE — G8754 DIAS BP LESS 90: HCPCS | Performed by: FAMILY MEDICINE

## 2021-12-30 PROCEDURE — 3051F HG A1C>EQUAL 7.0%<8.0%: CPT | Performed by: FAMILY MEDICINE

## 2021-12-30 PROCEDURE — G0463 HOSPITAL OUTPT CLINIC VISIT: HCPCS | Performed by: FAMILY MEDICINE

## 2021-12-30 RX ORDER — SPIRONOLACTONE 25 MG/1
25 TABLET ORAL DAILY
Qty: 90 TABLET | Refills: 3 | Status: SHIPPED | OUTPATIENT
Start: 2021-12-30

## 2021-12-30 NOTE — LETTER
12/30/2021    Mr. Ariel Toledo  [address] [phone]      Dear Ariel Toledo:    Current Outpatient Medications   Medication Instructions    albuterol (PROVENTIL HFA, VENTOLIN HFA, PROAIR HFA) 90 mcg/actuation inhaler 1-2 Puffs, Inhalation, EVERY 6 HOURS AS NEEDED    amLODIPine (NORVASC) 10 mg tablet Take 1 tablet by mouth once daily    aspirin delayed-release 81 mg, Oral, DAILY    carvediloL (COREG) 6.25 mg, Oral, 2 TIMES DAILY WITH MEALS    ferrous sulfate 325 mg (65 mg iron) tablet Oral, DAILY BEFORE BREAKFAST    losartan (COZAAR) 100 mg tablet TAKE 1 TABLET BY MOUTH ONCE DAILY FOR HIGH BLOOD PRESSURE    metFORMIN ER (GLUCOPHAGE XR) 500 mg tablet TAKE 4 TABLETS BY MOUTH ONCE DAILY WITH BREAKFAST    minocycline (MINOCIN, DYNACIN) 100 mg, DAILY    rosuvastatin (CRESTOR) 20 mg, Oral, EVERY BEDTIME    spironolactone (ALDACTONE) 25 mg, Oral, DAILY    Yupelri 175 mcg, Nebulization, DAILY         Please call me if you have any questions: [phone]    Sincerely,      Tasha Danielson MD

## 2021-12-30 NOTE — PROGRESS NOTES
HISTORY OF PRESENT ILLNESS  HPI  Megan Polanco is a [de-identified] y. o. male with a history of HTN, CAD, A-fib, CHF with preserved ejection fraction, reflux esophagitis, DM-II with nephropathy, BCC, cerebral infarction, TOPHER, fatigue, anemia and hyperlipidemia with LDL goal <70, who presents today with wife for f/u of these health problems. Pt has noticed that when he is eating, he feels like food is \"going down the wrong pipe\" and then he spends time trying to cough it up. Pt usually drinks diet Pepsi at home and water or beer when he goes out to eat. He will eat something and then drink a liquid before his symptoms come on. It always happens elated to swallowing food or liquid but does not come on every time he eats. He wants to discuss his spirolactone. Pt takes a half pill of spirolactone 25 mg once a night. Pt does not recall what he was on this medicine for. Pt denies unusual SOB, chest pain, and any recent ER visits or hospitalizations.        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 2/25/2010    Other and unspecified hyperlipidemia 2/25/2010    Positive PPD 2/25/2010    Reflux esophagitis 2/25/2010    NO LONGER A PROBLEM    Stroke [facility])     in 1959 at age of 26    Type II or unspecified type diabetes mellitus without mention of complication, not stated as uncontrolled 2/25/2010     Past Surgical History:   Procedure Laterality Date    COLONOSCOPY N/A 11/11/2020    COLONOSCOPY/EGD performed by Ravi Gaines MD at [address] [place] [address] [id number]  12/13/2013         HC CAPSULE ENDOSCOPE M2A  4/4/2014         HX COLONOSCOPY      HX CORONARY STENT PLACEMENT      HX HEART ASSIST DEVICE Left 09/24/2019    HX KNEE REPLACEMENT Left 2010    HX ORTHOPAEDIC Right     FUSION IN RIGHT WRIST    HX OTHER SURGICAL      watchman procedure.  HX OTHER SURGICAL      shoulder surgery.  HX VASECTOMY  1983    SD CARDIAC SURG PROCEDURE UNLIST      X4 STENTS    SD CARDIOVERSION ELECTIVE ARRHYTHMIA EXTERNAL N/A 9/24/2019    Ep Cardioversion [place] Claud[place] [address], [facility]/s Dr CATH LAB    UPPER GI ENDOSCOPY,BIOPSY  12/13/2013          Current Outpatient Medications on File Prior to Visit   Medication Sig Dispense Refill    metFORMIN ER (GLUCOPHAGE XR) 500 mg tablet TAKE 4 TABLETS BY MOUTH ONCE DAILY WITH BREAKFAST 360 Tablet 0    carvediloL (Coreg) 6.25 mg tablet Take 1 Tablet by mouth two (2) times daily (with meals). 180 Tablet 3    amLODIPine (NORVASC) 10 mg tablet Take 1 tablet by mouth once daily 90 Tablet 0    minocycline (MINOCIN, DYNACIN) 100 mg capsule 100 mg daily.  losartan (COZAAR) 100 mg tablet TAKE 1 TABLET BY MOUTH ONCE DAILY FOR HIGH BLOOD PRESSURE 90 Tab 3    albuterol (PROVENTIL HFA, VENTOLIN HFA, PROAIR HFA) 90 mcg/actuation inhaler Take 1-2 Puffs by inhalation every six (6) hours as needed for Wheezing. Indications: bronchospasm 1 Inhaler 3    ferrous sulfate 325 mg (65 mg iron) tablet Take  by mouth Daily (before breakfast).  revefenacin (Yupelri) 175 mcg/3 mL nebu nebulizer solution 175 mcg by Nebulization route daily.  aspirin delayed-release 81 mg tablet Take 81 mg by mouth daily. Indications: MYOCARDIAL REINFARCTION PREVENTION      [DISCONTINUED] rosuvastatin (Crestor) 20 mg tablet Take 1 Tablet by mouth nightly. (Patient not taking: Reported on 12/30/2021) 30 Tablet 5     No current facility-administered medications on file prior to visit.      Allergies   Allergen Reactions    Adhesive Tape Rash    Baycol Nausea and Vomiting    Clonidine Other (comments)     Headache and sleepiness      Pravachol [Pravastatin] Nausea and Vomiting     Family History   Problem Relation Age of Onset    Heart Disease Mother     Cancer Father         colon    Heart Disease Maternal Grandmother     Heart Disease Maternal Grandfather     Cancer Paternal Grandmother     Cancer Paternal Grandfather     Anesth Problems Neg Hx      Social History     Socioeconomic History    Marital status:    Tobacco Use    Smoking status: Never Smoker    Smokeless tobacco: Former User     Types: Chew    Tobacco comment: patient reports quitting over 20 yeaers ago. Vaping Use    Vaping Use: Never used   Substance and Sexual Activity    Alcohol use: Yes     Comment: once a year    Drug use: No    Sexual activity: Not Currently   Other Topics Concern     Service Yes    Blood Transfusions Yes    Caffeine Concern Yes     Comment: 2-3 cups of coffee daily    Occupational Exposure No    Hobby Hazards No    Sleep Concern No    Stress Concern No    Weight Concern No    Special Diet No    Back Care No    Exercise Yes    Bike Helmet No     Comment: pt doesn't ride a bike   [address] Yes    Self-Exams No           Review of Systems   Constitutional: Negative for chills, diaphoresis, fever, malaise/fatigue and weight loss. Eyes: Negative for blurred vision, double vision, pain and redness. Respiratory: Negative for cough, shortness of breath and wheezing. Cardiovascular: Negative for chest pain, palpitations, orthopnea, claudication, leg swelling and PND. Genitourinary: Negative for frequency. Skin: Negative for itching and rash. Neurological: Negative for dizziness, tingling, tremors, sensory change, speech change, focal weakness, seizures, loss of consciousness, weakness and headaches. Endo/Heme/Allergies: Negative for environmental allergies and polydipsia.  Does not bruise/bleed easily. Results for orders placed or performed in visit on 12/30/21   HEMOGLOBIN A1C WITH EAG   Result Value Ref Range    Hemoglobin A1c 7.7 (H) 4.0 - 5.6 %    Est. average glucose 130 mg/dL   METABOLIC PANEL, COMPREHENSIVE   Result Value Ref Range    Sodium 136 136 - 145 mmol/L    Potassium 4.7 3.5 - 5.1 mmol/L    Chloride 103 97 - 108 mmol/L    CO2 26 21 - 32 mmol/L    Anion gap 7 5 - 15 mmol/L    Glucose 201 (H) 65 - 100 mg/dL    BUN 20 6 - 20 MG/DL    Creatinine 1.43 (H) 0.70 - 1.30 MG/DL    BUN/Creatinine ratio 14 12 - 20      GFR est AA 58 (L) >60 ml/min/1.73m2    GFR est non-AA 48 (L) >60 ml/min/1.73m2    Calcium 9.5 8.5 - 10.1 MG/DL    Bilirubin, total 0.8 0.2 - 1.0 MG/DL    ALT (SGPT) 15 12 - 78 U/L    AST (SGOT) 5 (L) 15 - 37 U/L    Alk. phosphatase 117 45 - 117 U/L    Protein, total 7.1 6.4 - 8.2 g/dL    Albumin 3.3 (L) 3.5 - 5.0 g/dL    Globulin 3.8 2.0 - 4.0 g/dL    A-G Ratio 0.9 (L) 1.1 - 2.2     LIPID PANEL   Result Value Ref Range    Cholesterol, total 233 (H) <200 MG/DL    Triglyceride 120 <150 MG/DL    HDL Cholesterol 44 MG/DL    LDL, calculated 165 (H) 0 - 100 MG/DL    VLDL, calculated 24 MG/DL    CHOL/HDL Ratio 5.3 (H) 0.0 - 5.0     MICROALBUMIN, UR, RAND W/ MICROALB/CREAT RATIO   Result Value Ref Range    Microalbumin,urine random 12.80 MG/DL    Creatinine, urine 256.00 mg/dL    Microalbumin/Creat ratio (mg/g creat) 50 (H) 0 - 30 mg/g             Physical Exam  Visit Vitals  /70 (BP 1 Location: Left arm, BP Patient Position: Sitting, BP Cuff Size: Large adult)   Pulse 69   Temp 98.4 °F (36.9 °C)   Resp 16   SpO2 97%         Head: Normocephalic, without obvious abnormality, atraumatic  Eyes: conjunctivae/corneas clear. PERRL, EOM's intact.    Neck: supple, symmetrical, trachea midline, no adenopathy, thyroid: not enlarged, symmetric, no tenderness/mass/nodules, no carotid bruit and no JVD  Lungs: clear to auscultation bilaterally  Heart: regular rate and rhythm, S1, S2 normal, no murmur, click, rub or gallop  Extremities: extremities normal, atraumatic, no cyanosis or edema  Pulses: 2+ and symmetric  Lymph nodes: Cervical, supraclavicular, and axillary nodes normal.  Neurologic: Grossly normal      ASSESSMENT and PLAN    ICD-10-CM ICD-9-CM    1. Dyslipidemia, goal LDL below 70  E78.5 272.4 LIPID PANEL      METABOLIC PANEL, COMPREHENSIVE      METABOLIC PANEL, COMPREHENSIVE      LIPID PANEL   2. Type 2 diabetes mellitus with diabetic nephropathy, without long-term current use of insulin ([facility])  E11.21 250.40 MICROALBUMIN, UR, RAND W/ MICROALB/CREAT RATIO     583.81 HEMOGLOBIN A1C WITH EAG      HEMOGLOBIN A1C WITH EAG      MICROALBUMIN, UR, RAND W/ MICROALB/CREAT RATIO   3. Essential hypertension, benign  I10 401.1 spironolactone (ALDACTONE) 25 mg tablet   4. Needs flu shot  Z23 V04.81 INFLUENZA, HIGH DOSE SEASONAL   5. Stage 3a chronic kidney disease ([facility])  N18.31 585.3    6. Persistent atrial fibrillation ([facility])  I48.19 427.31    7. HURD (dyspnea on exertion)  R06.00 786.09    8. Iron deficiency  E61.1 280.9    9. Gastroesophageal reflux disease with esophagitis without hemorrhage  K21.00 530.81      530.10    10. Type 2 diabetes with nephropathy ([facility])  E11.21 250.40      583.81      Diagnoses and all orders for this visit:    1. Dyslipidemia, goal LDL below 70  -     LIPID PANEL; Future  -     METABOLIC PANEL, COMPREHENSIVE; Future    2. Type 2 diabetes mellitus with diabetic nephropathy, without long-term current use of insulin ([facility])  -     MICROALBUMIN, UR, RAND W/ MICROALB/CREAT RATIO; Future  -     HEMOGLOBIN A1C WITH EAG; Future    3. Essential hypertension, benign  -     spironolactone (ALDACTONE) 25 mg tablet; Take 1 Tablet by mouth daily. 4. Needs flu shot  -     INFLUENZA, HIGH DOSE SEASONAL    5. Stage 3a chronic kidney disease (HCC)    6. Persistent atrial fibrillation ([address]    7. HURD (dyspnea on exertion)    8. Iron deficiency    9.  Gastroesophageal reflux disease with esophagitis without hemorrhage    10. Type 2 diabetes with nephropathy ([facility]      Follow-up and Dispositions    · Return in about 3 months (around 3/30/2022), or DM or BP or coughing are not controlled. , for F/U HTN,CHOL,DM, F/U of obesity, osteoarthritis, atrial fibrillation. lab results and schedule of future lab studies reviewed with patient  reviewed diet, exercise and weight control  cardiovascular risk and specific lipid/LDL goals reviewed  reviewed medications and side effects in detail  Please call my office if there are any questions- [phone]. I will arrange for follow up after the lab tests done from today return  Recommended a weekly \"heart check. \" I went into detail how to do this. Call for refills on medications as needed. Discussed expected course/resolution/complications of diagnosis in detail with patient. Medication risks/benefits/costs/interactions/alternatives discussed with patient. Pt was given an after visit summary which includes diagnoses, current medications & vitals. Pt expressed understanding with the diagnosis and plan      BMI is significantly elevated- in the obese range. I reviewed diet, exercise and weight control. Discussed weight control in detail, the importance of mainly decreased carbs, and for weight maintenance, exercise; discussed different diets and that it isn't as important to watch the type of foods as it is to decrease calorie intake no matter what type of diet you do, etc.     Total 50 minutes  re: Recommended a weekly \"heart check. \" I went into detail how to do this. Regular exercise is very important to your health; it helps mentally, physically, socially; it prevents injuries if done properly. Exercise, even as simple as walking 20-30 minutes daily has major benefits to your health even though your \"numbers\" are the same in the lab.  See if you can add this into your daily regimen and after a few months it will become a regular habit-\"just something you do,\" like brushing your teeth. A combination of aerobic exercise and strengthening and stretching is felt to be the best for you, so this should be your ultimate goal.   This can be done in the privacy of your home or in a group setting as at the gym  Some prefer having a , others prefer to do exercise in groups or individually. Do what \"works\" for you. You need to make it simple and \"fun,\" or you most likely will not continue it. Reviewed BP, cholesterol and diabetic goals. LDL goal<100,HDL goal>45, Triglyceride goal<150, A1C< 7.0, BP<140/90. Discussed specific diabetic recommendations: low cholesterol diet, weight control and daily exercise discussed, home glucose monitoring emphasized, all medications, side effects and compliance discussed carefully, foot care discussed and Podiatry visits discussed, annual eye examinations at Ophthalmology discussed, glycohemoglobin and other lab monitoring discussed and long term diabetic complications discussed. Reviewed symptoms, or lack thereof, of hypertension and elevated cholesterol. Review of  the proper technique of checking the blood pressure- check it on an average day only, not on a stressful day, sitting, no exercise for at least 1 hour and not experiencing any new pain( chronic pain is OK). Patient encouraged to check BP sitting and standing at least once a month and to report these readings to me if > 140/ 90 on average , or if the standing BP is >  15 points lower than the sitting. Always check it twice and if there is > 5 points decrease from the previous reading( top reading or systolic) keep checking it until it does not drop 5 points. Write only this final reading down, not the preceding readings. If out of these readings there is only 1 out of 4 or less > 531, or > 90 diastolic then your blood pressure is OK; it needs further treatment if it is above this.     Also, don't forget,  as noted above, to check your blood pressure standing once a month; this is to detect a drop in your BP that might lead to fainting and serious injury; you check it standing with your arm hanging straight down and relaxed. Check it twice waiting 1 minute between the two readings. If, with either one of these 2 readings there is a > 15 point drop of the systolic compared to your sitting pressure( done before the standing BP), then let me know. Following these guidelines, continue to check your BP and write down only the ones described above and it will help me to effectively treat your blood pressure. Also, discussed symptoms of concern that were noted today in the note above, treatment options( including doing nothing), when to follow up before recommended time frame. Also, answered all questions. Pt's complaint of coughing episode related to swallowing food is most likely due to small amount of liquid leaking into the trachea, so I encouraged him to keep a close eye on when it happens and mostly it is from swallowing liquid. If he cannot adjust his eating habits to solve that problem, he should let me know. I did tell him pureeing food will prevent solid food form causing a problem. This document was written by Cullen Aguilar, as dictated by Valeri Vega MD.  I have reviewed and agree with the above note and have made corrections where appropriate Shimon Long M.D.

## 2021-12-30 NOTE — PROGRESS NOTES
Chief Complaint   Patient presents with    Diabetes    Cholesterol Problem    Hypertension    Cough     when eating feels like food going down wrong way   1. \"Have you been to the ER, urgent care clinic since your last visit? Hospitalized since your last visit? \" No    2. \"Have you seen or consulted any other health care providers outside of the [address] [place] [address] since your last visit? \"  Dr Virgen Chowdhuryache Dr Juliana Brown    3. For patients over 45: Has the patient had a colonoscopy? Yes, HM satisfied with blue hyperlink     If the patient is female:    4. For patients over 36: Has the patient had a mammogram? NA based on age or sex    11. For patients over 21: Has the patient had a pap smear?  NA based on age or sex

## 2022-01-17 DIAGNOSIS — I25.10 CORONARY ARTERY DISEASE INVOLVING NATIVE CORONARY ARTERY OF NATIVE HEART WITHOUT ANGINA PECTORIS: ICD-10-CM

## 2022-01-18 RX ORDER — AMLODIPINE BESYLATE 10 MG/1
TABLET ORAL
Qty: 90 TABLET | Refills: 1 | Status: SHIPPED | OUTPATIENT
Start: 2022-01-18 | End: 2022-08-15

## 2022-03-18 PROBLEM — E11.21 TYPE 2 DIABETES MELLITUS WITH DIABETIC NEPHROPATHY, WITHOUT LONG-TERM CURRENT USE OF INSULIN (HCC): Status: ACTIVE | Noted: 2018-04-18

## 2022-03-18 PROBLEM — Z79.01 CHRONIC ANTICOAGULATION: Status: ACTIVE | Noted: 2019-09-04

## 2022-03-19 PROBLEM — R47.1 DYSARTHRIA: Status: ACTIVE | Noted: 2019-11-07

## 2022-03-19 PROBLEM — N18.30 CKD (CHRONIC KIDNEY DISEASE) STAGE 3, GFR 30-59 ML/MIN (HCC): Status: ACTIVE | Noted: 2019-07-31

## 2022-03-19 PROBLEM — I50.32 CHRONIC HEART FAILURE WITH PRESERVED EJECTION FRACTION (HCC): Status: ACTIVE | Noted: 2019-05-22

## 2022-03-19 PROBLEM — R53.82 CHRONIC FATIGUE: Status: ACTIVE | Noted: 2018-04-23

## 2022-03-19 PROBLEM — Z95.818 PRESENCE OF WATCHMAN LEFT ATRIAL APPENDAGE CLOSURE DEVICE: Status: ACTIVE | Noted: 2019-09-24

## 2022-04-04 ENCOUNTER — OFFICE VISIT (OUTPATIENT)
Dept: FAMILY MEDICINE CLINIC | Age: 82
End: 2022-04-04
Payer: MEDICARE

## 2022-04-04 VITALS
DIASTOLIC BLOOD PRESSURE: 78 MMHG | SYSTOLIC BLOOD PRESSURE: 122 MMHG | OXYGEN SATURATION: 98 % | TEMPERATURE: 98.6 F | RESPIRATION RATE: 16 BRPM | HEART RATE: 68 BPM

## 2022-04-04 DIAGNOSIS — Z79.01 CHRONIC ANTICOAGULATION: ICD-10-CM

## 2022-04-04 DIAGNOSIS — E11.21 TYPE 2 DIABETES MELLITUS WITH DIABETIC NEPHROPATHY, WITHOUT LONG-TERM CURRENT USE OF INSULIN (HCC): ICD-10-CM

## 2022-04-04 DIAGNOSIS — I63.40 CEREBRAL INFARCTION DUE TO EMBOLISM OF CEREBRAL ARTERY (HCC): ICD-10-CM

## 2022-04-04 DIAGNOSIS — I48.19 PERSISTENT ATRIAL FIBRILLATION (HCC): ICD-10-CM

## 2022-04-04 DIAGNOSIS — E78.5 DYSLIPIDEMIA, GOAL LDL BELOW 70: ICD-10-CM

## 2022-04-04 DIAGNOSIS — Z71.89 ACP (ADVANCE CARE PLANNING): ICD-10-CM

## 2022-04-04 DIAGNOSIS — N18.31 STAGE 3A CHRONIC KIDNEY DISEASE (HCC): ICD-10-CM

## 2022-04-04 DIAGNOSIS — I10 ESSENTIAL HYPERTENSION, BENIGN: Primary | ICD-10-CM

## 2022-04-04 DIAGNOSIS — J45.20 MILD INTERMITTENT REACTIVE AIRWAY DISEASE WITH WHEEZING WITHOUT COMPLICATION: ICD-10-CM

## 2022-04-04 DIAGNOSIS — G47.33 OSA (OBSTRUCTIVE SLEEP APNEA): ICD-10-CM

## 2022-04-04 DIAGNOSIS — Z00.00 MEDICARE ANNUAL WELLNESS VISIT, SUBSEQUENT: ICD-10-CM

## 2022-04-04 DIAGNOSIS — K21.00 GASTROESOPHAGEAL REFLUX DISEASE WITH ESOPHAGITIS WITHOUT HEMORRHAGE: ICD-10-CM

## 2022-04-04 DIAGNOSIS — I25.10 CORONARY ARTERY DISEASE INVOLVING NATIVE CORONARY ARTERY OF NATIVE HEART WITHOUT ANGINA PECTORIS: ICD-10-CM

## 2022-04-04 LAB
ALBUMIN SERPL-MCNC: 3.6 G/DL (ref 3.5–5)
ALBUMIN/GLOB SERPL: 1.1 {RATIO} (ref 1.1–2.2)
ALP SERPL-CCNC: 102 U/L (ref 45–117)
ALT SERPL-CCNC: 19 U/L (ref 12–78)
ANION GAP SERPL CALC-SCNC: 5 MMOL/L (ref 5–15)
AST SERPL-CCNC: 5 U/L (ref 15–37)
BILIRUB SERPL-MCNC: 0.8 MG/DL (ref 0.2–1)
BUN SERPL-MCNC: 19 MG/DL (ref 6–20)
BUN/CREAT SERPL: 15 (ref 12–20)
CALCIUM SERPL-MCNC: 8.8 MG/DL (ref 8.5–10.1)
CHLORIDE SERPL-SCNC: 104 MMOL/L (ref 97–108)
CO2 SERPL-SCNC: 26 MMOL/L (ref 21–32)
CREAT SERPL-MCNC: 1.31 MG/DL (ref 0.7–1.3)
EST. AVERAGE GLUCOSE BLD GHB EST-MCNC: 169 MG/DL
GLOBULIN SER CALC-MCNC: 3.4 G/DL (ref 2–4)
GLUCOSE SERPL-MCNC: 155 MG/DL (ref 65–100)
HBA1C MFR BLD: 7.5 % (ref 4–5.6)
POTASSIUM SERPL-SCNC: 4.4 MMOL/L (ref 3.5–5.1)
PROT SERPL-MCNC: 7 G/DL (ref 6.4–8.2)
SODIUM SERPL-SCNC: 135 MMOL/L (ref 136–145)

## 2022-04-04 PROCEDURE — 3051F HG A1C>EQUAL 7.0%<8.0%: CPT | Performed by: FAMILY MEDICINE

## 2022-04-04 PROCEDURE — G8510 SCR DEP NEG, NO PLAN REQD: HCPCS | Performed by: FAMILY MEDICINE

## 2022-04-04 PROCEDURE — 1101F PT FALLS ASSESS-DOCD LE1/YR: CPT | Performed by: FAMILY MEDICINE

## 2022-04-04 PROCEDURE — G8427 DOCREV CUR MEDS BY ELIG CLIN: HCPCS | Performed by: FAMILY MEDICINE

## 2022-04-04 PROCEDURE — 99215 OFFICE O/P EST HI 40 MIN: CPT | Performed by: FAMILY MEDICINE

## 2022-04-04 PROCEDURE — G8752 SYS BP LESS 140: HCPCS | Performed by: FAMILY MEDICINE

## 2022-04-04 PROCEDURE — G0439 PPPS, SUBSEQ VISIT: HCPCS | Performed by: FAMILY MEDICINE

## 2022-04-04 PROCEDURE — G8536 NO DOC ELDER MAL SCRN: HCPCS | Performed by: FAMILY MEDICINE

## 2022-04-04 PROCEDURE — G8417 CALC BMI ABV UP PARAM F/U: HCPCS | Performed by: FAMILY MEDICINE

## 2022-04-04 PROCEDURE — G8754 DIAS BP LESS 90: HCPCS | Performed by: FAMILY MEDICINE

## 2022-04-04 PROCEDURE — G0463 HOSPITAL OUTPT CLINIC VISIT: HCPCS | Performed by: FAMILY MEDICINE

## 2022-04-04 NOTE — PROGRESS NOTES
HISTORY OF PRESENT ILLNESS  HPI  Leti Polanco is V 23 y. o. male with a history of HTN, CAD, A-fib, CHF with preserved ejection fraction, reflux esophagitis, DM-II with nephropathy, BCC, cerebral infarction, TOPHER, fatigue, anemia and hyperlipidemia with LDL goal <70, who presents to the office today c/o diarrhea and for f/u of these health problems. Pt says he has some SOB but no more than usual. He started going to [facility] to help him start standing up and start walking. He has been using a crutch to walk but he cannot stand up too long. He explains that he feels \"weak in his stomach and has to sit down\". Pt reports he has had diarrhea with some constipation in the last month. Pt has the diarrhea almost every day. He has not started any new medicines. He feels it may be related to his metformin. Pt has noticed when he stopped his medicine for a few days, his diarrhea improved. Pt asks about using Imodium AD. Pt was told to stop rosuvastatin by Dr. Mauricio Weiner (cardiology) a couple of months ago, but he is not sure why. Pt says he did not mention his diarrhea to Dr. Mauricio Weiner.    He mentions that he has had some lower abd pain for a \"long time\". He wears a belt tightly around this same area. Pt last had a colonoscopy in the past 10-15 years. He recalls about 9 polyps were removed on his last colonoscopy and should have repeated it by now. Pt denies unusual SOB, chest pain, and any recent ER visits or hospitalizations.          Past Medical History:   Diagnosis Date    Arrhythmia     a fib    Atrial fibrillation ([facility] [address] 8/16/2013    Basal cell carcinoma of anterior chest 2/25/2010    EARS AND NOSE    CAD (coronary artery disease) 2/25/2010    CVA (cerebral infarction) 2/25/2010    patient denies 12/13/13    Diverticula of colon 2/25/2010    Dyslipidemia 12/20/2010    Essential hypertension, benign 2/25/2010    Hypertension     TOPHER (obstructive sleep apnea) 1/23/2015    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 2/25/2010    Other and unspecified hyperlipidemia 2/25/2010    Positive PPD 2/25/2010    Reflux esophagitis 2/25/2010    NO LONGER A PROBLEM    Stroke [facility])     in 1959 at age of 26    Type II or unspecified type diabetes mellitus without mention of complication, not stated as uncontrolled 2/25/2010     Past Surgical History:   Procedure Laterality Date    COLONOSCOPY N/A 11/11/2020    COLONOSCOPY/EGD performed by Zarina Brumfield MD at [address]  12/13/2013         HC CAPSULE ENDOSCOPE M2A  4/4/2014         HX COLONOSCOPY      HX CORONARY STENT PLACEMENT      HX HEART ASSIST DEVICE Left 09/24/2019    HX KNEE REPLACEMENT Left 2010    HX ORTHOPAEDIC Right     FUSION IN RIGHT WRIST    HX OTHER SURGICAL      watchman procedure.  HX OTHER SURGICAL      shoulder surgery.  HX VASECTOMY  1983    [place] CARDIAC SURG PROCEDURE UNLIST      X4 STENTS    [place] CARDIOVERSION ELECTIVE ARRHYTHMIA EXTERNAL N/A 9/24/2019    Ep Cardioversion performed by Nabeel Crooks MD at [address], [facility] CATH LAB    UPPER GI ENDOSCOPY,BIOPSY  12/13/2013          Current Outpatient Medications on File Prior to Visit   Medication Sig Dispense Refill    amLODIPine (NORVASC) 10 mg tablet Take 1 tablet by mouth once daily 90 Tablet 1    spironolactone (ALDACTONE) 25 mg tablet Take 1 Tablet by mouth daily. 90 Tablet 3    metFORMIN ER (GLUCOPHAGE XR) 500 mg tablet TAKE 4 TABLETS BY MOUTH ONCE DAILY WITH BREAKFAST 360 Tablet 0    carvediloL (Coreg) 6.25 mg tablet Take 1 Tablet by mouth two (2) times daily (with meals). 180 Tablet 3    minocycline (MINOCIN, DYNACIN) 100 mg capsule 100 mg daily.  losartan (COZAAR) 100 mg tablet TAKE 1 TABLET BY MOUTH ONCE DAILY FOR HIGH BLOOD PRESSURE 90 Tab 3    albuterol (PROVENTIL HFA, VENTOLIN HFA, PROAIR HFA) 90 mcg/actuation inhaler Take 1-2 Puffs by inhalation every six (6) hours as needed for Wheezing.  Indications: bronchospasm 1 Inhaler 3    ferrous sulfate 325 mg (65 mg iron) tablet Take  by mouth Daily (before breakfast).  revefenacin (Yupelri) 175 mcg/3 mL nebu nebulizer solution 175 mcg by Nebulization route daily.  aspirin delayed-release 81 mg tablet Take 81 mg by mouth daily. Indications: MYOCARDIAL REINFARCTION PREVENTION       No current facility-administered medications on file prior to visit. Allergies   Allergen Reactions    Adhesive Tape Rash    Baycol Nausea and Vomiting    Clonidine Other (comments)     Headache and sleepiness      Pravachol [Pravastatin] Nausea and Vomiting     Family History   Problem Relation Age of Onset    Heart Disease Mother     Cancer Father         colon    Heart Disease Maternal Grandmother     Heart Disease Maternal Grandfather     Cancer Paternal Grandmother     Cancer Paternal Grandfather     Anesth Problems Neg Hx      Social History     Socioeconomic History    Marital status:    Tobacco Use    Smoking status: Never Smoker    Smokeless tobacco: Former User     Types: Chew    Tobacco comment: patient reports quitting over 20 yeaers ago. Vaping Use    Vaping Use: Never used   Substance and Sexual Activity    Alcohol use: Yes     Comment: once a year    Drug use: No    Sexual activity: Not Currently   Other Topics Concern     Service Yes    Blood Transfusions Yes    Caffeine Concern Yes     Comment: 2-3 cups of coffee daily    Occupational Exposure No    Hobby Hazards No    Sleep Concern No    Stress Concern No    Weight Concern No    Special Diet No    Back Care No    Exercise Yes    Bike Helmet No     Comment: pt doesn't ride a bike   [place] Yes    Self-Exams No               Review of Systems   Constitutional: Negative for chills, diaphoresis, fever, malaise/fatigue and weight loss. Eyes: Negative for blurred vision, double vision, pain and redness.    Respiratory: Negative for cough, shortness of breath and wheezing. Cardiovascular: Negative for chest pain, palpitations, orthopnea, claudication, leg swelling and PND. Skin: Negative for itching and rash. Neurological: Negative for dizziness, tingling, tremors, sensory change, speech change, focal weakness, seizures, loss of consciousness, weakness and headaches. Results for orders placed or performed in visit on 04/04/22   HEMOGLOBIN A1C WITH EAG   Result Value Ref Range    Hemoglobin A1c 7.5 (H) 4.0 - 5.6 %    Est. average glucose 117 mg/dL   METABOLIC PANEL, COMPREHENSIVE   Result Value Ref Range    Sodium 135 (L) 136 - 145 mmol/L    Potassium 4.4 3.5 - 5.1 mmol/L    Chloride 104 97 - 108 mmol/L    CO2 26 21 - 32 mmol/L    Anion gap 5 5 - 15 mmol/L    Glucose 155 (H) 65 - 100 mg/dL    BUN 19 6 - 20 MG/DL    Creatinine 1.31 (H) 0.70 - 1.30 MG/DL    BUN/Creatinine ratio 15 12 - 20      GFR est AA >60 >60 ml/min/1.73m2    GFR est non-AA 53 (L) >60 ml/min/1.73m2    Calcium 8.8 8.5 - 10.1 MG/DL    Bilirubin, total 0.8 0.2 - 1.0 MG/DL    ALT (SGPT) 19 12 - 78 U/L    AST (SGOT) 5 (L) 15 - 37 U/L    Alk. phosphatase 102 45 - 117 U/L    Protein, total 7.0 6.4 - 8.2 g/dL    Albumin 3.6 3.5 - 5.0 g/dL    Globulin 3.4 2.0 - 4.0 g/dL    A-G Ratio 1.1 1.1 - 2.2               Physical Exam  Visit Vitals  /78 (BP 1 Location: Left arm, BP Patient Position: Sitting, BP Cuff Size: Large adult)   Pulse 68   Temp 98.6 °F (37 °C)   Resp 16   SpO2 98%         Pt is sitting in a motorized wheelchair. Head: Normocephalic, without obvious abnormality, atraumatic. Eyes: conjunctivae/corneas clear. PERRL, EOM's intact.    Neck: supple, symmetrical, trachea midline, no adenopathy, thyroid: not enlarged, symmetric, no tenderness/mass/nodules, no carotid bruit and no JVD  Lungs: clear to auscultation bilaterally  Heart: regular rate and rhythm, S1, S2 normal, no murmur, click, rub or gallop  Extremities: extremities normal, atraumatic, no cyanosis or edema  Pulses: 2+ and symmetric  Lymph nodes: Cervical, supraclavicular, and axillary nodes normal.  Neurologic: Grossly normal. He has his baseline weakness on the right side of his body. ASSESSMENT and PLAN    ICD-10-CM ICD-9-CM    1. Essential hypertension, benign  N40 768.0 METABOLIC PANEL, COMPREHENSIVE      METABOLIC PANEL, COMPREHENSIVE   2. Type 2 diabetes mellitus with diabetic nephropathy, without long-term current use of insulin ([facility])  K37.91 665.27 METABOLIC PANEL, COMPREHENSIVE     583.81 HEMOGLOBIN A1C WITH EAG      HEMOGLOBIN A1C WITH EAG      METABOLIC PANEL, COMPREHENSIVE   3. Dyslipidemia, goal LDL below 70  E78.5 272.4    4. Gastroesophageal reflux disease with esophagitis without hemorrhage  K21.00 530.81      530.10    5. Persistent atrial fibrillation (HCC)  I48.19 427.31    6. Stage 3a chronic kidney disease (HCC)  N18.31 585.3    7. TOPHER (obstructive sleep apnea)  G47.33 327.23    8. Coronary artery disease involving native coronary artery of native heart without angina pectoris  I25.10 414.01    9. Chronic anticoagulation  Z79.01 V58.61    10. 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 ACP (advance care planning)  Z71.89 V65.49    12. Medicare annual wellness visit, subsequent  Z00.00 V70.0    13. Mild intermittent reactive airway disease with wheezing without complication  D57.86 759.00      Diagnoses and all orders for this visit:    1. Essential hypertension, benign  -     METABOLIC PANEL, COMPREHENSIVE; Future    2. Type 2 diabetes mellitus with diabetic nephropathy, without long-term current use of insulin ([facility])  -     METABOLIC PANEL, COMPREHENSIVE; Future  -     HEMOGLOBIN A1C WITH EAG; Future    3. Dyslipidemia, goal LDL below 70    4. Gastroesophageal reflux disease with esophagitis without hemorrhage    5. Persistent atrial fibrillation (HCC)    6. Stage 3a chronic kidney disease ([facility] [address]    7. TOPHER (obstructive sleep apnea)    8. Coronary artery disease involving native coronary artery of native heart without angina pectoris    9. Chronic anticoagulation    10. 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 ACP (advance care planning)    12. Medicare annual wellness visit, subsequent    13. Mild intermittent reactive airway disease with wheezing without complication      Follow-up and Dispositions    · Return in about 3 months (around 7/4/2022), or if abd discomfort/ diarrhea symptoms worsen or fail to improve, for F/U HTN,CHOL,DM, f/u CAD, osteoarthritis. lab results and schedule of future lab studies reviewed with patient  reviewed diet, exercise and weight control  cardiovascular risk and specific lipid/LDL goals reviewed  reviewed medications and side effects in detail  Please call my office if there are any questions- [phone]. I will arrange for follow up after the lab tests done from today return  Recommended a weekly \"heart check. \" I went into detail how to do this. Call for refills on medications as needed. Discussed expected course/resolution/complications of diagnosis in detail with patient. Medication risks/benefits/costs/interactions/alternatives discussed with patient. Pt was given an after visit summary which includes diagnoses, current medications & vitals. Pt expressed understanding with the diagnosis and plan      BMI is significantly elevated- in the obese range. I reviewed diet, exercise and weight control. Discussed weight control in detail, the importance of mainly decreased carbs, and for weight maintenance, exercise; discussed different diets and that it isn't as important to watch the type of foods as it is to decrease calorie intake no matter what type of diet you do, etc.       Total 50 minutes  re: Recommended a weekly \"heart check. \" I went into detail how to do this.      Regular exercise is very important to your health; it helps mentally, physically, socially; it prevents injuries if done properly. Exercise, even as simple as walking 20-30 minutes daily has major benefits to your health even though your \"numbers\" are the same in the lab. See if you can add this into your daily regimen and after a few months it will become a regular habit-\"just something you do,\" like brushing your teeth. A combination of aerobic exercise and strengthening and stretching is felt to be the best for you, so this should be your ultimate goal.   This can be done in the privacy of your home or in a group setting as at the gym  Some prefer having a , others prefer to do exercise in groups or individually. Do what \"works\" for you. You need to make it simple and \"fun,\" or you most likely will not continue it. Reviewed BP, cholesterol and diabetic goals. LDL goal<100,HDL goal>45, Triglyceride goal<150, A1C< 7.0, BP<140/90. Discussed specific diabetic recommendations: low cholesterol diet, weight control and daily exercise discussed, home glucose monitoring emphasized, all medications, side effects and compliance discussed carefully, foot care discussed and Podiatry visits discussed, annual eye examinations at Ophthalmology discussed, glycohemoglobin and other lab monitoring discussed and long term diabetic complications discussed. Reviewed symptoms, or lack thereof, of hypertension and elevated cholesterol. Review of  the proper technique of checking the blood pressure- check it on an average day only, not on a stressful day, sitting, no exercise for at least 1 hour and not experiencing any new pain( chronic pain is OK). Patient encouraged to check BP sitting and standing at least once a month and to report these readings to me if > 140/ 90 on average , or if the standing BP is >  15 points lower than the sitting.      Always check it twice and if there is > 5 points decrease from the previous reading( top reading or systolic) keep checking it until it does not drop 5 points. Write only this final reading down, not the preceding readings. If out of these readings there is only 1 out of 4 or less > 536, or > 90 diastolic then your blood pressure is OK; it needs further treatment if it is above this. Also, don't forget,  as noted above, to check your blood pressure standing once a month; this is to detect a drop in your BP that might lead to fainting and serious injury; you check it standing with your arm hanging straight down and relaxed. Check it twice waiting 1 minute between the two readings. If, with either one of these 2 readings there is a > 15 point drop of the systolic compared to your sitting pressure( done before the standing BP), then let me know. Following these guidelines, continue to check your BP and write down only the ones described above and it will help me to effectively treat your blood pressure. Also, discussed symptoms of concern that were noted today in the note above, treatment options( including doing nothing), when to follow up before recommended time frame. Also, answered all questions. Because pt is having diarrhea, I suggested he cut back his metformin to 2 a day, and if the diarrhea does not improve significantly, he should cut out the metformin all together. If it does not resolve he should restart it and give me a call, so we can decide what to do next. If it does resolve, then suggested that he start back the metformin and see what dose he can tolerate. At that point, he should also use some Imodium to control the diarrhea and we talked about how to titrate that. He does need to repeat the colonoscopy as directed by his GI specialist.    Because pt is having trouble standing and supporting his weight, he should continue with his therapy and also continue working on weight loss which will also help his ability to stand. If his abd pain does not resolve with the diarrhea, he should let me know.     This document was written by Stacy Curran, as dictated by Herbert Connolly MD.   I have reviewed and agree with the above note and have made corrections where appropriate Shimon Mckeon M.D. This is the Subsequent Medicare Annual Wellness Exam, performed 12 months or more after the Initial AWV or the last Subsequent AWV    I have reviewed the patient's medical history in detail and updated the computerized patient record. Assessment/Plan   Education and counseling provided:  Are appropriate based on today's review and evaluation    1. Essential hypertension, benign  -     METABOLIC PANEL, COMPREHENSIVE; Future  2. Type 2 diabetes mellitus with diabetic nephropathy, without long-term current use of insulin (HCC)  -     METABOLIC PANEL, COMPREHENSIVE; Future  -     HEMOGLOBIN A1C WITH EAG; Future  3. Dyslipidemia, goal LDL below 70  4. Gastroesophageal reflux disease with esophagitis without hemorrhage  5. Persistent atrial fibrillation (HCC)  6. Stage 3a chronic kidney disease ([facility] [address]  7. TOPHER (obstructive sleep apnea)  8. Coronary artery disease involving native coronary artery of native heart without angina pectoris  9. Chronic anticoagulation  10. 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 ACP (advance care planning)  12. Medicare annual wellness visit, subsequent  13. Mild intermittent reactive airway disease with wheezing without complication       Depression Risk Factor Screening     3 most recent PHQ Screens 4/4/2022   Little interest or pleasure in doing things Not at all   Feeling down, depressed, irritable, or hopeless Not at all   Total Score PHQ 2 0       Alcohol & Drug Abuse Risk Screen    Do you average more than 1 drink per night or more than 7 drinks a week: No    In the past three months have you have had more than 4 drinks containing alcohol on one occasion: No          Functional Ability and Level of Safety    Hearing: Hearing is good.       Activities of Daily Living: The home contains: handrails and grab bars  Patient needs help with:  shopping, preparing meals, laundry, housework and walking      Ambulation: wheelchair bound     Fall Risk:  Fall Risk Assessment, last 12 mths 4/4/2022   Able to walk? No   Fall in past 12 months? -   Do you feel unsteady? -   Are you worried about falling -   Is TUG test greater than 12 seconds? -   Is the gait abnormal? -   Number of falls in past 12 months -   Fall with injury? -      Abuse Screen:  Patient is not abused       Cognitive Screening    Has your family/caregiver stated any concerns about your memory: no     Cognitive Screening: Normal - Mini Cog Test   Advanced directive already completed and in the chart. No changes desired.         Health Maintenance Due     Health Maintenance Due   Topic Date Due    Shingrix Vaccine Age 49> (1 of 2) Never done       Patient Care Team   Patient Care Team:  Ulises Farfan MD as PCP - General  Ulises Farfan MD as PCP - [facility] Empaneled Provider  Val Nielson MD as Consulting Provider (Cardiology)  Reva Childers MD (Pulmonary Disease)  Zarina Brumfield MD (Gastroenterology)  Kimi Varma MD (Orthopedic Surgery)  Clint Ignacio MD (Optometry)  Saw Castillo MD as Physician (Sleep Medicine)  Dangelo Jimenez MD as Physician (Nephrology)  Saw Castillo MD as Physician (Sleep Medicine)  Irma Norman MD as Physician (Pulmonary Disease)  Alfred Pascual MD as Physician (Pulmonary Disease)    History     Patient Active Problem List   Diagnosis Code    Essential hypertension, benign I10    Reflux esophagitis K21.00    CAD (coronary artery disease) I25.10    Dyslipidemia, goal LDL below 70 E78.5    Atrial fibrillation ([address] I48.91    TOPHER (obstructive sleep apnea) G47.33    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 ([address]- @ 24 yo-weak on right arm>leg I63.40    Type 2 diabetes mellitus with diabetic nephropathy, without long-term current use of insulin (HCC) E11.21    Chronic fatigue R53.82    Chronic heart failure with preserved ejection fraction ([facility]) I50.32    CKD (chronic kidney disease) stage 3, GFR 30-59 ml/min ([facility]) N18.30    Chronic anticoagulation Z79.01    Presence of Watchman left atrial appendage closure device Z95.818    Dysarthria R47.1     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 2/25/2010    Other and unspecified hyperlipidemia 2/25/2010    Positive PPD 2/25/2010    Reflux esophagitis 2/25/2010    NO LONGER A PROBLEM    Stroke [facility])     in 1959 at age of 26    Type II or unspecified type diabetes mellitus without mention of complication, not stated as uncontrolled 2/25/2010      Past Surgical History:   Procedure Laterality Date    COLONOSCOPY N/A 11/11/2020    COLONOSCOPY/EGD performed by Lora Hayes MD at [address] [place] [address]  12/13/2013          CAPSULE ENDOSCOPE M2A  4/4/2014         HX COLONOSCOPY      HX CORONARY STENT PLACEMENT      HX HEART ASSIST DEVICE Left 09/24/2019    HX KNEE REPLACEMENT Left 2010    HX ORTHOPAEDIC Right     FUSION IN RIGHT WRIST    HX OTHER SURGICAL      watchman procedure.  HX OTHER SURGICAL      shoulder surgery.      HX VASECTOMY  1983    [place] CARDIAC SURG PROCEDURE UNLIST      X4 STENTS    [place] CARDIOVERSION ELECTIVE ARRHYTHMIA EXTERNAL N/A 9/24/2019    Ep Cardioversion performed by Pavan Lopez MD at [address] CATH LAB    UPPER GI ENDOSCOPY,BIOPSY  12/13/2013          Current Outpatient Medications   Medication Sig Dispense Refill    amLODIPine (NORVASC) 10 mg tablet Take 1 tablet by mouth once daily 90 Tablet 1    spironolactone (ALDACTONE) 25 mg tablet Take 1 Tablet by mouth daily. 90 Tablet 3    metFORMIN ER (GLUCOPHAGE XR) 500 mg tablet TAKE 4 TABLETS BY MOUTH ONCE DAILY WITH BREAKFAST 360 Tablet 0    carvediloL (Coreg) 6.25 mg tablet Take 1 Tablet by mouth two (2) times daily (with meals). 180 Tablet 3    minocycline (MINOCIN, DYNACIN) 100 mg capsule 100 mg daily.  losartan (COZAAR) 100 mg tablet TAKE 1 TABLET BY MOUTH ONCE DAILY FOR HIGH BLOOD PRESSURE 90 Tab 3    albuterol (PROVENTIL HFA, VENTOLIN HFA, PROAIR HFA) 90 mcg/actuation inhaler Take 1-2 Puffs by inhalation every six (6) hours as needed for Wheezing. Indications: bronchospasm 1 Inhaler 3    ferrous sulfate 325 mg (65 mg iron) tablet Take  by mouth Daily (before breakfast).  revefenacin (Yupelri) 175 mcg/3 mL nebu nebulizer solution 175 mcg by Nebulization route daily.  aspirin delayed-release 81 mg tablet Take 81 mg by mouth daily. Indications: MYOCARDIAL REINFARCTION PREVENTION       Allergies   Allergen Reactions    Adhesive Tape Rash    Baycol Nausea and Vomiting    Clonidine Other (comments)     Headache and sleepiness      Pravachol [Pravastatin] Nausea and Vomiting       Family History   Problem Relation Age of Onset    Heart Disease Mother     Cancer Father         colon    Heart Disease Maternal Grandmother     Heart Disease Maternal Grandfather     Cancer Paternal Grandmother     Cancer Paternal Grandfather     Anesth Problems Neg Hx      Social History     Tobacco Use    Smoking status: Never Smoker    Smokeless tobacco: Former User     Types: Chew    Tobacco comment: patient reports quitting over 20 yeaers ago.     Substance Use Topics    Alcohol use: Yes     Comment: once a year         Ayesha Boss MD

## 2022-04-04 NOTE — PROGRESS NOTES
Chief Complaint   Patient presents with    Diabetes    Diarrhea   1. \"Have you been to the ER, urgent care clinic since your last visit? Hospitalized since your last visit? \" No    2. \"Have you seen or consulted any other health care providers outside of the [address] [place] [address] since your last visit? \" Yes Dr Ira Nunn pulmonary,     3. For patients aged 39-70: Has the patient had a colonoscopy / FIT/ Cologuard?  Yes - no Care Gap present

## 2022-04-07 RX ORDER — RANOLAZINE 500 MG/1
TABLET, EXTENDED RELEASE ORAL
Qty: 60 TABLET | Refills: 0 | OUTPATIENT
Start: 2022-04-07

## 2022-04-10 NOTE — PATIENT INSTRUCTIONS
Medicare Wellness Visit, Male    The best way to live healthy is to have a lifestyle where you eat a well-balanced diet, exercise regularly, limit alcohol use, and quit all forms of tobacco/nicotine, if applicable. Regular preventive services are another way to keep healthy. Preventive services (vaccines, screening tests, monitoring & exams) can help personalize your care plan, which helps you manage your own care. Screening tests can find health problems at the earliest stages, when they are easiest to treat. [facility] follows the current, evidence-based guidelines published by the [facility] ([facility]) when recommending preventive services for our patients. Because we follow these guidelines, sometimes recommendations change over time as research supports it. (For example, a prostate screening blood test is no longer routinely recommended for men with no symptoms). Of course, you and your doctor may decide to screen more often for some diseases, based on your risk and co-morbidities (chronic disease you are already diagnosed with). Preventive services for you include:  - Medicare offers their members a free annual wellness visit, which is time for you and your primary care provider to discuss and plan for your preventive service needs. Take advantage of this benefit every year!  -All adults over age 72 should receive the recommended pneumonia vaccines. Current USPSTF guidelines recommend a series of two vaccines for the best pneumonia protection.   -All adults should have a flu vaccine yearly and tetanus vaccine every 10 years.  -All adults age 48 and older should receive the shingles vaccines (series of two vaccines).        -All adults age 38-68 who are overweight should have a diabetes screening test once every three years.   -Other screening tests & preventive services for persons with diabetes include: an eye exam to screen for diabetic retinopathy, a kidney function test, a foot exam, and stricter control over your cholesterol.   -Cardiovascular screening for adults with routine risk involves an electrocardiogram (ECG) at intervals determined by the provider.   -Colorectal cancer screening should be done for adults age 54-65 with no increased risk factors for colorectal cancer. There are a number of acceptable methods of screening for this type of cancer. Each test has its own benefits and drawbacks. Discuss with your provider what is most appropriate for you during your annual wellness visit. The different tests include: colonoscopy (considered the best screening method), a fecal occult blood test, a fecal DNA test, and sigmoidoscopy.  -All adults born between [facility] should be screened once for Hepatitis C.  -An Abdominal Aortic Aneurysm (AAA) Screening is recommended for men age 73-68 who has ever smoked in their lifetime.      Here is a list of your current Health Maintenance items (your personalized list of preventive services) with a due date:  Health Maintenance Due   Topic Date Due    Shingles Vaccine (1 of 2) Never done

## 2022-05-06 RX ORDER — RANOLAZINE 500 MG/1
TABLET, EXTENDED RELEASE ORAL
Qty: 60 TABLET | Refills: 0 | OUTPATIENT
Start: 2022-05-06

## 2022-05-12 ENCOUNTER — OFFICE VISIT (OUTPATIENT)
Dept: CARDIOLOGY CLINIC | Age: 82
End: 2022-05-12
Payer: MEDICARE

## 2022-05-12 VITALS
WEIGHT: 257 LBS | BODY MASS INDEX: 31.29 KG/M2 | OXYGEN SATURATION: 96 % | HEIGHT: 76 IN | HEART RATE: 84 BPM | SYSTOLIC BLOOD PRESSURE: 110 MMHG | DIASTOLIC BLOOD PRESSURE: 68 MMHG

## 2022-05-12 DIAGNOSIS — R06.02 SOB (SHORTNESS OF BREATH): ICD-10-CM

## 2022-05-12 DIAGNOSIS — E78.5 DYSLIPIDEMIA, GOAL LDL BELOW 70: ICD-10-CM

## 2022-05-12 DIAGNOSIS — Z86.73 HISTORY OF CVA (CEREBROVASCULAR ACCIDENT): ICD-10-CM

## 2022-05-12 DIAGNOSIS — G47.33 OSA (OBSTRUCTIVE SLEEP APNEA): ICD-10-CM

## 2022-05-12 DIAGNOSIS — E11.21 TYPE 2 DIABETES MELLITUS WITH DIABETIC NEPHROPATHY, WITHOUT LONG-TERM CURRENT USE OF INSULIN (HCC): ICD-10-CM

## 2022-05-12 DIAGNOSIS — I25.10 CORONARY ARTERY DISEASE INVOLVING NATIVE CORONARY ARTERY OF NATIVE HEART WITHOUT ANGINA PECTORIS: Primary | ICD-10-CM

## 2022-05-12 DIAGNOSIS — I10 ESSENTIAL HYPERTENSION, BENIGN: ICD-10-CM

## 2022-05-12 DIAGNOSIS — N18.30 STAGE 3 CHRONIC KIDNEY DISEASE, UNSPECIFIED WHETHER STAGE 3A OR 3B CKD (HCC): ICD-10-CM

## 2022-05-12 DIAGNOSIS — I48.0 PAROXYSMAL ATRIAL FIBRILLATION (HCC): ICD-10-CM

## 2022-05-12 DIAGNOSIS — Z79.01 CHRONIC ANTICOAGULATION: ICD-10-CM

## 2022-05-12 PROCEDURE — 99214 OFFICE O/P EST MOD 30 MIN: CPT | Performed by: SPECIALIST

## 2022-05-12 PROCEDURE — G8417 CALC BMI ABV UP PARAM F/U: HCPCS | Performed by: SPECIALIST

## 2022-05-12 PROCEDURE — 93010 ELECTROCARDIOGRAM REPORT: CPT | Performed by: SPECIALIST

## 2022-05-12 PROCEDURE — 1101F PT FALLS ASSESS-DOCD LE1/YR: CPT | Performed by: SPECIALIST

## 2022-05-12 PROCEDURE — G0463 HOSPITAL OUTPT CLINIC VISIT: HCPCS | Performed by: SPECIALIST

## 2022-05-12 PROCEDURE — 93005 ELECTROCARDIOGRAM TRACING: CPT | Performed by: SPECIALIST

## 2022-05-12 PROCEDURE — G8536 NO DOC ELDER MAL SCRN: HCPCS | Performed by: SPECIALIST

## 2022-05-12 PROCEDURE — 3051F HG A1C>EQUAL 7.0%<8.0%: CPT | Performed by: SPECIALIST

## 2022-05-12 PROCEDURE — G8427 DOCREV CUR MEDS BY ELIG CLIN: HCPCS | Performed by: SPECIALIST

## 2022-05-12 PROCEDURE — G8432 DEP SCR NOT DOC, RNG: HCPCS | Performed by: SPECIALIST

## 2022-05-12 PROCEDURE — G8754 DIAS BP LESS 90: HCPCS | Performed by: SPECIALIST

## 2022-05-12 PROCEDURE — G8752 SYS BP LESS 140: HCPCS | Performed by: SPECIALIST

## 2022-05-12 RX ORDER — ROSUVASTATIN CALCIUM 40 MG/1
40 TABLET, COATED ORAL
Qty: 30 TABLET | Refills: 5 | Status: SHIPPED | OUTPATIENT
Start: 2022-05-12

## 2022-05-12 RX ORDER — ATORVASTATIN CALCIUM 40 MG/1
TABLET, FILM COATED ORAL
COMMUNITY
End: 2022-05-12

## 2022-05-12 NOTE — PATIENT INSTRUCTIONS
1) when I last saw you in November you said that the ranolazine also known as Ranexa was not working so we stopped it but apparently you continued it. You can stop it now and see if your symptoms recur and if they do then I would go back on the Ranexa and let me know. 2) I am not sure which cholesterol medicine you are taking because it is listed that you are taking atorvastatin which is also known as Lipitor. At this point I had thought you were on Crestor at 40 mg a day Crestor is also known as rosuvastatin. I see an increase in your LDL up to 166. This was done in December 2021. In the past your LDL which is your lousy cholesterol was at goal in the 70s. I favor this time going back on Crestor also known as rosuvastatin at 40 mg a day and stopping the Lipitor which is known as atorvastatin. Your prescription for your Crestor is at your pharmacy    3) in 2 months I like for you to get your cholesterol checked at [place] [address]. 4) this is your list of medicines you should be taking    A.  Crestor which is rosuvastatin at 40 mg at night  B. Amlodipine also known as Norvasc 10 mg a day  C. Spironolactone 25 mg a day  D. Metformin 500 mg tablets to take 4 tablets by mouth once daily with breakfast  E.  Carvedilol also known as Coreg 6.25 mg 1 tablet 2 times a day  F. Minocycline 100 mg a day  G. losartan also known as Cozaar 100 mg a day  H. Albuterol 1 to 2 puffs every 6 hours as needed  I. Ferrous sulfate 325 mg daily  J.  Revefenacin 175 mg nebulization daily  K. Aspirin 81 mg a day                                                                                                                                              It is  my pleasure to have the opportunity to be involved in taking care of you and to provide you the best cardiac care.     At the end of every visit I  encourage you to eat healthy and clean and reduce your red meat intake as well as exercise 30 minutes 5 days a week to ensure a healthy heart. If you are a smoker , it will be essential that you stop smoking to reduce your risk of heart disease. Part of providing you the best heart care possible IS AN ACCURATE KNOWLEDGE OF YOUR MEDICINE. It  will be  essential at each office visit that you provide me with an accurate list of your medicines. When you come into the office you should have that list or another option is lining up your pill bottles  and taking a snapshot with your cell phone of all the medicines you are taking currently and show it to the nurse in the examining room. Inaccurate reporting of your medication to the nurse may lead to adverse events and medical errors. Thank you again for giving me the opportunity to be part of your heart care and it is my pleasure. All the best,    Shimon Pittman MD

## 2022-05-12 NOTE — PROGRESS NOTES
Chief Complaint   Patient presents with    Follow-up     6month,Afib    Cholesterol Problem    Coronary Artery Disease     Visit Vitals  /68 (BP 1 Location: Left upper arm, BP Patient Position: Sitting)   Pulse 84   Ht 6' 4\" (1.93 m)   Wt 257 lb (116.6 kg)   SpO2 96%   BMI 31.28 kg/m²     Chest pain denied   SOB  Yes. Palpitations Yes. Swelling in hands/feet Yes.   Dizziness denied   Recent hospital stays denied   Refills denied

## 2022-05-12 NOTE — PROGRESS NOTES
CARDIOLOGY OFFICE NOTE    Shimon Leiva MD, [address], [place], [address] [facility]  Phone [phone]; Fax [phone]  Mobile [phone]   Voice Mail [phone]      Patria Pimentel MD       ATTENTION:   This medical record was transcribed using an electronic medical records/speech recognition system. Although proofread, it may and can contain electronic, spelling and other errors. Corrections may be executed at a later time. Please feel free to contact us for any clarifications as needed. Evelyn Foy is a 81 y.o. male with  referred for dyspnea. Cardiac risk factors: diabetes mellitus, sedentary life style, male gender, hypertension  I have personally obtained the history from the patient. HISTORY OF PRESENTING ILLNESS      Mr. Polanco's cardiac history includes CAD with multiple PCI's, PAF status post watchman with a history of GI bleeding he has been followed by Dr. Laura Huitron in the past.  He has had a history of class III-IV dyspnea on exertion. He is somewhat confined to a wheelchair secondary to back pain. Over the years I think he just got progressively deconditioned and has limited ability to ambulate. [facility] and he is making good progress they said. He also throws the baseball to his grandson and feels like he might of improved somewhat. Has had a heart catheterization in the past and this was performed in December 2020 with multivessel CAD which were all not amenable to intervention. He does have a watchman device and is not anticoagulated remains in A. fib with no issues.          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 ([facility]- @ 27 yo-weak on right arm>leg 04/12/2016    TOPHER (obstructive sleep apnea) 01/23/2015    Atrial fibrillation ([facility] 08/16/2013    Dyslipidemia, goal LDL below 70 12/20/2010    Essential hypertension, benign 02/25/2010    Reflux esophagitis 02/25/2010    CAD (coronary artery disease) 02/25/2010           PAST MEDICAL HISTORY     Past Medical History:   Diagnosis Date    Arrhythmia     a fib    Atrial fibrillation ([facility] 8/16/2013    Basal cell carcinoma of anterior chest 2/25/2010    EARS AND NOSE    CAD (coronary artery disease) 2/25/2010    CVA (cerebral infarction) 2/25/2010    patient denies 12/13/13    Diverticula of colon 2/25/2010    Dyslipidemia 12/20/2010    Essential hypertension, benign 2/25/2010    Hypertension     TOPHER (obstructive sleep apnea) 1/23/2015    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)     in 1959 at age of 26    Type II or unspecified type diabetes mellitus without mention of complication, not stated as uncontrolled 2/25/2010           PAST SURGICAL HISTORY     Past Surgical History:   Procedure Laterality Date    COLONOSCOPY N/A 11/11/2020    COLONOSCOPY/EGD performed by Gilberto Nichole MD at [address]  12/13/2013          CAPSULE ENDOSCOPE M2A  4/4/2014         HX COLONOSCOPY      HX CORONARY STENT PLACEMENT      HX HEART ASSIST DEVICE Left 09/24/2019    HX KNEE REPLACEMENT Left 2010    HX ORTHOPAEDIC Right     FUSION IN RIGHT WRIST    HX OTHER SURGICAL      watchman procedure.      HX OTHER SURGICAL      shoulder surgery.  HX VASECTOMY  1983    SC CARDIAC SURG PROCEDURE UNLIST      X4 STENTS    SC CARDIOVERSION ELECTIVE ARRHYTHMIA EXTERNAL N/A 9/24/2019    Ep Cardioversion performed by Richard Mcneil MD [facility]  CATH LAB    UPPER GI ENDOSCOPY,BIOPSY  12/13/2013               ALLERGIES     Allergies   Allergen Reactions    Adhesive Tape Rash    Baycol Nausea and Vomiting    Clonidine Other (comments)     Headache and sleepiness      Pravachol [Pravastatin] Nausea and Vomiting          FAMILY HISTORY     Family History   Problem Relation Age of Onset    Heart Disease Mother     Cancer Father         colon    Heart Disease Maternal Grandmother     Heart Disease Maternal Grandfather     Cancer Paternal Grandmother     Cancer Paternal Grandfather     Anesth Problems Neg Hx     negative for cardiac disease       SOCIAL HISTORY     Social History     Socioeconomic History    Marital status:    Tobacco Use    Smoking status: Never Smoker    Smokeless tobacco: Former User     Types: Chew    Tobacco comment: patient reports quitting over 20 yeaers ago.     Vaping Use    Vaping Use: Never used   Substance and Sexual Activity    Alcohol use: Yes     Comment: once a year    Drug use: No    Sexual activity: Not Currently   Other Topics Concern     Service Yes    Blood Transfusions Yes    Caffeine Concern Yes     Comment: 2-3 cups of coffee daily    Occupational Exposure No    Hobby Hazards No    Sleep Concern No    Stress Concern No    Weight Concern No    Special Diet No    Back Care No    Exercise Yes    Bike Helmet No     Comment: pt doesn't ride a bike   [address] [facility] Yes    Self-Exams No         MEDICATIONS     Current Outpatient Medications   Medication Sig    atorvastatin (LIPITOR) 40 mg tablet atorvastatin 40 mg tablet   TAKE 1 TABLET BY MOUTH ONCE DAILY    amLODIPine (NORVASC) 10 mg tablet Take 1 tablet by mouth once daily    spironolactone (ALDACTONE) 25 mg tablet Take 1 Tablet by mouth daily.  metFORMIN ER (GLUCOPHAGE XR) 500 mg tablet TAKE 4 TABLETS BY MOUTH ONCE DAILY WITH BREAKFAST    carvediloL (Coreg) 6.25 mg tablet Take 1 Tablet by mouth two (2) times daily (with meals).  minocycline (MINOCIN, DYNACIN) 100 mg capsule 100 mg daily.  losartan (COZAAR) 100 mg tablet TAKE 1 TABLET BY MOUTH ONCE DAILY FOR HIGH BLOOD PRESSURE    albuterol (PROVENTIL HFA, VENTOLIN HFA, PROAIR HFA) 90 mcg/actuation inhaler Take 1-2 Puffs by inhalation every six (6) hours as needed for Wheezing. Indications: bronchospasm    ferrous sulfate 325 mg (65 mg iron) tablet Take  by mouth Daily (before breakfast).  revefenacin (Yupelri) 175 mcg/3 mL nebu nebulizer solution 175 mcg by Nebulization route daily.  aspirin delayed-release 81 mg tablet Take 81 mg by mouth daily. Indications: MYOCARDIAL REINFARCTION PREVENTION     No current facility-administered medications for this visit. I have reviewed the nurses notes, vitals, problem list, allergy list, medical history, family, social history and medications. REVIEW OF SYMPTOMS   For positive per HPI  General: Pt denies excessive weight gain or loss. Pt is able to conduct ADL's  HEENT: Denies blurred vision, headaches, hearing loss, epistaxis and difficulty swallowing. Respiratory: Denies cough, congestion, shortness of breath, HURD, wheezing or stridor. Cardiovascular: Denies precordial pain, palpitations, edema or PND  Gastrointestinal: Denies poor appetite, indigestion, abdominal pain or blood in stool  Genitourinary: Denies hematuria, dysuria, increased urinary frequency  Musculoskeletal: Denies joint pain or swelling from muscles or joints  Neurologic: Denies tremor, paresthesias, headache, or sensory motor disturbance  Psychiatric: Denies confusion, insomnia, depression  Integumentray: Denies rash, itching or ulcers.   Hematologic: Denies easy bruising, bleeding     PHYSICAL EXAMINATION      Vitals:    22 0951   BP: 110/68   Pulse: 84   SpO2: 96%   Weight: 257 lb (116.6 kg)   Height: 6' 4\" (1.93 m)     General: Well developed deconditioned and overweight in no acute distress. HEENT: No jaundice, oral mucosa moist, no oral ulcers  Neck: Supple, no stiffness, no lymphadenopathy, supple  Heart:   Irregular  Respiratory: Clear bilaterally x 4, no wheezing or rales  Extremities: Trace edema, normal cap refill, no cyanosis. Musculoskeletal: No clubbing, no deformities  Neuro: A&Ox3, speech clear, in a wheelchair, cooperative, no focal neurologic deficits  Skin: Skin color is normal. No rashes or lesions. Non diaphoretic, moist.        EK2022 atrial fibrillation well controlled rate     DIAGNOSTIC DATA     1. Cath   (): LAD p90, m70. OM1 70, OM2 90 (small). RCA m40. EF 55%. No AVG/MR. Patent L SC.   PCI (): mLAD 2.75x18 Xience, pLAD 2.75x23 Xience. OM1 3.0x15 Xience. Cath (13): LAD patent prox and mid stents. LCx m40; patent OM1 stent. RCA p80. EF 55%. No AVG/MR. PCI ost/proxRCA (13): 3.0x18 Xience. 17:Cath (17): LAD patent with patent prox and mid stent. LCx patent with patent OM1 stent. RCA p90 (ISR) and p90 distal to stent. EF 65%. No AVG/MR. PCI osteal/prox RCA (17): 3.0x28 Xience. 18: RCA - prior stents present; Prox ISR 60%; Mid to Distal - 60%   Difficulty engaging guide secondary to stent at ostium   JR4/AR1 guide would not engage ; THE [facility] guide used to engage RCA   FFR - not siginificant 0.89   20-Multivessel CAD. Moderate diffuse LAD disease with iFR of 0.92. Distal Lcx with severe disease not amendable to PCI. RCA with diffuse moderate ISR. Long term durable result of RCA will be difficult due to caliber of vessel, furthermore with his hx of GI bleeding I do not think it is worth the risk to expose patient to DAPT again. Recent Hgb of 8 due to GI bleed. Mildly elevated lvedp     2.  Stress Test   [place] Cardiolite (8/11/9): Mild fixed inf defect. No reversible defects. EF 51% with mild inf HK. Lexiscan Cardiolite 4/15/14 - normal perfusion imaging, EF 52%     3. Echo   (8/22/13): EF 55%. Mod dil LA. Mild dil RA. Mild MR/TR/AR. PASP 24.   6/18/18 - EF 55%. No WMA. Mild asymmetric hypertrophy of the septum. MIld to mod LAE. AoV calcification and sclerosis. No significant change c/t study of 22-Aug-2013.   4/11/19 - EF 55-60%. Mod LAE. AoV sclerosis. Mild MR. Mild TR. Severely elevated CVP (>15 mmHg)   YESENIA 11/9/19 - 30mm Watchman device present. Well seated. No evidence of carlton-device leak. No overlying thrombus noted on the device. Very small right to left shunting likely related to recent trans-septal puncture for Watchman implantation. LVEF 56-60%. 3/19/20- EF 61%, Mild concentric hypertrophy, LAE, severe AV sclerosis, Mild TR, PAP 28.6 mmHg       4. DCCV 12/18/14     5. 9/24/2019:Endocardial left atrial appendage occlusion was performed utilizing a 30 mm WATCHMAN device per Dr. Sandip Simmons     6.  Lipids   8/17/20- , HDL 50, LDL 74,    2/24/21- , HDL 42, LDL 82.4,   12/30/21- , HDL 44, ,          LABORATORY DATA            Lab Results   Component Value Date/Time    WBC 7.1 02/24/2021 12:42 PM    Hemoglobin (POC) 9.9 03/19/2014 09:14 AM    HGB 13.0 09/29/2021 11:53 AM    HCT 41.2 09/29/2021 11:53 AM    PLATELET 229 79/34/0440 12:42 PM    MCV 93.7 02/24/2021 12:42 PM      Lab Results   Component Value Date/Time    Sodium 135 (L) 04/04/2022 11:05 AM    Potassium 4.4 04/04/2022 11:05 AM    Chloride 104 04/04/2022 11:05 AM    CO2 26 04/04/2022 11:05 AM    Anion gap 5 04/04/2022 11:05 AM    Glucose 155 (H) 04/04/2022 11:05 AM    BUN 19 04/04/2022 11:05 AM    Creatinine 1.31 (H) 04/04/2022 11:05 AM    BUN/Creatinine ratio 15 04/04/2022 11:05 AM    GFR est AA >60 04/04/2022 11:05 AM    GFR est non-AA 53 (L) 04/04/2022 11:05 AM    Calcium 8.8 04/04/2022 11:05 AM    Bilirubin, total 0.8 2022 11:05 AM    Alk. phosphatase 102 2022 11:05 AM    Protein, total 7.0 2022 11:05 AM    Albumin 3.6 2022 11:05 AM    Globulin 3.4 2022 11:05 AM    A-G Ratio 1.1 2022 11:05 AM    ALT (SGPT) 19 2022 11:05 AM        EC2022-A. fib with controlled rate   ASSESSMENT/RECOMMENDATIONS:.         1.  Class III dyspnea on exertion  -Again I do feel that his shortness of breath is multifactorial.  As we stated before he has COPD and most likely some underlying microvascular coronary artery disease as well as moderate CAD elsewhere.  -And her last visit I gave him in his after visit summary instructions to stop his Ranexa because it was not making any difference he is and he continued it. In the meantime I believe he stopped his cholesterol-lowering medicine which was not on my instructions.  -He is doing rehab at [facility] and they have noticed an improvement in his ability to ambulate. 2.  PCI's with a PCI and stenting with a drug-eluting stent of the RCA  -No intervention was performed on the last cardiac catheterization 2020   -Treatment will be medical management at this time  -Discontinue his Ranexa  3. PAF   -No issues with his atrial fibrillation  -Has a watchman device does not require anticoagulation  4. Dyslipidemia LDL goal below 70  -Last cholesterol was in 1374 was 888.  -Salicylates taking atorvastatin although in the past I had him listed as being on Crestor. Very confusing I am not sure what he is taking but again I written it out and is discharge paperwork and asked that he read this and have his cholesterol checked in 2 months.   Lab Results   Component Value Date/Time    Cholesterol, total 233 (H) 2021 10:38 AM    HDL Cholesterol 44 2021 10:38 AM    LDL, calculated 165 (H) 2021 10:38 AM    VLDL, calculated 24 2021 10:38 AM    Triglyceride 120 2021 10:38 AM    CHOL/HDL Ratio 5.3 (H) 12/30/2021 10:38 AM   5. Type 2 diabetes  -Hemoglobin A1c goal should be close to 7 or less  6. Hypertension   -Blood pressure is at goal    Follow-up with me in 6 months      Orders Placed This Encounter    AMB POC EKG ROUTINE W/ 12 LEADS, INTER & REP     Order Specific Question:   Reason for Exam:     Answer:   CAD    atorvastatin (LIPITOR) 40 mg tablet     Sig: atorvastatin 40 mg tablet   TAKE 1 TABLET BY MOUTH ONCE DAILY       We discussed the expected course, resolution and complications of the diagnosis(es) in detail. Medication risks, benefits, costs, interactions, and alternatives were discussed as indicated. I advised him to contact the office if his condition worsens, changes or fails to improve as anticipated. He expressed understanding with the diagnosis(es) and plan          Follow-up and Dispositions  ·   Return in about 6 months (around 11/12/2022). I have discussed the diagnosis with  Jhonny Goodman and the intended plan as seen in the above orders. Questions were answered concerning future plans. I have discussed medication side effects and warnings with the patient as well. Thank you,  Herbert Hsu MD for involving me in the care of  Jhonny Goodman. Please do not hesitate to contact me for further questions/concerns. Shimon Cochran MD, [address] [place] [address] Rd[address]      [address] [place] [address], [address] [place] [address]     [place] [facility]      [phone] / [phone] Fax

## 2022-05-12 NOTE — LETTER
5/12/2022    Patient: Luisana Ritter   YOB: 1940   Date of Visit: 5/12/2022     Lesia Rodriguez MD  [address] [place] [address]    Dear Lesia Rodriguez MD,      Thank you for referring Mr. Saray Haskins to [address] [place] [address] for evaluation. My notes for this consultation are attached. If you have questions, please do not hesitate to call me. I look forward to following your patient along with you.       Sincerely,    Katie Ramirez MD

## 2022-05-18 RX ORDER — LOSARTAN POTASSIUM 100 MG/1
TABLET ORAL
Qty: 90 TABLET | Refills: 0 | Status: SHIPPED | OUTPATIENT
Start: 2022-05-18 | End: 2022-09-01

## 2022-06-01 DIAGNOSIS — I25.10 CORONARY ARTERY DISEASE INVOLVING NATIVE CORONARY ARTERY OF NATIVE HEART WITHOUT ANGINA PECTORIS: ICD-10-CM

## 2022-06-01 DIAGNOSIS — E78.5 DYSLIPIDEMIA, GOAL LDL BELOW 70: Primary | ICD-10-CM

## 2022-06-14 NOTE — PROGRESS NOTES
HISTORY OF PRESENT ILLNESS  HPI Mr. Deann Monaco is a 67y.o. year old male, he is seen today for Transition of Care services following a hospital discharge for atrial fib/ watchman procedure on 9/25. Our office Nurse Navigator performed an outreach to Mr. Padmini Gomez on 9/27/2019 (within 2 business days of discharge) to complete medication reconciliation and a telephonic assessment of his condition. Kori Araiza is a 67 y.o. Male with a history of HTN, CAD, A-fib, CHF with preserved ejection fraction, reflux esophagitis, DM-II with nephropathy, BCC, cerebral infarction, TOPHER, osteitis deformans, fatigue, anemia and hyperlipidemia with LDL goal <70, who presents to the office today for a hospital follow up. Pt had heart surgery on 9/24 at [facility] where he had a watchman left atrial appendage closure device inserted via right groin veins. . Pt was discharged on 9/25. Pt notes the procedure was to get him off of blood thinners. Pt states he still feels some tiredness after the surgery. Pt is scheduled to see Dr. Fozia Marie to follow up on Friday. Pt says he has been using a mouthpiece when he sleeps now. Pt reports he has had to use the bathroom less times throughout the night now. Pt denies unusual SOB, chest pain, and any ER visits or hospitalizations since 9/25.           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 2/25/2010    Other and unspecified hyperlipidemia 2/25/2010    Positive PPD 2/25/2010    Reflux esophagitis 2/25/2010    NO LONGER A PROBLEM    Stroke [facility])     in 65 at age of 26    Type II or unspecified type diabetes mellitus without mention of complication, not stated as uncontrolled 2/25/2010     Past Surgical History:   Procedure Laterality Date    CARDIAC SURG PROCEDURE UNLIST      X4 STENTS    COLONOSCOPY,REMV ERICA,SNARE  12/13/2013         HC CAPSULE ENDOSCOPE M2A  4/4/2014         HX COLONOSCOPY      HX CORONARY STENT PLACEMENT      HX HEART ASSIST DEVICE Left 09/24/2019    HX KNEE REPLACEMENT Left     HX ORTHOPAEDIC Right     FUSION IN RIGHT WRIST    HX VASECTOMY  1983    [place] CARDIOVERSION ELECTIVE ARRHYTHMIA EXTERNAL N/A 9/24/2019    Ep Cardioversion performed by Nano Hernandez MD at [address] [address], [facility]/s Dr CATH LAB    UPPER GI ENDOSCOPY,BIOPSY  12/13/2013          Current Outpatient Medications on File Prior to Visit   Medication Sig Dispense Refill    spironolactone (ALDACTONE) 25 mg tablet 25 mg daily. 3    losartan (COZAAR) 100 mg tablet TAKE 1 TABLET BY MOUTH ONCE DAILY FOR HIGH BLOOD PRESSURE 90 Tab 3    amiodarone (CORDARONE) 200 mg tablet Take 1 Tab by mouth daily. 30 Tab 1    amLODIPine (NORVASC) 10 mg tablet Take 1 Tab by mouth daily. 90 Tab 3    furosemide (LASIX) 40 mg tablet Take 1 Tab by mouth daily. (Patient taking differently: Take 80 mg by mouth daily.) 90 Tab 3    warfarin (COUMADIN) 5 mg tablet TAKE 1 AND 1/2 (ONE-HALF) TABLETS BY MOUTH ONCE DAILY 135 Tab 5    carvedilol (COREG) 6.25 mg tablet Take 1 Tab by mouth two (2) times daily (with meals). 180 Tab 3    metFORMIN ER (GLUCOPHAGE XR) 500 mg tablet TAKE 4 TABLETS BY MOUTH ONCE DAILY WITH BREAKFAST 360 Tab 1    aspirin delayed-release 81 mg tablet Take 81 mg by mouth daily. Indications: MYOCARDIAL REINFARCTION PREVENTION       No current facility-administered medications on file prior to visit.       Allergies   Allergen Reactions    Adhesive Tape Rash    Baycol Nausea and Vomiting    Clonidine Other (comments)     Headache and sleepiness      Pravachol [Pravastatin] Nausea and Vomiting     Family History   Problem Relation Age of Onset    Heart Disease Mother     Cancer Father         colon    Heart Disease Maternal Grandmother     Heart Disease Maternal Grandfather     Cancer Paternal Grandmother     Cancer Paternal Grandfather     Anesth Problems Neg Hx      Social History     Socioeconomic History    Marital status:      Spouse name: Not on file    Number of children: Not on file    Years of education: Not on file    Highest education level: Not on file   Tobacco Use    Smoking status: Never Smoker    Smokeless tobacco: Former User     Types: Chew    Tobacco comment: patient reports quitting over 20 yeaers ago. Substance and Sexual Activity    Alcohol use: Yes     Frequency: Never     Comment: once a year    Drug use: No   Other Topics Concern     Service Yes    Blood Transfusions Yes    Caffeine Concern Yes     Comment: 2-3 cups of coffee daily    Occupational Exposure No    Hobby Hazards No    Sleep Concern No    Stress Concern No    Weight Concern No    Special Diet No    Back Care No    Exercise Yes    Bike Helmet No     Comment: pt doesn't ride a bike   [address] Yes    Self-Exams No             Review of Systems   Constitutional: Negative for chills, diaphoresis, fever, malaise/fatigue and weight loss. Eyes: Negative for blurred vision, double vision and pain. Respiratory: Negative for cough, shortness of breath and wheezing. Cardiovascular: Negative for chest pain, palpitations, orthopnea, claudication, leg swelling and PND. Genitourinary: Negative for frequency. Skin: Negative for itching and rash. Neurological: Negative for dizziness, tingling, tremors, sensory change, speech change, focal weakness, seizures, loss of consciousness, weakness and headaches. Endo/Heme/Allergies: Negative for environmental allergies and polydipsia. Does not bruise/bleed easily.      Results for orders placed or performed in visit on 09/30/19   AMB PT/INR EXTERNAL   Result Value Ref Range    INR, External 2.6 2.0 - 3.0   AMB POC PT/INR   Result Value Ref Range    VALID INTERNAL CONTROL POC Yes     Prothrombin time (POC) 31.1 seconds    INR POC 2.6          Physical Exam  Visit Vitals  /70 (BP 1 Location: Left arm, BP Patient Position: Sitting)   Pulse (!) 54   Temp 98.1 °F (36.7 °C) (Oral)   Resp 18   Ht 6' 4\" (1.93 m)   Wt 249 lb (112.9 kg)   SpO2 96%   BMI 30.31 kg/m²       Pt is unable to get up for a chair without major assistance from me helping him. This is something that has gradually gotten worse. Head: Normocephalic, without obvious abnormality, atraumatic  Eyes: conjunctivae/corneas clear. PERRL, EOM's intact. Neck: supple, symmetrical, trachea midline, no adenopathy, thyroid: not enlarged, symmetric, no tenderness/mass/nodules, no carotid bruit and no JVD  Lungs: clear to auscultation bilaterally  Heart: regular rate and rhythm, S1, S2 normal, no murmur, click, rub or gallop  Extremities: extremities normal, atraumatic, no cyanosis or edema  Pulses: 2+ and symmetric  Lymph nodes: Cervical, supraclavicular, and axillary nodes normal.  Neurologic: Grossly normal      ASSESSMENT and PLAN    ICD-10-CM ICD-9-CM    1. Persistent atrial fibrillation I48.19 427.31    2. Encounter for immunization Z23 V03.89 INFLUENZA VACCINE INACTIVATED (IIV), SUBUNIT, ADJUVANTED, IM      PNEUMOCOCCAL CONJ VACCINE 13 VALENT IM   3. Coronary artery disease involving native coronary artery of native heart without angina pectoris I25.10 414.01    4. CKD (chronic kidney disease) stage 3, GFR 30-59 ml/min ([facility]) N18.3 585.3    5. Type 2 diabetes mellitus without complication, without long-term current use of insulin ([facility]) E11.9 250.00    6. 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 Reflux esophagitis K21.0 530.11    8.  Essential hypertension, benign I10 401.1 9. HURD (dyspnea on exertion) R06.09 786.09    10. TOPHER (obstructive sleep apnea) G47.33 327.23      Diagnoses and all orders for this visit:    1. Persistent atrial fibrillation    2. Encounter for immunization  -     INFLUENZA VACCINE INACTIVATED (IIV), SUBUNIT, ADJUVANTED, IM  -     PNEUMOCOCCAL CONJ VACCINE 13 VALENT IM    3. Coronary artery disease involving native coronary artery of native heart without angina pectoris    4. CKD (chronic kidney disease) stage 3, GFR 30-59 ml/min ([facility])    5. Type 2 diabetes mellitus without complication, without long-term current use of insulin ([facility]    6. 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 Reflux esophagitis    8. Essential hypertension, benign    9. HURD (dyspnea on exertion)    10. TOPHER (obstructive sleep apnea)      Follow-up and Dispositions    · Return in about 3 months (around 1/9/2020), or BP or glucose OOC, for F/U HTN,CHOL,DM. reviewed medications and side effects in detail  Please call my office if there are any questions- [phone]. Discussed expected course/resolution/complications of diagnosis in detail with patient. Medication risks/benefits/costs/interactions/alternatives discussed with patient. Pt was given an after visit summary which includes diagnoses, current medications & vitals. Pt expressed understanding with the diagnosis and plan. Total 25 minutes,60 % counseling re: Encouraged him to follow up with his cardiologist for his recent procedure as I am not familiar with it. Recommended a weekly \"heart check. \" I went into detail how to do this. Review of  the proper technique of checking the blood pressure- check it on an average day only, not on a stressful day, sitting, no exercise for at least 1 hour and not experiencing any new pain( chronic pain is OK).  Patient encouraged to check BP sitting and standing at least once a month and to report these readings to me if > 140/ 90 on average , or if the standing BP is >  15 points lower than the sitting. Always check it twice and if there is > 5 points decrease from the previous reading( top reading or systolic) keep checking it until it does not drop 5 points. Write only this final reading down, not the preceding readings. If out of these readings there is only 1 out of 4 or less > 983, or > 90 diastolic then your blood pressure is OK; it needs further treatment if it is above this. Also, don't forget,  as noted above, to check your blood pressure standing once a month; this is to detect a drop in your BP that might lead to fainting and serious injury; you check it standing with your arm hanging straight down and relaxed. Check it twice waiting 1 minute between the two readings. If, with either one of these 2 readings there is a > 15 point drop of the systolic compared to your sitting pressure( done before the standing BP), then let me know. Following these guidelines, continue to check your BP and write down only the ones described above and it will help me to effectively treat your blood pressure. Reviewed symptoms, or lack thereof, of hypertension and elevated cholesterol. Reviewed BP, cholesterol and diabetic goals. LDL goal<100,HDL goal>45, Triglyceride goal<150, A1C< 7.0, BP<140/90. Discussed specific diabetic recommendations: low cholesterol diet, weight control and daily exercise discussed, home glucose monitoring emphasized, all medications, side effects and compliance discussed carefully, foot care discussed and Podiatry visits discussed, annual eye examinations at Ophthalmology discussed, glycohemoglobin and other lab monitoring discussed and long term diabetic complications discussed. Reviewed some strengthening exercises that can help him get out of a chair on this own, but he needs to clear with Dr. Yamileth Dotson his ability to do this.  Also encouraged him to continue the recumbent bike that he feels comfortable with, compared to a regular bike. Also, discussed symptoms of concern that were noted today in the note above, treatment options( including doing nothing), when to follow up before recommended time frame. Also, answered all questions. Patient to call if no better in 3 -4 days and prn new problems. This document was written by Connor Peres, as dictated by Letitia Asencio MD.  I have reviewed and agree with the above note and have made corrections where appropriate Mark C. Marylen Brand M.D. impairments found

## 2022-07-27 ENCOUNTER — OFFICE VISIT (OUTPATIENT)
Dept: FAMILY MEDICINE CLINIC | Age: 82
End: 2022-07-27
Payer: MEDICARE

## 2022-07-27 VITALS
HEIGHT: 76 IN | RESPIRATION RATE: 16 BRPM | SYSTOLIC BLOOD PRESSURE: 130 MMHG | WEIGHT: 253 LBS | OXYGEN SATURATION: 97 % | BODY MASS INDEX: 30.81 KG/M2 | HEART RATE: 90 BPM | DIASTOLIC BLOOD PRESSURE: 80 MMHG | TEMPERATURE: 96.8 F

## 2022-07-27 DIAGNOSIS — I63.40 CEREBRAL INFARCTION DUE TO EMBOLISM OF CEREBRAL ARTERY (HCC): ICD-10-CM

## 2022-07-27 DIAGNOSIS — K21.00 GASTROESOPHAGEAL REFLUX DISEASE WITH ESOPHAGITIS WITHOUT HEMORRHAGE: ICD-10-CM

## 2022-07-27 DIAGNOSIS — G47.33 OSA (OBSTRUCTIVE SLEEP APNEA): ICD-10-CM

## 2022-07-27 DIAGNOSIS — N18.31 STAGE 3A CHRONIC KIDNEY DISEASE (HCC): ICD-10-CM

## 2022-07-27 DIAGNOSIS — I48.0 PAROXYSMAL ATRIAL FIBRILLATION (HCC): ICD-10-CM

## 2022-07-27 DIAGNOSIS — E11.21 TYPE 2 DIABETES WITH NEPHROPATHY (HCC): ICD-10-CM

## 2022-07-27 DIAGNOSIS — Z79.01 CHRONIC ANTICOAGULATION: ICD-10-CM

## 2022-07-27 DIAGNOSIS — I10 ESSENTIAL HYPERTENSION, BENIGN: Primary | ICD-10-CM

## 2022-07-27 DIAGNOSIS — I25.10 CORONARY ARTERY DISEASE INVOLVING NATIVE CORONARY ARTERY OF NATIVE HEART WITHOUT ANGINA PECTORIS: ICD-10-CM

## 2022-07-27 DIAGNOSIS — I69.320 CVA, OLD, APHASIA: ICD-10-CM

## 2022-07-27 DIAGNOSIS — E78.5 DYSLIPIDEMIA, GOAL LDL BELOW 70: ICD-10-CM

## 2022-07-27 LAB
ALBUMIN SERPL-MCNC: 3.5 G/DL (ref 3.5–5)
ALBUMIN/GLOB SERPL: 1.1 {RATIO} (ref 1.1–2.2)
ALP SERPL-CCNC: 126 U/L (ref 45–117)
ALT SERPL-CCNC: 23 U/L (ref 12–78)
ANION GAP SERPL CALC-SCNC: 7 MMOL/L (ref 5–15)
AST SERPL-CCNC: 10 U/L (ref 15–37)
BILIRUB SERPL-MCNC: 0.6 MG/DL (ref 0.2–1)
BUN SERPL-MCNC: 25 MG/DL (ref 6–20)
BUN/CREAT SERPL: 21 (ref 12–20)
CALCIUM SERPL-MCNC: 9.1 MG/DL (ref 8.5–10.1)
CHLORIDE SERPL-SCNC: 107 MMOL/L (ref 97–108)
CHOLEST SERPL-MCNC: 110 MG/DL
CO2 SERPL-SCNC: 27 MMOL/L (ref 21–32)
CREAT SERPL-MCNC: 1.21 MG/DL (ref 0.7–1.3)
ERYTHROCYTE [DISTWIDTH] IN BLOOD BY AUTOMATED COUNT: 14.8 % (ref 11.5–14.5)
EST. AVERAGE GLUCOSE BLD GHB EST-MCNC: 214 MG/DL
GLOBULIN SER CALC-MCNC: 3.2 G/DL (ref 2–4)
GLUCOSE SERPL-MCNC: 185 MG/DL (ref 65–100)
HBA1C MFR BLD: 9.1 % (ref 4–5.6)
HCT VFR BLD AUTO: 40.2 % (ref 36.6–50.3)
HDLC SERPL-MCNC: 45 MG/DL
HDLC SERPL: 2.4 {RATIO} (ref 0–5)
HGB BLD-MCNC: 12.5 G/DL (ref 12.1–17)
LDLC SERPL CALC-MCNC: 44.6 MG/DL (ref 0–100)
MCH RBC QN AUTO: 31 PG (ref 26–34)
MCHC RBC AUTO-ENTMCNC: 31.1 G/DL (ref 30–36.5)
MCV RBC AUTO: 99.8 FL (ref 80–99)
NRBC # BLD: 0 K/UL (ref 0–0.01)
NRBC BLD-RTO: 0 PER 100 WBC
PLATELET # BLD AUTO: 205 K/UL (ref 150–400)
PMV BLD AUTO: 11.9 FL (ref 8.9–12.9)
POTASSIUM SERPL-SCNC: 4.7 MMOL/L (ref 3.5–5.1)
PROT SERPL-MCNC: 6.7 G/DL (ref 6.4–8.2)
RBC # BLD AUTO: 4.03 M/UL (ref 4.1–5.7)
SODIUM SERPL-SCNC: 141 MMOL/L (ref 136–145)
TRIGL SERPL-MCNC: 102 MG/DL (ref ?–150)
VLDLC SERPL CALC-MCNC: 20.4 MG/DL
WBC # BLD AUTO: 7.4 K/UL (ref 4.1–11.1)

## 2022-07-27 PROCEDURE — 3046F HEMOGLOBIN A1C LEVEL >9.0%: CPT | Performed by: FAMILY MEDICINE

## 2022-07-27 PROCEDURE — G8432 DEP SCR NOT DOC, RNG: HCPCS | Performed by: FAMILY MEDICINE

## 2022-07-27 PROCEDURE — 1101F PT FALLS ASSESS-DOCD LE1/YR: CPT | Performed by: FAMILY MEDICINE

## 2022-07-27 PROCEDURE — G8536 NO DOC ELDER MAL SCRN: HCPCS | Performed by: FAMILY MEDICINE

## 2022-07-27 PROCEDURE — G8754 DIAS BP LESS 90: HCPCS | Performed by: FAMILY MEDICINE

## 2022-07-27 PROCEDURE — G8752 SYS BP LESS 140: HCPCS | Performed by: FAMILY MEDICINE

## 2022-07-27 PROCEDURE — G0463 HOSPITAL OUTPT CLINIC VISIT: HCPCS | Performed by: FAMILY MEDICINE

## 2022-07-27 PROCEDURE — 1123F ACP DISCUSS/DSCN MKR DOCD: CPT | Performed by: FAMILY MEDICINE

## 2022-07-27 PROCEDURE — G8417 CALC BMI ABV UP PARAM F/U: HCPCS | Performed by: FAMILY MEDICINE

## 2022-07-27 PROCEDURE — 99215 OFFICE O/P EST HI 40 MIN: CPT | Performed by: FAMILY MEDICINE

## 2022-07-27 PROCEDURE — G8427 DOCREV CUR MEDS BY ELIG CLIN: HCPCS | Performed by: FAMILY MEDICINE

## 2022-07-27 RX ORDER — TAMSULOSIN HYDROCHLORIDE 0.4 MG/1
CAPSULE ORAL
COMMUNITY

## 2022-07-27 NOTE — PROGRESS NOTES
Chief Complaint   Patient presents with    Diabetes    Hypertension     Fasting      1. Have you been to the ER, urgent care clinic since your last visit? Hospitalized since your last visit? No    2. Have you seen or consulted any other health care providers outside of the [address] [place] [address] since your last visit? Include any pap smears or colon screening.  No

## 2022-07-27 NOTE — PROGRESS NOTES
HISTORY OF PRESENT ILLNESS  HPI  Alexandra Hughes is a 81 y.o. male with a history of HTN, CAD, A-fib, CHF with preserved ejection fraction, reflux esophagitis, DM-II with nephropathy, BCC, cerebral infarction, TOPHER, fatigue, anemia and hyperlipidemia with LDL goal <70, who presents to the office today for f/u of these health problems. Pt reports that he continues to have daily diarrhea. Pt decreased his metformin to 2 tablets of metformin  mg a day and his diarrhea still persists. He finds that he has no diarrhea if he takes Imodium. Pt denies unusual SOB, chest pain, and any recent ER visits or hospitalizations. 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 2/25/2010    Other and unspecified hyperlipidemia 2/25/2010    Positive PPD 2/25/2010    Reflux esophagitis 2/25/2010    NO LONGER A PROBLEM    Stroke ([facility]     in 1959 at age of 26    Type II or unspecified type diabetes mellitus without mention of complication, not stated as uncontrolled 2/25/2010     Past Surgical History:   Procedure Laterality Date    COLONOSCOPY N/A 11/11/2020    COLONOSCOPY/EGD performed by Dante Pacheco MD at [facility]  12/13/2013         [facility] CAPSULE ENDOSCOPE M2A  4/4/2014         HX COLONOSCOPY      HX CORONARY STENT PLACEMENT      HX HEART ASSIST DEVICE Left 09/24/2019    HX KNEE REPLACEMENT Left 2010    HX ORTHOPAEDIC Right     FUSION IN RIGHT WRIST    HX OTHER SURGICAL      watchman procedure.      HX OTHER SURGICAL      shoulder surgery. HX VASECTOMY  1983    ID CARDIAC SURG PROCEDURE UNLIST      X4 STENTS    ID CARDIOVERSION ELECTIVE ARRHYTHMIA EXTERNAL N/A 9/24/2019    Ep Cardioversion performed by Joe Begum MD at [place] [address], [facility]    UPPER GI ENDOSCOPY,BIOPSY  12/13/2013          Current Outpatient Medications on File Prior to Visit   Medication Sig Dispense Refill    tamsulosin (FLOMAX) 0.4 mg capsule tamsulosin 0.4 mg capsule   TAKE 2 CAPSULES BY MOUTH ONCE DAILY AT BEDTIME      losartan (COZAAR) 100 mg tablet TAKE 1 TABLET BY MOUTH ONCE DAILY FOR HIGH BLOOD PRESSURE 90 Tablet 0    rosuvastatin (CRESTOR) 40 mg tablet Take 1 Tablet by mouth nightly. 30 Tablet 5    spironolactone (ALDACTONE) 25 mg tablet Take 1 Tablet by mouth daily. 90 Tablet 3    metFORMIN ER (GLUCOPHAGE XR) 500 mg tablet TAKE 4 TABLETS BY MOUTH ONCE DAILY WITH BREAKFAST 360 Tablet 0    carvediloL (Coreg) 6.25 mg tablet Take 1 Tablet by mouth two (2) times daily (with meals). 180 Tablet 3    minocycline (MINOCIN, DYNACIN) 100 mg capsule 100 mg daily. albuterol (PROVENTIL HFA, VENTOLIN HFA, PROAIR HFA) 90 mcg/actuation inhaler Take 1-2 Puffs by inhalation every six (6) hours as needed for Wheezing. Indications: bronchospasm 1 Inhaler 3    ferrous sulfate 325 mg (65 mg iron) tablet Take  by mouth Daily (before breakfast). aspirin delayed-release 81 mg tablet Take 81 mg by mouth daily. Indications: MYOCARDIAL REINFARCTION PREVENTION      [DISCONTINUED] revefenacin (Yupelri) 175 mcg/3 mL nebu nebulizer solution 175 mcg by Nebulization route daily. (Patient not taking: Reported on 7/27/2022)       No current facility-administered medications on file prior to visit.      Allergies   Allergen Reactions    Adhesive Tape Rash    Baycol Nausea and Vomiting    Clonidine Other (comments)     Headache and sleepiness      Pravachol [Pravastatin] Nausea and Vomiting     Family History   Problem Relation Age of Onset    Heart Disease Mother     Cancer Father colon    Heart Disease Maternal Grandmother     Heart Disease Maternal Grandfather     Cancer Paternal Grandmother     Cancer Paternal Grandfather     Anesth Problems Neg Hx      Social History     Socioeconomic History    Marital status:    Tobacco Use    Smoking status: Never    Smokeless tobacco: Former     Types: Chew     Quit date: 10/30/1996    Tobacco comments:     patient reports quitting over 20 yeaers ago. Vaping Use    Vaping Use: Never used   Substance and Sexual Activity    Alcohol use: Yes     Comment: once a year    Drug use: No    Sexual activity: Not Currently   Other Topics Concern     Service Yes    Blood Transfusions Yes    Caffeine Concern Yes     Comment: 2-3 cups of coffee daily    Occupational Exposure No    Hobby Hazards No    Sleep Concern No    Stress Concern No    Weight Concern No    Special Diet No    Back Care No    Exercise Yes    Bike Helmet No     Comment: pt doesn't ride a bike    Seat Belt Yes    Self-Exams No             Review of Systems   Constitutional:  Negative for chills, diaphoresis, fever, malaise/fatigue and weight loss. Eyes:  Negative for blurred vision, double vision, pain and redness. Respiratory:  Negative for cough, shortness of breath and wheezing. Cardiovascular:  Negative for chest pain, palpitations, orthopnea, claudication, leg swelling and PND. Genitourinary:  Negative for frequency. Skin:  Negative for itching and rash. Neurological:  Negative for dizziness, tingling, tremors, sensory change, speech change, focal weakness, seizures, loss of consciousness, weakness and headaches. Endo/Heme/Allergies:  Negative for environmental allergies and polydipsia. Does not bruise/bleed easily.    Results for orders placed or performed in visit on 07/27/22   CBC W/O DIFF   Result Value Ref Range    WBC 7.4 4.1 - 11.1 K/uL    RBC 4.03 (L) 4.10 - 5.70 M/uL    HGB 12.5 12.1 - 17.0 g/dL    HCT 40.2 36.6 - 50.3 %    MCV 99.8 (H) 80.0 - 99.0 FL    MCH 31.0 26.0 - 34.0 PG    MCHC 31.1 30.0 - 36.5 g/dL    RDW 14.8 (H) 11.5 - 14.5 %    PLATELET 331 403 - 486 K/uL    MPV 11.9 8.9 - 12.9 FL    NRBC 0.0 0  WBC    ABSOLUTE NRBC 0.00 0.00 - 9.85 K/uL   METABOLIC PANEL, COMPREHENSIVE   Result Value Ref Range    Sodium 141 136 - 145 mmol/L    Potassium 4.7 3.5 - 5.1 mmol/L    Chloride 107 97 - 108 mmol/L    CO2 27 21 - 32 mmol/L    Anion gap 7 5 - 15 mmol/L    Glucose 185 (H) 65 - 100 mg/dL    BUN 25 (H) 6 - 20 MG/DL    Creatinine 1.21 0.70 - 1.30 MG/DL    BUN/Creatinine ratio 21 (H) 12 - 20      GFR est AA >60 >60 ml/min/1.73m2    GFR est non-AA 58 (L) >60 ml/min/1.73m2    Calcium 9.1 8.5 - 10.1 MG/DL    Bilirubin, total 0.6 0.2 - 1.0 MG/DL    ALT (SGPT) 23 12 - 78 U/L    AST (SGOT) 10 (L) 15 - 37 U/L    Alk. phosphatase 126 (H) 45 - 117 U/L    Protein, total 6.7 6.4 - 8.2 g/dL    Albumin 3.5 3.5 - 5.0 g/dL    Globulin 3.2 2.0 - 4.0 g/dL    A-G Ratio 1.1 1.1 - 2.2     HEMOGLOBIN A1C WITH EAG   Result Value Ref Range    Hemoglobin A1c 9.1 (H) 4.0 - 5.6 %    Est. average glucose 214 mg/dL   LIPID PANEL   Result Value Ref Range    Cholesterol, total 110 <200 MG/DL    Triglyceride 102 <150 MG/DL    HDL Cholesterol 45 MG/DL    LDL, calculated 44.6 0 - 100 MG/DL    VLDL, calculated 20.4 MG/DL    CHOL/HDL Ratio 2.4 0.0 - 5.0             Physical Exam  Visit Vitals  /80 (BP 1 Location: Left arm, BP Patient Position: Sitting, BP Cuff Size: Adult)   Pulse 90   Temp 96.8 °F (36 °C) (Temporal)   Resp 16   Ht 6' 4\" (1.93 m)   Wt 253 lb (114.8 kg)   SpO2 97%   BMI 30.80 kg/m²         Head: Normocephalic, without obvious abnormality, atraumatic  Eyes: conjunctivae/corneas clear. PERRL, EOM's intact.    Neck: supple, symmetrical, trachea midline, no adenopathy, thyroid: not enlarged, symmetric, no tenderness/mass/nodules, no carotid bruit and no JVD  Lungs: clear to auscultation bilaterally  Heart: regular rate and rhythm, S1, S2 normal, no murmur, click, rub or gallop  Extremities: extremities normal, atraumatic, no cyanosis or edema  Pulses: 2+ and symmetric  Lymph nodes: Cervical, supraclavicular, and axillary nodes normal.  Neurologic: Grossly normal. Unchanged. Has his long standing right sided weakness. ASSESSMENT and PLAN    ICD-10-CM ICD-9-CM    1. Essential hypertension, benign  K29 136.5 METABOLIC PANEL, COMPREHENSIVE      CBC W/O DIFF      CBC W/O DIFF      METABOLIC PANEL, COMPREHENSIVE      2. Dyslipidemia, goal LDL below 70  E78.5 272.4 LIPID PANEL      LIPID PANEL      3. Stage 3a chronic kidney disease (HCC)  N18.31 585.3       4. Type 2 diabetes with nephropathy (HCC)  E11.21 250.40 HEMOGLOBIN A1C WITH EAG     583.81 HEMOGLOBIN A1C WITH EAG      5. TOPHER (obstructive sleep apnea)  G47.33 327.23       6. 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 Coronary artery disease involving native coronary artery of native heart without angina pectoris  I25.10 414.01       8. Chronic anticoagulation  Z79.01 V58.61       9. Paroxysmal atrial fibrillation (HCC)  I48.0 427.31       10. Gastroesophageal reflux disease with esophagitis without hemorrhage  K21.00 530.81      530.10       11. CVA, old, aphasia  I69.320 438.11         Diagnoses and all orders for this visit:    1. Essential hypertension, benign  -     METABOLIC PANEL, COMPREHENSIVE; Future  -     CBC W/O DIFF; Future    2. Dyslipidemia, goal LDL below 70  -     LIPID PANEL; Future    3. Stage 3a chronic kidney disease ([facility]    4. Type 2 diabetes with nephropathy (HCC)  -     HEMOGLOBIN A1C WITH EAG; Future    5. TOPHER (obstructive sleep apnea)    6. 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 Coronary artery disease involving native coronary artery of native heart without angina pectoris    8. Chronic anticoagulation    9. Paroxysmal atrial fibrillation (HCC)    10.  Gastroesophageal reflux disease with esophagitis without hemorrhage    11. CVA, old, aphasia    Follow-up and Dispositions    Return in about 6 months (around 1/27/2023) for F/U HTN,CHOL,DM, f/u CAD, GERD, osteoarthritis, CKD, CHF.         lab results and schedule of future lab studies reviewed with patient  reviewed diet, exercise and weight control  cardiovascular risk and specific lipid/LDL goals reviewed  reviewed medications and side effects in detail  Please call my office if there are any questions- [phone]. I will arrange for follow up after the lab tests done from today return  Recommended a weekly \"heart check. \" I went into detail how to do this. Call for refills on medications as needed. Discussed expected course/resolution/complications of diagnosis in detail with patient. Medication risks/benefits/costs/interactions/alternatives discussed with patient. Pt was given an after visit summary which includes diagnoses, current medications & vitals. Pt expressed understanding with the diagnosis and plan    BMI is significantly elevated- in the obese range. I reviewed diet, exercise and weight control. Discussed weight control in detail, the importance of mainly decreased carbs, and for weight maintenance, exercise; discussed different diets and that it isn't as important to watch the type of foods as it is to decrease calorie intake no matter what type of diet you do, etc.   Total 50 minutes  re: Recommended a weekly \"heart check. \" I went into detail how to do this. Regular exercise is very important to your health; it helps mentally, physically, socially; it prevents injuries if done properly. Exercise, even as simple as walking 20-30 minutes daily has major benefits to your health even though your \"numbers\" are the same in the lab. See if you can add this into your daily regimen and after a few months it will become a regular habit-\"just something you do,\" like brushing your teeth.      A combination of aerobic exercise and strengthening and stretching is felt to be the best for you, so this should be your ultimate goal.   This can be done in the privacy of your home or in a group setting as at the gym  Some prefer having a , others prefer to do exercise in groups or individually. Do what \"works\" for you. You need to make it simple and \"fun,\" or you most likely will not continue it. Reviewed BP, cholesterol and diabetic goals. LDL goal<100,HDL goal>45, Triglyceride goal<150, A1C< 7.0, BP<140/90. Discussed specific diabetic recommendations: low cholesterol diet, weight control and daily exercise discussed, home glucose monitoring emphasized, all medications, side effects and compliance discussed carefully, foot care discussed and Podiatry visits discussed, annual eye examinations at Ophthalmology discussed, glycohemoglobin and other lab monitoring discussed and long term diabetic complications discussed. Reviewed symptoms, or lack thereof, of hypertension and elevated cholesterol. Review of  the proper technique of checking the blood pressure- check it on an average day only, not on a stressful day, sitting, no exercise for at least 1 hour and not experiencing any new pain( chronic pain is OK). Patient encouraged to check BP sitting and standing at least once a month and to report these readings to me if > 140/ 90 on average , or if the standing BP is >  15 points lower than the sitting. Always check it twice and if there is > 5 points decrease from the previous reading( top reading or systolic) keep checking it until it does not drop 5 points. Write only this final reading down, not the preceding readings. If out of these readings there is only 1 out of 4 or less > 078, or > 90 diastolic then your blood pressure is OK; it needs further treatment if it is above this.     Also, don't forget,  as noted above, to check your blood pressure standing once a month; this is to detect a drop in your BP that might lead to fainting and serious injury; you check it standing with your arm hanging straight down and relaxed. Check it twice waiting 1 minute between the two readings. If, with either one of these 2 readings there is a > 15 point drop of the systolic compared to your sitting pressure( done before the standing BP), then let me know. Following these guidelines, continue to check your BP and write down only the ones described above and it will help me to effectively treat your blood pressure. Also, discussed symptoms of concern that were noted today in the note above, treatment options( including doing nothing), when to follow up before recommended time frame. Also, answered all questions. He was thinking the diarrhea is from the metformin but it did not seem to make much difference when he decreased it. I encouraged him to continue the metformin up to 4 a day if he can tolerate it and use the Imodium to slow it down. Apparently, his cardiologist Dr. Irene Douglas put hm on 40 mg of rosuvastatin, so we will check his cholesterol today. He can use the information when he sees Dr. Irene Douglas next month. I encouraged him to continue practicing the skills he learned in PT to prevent falls. I discussed with him medication cost and put [facility] on his phone and encouraged him to use that. This document was written by Arnoldo Sesay, as dictated by Michael Wahl MD.   I have reviewed and agree with the above note and have made corrections where appropriate Shimon Kendrick M.D.

## 2022-08-14 DIAGNOSIS — I25.10 CORONARY ARTERY DISEASE INVOLVING NATIVE CORONARY ARTERY OF NATIVE HEART WITHOUT ANGINA PECTORIS: ICD-10-CM

## 2022-08-15 RX ORDER — AMLODIPINE BESYLATE 10 MG/1
TABLET ORAL
Qty: 90 TABLET | Refills: 1 | Status: SHIPPED | OUTPATIENT
Start: 2022-08-15

## 2022-09-01 RX ORDER — LOSARTAN POTASSIUM 100 MG/1
TABLET ORAL
Qty: 90 TABLET | Refills: 1 | Status: SHIPPED | OUTPATIENT
Start: 2022-09-01

## 2022-09-02 ENCOUNTER — TELEPHONE (OUTPATIENT)
Dept: FAMILY MEDICINE CLINIC | Age: 82
End: 2022-09-02

## 2022-09-02 NOTE — TELEPHONE ENCOUNTER
Patient called stated went to kidney doctor A1C over 9 and was told to ask provider what he wants to do about it.     Requesting a call back    Best call back #[phone]

## 2022-09-02 NOTE — TELEPHONE ENCOUNTER
A1c 9.1 In July, no results notes in chart. last Metformin refill 12/2021. Patient will be contacted with recommendations.    thanks

## 2022-09-02 NOTE — TELEPHONE ENCOUNTER
Sent message to NP Cindy Villafana and she advised patient be seen to discuss more on BS. Writer contacted patient, name and  verified. Patient stated he does not want to see anyone and will like PCP to give him a call back on Tuesday and ask his nurse to tell him what to do. Message sent to PCP and Nurse.

## 2022-09-06 NOTE — TELEPHONE ENCOUNTER
Still on metformn? If no, when did he stop and why( unclear in the chart. ...); if does not recall, have him start back on 1 a day and increase that in 2 weeks to 2 a day with larges meal of the day. Continue to keep the carbs low in the diet- both sweets and potatoes, rice and bread, etc and further weight reduction will help as well ( and avoid $$ medications that will be needed if these measures do not work. OK to follow up at 11/29 appt.

## 2022-09-16 DIAGNOSIS — E11.9 TYPE 2 DIABETES MELLITUS WITHOUT COMPLICATION (HCC): ICD-10-CM

## 2022-09-16 RX ORDER — METFORMIN HYDROCHLORIDE 500 MG/1
TABLET, EXTENDED RELEASE ORAL
Qty: 360 TABLET | Refills: 1 | Status: SHIPPED | OUTPATIENT
Start: 2022-09-16

## 2022-11-23 ENCOUNTER — OFFICE VISIT (OUTPATIENT)
Dept: FAMILY MEDICINE CLINIC | Age: 82
End: 2022-11-23
Payer: MEDICARE

## 2022-11-23 VITALS
TEMPERATURE: 97.5 F | HEIGHT: 76 IN | RESPIRATION RATE: 17 BRPM | DIASTOLIC BLOOD PRESSURE: 64 MMHG | HEART RATE: 83 BPM | WEIGHT: 253 LBS | BODY MASS INDEX: 30.81 KG/M2 | OXYGEN SATURATION: 98 % | SYSTOLIC BLOOD PRESSURE: 118 MMHG

## 2022-11-23 DIAGNOSIS — I48.19 PERSISTENT ATRIAL FIBRILLATION (HCC): ICD-10-CM

## 2022-11-23 DIAGNOSIS — Z79.01 CHRONIC ANTICOAGULATION: ICD-10-CM

## 2022-11-23 DIAGNOSIS — N18.31 STAGE 3A CHRONIC KIDNEY DISEASE (HCC): ICD-10-CM

## 2022-11-23 DIAGNOSIS — Z23 NEEDS FLU SHOT: ICD-10-CM

## 2022-11-23 DIAGNOSIS — I10 ESSENTIAL HYPERTENSION, BENIGN: Primary | ICD-10-CM

## 2022-11-23 DIAGNOSIS — K21.00 GASTROESOPHAGEAL REFLUX DISEASE WITH ESOPHAGITIS WITHOUT HEMORRHAGE: ICD-10-CM

## 2022-11-23 DIAGNOSIS — E78.5 DYSLIPIDEMIA, GOAL LDL BELOW 70: ICD-10-CM

## 2022-11-23 DIAGNOSIS — D50.9 IRON DEFICIENCY ANEMIA, UNSPECIFIED IRON DEFICIENCY ANEMIA TYPE: ICD-10-CM

## 2022-11-23 DIAGNOSIS — E11.21 TYPE 2 DIABETES WITH NEPHROPATHY (HCC): ICD-10-CM

## 2022-11-23 DIAGNOSIS — I25.10 CORONARY ARTERY DISEASE INVOLVING NATIVE CORONARY ARTERY OF NATIVE HEART WITHOUT ANGINA PECTORIS: ICD-10-CM

## 2022-11-23 DIAGNOSIS — I63.40 CEREBRAL INFARCTION DUE TO EMBOLISM OF CEREBRAL ARTERY (HCC): ICD-10-CM

## 2022-11-23 DIAGNOSIS — G47.33 OSA (OBSTRUCTIVE SLEEP APNEA): ICD-10-CM

## 2022-11-23 PROCEDURE — G8417 CALC BMI ABV UP PARAM F/U: HCPCS | Performed by: FAMILY MEDICINE

## 2022-11-23 PROCEDURE — 3074F SYST BP LT 130 MM HG: CPT | Performed by: FAMILY MEDICINE

## 2022-11-23 PROCEDURE — 3052F HG A1C>EQUAL 8.0%<EQUAL 9.0%: CPT | Performed by: FAMILY MEDICINE

## 2022-11-23 PROCEDURE — G8432 DEP SCR NOT DOC, RNG: HCPCS | Performed by: FAMILY MEDICINE

## 2022-11-23 PROCEDURE — G8752 SYS BP LESS 140: HCPCS | Performed by: FAMILY MEDICINE

## 2022-11-23 PROCEDURE — 90694 VACC AIIV4 NO PRSRV 0.5ML IM: CPT | Performed by: FAMILY MEDICINE

## 2022-11-23 PROCEDURE — G8427 DOCREV CUR MEDS BY ELIG CLIN: HCPCS | Performed by: FAMILY MEDICINE

## 2022-11-23 PROCEDURE — G8536 NO DOC ELDER MAL SCRN: HCPCS | Performed by: FAMILY MEDICINE

## 2022-11-23 PROCEDURE — 3078F DIAST BP <80 MM HG: CPT | Performed by: FAMILY MEDICINE

## 2022-11-23 PROCEDURE — G8754 DIAS BP LESS 90: HCPCS | Performed by: FAMILY MEDICINE

## 2022-11-23 PROCEDURE — 99215 OFFICE O/P EST HI 40 MIN: CPT | Performed by: FAMILY MEDICINE

## 2022-11-23 PROCEDURE — G0463 HOSPITAL OUTPT CLINIC VISIT: HCPCS | Performed by: FAMILY MEDICINE

## 2022-11-23 PROCEDURE — 1101F PT FALLS ASSESS-DOCD LE1/YR: CPT | Performed by: FAMILY MEDICINE

## 2022-11-23 PROCEDURE — 1123F ACP DISCUSS/DSCN MKR DOCD: CPT | Performed by: FAMILY MEDICINE

## 2022-11-23 NOTE — PROGRESS NOTES
No chief complaint on file. 1. \"Have you been to the ER, urgent care clinic since your last visit? Hospitalized since your last visit? \" No    2. \"Have you seen or consulted any other health care providers outside of the [address] [place] [address] since your last visit? \" No     3. For patients aged 39-70: Has the patient had a colonoscopy / FIT/ Cologuard? Yes - no Care Gap present      If the patient is female:    4. For patients aged 41-77: Has the patient had a mammogram within the past 2 years? NA - based on age or sex      11. For patients aged 21-65: Has the patient had a pap smear? NA - based on age or sex         3 most recent PHQ Screens 4/4/2022   Little interest or pleasure in doing things Not at all   Feeling down, depressed, irritable, or hopeless Not at all   Total Score PHQ 2 0       Health Maintenance Due   Topic Date Due    Shingrix Vaccine Age 49> (1 of 2) Never done    Eye Exam Retinal or Dilated  05/12/2017    Flu Vaccine (1) 08/01/2022      Lowell Henry is a 81 y.o. male  who presents for routine immunizations. Influenza Vaccine High Dose  She denies any symptoms , reactions or allergies that would exclude them from being immunized today. Risks and adverse reactions were discussed and the VIS was given to them. All questions were addressed. She was observed for 10 min post injection. There were no reactions observed.

## 2022-11-23 NOTE — PROGRESS NOTES
HISTORY OF PRESENT ILLNESS  HPI  Roro Eddy is a 81 y.o. male with a history of HTN, CAD, A-fib, CHF with preserved ejection fraction, reflux esophagitis, DM-II with nephropathy, BCC, cerebral infarction, TOPHER, fatigue, anemia and hyperlipidemia with LDL goal <70, who presents to the office today for f/u of these health problems. Pt does not check his BP outside of the office. Checks glucose and there has been no significant change. He has been off iron for 3-4 months. He states he is not taking any blood thinners since the Watchman procedure in 2019  . Pt denies unusual SOB, chest pain, and any recent ER visits or hospitalizations. 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 CAPSULE ENDOSCOPE M2A  04/04/2014         HX HEART ASSIST DEVICE Left 09/24/2019    Watchman    HX KNEE REPLACEMENT Left 2010    HX ORTHOPAEDIC Right     FUSION IN RIGHT WRIST    HX OTHER SURGICAL      shoulder surgery.      [facility] OR CARDIAC SURG PROCEDURE UNLIST      X4 STENTS    OR CARDIOVERSION ELECTIVE ARRHYTHMIA EXTERNAL N/A 09/24/2019    Ep Cardioversion performed by Zulema Murphy MD at [place] [id number], [facility]/s Dr CATH LAB    UPPER GI ENDOSCOPY,BIOPSY  12/13/2013          Current Outpatient Medications on File Prior to Visit   Medication Sig Dispense Refill    metFORMIN ER (GLUCOPHAGE XR) 500 mg tablet TAKE 4 TABLETS BY MOUTH ONCE DAILY WITH BREAKFAST 360 Tablet 1    losartan (COZAAR) 100 mg tablet TAKE 1 TABLET BY MOUTH ONCE DAILY FOR HIGH BLOOD PRESSURE 90 Tablet 1    amLODIPine (NORVASC) 10 mg tablet Take 1 tablet by mouth once daily 90 Tablet 1    tamsulosin (FLOMAX) 0.4 mg capsule tamsulosin 0.4 mg capsule   TAKE 2 CAPSULES BY MOUTH ONCE DAILY AT BEDTIME      rosuvastatin (CRESTOR) 40 mg tablet Take 1 Tablet by mouth nightly. 30 Tablet 5    spironolactone (ALDACTONE) 25 mg tablet Take 1 Tablet by mouth daily. 90 Tablet 3    carvediloL (Coreg) 6.25 mg tablet Take 1 Tablet by mouth two (2) times daily (with meals). 180 Tablet 3    minocycline (MINOCIN, DYNACIN) 100 mg capsule 100 mg daily. albuterol (PROVENTIL HFA, VENTOLIN HFA, PROAIR HFA) 90 mcg/actuation inhaler Take 1-2 Puffs by inhalation every six (6) hours as needed for Wheezing. Indications: bronchospasm 1 Inhaler 3    ferrous sulfate 325 mg (65 mg iron) tablet Take  by mouth Daily (before breakfast). aspirin delayed-release 81 mg tablet Take 81 mg by mouth daily. Indications: MYOCARDIAL REINFARCTION PREVENTION       No current facility-administered medications on file prior to visit.      Allergies   Allergen Reactions    Adhesive Tape Rash    Baycol Nausea and Vomiting    Clonidine Other (comments)     Headache and sleepiness      Pravachol [Pravastatin] Nausea and Vomiting     Family History   Problem Relation Age of Onset    Heart Disease Mother     Cancer Father         colon    Heart Disease Maternal Grandmother     Heart Disease Maternal Grandfather     Cancer Paternal Grandmother     Cancer Paternal Grandfather     Anesth Problems Neg Hx      Social History     Socioeconomic History    Marital status:    Tobacco Use    Smoking status: Never    Smokeless tobacco: Former     Types: Chew     Quit date: 10/30/1996    Tobacco comments:     patient reports quitting over 20 yeaers ago. Vaping Use    Vaping Use: Never used   Substance and Sexual Activity    Alcohol use: Yes     Comment: once a year    Drug use: No    Sexual activity: Not Currently   Other Topics Concern     Service Yes    Blood Transfusions Yes    Caffeine Concern Yes     Comment: 2-3 cups of coffee daily    Occupational Exposure No    Hobby Hazards No    Sleep Concern No    Stress Concern No    Weight Concern No    Special Diet No    Back Care No    Exercise Yes    Bike Helmet No     Comment: pt doesn't ride a bike    Seat Belt Yes    Self-Exams No     Social Determinants of Health     Financial Resource Strain: Low Risk     Difficulty of Paying Living Expenses: Not hard at all   Food Insecurity: No Food Insecurity    Worried About Running Out of Food in the Last Year: Never true    Ran Out of Food in the Last Year: Never true               Review of Systems   Constitutional:  Negative for chills, diaphoresis, fever, malaise/fatigue and weight loss. Eyes:  Negative for blurred vision, double vision, pain and redness. Respiratory:  Negative for cough, shortness of breath and wheezing. Cardiovascular:  Negative for chest pain, palpitations, orthopnea, claudication, leg swelling and PND. Genitourinary:  Negative for frequency. Skin:  Negative for itching and rash. Neurological:  Negative for dizziness, tingling, tremors, sensory change, speech change, focal weakness, seizures, loss of consciousness, weakness and headaches. Endo/Heme/Allergies:  Negative for environmental allergies and polydipsia. Does not bruise/bleed easily.    Results for orders placed or performed in visit on 11/23/22 CBC W/O DIFF   Result Value Ref Range    WBC 7.7 4.1 - 11.1 K/uL    RBC 3.84 (L) 4.10 - 5.70 M/uL    HGB 12.2 12.1 - 17.0 g/dL    HCT 38.1 36.6 - 50.3 %    MCV 99.2 (H) 80.0 - 99.0 FL    MCH 31.8 26.0 - 34.0 PG    MCHC 32.0 30.0 - 36.5 g/dL    RDW 15.3 (H) 11.5 - 14.5 %    PLATELET 568 287 - 974 K/uL    MPV 12.2 8.9 - 12.9 FL    NRBC 0.3 (H) 0  WBC    ABSOLUTE NRBC 0.02 (H) 0.00 - 0.01 K/uL   IRON PROFILE   Result Value Ref Range    Iron 79 35 - 150 ug/dL    TIBC 356 250 - 450 ug/dL    Iron % saturation 22 20 - 50 %   HEMOGLOBIN A1C WITH EAG   Result Value Ref Range    Hemoglobin A1c 8.2 (H) 4.0 - 5.6 %    Est. average glucose 189 mg/dL   MICROALBUMIN, UR, RAND W/ MICROALB/CREAT RATIO   Result Value Ref Range    Microalbumin,urine random 5.90 MG/DL    Creatinine, urine random 110.00 mg/dL    Microalbumin/Creat ratio (mg/g creat) 54 (H) 0 - 30 mg/g   METABOLIC PANEL, COMPREHENSIVE   Result Value Ref Range    Sodium 140 136 - 145 mmol/L    Potassium 4.5 3.5 - 5.1 mmol/L    Chloride 106 97 - 108 mmol/L    CO2 29 21 - 32 mmol/L    Anion gap 5 5 - 15 mmol/L    Glucose 209 (H) 65 - 100 mg/dL    BUN 22 (H) 6 - 20 MG/DL    Creatinine 1.35 (H) 0.70 - 1.30 MG/DL    BUN/Creatinine ratio 16 12 - 20      eGFR 53 (L) >60 ml/min/1.73m2    Calcium 9.1 8.5 - 10.1 MG/DL    Bilirubin, total 0.6 0.2 - 1.0 MG/DL    ALT (SGPT) 20 12 - 78 U/L    AST (SGOT) 8 (L) 15 - 37 U/L    Alk.  phosphatase 111 45 - 117 U/L    Protein, total 6.9 6.4 - 8.2 g/dL    Albumin 3.6 3.5 - 5.0 g/dL    Globulin 3.3 2.0 - 4.0 g/dL    A-G Ratio 1.1 1.1 - 2.2     LIPID PANEL   Result Value Ref Range    Cholesterol, total 117 <200 MG/DL    Triglyceride 104 <150 MG/DL    HDL Cholesterol 48 MG/DL    LDL, calculated 48.2 0 - 100 MG/DL    VLDL, calculated 20.8 MG/DL    CHOL/HDL Ratio 2.4 0.0 - 5.0               Physical Exam  Visit Vitals  /64 (BP 1 Location: Left upper arm, BP Patient Position: Sitting, BP Cuff Size: Adult long)   Pulse 83   Temp 97.5 °F (36.4 °C) (Temporal)   Resp 17   Ht 6' 4\" (1.93 m)   Wt 253 lb (114.8 kg)   SpO2 98%   BMI 30.80 kg/m²         Head: Normocephalic, without obvious abnormality, atraumatic  Eyes: conjunctivae/corneas clear. PERRL, EOM's intact. Neck: supple, symmetrical, trachea midline, no adenopathy, thyroid: not enlarged, symmetric, no tenderness/mass/nodules, no carotid bruit and no JVD  Lungs: clear to auscultation bilaterally  Heart: irregular irregular rate and rhythm, S1, S2 normal, no murmur, click, rub or gallop  Extremities: extremities normal, atraumatic, no cyanosis or edema  Pulses: 2+ and symmetric  Lymph nodes: Cervical, supraclavicular, and axillary nodes normal.  Neurologic: Grossly normal. Chronic weakness on the right side of the body, right arm more than right leg, from his stroke years ago. He has occasionally slurred speech when he is speaking. This is from a mild stroke he had in the past year shortly after the Watchman procedure      ASSESSMENT and PLAN    ICD-10-CM ICD-9-CM    1. Essential hypertension, benign  O22 260.1 METABOLIC PANEL, COMPREHENSIVE      METABOLIC PANEL, COMPREHENSIVE      2. Dyslipidemia, goal LDL below 70  E78.5 272.4 LIPID PANEL      METABOLIC PANEL, COMPREHENSIVE      METABOLIC PANEL, COMPREHENSIVE      LIPID PANEL      HEPATIC FUNCTION PANEL      LIPID PANEL      3. Type 2 diabetes with nephropathy (HCC)  E11.21 250.40 MICROALBUMIN, UR, RAND W/ MICROALB/CREAT RATIO     583.81 HEMOGLOBIN A1C WITH EAG      HEMOGLOBIN A1C WITH EAG      MICROALBUMIN, UR, RAND W/ MICROALB/CREAT RATIO      4. Needs flu shot  Z23 V04.81 INFLUENZA, FLUAD, (AGE 72 Y+), IM, PF, 0.5 ML      5. Stage 3a chronic kidney disease (HCC)  N18.31 585.3       6. Coronary artery disease involving native coronary artery of native heart without angina pectoris  I25.10 414.01 HEPATIC FUNCTION PANEL      LIPID PANEL      7. TOPHER (obstructive sleep apnea)  G47.33 327.23       8.  Chronic anticoagulation  Z79.01 V58.61 9. Persistent atrial fibrillation (HCC)  I48.19 427.31       10. Gastroesophageal reflux disease with esophagitis without hemorrhage  K21.00 530.81      530.10       11. 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 Iron deficiency anemia, unspecified iron deficiency anemia type  D50.9 280.9 IRON PROFILE      CBC W/O DIFF      CBC W/O DIFF      IRON PROFILE    11-23/23- stopped iron 3 mths ago. (constipation). Diagnoses and all orders for this visit:    1. Essential hypertension, benign  -     METABOLIC PANEL, COMPREHENSIVE; Future    2. Dyslipidemia, goal LDL below 70  -     LIPID PANEL; Future  -     METABOLIC PANEL, COMPREHENSIVE; Future  -     HEPATIC FUNCTION PANEL  -     LIPID PANEL    3. Type 2 diabetes with nephropathy (HCC)  -     MICROALBUMIN, UR, RAND W/ MICROALB/CREAT RATIO; Future  -     HEMOGLOBIN A1C WITH EAG; Future    4. Needs flu shot  -     INFLUENZA, FLUAD, (AGE 72 Y+), IM, PF, 0.5 ML    5. Stage 3a chronic kidney disease ([facility] [address]    6. Coronary artery disease involving native coronary artery of native heart without angina pectoris  -     HEPATIC FUNCTION PANEL  -     LIPID PANEL    7. TOPHER (obstructive sleep apnea)    8. Chronic anticoagulation    9. Persistent atrial fibrillation ([facility] [address]    10. Gastroesophageal reflux disease with esophagitis without hemorrhage    11. 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 Iron deficiency anemia, unspecified iron deficiency anemia type  Comments:  11-23/23- stopped iron 3 mths ago. (constipation). Orders:  -     IRON PROFILE; Future  -     CBC W/O DIFF; Future    Follow-up and Dispositions    Return in about 6 months (around 5/23/2023) for F/U HTN,CHOL,DM, F/U of obesity, f/u CAD.        lab results and schedule of future lab studies reviewed with patient  reviewed diet, exercise and weight control  cardiovascular risk and specific lipid/LDL goals reviewed  reviewed medications and side effects in detail  Please call my office if there are any questions- [phone]. I will arrange for follow up after the lab tests done from today return  Recommended a weekly \"heart check. \" I went into detail how to do this. Call for refills on medications as needed. Discussed expected course/resolution/complications of diagnosis in detail with patient. Medication risks/benefits/costs/interactions/alternatives discussed with patient. Pt was given an after visit summary which includes diagnoses, current medications & vitals. Pt expressed understanding with the diagnosis and plan    BMI is significantly elevated- in the obese range. I reviewed diet, exercise and weight control. Discussed weight control in detail, the importance of mainly decreased carbs, and for weight maintenance, exercise; discussed different diets and that it isn't as important to watch the type of foods as it is to decrease calorie intake no matter what type of diet you do, etc.  Mindful eating also discussed- Naturally Slim( now W[employer]) or Noom are 2 options. Total 55 minutes  re: Discussed specific diabetic recommendations: low cholesterol diet, weight control and daily exercise discussed, home glucose monitoring emphasized, all medications, side effects and compliance discussed carefully, foot care discussed and Podiatry visits discussed, annual eye examinations at Ophthalmology discussed, glycohemoglobin and other lab monitoring discussed and long term diabetic complications discussed. Reviewed BP, cholesterol and diabetic goals. LDL goal<100,HDL goal>45, Triglyceride goal<150, A1C< 7.0, BP<140/90. Review of  the proper technique of checking the blood pressure- check it on an average day only, not on a stressful day, sitting, no exercise for at least 1 hour and not experiencing any new pain( chronic pain is OK).  Patient encouraged to check BP sitting and standing at least once a month and to report these readings to me if > 140/ 90 on average , or if the standing BP is >  15 points lower than the sitting. Always check it twice and if there is > 5 points decrease from the previous reading( top reading or systolic) keep checking it until it does not drop 5 points. Write only this final reading down, not the preceding readings. If out of these readings there is only 1 out of 4 or less > 539, or > 90 diastolic then your blood pressure is OK; it needs further treatment if it is above this. Also, don't forget,  as noted above, to check your blood pressure standing once a month; this is to detect a drop in your BP that might lead to fainting and serious injury; you check it standing with your arm hanging straight down and relaxed. Check it twice waiting 1 minute between the two readings. If, with either one of these 2 readings there is a > 15 point drop of the systolic compared to your sitting pressure( done before the standing BP), then let me know. Following these guidelines, continue to check your BP and write down only the ones described above and it will help me to effectively treat your blood pressure. Reviewed symptoms, or lack thereof, of hypertension and elevated cholesterol. Regular exercise is very important to your health; it helps mentally, physically, socially; it prevents injuries if done properly. Exercise, even as simple as walking 20-30 minutes daily has major benefits to your health even though your \"numbers\" are the same in the lab. See if you can add this into your daily regimen and after a few months it will become a regular habit-\"just something you do,\" like brushing your teeth.      A combination of aerobic exercise and strengthening and stretching is felt to be the best for you, so this should be your ultimate goal.   This can be done in the privacy of your home or in a group setting as at the gym  Some prefer having a , others prefer to do exercise in groups or individually. Do what \"works\" for you. You need to make it simple and \"fun,\" or you most likely will not continue it. Recommended a weekly \"heart check. \" I went into detail how to do this. Also, discussed symptoms of concern that were noted today in the note above, treatment options( including doing nothing), when to follow up before recommended time frame. Also, answered all questions. We checked his iron and CBC level due to hx of previous occult blood loss from the GI tract. Last colonoscopy was unremarkable done in 2020. This document was written by Josefa Duran, as dictated by Jluis Traore MD.   I have reviewed and agree with the above note and have made corrections where appropriate Shimon Lake M.D.

## 2022-11-24 LAB
ALBUMIN SERPL-MCNC: 3.6 G/DL (ref 3.5–5)
ALBUMIN/GLOB SERPL: 1.1 {RATIO} (ref 1.1–2.2)
ALP SERPL-CCNC: 111 U/L (ref 45–117)
ALT SERPL-CCNC: 20 U/L (ref 12–78)
ANION GAP SERPL CALC-SCNC: 5 MMOL/L (ref 5–15)
AST SERPL-CCNC: 8 U/L (ref 15–37)
BILIRUB SERPL-MCNC: 0.6 MG/DL (ref 0.2–1)
BUN SERPL-MCNC: 22 MG/DL (ref 6–20)
BUN/CREAT SERPL: 16 (ref 12–20)
CALCIUM SERPL-MCNC: 9.1 MG/DL (ref 8.5–10.1)
CHLORIDE SERPL-SCNC: 106 MMOL/L (ref 97–108)
CHOLEST SERPL-MCNC: 117 MG/DL
CO2 SERPL-SCNC: 29 MMOL/L (ref 21–32)
CREAT SERPL-MCNC: 1.35 MG/DL (ref 0.7–1.3)
CREAT UR-MCNC: 110 MG/DL
ERYTHROCYTE [DISTWIDTH] IN BLOOD BY AUTOMATED COUNT: 15.3 % (ref 11.5–14.5)
EST. AVERAGE GLUCOSE BLD GHB EST-MCNC: 189 MG/DL
GLOBULIN SER CALC-MCNC: 3.3 G/DL (ref 2–4)
GLUCOSE SERPL-MCNC: 209 MG/DL (ref 65–100)
HBA1C MFR BLD: 8.2 % (ref 4–5.6)
HCT VFR BLD AUTO: 38.1 % (ref 36.6–50.3)
HDLC SERPL-MCNC: 48 MG/DL
HDLC SERPL: 2.4 {RATIO} (ref 0–5)
HGB BLD-MCNC: 12.2 G/DL (ref 12.1–17)
IRON SATN MFR SERPL: 22 % (ref 20–50)
IRON SERPL-MCNC: 79 UG/DL (ref 35–150)
LDLC SERPL CALC-MCNC: 48.2 MG/DL (ref 0–100)
MCH RBC QN AUTO: 31.8 PG (ref 26–34)
MCHC RBC AUTO-ENTMCNC: 32 G/DL (ref 30–36.5)
MCV RBC AUTO: 99.2 FL (ref 80–99)
MICROALBUMIN UR-MCNC: 5.9 MG/DL
MICROALBUMIN/CREAT UR-RTO: 54 MG/G (ref 0–30)
NRBC # BLD: 0.02 K/UL (ref 0–0.01)
NRBC BLD-RTO: 0.3 PER 100 WBC
PLATELET # BLD AUTO: 222 K/UL (ref 150–400)
PMV BLD AUTO: 12.2 FL (ref 8.9–12.9)
POTASSIUM SERPL-SCNC: 4.5 MMOL/L (ref 3.5–5.1)
PROT SERPL-MCNC: 6.9 G/DL (ref 6.4–8.2)
RBC # BLD AUTO: 3.84 M/UL (ref 4.1–5.7)
SODIUM SERPL-SCNC: 140 MMOL/L (ref 136–145)
TIBC SERPL-MCNC: 356 UG/DL (ref 250–450)
TRIGL SERPL-MCNC: 104 MG/DL (ref ?–150)
VLDLC SERPL CALC-MCNC: 20.8 MG/DL
WBC # BLD AUTO: 7.7 K/UL (ref 4.1–11.1)

## 2022-11-26 PROBLEM — Z79.01 CHRONIC ANTICOAGULATION: Status: RESOLVED | Noted: 2019-09-04 | Resolved: 2022-11-26

## 2022-11-29 ENCOUNTER — OFFICE VISIT (OUTPATIENT)
Dept: CARDIOLOGY CLINIC | Age: 82
End: 2022-11-29
Payer: MEDICARE

## 2022-11-29 VITALS
SYSTOLIC BLOOD PRESSURE: 132 MMHG | DIASTOLIC BLOOD PRESSURE: 68 MMHG | BODY MASS INDEX: 30.81 KG/M2 | WEIGHT: 253 LBS | HEIGHT: 76 IN | OXYGEN SATURATION: 97 % | HEART RATE: 72 BPM

## 2022-11-29 DIAGNOSIS — N18.30 STAGE 3 CHRONIC KIDNEY DISEASE, UNSPECIFIED WHETHER STAGE 3A OR 3B CKD (HCC): ICD-10-CM

## 2022-11-29 DIAGNOSIS — I25.10 CORONARY ARTERY DISEASE INVOLVING NATIVE CORONARY ARTERY OF NATIVE HEART WITHOUT ANGINA PECTORIS: Primary | ICD-10-CM

## 2022-11-29 DIAGNOSIS — I10 ESSENTIAL HYPERTENSION, BENIGN: ICD-10-CM

## 2022-11-29 DIAGNOSIS — E11.21 TYPE 2 DIABETES MELLITUS WITH DIABETIC NEPHROPATHY, WITHOUT LONG-TERM CURRENT USE OF INSULIN (HCC): ICD-10-CM

## 2022-11-29 DIAGNOSIS — E78.5 DYSLIPIDEMIA, GOAL LDL BELOW 70: ICD-10-CM

## 2022-11-29 DIAGNOSIS — Z86.73 HISTORY OF CVA (CEREBROVASCULAR ACCIDENT): ICD-10-CM

## 2022-11-29 DIAGNOSIS — G47.33 OSA (OBSTRUCTIVE SLEEP APNEA): ICD-10-CM

## 2022-11-29 PROCEDURE — G8427 DOCREV CUR MEDS BY ELIG CLIN: HCPCS | Performed by: SPECIALIST

## 2022-11-29 PROCEDURE — 1101F PT FALLS ASSESS-DOCD LE1/YR: CPT | Performed by: SPECIALIST

## 2022-11-29 PROCEDURE — G8754 DIAS BP LESS 90: HCPCS | Performed by: SPECIALIST

## 2022-11-29 PROCEDURE — G8536 NO DOC ELDER MAL SCRN: HCPCS | Performed by: SPECIALIST

## 2022-11-29 PROCEDURE — 1123F ACP DISCUSS/DSCN MKR DOCD: CPT | Performed by: SPECIALIST

## 2022-11-29 PROCEDURE — 3052F HG A1C>EQUAL 8.0%<EQUAL 9.0%: CPT | Performed by: SPECIALIST

## 2022-11-29 PROCEDURE — G8752 SYS BP LESS 140: HCPCS | Performed by: SPECIALIST

## 2022-11-29 PROCEDURE — G0463 HOSPITAL OUTPT CLINIC VISIT: HCPCS | Performed by: SPECIALIST

## 2022-11-29 PROCEDURE — G8432 DEP SCR NOT DOC, RNG: HCPCS | Performed by: SPECIALIST

## 2022-11-29 PROCEDURE — 99214 OFFICE O/P EST MOD 30 MIN: CPT | Performed by: SPECIALIST

## 2022-11-29 PROCEDURE — 3074F SYST BP LT 130 MM HG: CPT | Performed by: SPECIALIST

## 2022-11-29 PROCEDURE — G8417 CALC BMI ABV UP PARAM F/U: HCPCS | Performed by: SPECIALIST

## 2022-11-29 PROCEDURE — 3078F DIAST BP <80 MM HG: CPT | Performed by: SPECIALIST

## 2022-11-29 NOTE — PROGRESS NOTES
CARDIOLOGY OFFICE NOTE    Shimon Neff MD, [address], [place], [address] [facility]  Phone [phone]; Fax [phone]  Mobile [phone]   Voice Mail [phone]      Mariaa Blue MD       ATTENTION:   This medical record was transcribed using an electronic medical records/speech recognition system. Although proofread, it may and can contain electronic, spelling and other errors. Corrections may be executed at a later time. Please feel free to contact us for any clarifications as needed. Zoya Akers is a 81 y.o. male with  referred for dyspnea. Cardiac risk factors: diabetes mellitus, sedentary life style, male gender, hypertension  I have personally obtained the history from the patient. HISTORY OF PRESENTING ILLNESS   From previous note:  Mr. Emile Garcia cardiac history includes CAD with multiple PCI's, PAF status post watchman with a history of GI bleeding he has been followed by Dr. Esther Rasmussen in the past.  He has had a history of class III-IV dyspnea on exertion. He is somewhat confined to a wheelchair secondary to back pain. Over the years I think he just got progressively deconditioned and has limited ability to ambulate. [facility] there and he is making good progress they said. He also throws the baseball to his grandson and feels like he might of improved somewhat. Has had a heart catheterization in the past and this was performed in December 2020 with multivessel CAD which were all not amenable to intervention. He does have a watchman device and is not anticoagulated remains in A. fib with no issues. Today: He is doing well remains an active. He will intermittently have lower extremity edema but none today. No complaints of any issues with his atrial fibrillation and he is status post watchman device is not anticoagulated.          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 ([address]- @ 27 yo-weak on right arm>leg 04/12/2016    TOPHER (obstructive sleep apnea) 01/23/2015    Atrial fibrillation ([address] 08/16/2013    Dyslipidemia, goal LDL below 70 12/20/2010    Essential hypertension, benign 02/25/2010    Reflux esophagitis 02/25/2010    CAD (coronary artery disease) 02/25/2010           PAST MEDICAL HISTORY     Past Medical History:   Diagnosis Date    Arrhythmia     a fib    Atrial fibrillation ([address] 8/16/2013    Basal cell carcinoma of anterior chest 2/25/2010    EARS AND NOSE    CAD (coronary artery disease) 2/25/2010    Chronic anticoagulation 9/4/2019    CVA (cerebral infarction) 2/25/2010    patient denies 12/13/13    Diverticula of colon 2/25/2010    Dyslipidemia 12/20/2010    Essential hypertension, benign 2/25/2010    Hypertension     TOPHER (obstructive sleep apnea) 1/23/2015    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 2/25/2010    Other and unspecified hyperlipidemia 2/25/2010    Positive PPD 2/25/2010    Reflux esophagitis 2/25/2010    NO LONGER A PROBLEM    Stroke ([address]     in 1959 at age of 26    Type II or unspecified type diabetes mellitus without mention of complication, not stated as uncontrolled 2/25/2010           PAST SURGICAL HISTORY     Past Surgical History:   Procedure Laterality Date    COLONOSCOPY N/A 11/11/2020    COLONOSCOPY/EGD performed by Maximiliano Sales MD at [address] [place] [address],[address]  12/13/2013         HC CAPSULE ENDOSCOPE M2A  04/04/2014         HX HEART ASSIST DEVICE Left 09/24/2019    Watchman    HX KNEE REPLACEMENT Left 2010    HX ORTHOPAEDIC Right     FUSION IN RIGHT WRIST    HX OTHER SURGICAL      shoulder surgery. HX VASECTOMY  1983    LA CARDIAC SURG PROCEDURE UNLIST      X4 STENTS    LA CARDIOVERSION ELECTIVE ARRHYTHMIA EXTERNAL N/A 09/24/2019    Ep Cardioversion performed by Alexis Valladares MD at [address], [facility]/s Dr CATH LAB    UPPER GI ENDOSCOPY,BIOPSY  12/13/2013               ALLERGIES     Allergies   Allergen Reactions    Adhesive Tape Rash    Baycol Nausea and Vomiting    Clonidine Other (comments)     Headache and sleepiness      Pravachol [Pravastatin] Nausea and Vomiting          FAMILY HISTORY     Family History   Problem Relation Age of Onset    Heart Disease Mother     Cancer Father         colon    Heart Disease Maternal Grandmother     Heart Disease Maternal Grandfather     Cancer Paternal Grandmother     Cancer Paternal Grandfather     Anesth Problems Neg Hx     negative for cardiac disease       SOCIAL HISTORY     Social History     Socioeconomic History    Marital status:    Tobacco Use    Smoking status: Never    Smokeless tobacco: Former     Types: Chew     Quit date: 10/30/1996    Tobacco comments:     patient reports quitting over 20 yeaers ago.     Vaping Use    Vaping Use: Never used   Substance and Sexual Activity    Alcohol use: Yes     Comment: once a year    Drug use: No    Sexual activity: Not Currently   Other Topics Concern     Service Yes    Blood Transfusions Yes    Caffeine Concern Yes     Comment: 2-3 cups of coffee daily    Occupational Exposure No    Hobby Hazards No    Sleep Concern No    Stress Concern No    Weight Concern No    Special Diet No    Back Care No    Exercise Yes    Bike Helmet No     Comment: pt doesn't ride a bike    Seat Belt Yes    Self-Exams No     Social Determinants of Health     Financial Resource Strain: Low Risk     Difficulty of Paying Living Expenses: Not hard at all   Food Insecurity: No Food Insecurity    Worried About Running Out of Food in the Last Year: Never true    Ran Out of Food in the Last Year: Never true         MEDICATIONS     Current Outpatient Medications   Medication Sig    metFORMIN ER (GLUCOPHAGE XR) 500 mg tablet TAKE 4 TABLETS BY MOUTH ONCE DAILY WITH BREAKFAST    losartan (COZAAR) 100 mg tablet TAKE 1 TABLET BY MOUTH ONCE DAILY FOR HIGH BLOOD PRESSURE    amLODIPine (NORVASC) 10 mg tablet Take 1 tablet by mouth once daily    tamsulosin (FLOMAX) 0.4 mg capsule tamsulosin 0.4 mg capsule   TAKE 2 CAPSULES BY MOUTH ONCE DAILY AT BEDTIME    rosuvastatin (CRESTOR) 40 mg tablet Take 1 Tablet by mouth nightly. spironolactone (ALDACTONE) 25 mg tablet Take 1 Tablet by mouth daily. carvediloL (Coreg) 6.25 mg tablet Take 1 Tablet by mouth two (2) times daily (with meals). minocycline (MINOCIN, DYNACIN) 100 mg capsule 100 mg daily. albuterol (PROVENTIL HFA, VENTOLIN HFA, PROAIR HFA) 90 mcg/actuation inhaler Take 1-2 Puffs by inhalation every six (6) hours as needed for Wheezing. Indications: bronchospasm    aspirin delayed-release 81 mg tablet Take 81 mg by mouth daily. Indications: MYOCARDIAL REINFARCTION PREVENTION     No current facility-administered medications for this visit. I have reviewed the nurses notes, vitals, problem list, allergy list, medical history, family, social history and medications. REVIEW OF SYMPTOMS   For positive per HPI  General: Pt denies excessive weight gain or loss. Pt is able to conduct ADL's  HEENT: Denies blurred vision, headaches, hearing loss, epistaxis and difficulty swallowing. Respiratory: Denies cough, congestion, shortness of breath, HURD, wheezing or stridor.   Cardiovascular: Denies precordial pain, palpitations, edema or PND  Gastrointestinal: Denies poor appetite, indigestion, abdominal pain or blood in stool  Genitourinary: Denies hematuria, dysuria, increased urinary frequency  Musculoskeletal: Denies joint pain or swelling from muscles or joints  Neurologic: Denies tremor, paresthesias, headache, or sensory motor disturbance  Psychiatric: Denies confusion, insomnia, depression  Integumentray: Denies rash, itching or ulcers. Hematologic: Denies easy bruising, bleeding     PHYSICAL EXAMINATION      Vitals:    22 1310   BP: 132/68   Pulse: 72   SpO2: 97%   Weight: 253 lb (114.8 kg)   Height: 6' 4\" (1.93 m)     General: Well developed deconditioned and overweight in no acute distress. Very friendly  HEENT: No jaundice, oral mucosa moist, no oral ulcers  Neck: Supple, no stiffness, no lymphadenopathy, supple  Heart:   Irregular  Respiratory: Clear bilaterally x 4, no wheezing or rales  Extremities: Trace edema, normal cap refill, no cyanosis. Musculoskeletal: No clubbing, no deformities  Neuro: A&Ox3, speech clear, in a wheelchair, cooperative, no focal neurologic deficits  Skin: Skin color is normal. No rashes or lesions. Non diaphoretic, moist.        EK2022 atrial fibrillation well controlled rate     DIAGNOSTIC DATA     1. Cath   (): LAD p90, m70. OM1 70, OM2 90 (small). RCA m40. EF 55%. No AVG/MR. Patent L SC.   PCI (): mLAD 2.75x18 Xience, pLAD 2.75x23 Xience. OM1 3.0x15 Xience. Cath (13): LAD patent prox and mid stents. LCx m40; patent OM1 stent. RCA p80. EF 55%. No AVG/MR. PCI ost/proxRCA (13): 3.0x18 Xience. 17:Cath (17): LAD patent with patent prox and mid stent. LCx patent with patent OM1 stent. RCA p90 (ISR) and p90 distal to stent. EF 65%. No AVG/MR. PCI osteal/prox RCA (17): 3.0x28 Xience. 18: RCA - prior stents present; Prox ISR 60%; Mid to Distal - 60%   Difficulty engaging guide secondary to stent at ostium   JR4/AR1 guide would not engage ; [facility] guide used to engage RCA   FFR - not siginificant 0.89   20-Multivessel CAD. Moderate diffuse LAD disease with iFR of 0.92. Distal Lcx with severe disease not amendable to PCI.  RCA with diffuse moderate ISR. Long term durable result of RCA will be difficult due to caliber of vessel, furthermore with his hx of GI bleeding I do not think it is worth the risk to expose patient to DAPT again. Recent Hgb of 8 due to GI bleed. Mildly elevated lvedp     2. Stress Test   Lexiscan Cardiolite (8/11/9): Mild fixed inf defect. No reversible defects. EF 51% with mild inf HK. Lexiscan Cardiolite 4/15/14 - normal perfusion imaging, EF 52%     3. Echo   (8/22/13): EF 55%. Mod dil LA. Mild dil RA. Mild MR/TR/MD. PASP 24.   6/18/18 - EF 55%. No WMA. Mild asymmetric hypertrophy of the septum. MIld to mod LAE. AoV calcification and sclerosis. No significant change c/t study of 22-Aug-2013.   4/11/19 - EF 55-60%. Mod LAE. AoV sclerosis. Mild MR. Mild TR. Severely elevated CVP (>15 mmHg)   YESENIA 11/9/19 - 30mm Watchman device present. Well seated. No evidence of carlton-device leak. No overlying thrombus noted on the device. Very small right to left shunting likely related to recent trans-septal puncture for Watchman implantation. LVEF 56-60%. 3/19/20- EF 61%, Mild concentric hypertrophy, LAE, severe AV sclerosis, Mild TR, PAP 28.6 mmHg       4. DCCV 12/18/14     5. 9/24/2019:Endocardial left atrial appendage occlusion was performed utilizing a 30 mm WATCHMAN device per Dr. Robbins Setting     6.  Lipids   8/17/20- , HDL 50, LDL 74,    2/24/21- , HDL 42, LDL 82.4,   12/30/21- , HDL 44, ,   11/23/22- , HDL 48, LDL 48.2,          LABORATORY DATA            Lab Results   Component Value Date/Time    WBC 7.7 11/23/2022 11:12 AM    Hemoglobin (POC) 9.9 03/19/2014 09:14 AM    HGB 12.2 11/23/2022 11:12 AM    HCT 38.1 11/23/2022 11:12 AM    PLATELET 142 38/78/7924 11:12 AM    MCV 99.2 (H) 11/23/2022 11:12 AM      Lab Results   Component Value Date/Time    Sodium 140 11/23/2022 11:12 AM    Potassium 4.5 11/23/2022 11:12 AM    Chloride 106 11/23/2022 11:12 AM    CO2 29 11/23/2022 11:12 AM Anion gap 5 2022 11:12 AM    Glucose 209 (H) 2022 11:12 AM    BUN 22 (H) 2022 11:12 AM    Creatinine 1.35 (H) 2022 11:12 AM    BUN/Creatinine ratio 16 2022 11:12 AM    GFR est AA >60 2022 11:55 AM    GFR est non-AA 58 (L) 2022 11:55 AM    Calcium 9.1 2022 11:12 AM    Bilirubin, total 0.6 2022 11:12 AM    Alk. phosphatase 111 2022 11:12 AM    Protein, total 6.9 2022 11:12 AM    Albumin 3.6 2022 11:12 AM    Globulin 3.3 2022 11:12 AM    A-G Ratio 1.1 2022 11:12 AM    ALT (SGPT) 20 2022 11:12 AM        EC2022-A. fib with controlled rate   ASSESSMENT/RECOMMENDATIONS:.         1.  Class III dyspnea on exertion  -Again I do feel that his shortness of breath is multifactorial.  As we stated before he has COPD and most likely some underlying microvascular coronary artery disease as well as moderate CAD elsewhere and inactivity  -Suggested that he buy a hand/foot bike for exercise  2. PCI's with a PCI and stenting with a drug-eluting stent of the RCA  -No intervention was performed on the last cardiac catheterization 2020   -Treatment will be medical management at this time  -In the past we had him on Ranexa did not make a difference in his his not having any complaints today of chest discomfort  3. PAF   -No issues with his atrial fibrillation  -Has a watchman device does not require anticoagulation  4. Dyslipidemia LDL goal below 70  -Last LDL went from  165->48 and he is currently taking his Crestor 40 mg daily  Lab Results   Component Value Date/Time    Cholesterol, total 117 2022 11:12 AM    HDL Cholesterol 48 2022 11:12 AM    LDL, calculated 48.2 2022 11:12 AM    VLDL, calculated 20.8 2022 11:12 AM    Triglyceride 104 2022 11:12 AM    CHOL/HDL Ratio 2.4 2022 11:12 AM   5.   Type 2 diabetes  -Hemoglobin A1c goal should be close to 7 or less  -Hemoglobin A1c was 8.2 and these were are more aggressively controlling his diabetes  Lab Results   Component Value Date/Time    Hemoglobin A1c 8.2 (H) 11/23/2022 11:12 AM    Hemoglobin A1c (POC) 6.3 08/14/2013 08:48 AM       6. Hypertension   -Blood pressure is at goal today at 132/68  -Continue losartan 100 mg again, amlodipine 10 mg daily spironolactone 25 mg daily and Coreg 6.25 mg twice daily    7. TOPHER    8.  CRI    Follow-up with me in 6 months      No orders of the defined types were placed in this encounter. We discussed the expected course, resolution and complications of the diagnosis(es) in detail. Medication risks, benefits, costs, interactions, and alternatives were discussed as indicated. I advised him to contact the office if his condition worsens, changes or fails to improve as anticipated. He expressed understanding with the diagnosis(es) and plan                  I have discussed the diagnosis with  Norma Nicolas and the intended plan as seen in the above orders. Questions were answered concerning future plans. I have discussed medication side effects and warnings with the patient as well. Thank you,  Jean Hickman MD for involving me in the care of  Norma Nicolas. Please do not hesitate to contact me for further questions/concerns. Shimon Cochran MD, [address] [place] [address] Rd[address]      [facility], [address] [place] [address], [address] Chiquita Thomas 917 [phone] / [phone] Fax

## 2022-11-29 NOTE — PROGRESS NOTES
Chief Complaint   Patient presents with    Follow-up     6month    Hypertension    Coronary Artery Disease    Irregular Heart Beat       Vitals:    11/29/22 1310   BP: 132/68   Pulse: 72   Height: 6' 4\" (1.93 m)   Weight: 253 lb (114.8 kg)   SpO2: 97%         Chest pain: no    SOB: no    Palpitations: skipping     Dizziness: no    Swelling: legs & ankles    Refills:       Have you been to the ER, urgent care clinic since your last visit? Hospitalized since your last visit? no    Have you seen or consulted other health care providers outside of the [facility] since your last visit?  (Include any pap smears or colon screening.)

## 2022-11-29 NOTE — LETTER
11/29/2022    Patient: Kenyatta Beth   YOB: 1940   Date of Visit: 11/29/2022     Charli Bhardwaj MD  [address] [place] [address]    Dear Charli Bhardwaj MD,      Thank you for referring Mr. Chuck Beasley to [address] [place] [address] for evaluation. My notes for this consultation are attached. If you have questions, please do not hesitate to call me. I look forward to following your patient along with you.       Sincerely,    Kashif Huerta MD

## 2022-12-05 NOTE — PROGRESS NOTES
GREAT NEWS!! All of your recent lab tests are normal, stable, or at goal. Normal CBC( red and white blood cells and platelets). A1C is better@ 8. 2( was 9.1) Your iron level is normal, so you can stay off the iron for now. I would continue everything the same and we will see you at your next scheduled follow up in  May. Next time you see your kidney specialist, ask him if a new medication-Kerendia would be helpful for your kidneys.

## 2022-12-09 RX ORDER — ROSUVASTATIN CALCIUM 40 MG/1
40 TABLET, COATED ORAL
Qty: 90 TABLET | Refills: 3 | Status: SHIPPED | OUTPATIENT
Start: 2022-12-09

## 2023-02-08 RX ORDER — CARVEDILOL 6.25 MG/1
TABLET ORAL
Qty: 180 TABLET | Refills: 3 | Status: SHIPPED | OUTPATIENT
Start: 2023-02-08

## 2023-03-02 DIAGNOSIS — I25.10 CORONARY ARTERY DISEASE INVOLVING NATIVE CORONARY ARTERY OF NATIVE HEART WITHOUT ANGINA PECTORIS: ICD-10-CM

## 2023-03-02 DIAGNOSIS — I10 ESSENTIAL HYPERTENSION, BENIGN: ICD-10-CM

## 2023-03-03 RX ORDER — AMLODIPINE BESYLATE 10 MG/1
TABLET ORAL
Qty: 90 TABLET | Refills: 1 | Status: SHIPPED | OUTPATIENT
Start: 2023-03-03

## 2023-03-03 RX ORDER — SPIRONOLACTONE 25 MG/1
TABLET ORAL
Qty: 90 TABLET | Refills: 1 | Status: SHIPPED | OUTPATIENT
Start: 2023-03-03

## 2023-03-16 RX ORDER — LOSARTAN POTASSIUM 100 MG/1
TABLET ORAL
Qty: 90 TABLET | Refills: 1 | Status: SHIPPED | OUTPATIENT
Start: 2023-03-16

## 2023-03-26 DIAGNOSIS — E11.9 TYPE 2 DIABETES MELLITUS WITHOUT COMPLICATION (HCC): ICD-10-CM

## 2023-03-27 RX ORDER — METFORMIN HYDROCHLORIDE 500 MG/1
TABLET, EXTENDED RELEASE ORAL
Qty: 360 TABLET | Refills: 0 | Status: SHIPPED | OUTPATIENT
Start: 2023-03-27

## 2023-05-24 ENCOUNTER — OFFICE VISIT (OUTPATIENT)
Age: 83
End: 2023-05-24
Payer: MEDICARE

## 2023-05-24 VITALS
TEMPERATURE: 98.2 F | SYSTOLIC BLOOD PRESSURE: 120 MMHG | DIASTOLIC BLOOD PRESSURE: 72 MMHG | OXYGEN SATURATION: 99 % | RESPIRATION RATE: 16 BRPM | HEART RATE: 78 BPM

## 2023-05-24 DIAGNOSIS — I50.22 CHRONIC SYSTOLIC (CONGESTIVE) HEART FAILURE (HCC): ICD-10-CM

## 2023-05-24 DIAGNOSIS — G47.33 OBSTRUCTIVE SLEEP APNEA (ADULT) (PEDIATRIC): ICD-10-CM

## 2023-05-24 DIAGNOSIS — I48.0 PAROXYSMAL ATRIAL FIBRILLATION (HCC): ICD-10-CM

## 2023-05-24 DIAGNOSIS — Z91.81 AT HIGH RISK FOR FALLS: ICD-10-CM

## 2023-05-24 DIAGNOSIS — E78.5 DYSLIPIDEMIA, GOAL LDL BELOW 70: ICD-10-CM

## 2023-05-24 DIAGNOSIS — I10 ESSENTIAL HYPERTENSION, BENIGN: Primary | ICD-10-CM

## 2023-05-24 DIAGNOSIS — E11.21 TYPE 2 DIABETES MELLITUS WITH DIABETIC NEPHROPATHY, WITHOUT LONG-TERM CURRENT USE OF INSULIN (HCC): ICD-10-CM

## 2023-05-24 DIAGNOSIS — J45.20 MILD INTERMITTENT ASTHMA, UNCOMPLICATED: ICD-10-CM

## 2023-05-24 PROCEDURE — 3078F DIAST BP <80 MM HG: CPT | Performed by: FAMILY MEDICINE

## 2023-05-24 PROCEDURE — G8419 CALC BMI OUT NRM PARAM NOF/U: HCPCS | Performed by: FAMILY MEDICINE

## 2023-05-24 PROCEDURE — 3074F SYST BP LT 130 MM HG: CPT | Performed by: FAMILY MEDICINE

## 2023-05-24 PROCEDURE — 99215 OFFICE O/P EST HI 40 MIN: CPT | Performed by: FAMILY MEDICINE

## 2023-05-24 PROCEDURE — G8427 DOCREV CUR MEDS BY ELIG CLIN: HCPCS | Performed by: FAMILY MEDICINE

## 2023-05-24 PROCEDURE — G0439 PPPS, SUBSEQ VISIT: HCPCS | Performed by: FAMILY MEDICINE

## 2023-05-24 PROCEDURE — 1036F TOBACCO NON-USER: CPT | Performed by: FAMILY MEDICINE

## 2023-05-24 PROCEDURE — 1123F ACP DISCUSS/DSCN MKR DOCD: CPT | Performed by: FAMILY MEDICINE

## 2023-05-24 PROCEDURE — 3052F HG A1C>EQUAL 8.0%<EQUAL 9.0%: CPT | Performed by: FAMILY MEDICINE

## 2023-05-24 SDOH — ECONOMIC STABILITY: HOUSING INSECURITY
IN THE LAST 12 MONTHS, WAS THERE A TIME WHEN YOU DID NOT HAVE A STEADY PLACE TO SLEEP OR SLEPT IN A SHELTER (INCLUDING NOW)?: NO

## 2023-05-24 SDOH — ECONOMIC STABILITY: INCOME INSECURITY: HOW HARD IS IT FOR YOU TO PAY FOR THE VERY BASICS LIKE FOOD, HOUSING, MEDICAL CARE, AND HEATING?: NOT HARD AT ALL

## 2023-05-24 SDOH — ECONOMIC STABILITY: FOOD INSECURITY: WITHIN THE PAST 12 MONTHS, YOU WORRIED THAT YOUR FOOD WOULD RUN OUT BEFORE YOU GOT MONEY TO BUY MORE.: NEVER TRUE

## 2023-05-24 SDOH — ECONOMIC STABILITY: FOOD INSECURITY: WITHIN THE PAST 12 MONTHS, THE FOOD YOU BOUGHT JUST DIDN'T LAST AND YOU DIDN'T HAVE MONEY TO GET MORE.: NEVER TRUE

## 2023-05-24 ASSESSMENT — PATIENT HEALTH QUESTIONNAIRE - PHQ9
SUM OF ALL RESPONSES TO PHQ QUESTIONS 1-9: 0
1. LITTLE INTEREST OR PLEASURE IN DOING THINGS: 0
2. FEELING DOWN, DEPRESSED OR HOPELESS: 0
SUM OF ALL RESPONSES TO PHQ9 QUESTIONS 1 & 2: 0

## 2023-05-24 ASSESSMENT — ENCOUNTER SYMPTOMS
EYE REDNESS: 0
SHORTNESS OF BREATH: 0
WHEEZING: 0
COUGH: 0
EYE PAIN: 0

## 2023-05-25 PROBLEM — I50.22 CHRONIC SYSTOLIC (CONGESTIVE) HEART FAILURE (HCC): Status: ACTIVE | Noted: 2023-05-25

## 2023-05-25 LAB
ALBUMIN SERPL-MCNC: 3.6 G/DL (ref 3.5–5)
ALBUMIN/GLOB SERPL: 1.1 (ref 1.1–2.2)
ALP SERPL-CCNC: 98 U/L (ref 45–117)
ALT SERPL-CCNC: 30 U/L (ref 12–78)
ANION GAP SERPL CALC-SCNC: 3 MMOL/L (ref 5–15)
AST SERPL-CCNC: 12 U/L (ref 15–37)
BILIRUB SERPL-MCNC: 0.7 MG/DL (ref 0.2–1)
BUN SERPL-MCNC: 24 MG/DL (ref 6–20)
BUN/CREAT SERPL: 18 (ref 12–20)
CALCIUM SERPL-MCNC: 9.2 MG/DL (ref 8.5–10.1)
CHLORIDE SERPL-SCNC: 106 MMOL/L (ref 97–108)
CHOLEST SERPL-MCNC: 114 MG/DL
CO2 SERPL-SCNC: 29 MMOL/L (ref 21–32)
CREAT SERPL-MCNC: 1.34 MG/DL (ref 0.7–1.3)
EST. AVERAGE GLUCOSE BLD GHB EST-MCNC: 212 MG/DL
GLOBULIN SER CALC-MCNC: 3.3 G/DL (ref 2–4)
GLUCOSE SERPL-MCNC: 213 MG/DL (ref 65–100)
HBA1C MFR BLD: 9 % (ref 4–5.6)
HDLC SERPL-MCNC: 48 MG/DL
HDLC SERPL: 2.4 (ref 0–5)
LDLC SERPL CALC-MCNC: 44.8 MG/DL (ref 0–100)
POTASSIUM SERPL-SCNC: 4.6 MMOL/L (ref 3.5–5.1)
PROT SERPL-MCNC: 6.9 G/DL (ref 6.4–8.2)
SODIUM SERPL-SCNC: 138 MMOL/L (ref 136–145)
TRIGL SERPL-MCNC: 106 MG/DL
VLDLC SERPL CALC-MCNC: 21.2 MG/DL

## 2023-05-25 ASSESSMENT — LIFESTYLE VARIABLES
HOW OFTEN DO YOU HAVE A DRINK CONTAINING ALCOHOL: MONTHLY OR LESS
HOW MANY STANDARD DRINKS CONTAINING ALCOHOL DO YOU HAVE ON A TYPICAL DAY: PATIENT DOES NOT DRINK

## 2023-05-25 NOTE — PROGRESS NOTES
Chief Complaint   Patient presents with    Diabetes    Hypertension    Cholesterol Problem    1. \"Have you been to the ER, urgent care clinic since your last visit? Hospitalized since your last visit? \" no  2. \"Have you seen or consulted any other health care providers outside of the [address] [place] [address] since your last visit? \" yes -Dr Cathy Thornton, Dr Lawrence Norris  3. For patients over 45: Has the patient had a colonoscopy? Yes     If the patient is female:    4. For patients over 40: Has the patient had a mammogram? N/A    5. For patients over 21: Has the patient had a pap smear?  N/A
Medicare Annual Wellness Visit    Gracia Potter is here for Diabetes, Hypertension, Cholesterol Problem, Obesity, Coronary Artery Disease, Gastroesophageal Reflux, and Medicare AWV    Assessment & Plan   Essential hypertension, benign  -     Comprehensive Metabolic Panel; Future  Type 2 diabetes mellitus with diabetic nephropathy, without long-term current use of insulin (HCC)  -     Comprehensive Metabolic Panel; Future  -     Hemoglobin A1C; Future  Dyslipidemia, goal LDL below 70  -     Lipid Panel; Future  -     Comprehensive Metabolic Panel; Future  Obstructive sleep apnea (adult) (pediatric)  At high risk for falls  Chronic systolic (congestive) heart failure  Mild intermittent asthma, uncomplicated  Paroxysmal atrial fibrillation ([facility]    Recommendations for Preventive Services Due: see orders and patient instructions/AVS.  Recommended screening schedule for the next 5-10 years is provided to the patient in written form: see Patient Instructions/AVS.     Return in about 6 months (around 11/24/2023) for F/U DM,CHOL,HTN, F/U CAD, atrial fibrillation, GERD. Subjective   The following acute and/or chronic problems were also addressed today:  DM,HTN,CAD,GERD,CHOL    Patient's complete Health Risk Assessment and screening values have been reviewed and are found in Flowsheets. The following problems were reviewed today and where indicated follow up appointments were made and/or referrals ordered. Positive Risk Factor Screenings with Interventions:    Fall Risk:  Do you feel unsteady or are you worried about falling? : (!) yes  2 or more falls in past year?: no  Fall with injury in past year?: no     Interventions:    Patient declines any further evaluation or treatment           Self-assessment of health:   In general, how would you say your health is?: (!) Poor    Interventions:  See AVS for additional education material  See A/P for plan and any pertinent orders      Weight and Activity:  Physical
check it twice and if there is > 5 points decrease from the previous reading( top reading or systolic) keep checking it until it does not drop 5 points. Write only this final reading down, not the preceding readings. If out of these readings there is only 1 out of 4 or less > 322, or > 90 diastolic then your blood pressure is OK; it needs further treatment if it is above this. Also, don't forget,  as noted above, to check your blood pressure standing once a month; this is to detect a drop in your BP that might lead to fainting and serious injury; you check it standing with your arm hanging straight down and relaxed. Check it twice waiting 1 minute between the two readings. If, with either one of these 2 readings there is a > 15 point drop of the systolic compared to your sitting pressure( done before the standing BP), then let me know. Following these guidelines, continue to check your BP and write down only the ones described above and it will help me to effectively treat your blood pressure. Also, discussed symptoms of concern that were noted today in the note above, treatment options( including doing nothing), when to follow up before recommended time frame. Also, answered all questions. I encouraged pt to let us know if he develops symptoms of SOB or chest pain. He should continue monitoring his BP at home on a monthly basis. Unless his DM is not controlled, we will continue to monitor it here in office. No need for him to monitor it at home. Because he had some polyps on is last colonoscopy, he was told to repeat that in a certain number of yeats. He believe it has been 2-3 years and I told him at his age and heart problems there is no need to do colonoscopy which he agreed with. We will basically start doing either heme stool test or Cologuard in place of the colonoscopy.        This document was written by Adrian Pate, as dictated by Olga Lidia Wright MD.    I have reviewed and agree with

## 2023-06-01 ENCOUNTER — OFFICE VISIT (OUTPATIENT)
Age: 83
End: 2023-06-01
Payer: MEDICARE

## 2023-06-01 ENCOUNTER — TELEPHONE (OUTPATIENT)
Age: 83
End: 2023-06-01

## 2023-06-01 VITALS
HEIGHT: 76 IN | OXYGEN SATURATION: 98 % | HEART RATE: 64 BPM | BODY MASS INDEX: 30.69 KG/M2 | SYSTOLIC BLOOD PRESSURE: 138 MMHG | WEIGHT: 252 LBS | DIASTOLIC BLOOD PRESSURE: 86 MMHG

## 2023-06-01 DIAGNOSIS — R06.02 SHORTNESS OF BREATH: ICD-10-CM

## 2023-06-01 DIAGNOSIS — I10 ESSENTIAL (PRIMARY) HYPERTENSION: ICD-10-CM

## 2023-06-01 DIAGNOSIS — G47.33 OBSTRUCTIVE SLEEP APNEA (ADULT) (PEDIATRIC): ICD-10-CM

## 2023-06-01 DIAGNOSIS — E78.5 HYPERLIPIDEMIA, UNSPECIFIED HYPERLIPIDEMIA TYPE: ICD-10-CM

## 2023-06-01 DIAGNOSIS — E11.21 TYPE 2 DIABETES MELLITUS WITH DIABETIC NEPHROPATHY, UNSPECIFIED WHETHER LONG TERM INSULIN USE (HCC): ICD-10-CM

## 2023-06-01 DIAGNOSIS — I25.10 ATHEROSCLEROSIS OF NATIVE CORONARY ARTERY OF NATIVE HEART WITHOUT ANGINA PECTORIS: Primary | ICD-10-CM

## 2023-06-01 PROCEDURE — 3079F DIAST BP 80-89 MM HG: CPT | Performed by: SPECIALIST

## 2023-06-01 PROCEDURE — G8427 DOCREV CUR MEDS BY ELIG CLIN: HCPCS | Performed by: SPECIALIST

## 2023-06-01 PROCEDURE — 1123F ACP DISCUSS/DSCN MKR DOCD: CPT | Performed by: SPECIALIST

## 2023-06-01 PROCEDURE — 93005 ELECTROCARDIOGRAM TRACING: CPT | Performed by: SPECIALIST

## 2023-06-01 PROCEDURE — 93010 ELECTROCARDIOGRAM REPORT: CPT | Performed by: SPECIALIST

## 2023-06-01 PROCEDURE — 1036F TOBACCO NON-USER: CPT | Performed by: SPECIALIST

## 2023-06-01 PROCEDURE — 99214 OFFICE O/P EST MOD 30 MIN: CPT | Performed by: SPECIALIST

## 2023-06-01 PROCEDURE — 3052F HG A1C>EQUAL 8.0%<EQUAL 9.0%: CPT | Performed by: SPECIALIST

## 2023-06-01 PROCEDURE — 3075F SYST BP GE 130 - 139MM HG: CPT | Performed by: SPECIALIST

## 2023-06-01 PROCEDURE — G8417 CALC BMI ABV UP PARAM F/U: HCPCS | Performed by: SPECIALIST

## 2023-06-01 RX ORDER — ORAL SEMAGLUTIDE 3 MG/1
3 TABLET ORAL DAILY
Qty: 30 TABLET | Refills: 0 | Status: SHIPPED | OUTPATIENT
Start: 2023-06-01

## 2023-06-01 NOTE — TELEPHONE ENCOUNTER
----- Message from Darian Huertas MD sent at 5/31/2023  2:53 AM EDT -----  Need better DM control- continue Metformin and add Rybelsus 3 mg daily and in 4 weeks, increase it to 7 mg and stay on until you follow up in  November. This helps with weight loss and better control of the diabetes

## 2023-06-01 NOTE — TELEPHONE ENCOUNTER
----- Message from Shirin Eden sent at 6/1/2023  2:21 PM EDT -----  Subject: Message to Provider    QUESTIONS  Information for Provider? Patient would like to speak to Gloria Prater.  ---------------------------------------------------------------------------  --------------  [address]  [phone]; Do not leave any message, patient will call back for answer  ---------------------------------------------------------------------------  --------------  SCRIPT ANSWERS  Relationship to Patient?  Self

## 2023-06-01 NOTE — PATIENT INSTRUCTIONS

## 2023-06-01 NOTE — PROGRESS NOTES
Chief Complaint   Patient presents with    Coronary Artery Disease    Hypertension    Cholesterol Problem    Irregular Heart Beat     AFIB     Vitals:    06/01/23 0952   BP: 138/86   Site: Left Upper Arm   Position: Sitting   Cuff Size: Medium Adult   Pulse: 64   SpO2: 98%   Weight: 252 lb (114.3 kg)   Height: 6' 4\" (1.93 m)      /86 (Site: Left Upper Arm, Position: Sitting, Cuff Size: Medium Adult)   Pulse 64   Ht 6' 4\" (1.93 m)   Wt 252 lb (114.3 kg)   SpO2 98%   BMI 30.67 kg/m²      Chest pain             NO  SOB                       NO  Swelling                 NO  Dizziness               NO  Recent hospital     NO  Refills                    NO         Covid vaccination  YES  Covid                     NO
Allergen Reactions    Clonidine Other (See Comments)     Headache and sleepiness    Adhesive Tape Rash    Cerivastatin Nausea And Vomiting    Pravastatin Nausea And Vomiting          FAMILY HISTORY     Family History   Problem Relation Age of Onset    Cancer Paternal Grandmother     Heart Disease Maternal Grandfather     Heart Disease Maternal Grandmother     Cancer Father         colon    Heart Disease Mother     Cancer Paternal Grandfather     Anesth Problems Neg Hx     negative for cardiac disease       SOCIAL HISTORY     Social History     Socioeconomic History    Marital status:      Spouse name: None    Number of children: None    Years of education: None    Highest education level: None   Tobacco Use    Smoking status: Never    Smokeless tobacco: Former     Quit date: 10/30/1996   Substance and Sexual Activity    Alcohol use: Yes     Comment: OCC    Drug use: No    Sexual activity: Never     Social Determinants of Health     Financial Resource Strain: Low Risk     Difficulty of Paying Living Expenses: Not hard at all   Food Insecurity: No Food Insecurity    Worried About Running Out of Food in the Last Year: Never true    Ran Out of Food in the Last Year: Never true   Transportation Needs: Unknown    Lack of Transportation (Non-Medical):  No   Physical Activity: Unknown    Days of Exercise per Week: Patient refused    Minutes of Exercise per Session: Patient refused   Housing Stability: Unknown    Unstable Housing in the Last Year: No         MEDICATIONS     Current Outpatient Medications   Medication Sig    amLODIPine (NORVASC) 10 MG tablet Take 1 tablet by mouth once daily    aspirin 81 MG EC tablet Take by mouth daily    carvedilol (COREG) 6.25 MG tablet TAKE 1 TABLET BY MOUTH TWICE DAILY WITH MEALS    losartan (COZAAR) 100 MG tablet TAKE 1 TABLET BY MOUTH ONCE DAILY FOR HIGH BLOOD PRESSURE    metFORMIN (GLUCOPHAGE-XR) 500 MG extended release tablet TAKE 4 TABLETS BY MOUTH ONCE DAILY WITH BREAKFAST

## 2023-07-05 ENCOUNTER — TELEPHONE (OUTPATIENT)
Age: 83
End: 2023-07-05

## 2023-07-05 RX ORDER — METFORMIN HYDROCHLORIDE 500 MG/1
TABLET, EXTENDED RELEASE ORAL
Qty: 360 TABLET | Refills: 1 | Status: SHIPPED | OUTPATIENT
Start: 2023-07-05

## 2023-07-05 NOTE — TELEPHONE ENCOUNTER
Patient called stating that he would like to speak with [facility], or Dr. Ivelisse Marin regarding his medication redolton. Patient had an understanding to call in a month to increase this medication. Patient is hoping to get a call back from one of them when it is possible.     Best call back number  [phone]

## 2023-07-05 NOTE — TELEPHONE ENCOUNTER
Paid $550 for jovita 3mg  needs 7mg called in   You should have copy of formulary that wife brought in

## 2023-07-06 NOTE — TELEPHONE ENCOUNTER
She mentioned concerns about her  and I told her it would be best to leave a MyChart message , etc. I do not recall her giving me a formulary- Do you have? If not, we need this to help find a less $$ medication than Rybelsus.      I put formulary on your desk when she brought it in at her visit   Let me know if they need to get another one

## 2023-07-07 RX ORDER — ORAL SEMAGLUTIDE 7 MG/1
7 TABLET ORAL DAILY
Qty: 90 TABLET | Refills: 1 | Status: SHIPPED | OUTPATIENT
Start: 2023-07-07

## 2023-09-11 ENCOUNTER — APPOINTMENT (OUTPATIENT)
Facility: HOSPITAL | Age: 83
End: 2023-09-11
Payer: MEDICARE

## 2023-09-11 ENCOUNTER — HOSPITAL ENCOUNTER (EMERGENCY)
Facility: HOSPITAL | Age: 83
Discharge: HOME | End: 2023-09-11
Attending: STUDENT IN AN ORGANIZED HEALTH CARE EDUCATION/TRAINING PROGRAM
Payer: MEDICARE

## 2023-09-11 VITALS
BODY MASS INDEX: 31.08 KG/M2 | WEIGHT: 250 LBS | TEMPERATURE: 97.9 F | HEIGHT: 75 IN | SYSTOLIC BLOOD PRESSURE: 121 MMHG | HEART RATE: 78 BPM | OXYGEN SATURATION: 97 % | DIASTOLIC BLOOD PRESSURE: 69 MMHG | RESPIRATION RATE: 15 BRPM

## 2023-09-11 DIAGNOSIS — R11.2 NAUSEA VOMITING AND DIARRHEA: Primary | ICD-10-CM

## 2023-09-11 DIAGNOSIS — R19.7 NAUSEA VOMITING AND DIARRHEA: Primary | ICD-10-CM

## 2023-09-11 LAB
ALBUMIN SERPL-MCNC: 3.6 G/DL (ref 3.5–5)
ALBUMIN/GLOB SERPL: 0.9 (ref 1.1–2.2)
ALP SERPL-CCNC: 88 U/L (ref 45–117)
ALT SERPL-CCNC: 23 U/L (ref 12–78)
ANION GAP SERPL CALC-SCNC: 8 MMOL/L (ref 5–15)
APPEARANCE UR: CLEAR
AST SERPL-CCNC: 18 U/L (ref 15–37)
BACTERIA URNS QL MICRO: NEGATIVE /HPF
BASOPHILS # BLD: 0.1 K/UL (ref 0–0.1)
BASOPHILS NFR BLD: 2 % (ref 0–1)
BILIRUB SERPL-MCNC: 0.7 MG/DL (ref 0.2–1)
BILIRUB UR QL: NEGATIVE
BUN SERPL-MCNC: 24 MG/DL (ref 6–20)
BUN/CREAT SERPL: 16 (ref 12–20)
CALCIUM SERPL-MCNC: 9.4 MG/DL (ref 8.5–10.1)
CHLORIDE SERPL-SCNC: 106 MMOL/L (ref 97–108)
CO2 SERPL-SCNC: 23 MMOL/L (ref 21–32)
COLOR UR: ABNORMAL
COMMENT:: NORMAL
CREAT SERPL-MCNC: 1.53 MG/DL (ref 0.7–1.3)
DIFFERENTIAL METHOD BLD: ABNORMAL
EOSINOPHIL # BLD: 0.1 K/UL (ref 0–0.4)
EOSINOPHIL NFR BLD: 2 % (ref 0–7)
EPITH CASTS URNS QL MICRO: ABNORMAL /LPF
ERYTHROCYTE [DISTWIDTH] IN BLOOD BY AUTOMATED COUNT: 15.6 % (ref 11.5–14.5)
GLOBULIN SER CALC-MCNC: 4.2 G/DL (ref 2–4)
GLUCOSE SERPL-MCNC: 165 MG/DL (ref 65–100)
GLUCOSE UR STRIP.AUTO-MCNC: NEGATIVE MG/DL
HCT VFR BLD AUTO: 38.6 % (ref 36.6–50.3)
HGB BLD-MCNC: 12.8 G/DL (ref 12.1–17)
HGB UR QL STRIP: NEGATIVE
IMM GRANULOCYTES # BLD AUTO: 0.1 K/UL (ref 0–0.04)
IMM GRANULOCYTES NFR BLD AUTO: 1 % (ref 0–0.5)
KETONES UR QL STRIP.AUTO: NEGATIVE MG/DL
LEUKOCYTE ESTERASE UR QL STRIP.AUTO: NEGATIVE
LYMPHOCYTES # BLD: 1.7 K/UL (ref 0.8–3.5)
LYMPHOCYTES NFR BLD: 24 % (ref 12–49)
MCH RBC QN AUTO: 31.1 PG (ref 26–34)
MCHC RBC AUTO-ENTMCNC: 33.2 G/DL (ref 30–36.5)
MCV RBC AUTO: 93.9 FL (ref 80–99)
MONOCYTES # BLD: 0.6 K/UL (ref 0–1)
MONOCYTES NFR BLD: 8 % (ref 5–13)
NEUTS SEG # BLD: 4.6 K/UL (ref 1.8–8)
NEUTS SEG NFR BLD: 63 % (ref 32–75)
NITRITE UR QL STRIP.AUTO: NEGATIVE
NRBC # BLD: 0 K/UL (ref 0–0.01)
NRBC BLD-RTO: 0 PER 100 WBC
PH UR STRIP: 5 (ref 5–8)
PLATELET # BLD AUTO: 249 K/UL (ref 150–400)
PMV BLD AUTO: 11.6 FL (ref 8.9–12.9)
POTASSIUM SERPL-SCNC: 4.3 MMOL/L (ref 3.5–5.1)
PROT SERPL-MCNC: 7.8 G/DL (ref 6.4–8.2)
PROT UR STRIP-MCNC: 30 MG/DL
RBC # BLD AUTO: 4.11 M/UL (ref 4.1–5.7)
RBC #/AREA URNS HPF: ABNORMAL /HPF (ref 0–5)
SODIUM SERPL-SCNC: 137 MMOL/L (ref 136–145)
SP GR UR REFRACTOMETRY: 1.01 (ref 1–1.03)
SPECIMEN HOLD: NORMAL
TROPONIN I SERPL HS-MCNC: 5 NG/L (ref 0–76)
URINE CULTURE IF INDICATED: ABNORMAL
UROBILINOGEN UR QL STRIP.AUTO: 1 EU/DL (ref 0.2–1)
WBC # BLD AUTO: 7.2 K/UL (ref 4.1–11.1)
WBC URNS QL MICRO: ABNORMAL /HPF (ref 0–4)

## 2023-09-11 PROCEDURE — 2580000003 HC RX 258: Performed by: STUDENT IN AN ORGANIZED HEALTH CARE EDUCATION/TRAINING PROGRAM

## 2023-09-11 PROCEDURE — 81001 URINALYSIS AUTO W/SCOPE: CPT

## 2023-09-11 PROCEDURE — 6360000002 HC RX W HCPCS: Performed by: STUDENT IN AN ORGANIZED HEALTH CARE EDUCATION/TRAINING PROGRAM

## 2023-09-11 PROCEDURE — 84484 ASSAY OF TROPONIN QUANT: CPT

## 2023-09-11 PROCEDURE — 96361 HYDRATE IV INFUSION ADD-ON: CPT

## 2023-09-11 PROCEDURE — 99285 EMERGENCY DEPT VISIT HI MDM: CPT

## 2023-09-11 PROCEDURE — 96374 THER/PROPH/DIAG INJ IV PUSH: CPT

## 2023-09-11 PROCEDURE — 6360000004 HC RX CONTRAST MEDICATION: Performed by: STUDENT IN AN ORGANIZED HEALTH CARE EDUCATION/TRAINING PROGRAM

## 2023-09-11 PROCEDURE — 85025 COMPLETE CBC W/AUTO DIFF WBC: CPT

## 2023-09-11 PROCEDURE — 74177 CT ABD & PELVIS W/CONTRAST: CPT

## 2023-09-11 PROCEDURE — 80053 COMPREHEN METABOLIC PANEL: CPT

## 2023-09-11 PROCEDURE — 93005 ELECTROCARDIOGRAM TRACING: CPT | Performed by: STUDENT IN AN ORGANIZED HEALTH CARE EDUCATION/TRAINING PROGRAM

## 2023-09-11 PROCEDURE — 36415 COLL VENOUS BLD VENIPUNCTURE: CPT

## 2023-09-11 RX ORDER — ONDANSETRON 4 MG/1
4 TABLET, FILM COATED ORAL EVERY 8 HOURS PRN
Qty: 15 TABLET | Refills: 0 | Status: SHIPPED | OUTPATIENT
Start: 2023-09-11

## 2023-09-11 RX ORDER — 0.9 % SODIUM CHLORIDE 0.9 %
1000 INTRAVENOUS SOLUTION INTRAVENOUS ONCE
Status: COMPLETED | OUTPATIENT
Start: 2023-09-11 | End: 2023-09-11

## 2023-09-11 RX ORDER — ONDANSETRON 2 MG/ML
4 INJECTION INTRAMUSCULAR; INTRAVENOUS ONCE
Status: COMPLETED | OUTPATIENT
Start: 2023-09-11 | End: 2023-09-11

## 2023-09-11 RX ADMIN — ONDANSETRON 4 MG: 2 INJECTION INTRAMUSCULAR; INTRAVENOUS at 14:25

## 2023-09-11 RX ADMIN — SODIUM CHLORIDE 1000 ML: 9 INJECTION, SOLUTION INTRAVENOUS at 14:25

## 2023-09-11 RX ADMIN — IOPAMIDOL 100 ML: 755 INJECTION, SOLUTION INTRAVENOUS at 14:03

## 2023-09-11 ASSESSMENT — ENCOUNTER SYMPTOMS
EYE DISCHARGE: 0
NAUSEA: 1
COUGH: 0
EYE REDNESS: 0
DIARRHEA: 1
RHINORRHEA: 0
ABDOMINAL PAIN: 0
VOMITING: 1
SHORTNESS OF BREATH: 0

## 2023-09-11 ASSESSMENT — PAIN SCALES - GENERAL
PAINLEVEL_OUTOF10: 0
PAINLEVEL_OUTOF10: 0

## 2023-09-11 ASSESSMENT — PAIN - FUNCTIONAL ASSESSMENT: PAIN_FUNCTIONAL_ASSESSMENT: 0-10

## 2023-09-11 NOTE — ED PROVIDER NOTES
ABDOMEN PELVIS W IV CONTRAST Additional Contrast? None   Final Result   No acute abnormality in the abdomen or pelvis. LABS:  Labs Reviewed   CBC WITH AUTO DIFFERENTIAL - Abnormal; Notable for the following components:       Result Value    RDW 15.6 (*)     Basophils % 2 (*)     Immature Granulocytes 1 (*)     Absolute Immature Granulocyte 0.1 (*)     All other components within normal limits   COMPREHENSIVE METABOLIC PANEL - Abnormal; Notable for the following components:    Glucose 165 (*)     BUN 24 (*)     Creatinine 1.53 (*)     Est, Glom Filt Rate 45 (*)     Globulin 4.2 (*)     Albumin/Globulin Ratio 0.9 (*)     All other components within normal limits   URINALYSIS WITH REFLEX TO CULTURE - Abnormal; Notable for the following components:    Protein, UA 30 (*)     Epithelial Cells UA MODERATE (*)     All other components within normal limits   TROPONIN   EXTRA TUBES HOLD       All other labs were within normal range or not returned as of this dictation. EMERGENCY DEPARTMENT COURSE and DIFFERENTIAL DIAGNOSIS/MDM:   Vitals:    Vitals:    09/11/23 1556 09/11/23 1659 09/11/23 1759 09/11/23 1930   BP: 116/68 138/71 116/70 121/69   Pulse: 80 95 80 78   Resp:   18 15   Temp:       TempSrc:       SpO2:  97% (!) 87% 97%   Weight:       Height:               Medical Decision Making      DECISION MAKING:  Geradine Klinefelter is a 81 y.o. male who comes in as above. Triage vital signs reviewed, patient afebrile, vital signs notable for tachycardia with a heart rate of 101 bpm, vital signs otherwise stable. On my examination he is nontoxic-appearing, mildly uncomfortable. Abdomen is soft and nontender. Mucous membranes dry. Differential diagnosis includes, but not limited to gastroenteritis, gastritis, peptic ulcer disease, cholelithiasis, cholecystitis, bowel obstruction, UTI, electrolyte abnormality, ACS. Labs reviewed. CBC without leukocytosis or anemia. Electrolytes stable. Glucose is 165.   BUN and

## 2023-09-11 NOTE — ED TRIAGE NOTES
Pt reports 11 day hx of nausea and vomiting., reports center chest pain that he describes as heart burn. Reports vomiting is intermittent. Reports diarrhea with abd pain for 11 days. Pt reports dizziness worse when flat.

## 2023-09-11 NOTE — DISCHARGE INSTRUCTIONS
Routine appointments for health maintenance with a primary care provider are very important and emergency department visits are no substitute. You should review all findings and test results from your visit today with your primary care physician. We recommended that you take medications as prescribed. Please drink a clear liquid diet for 48 hours such as soups and broths, followed by bland diet for 48 hours such as bananas, rice, applesauce, toast, and then return to your normal diet. Return to the emergency department for any new or concerning signs/symptoms or failure to improve.

## 2023-09-12 LAB
EKG DIAGNOSIS: NORMAL
EKG Q-T INTERVAL: 342 MS
EKG QRS DURATION: 96 MS
EKG QTC CALCULATION (BAZETT): 429 MS
EKG R AXIS: -30 DEGREES
EKG T AXIS: 48 DEGREES
EKG VENTRICULAR RATE: 95 BPM

## 2023-09-12 PROCEDURE — 93010 ELECTROCARDIOGRAM REPORT: CPT | Performed by: STUDENT IN AN ORGANIZED HEALTH CARE EDUCATION/TRAINING PROGRAM

## 2023-09-20 RX ORDER — AMLODIPINE BESYLATE 10 MG/1
TABLET ORAL
Qty: 90 TABLET | Refills: 0 | Status: SHIPPED | OUTPATIENT
Start: 2023-09-20

## 2023-10-02 RX ORDER — LOSARTAN POTASSIUM 100 MG/1
100 TABLET ORAL DAILY
Qty: 90 TABLET | Refills: 1 | Status: SHIPPED | OUTPATIENT
Start: 2023-10-02 | End: 2023-10-03

## 2023-10-02 RX ORDER — SPIRONOLACTONE 25 MG/1
TABLET ORAL
Qty: 90 TABLET | Refills: 3 | Status: SHIPPED | OUTPATIENT
Start: 2023-10-02

## 2023-10-03 ENCOUNTER — TELEPHONE (OUTPATIENT)
Age: 83
End: 2023-10-03

## 2023-10-03 RX ORDER — LOSARTAN POTASSIUM 100 MG/1
TABLET ORAL
Qty: 90 TABLET | Refills: 3 | Status: SHIPPED | OUTPATIENT
Start: 2023-10-03

## 2023-10-03 NOTE — TELEPHONE ENCOUNTER
Pt called said the Rybelsus is making him sick. He is throwing up and dehydrated. Pt requested callback.     [facility]# [phone]

## 2023-10-03 NOTE — TELEPHONE ENCOUNTER
What was fasting glucose before started it and what was it when on Rybelsus more recently? I would not wait til appt but try something as soon as his nausea, etc are 95% better; ever been on Trulicity or Mounjarro?      Pt holly call when he is better

## 2023-10-03 NOTE — TELEPHONE ENCOUNTER
Been on same dose since July? 7 mg or now on 14 mg? Unusual to have this side effects if on same dose for months. ... let me know. Thanks! On 7mg since July - lots of stomach pain, some nausea now vomiting. went to ER and had ct scan which was negative. Symptoms started after 3-4 weeks on meds. Stopped rybelsus 3-4 days ago and is starting to feel better    Used shot years ago maybe insulin.  Then used injection once a week    Vomiting started about a week ago ER   says to se [facility] but they feel it is from Rybelsus since it started after they started it and it keeps getting worse    Has appt 11/8/23 and would like to stay off med till then

## 2023-10-25 ENCOUNTER — TELEPHONE (OUTPATIENT)
Age: 83
End: 2023-10-25

## 2023-10-25 NOTE — TELEPHONE ENCOUNTER
Saul Sweeney called [facility] [employer] stated they fax paperwork for the provider to fill out and sent back checking the status.     Requesting a call back    Best call back #[phone]

## 2023-10-30 NOTE — TELEPHONE ENCOUNTER
Kaity El called from [employer] stated that he will refax order. Fax received and placed in  mailbox.

## 2023-11-08 ENCOUNTER — OFFICE VISIT (OUTPATIENT)
Age: 83
End: 2023-11-08
Payer: MEDICARE

## 2023-11-08 VITALS
OXYGEN SATURATION: 98 % | SYSTOLIC BLOOD PRESSURE: 126 MMHG | HEART RATE: 78 BPM | DIASTOLIC BLOOD PRESSURE: 74 MMHG | RESPIRATION RATE: 16 BRPM | TEMPERATURE: 98.3 F

## 2023-11-08 DIAGNOSIS — K21.00 GASTRO-ESOPHAGEAL REFLUX DISEASE WITH ESOPHAGITIS, WITHOUT BLEEDING: ICD-10-CM

## 2023-11-08 DIAGNOSIS — E78.5 DYSLIPIDEMIA, GOAL LDL BELOW 70: ICD-10-CM

## 2023-11-08 DIAGNOSIS — G47.33 OBSTRUCTIVE SLEEP APNEA (ADULT) (PEDIATRIC): ICD-10-CM

## 2023-11-08 DIAGNOSIS — I48.0 PAROXYSMAL ATRIAL FIBRILLATION (HCC): ICD-10-CM

## 2023-11-08 DIAGNOSIS — D50.8 IRON DEFICIENCY ANEMIA SECONDARY TO INADEQUATE DIETARY IRON INTAKE: ICD-10-CM

## 2023-11-08 DIAGNOSIS — I25.10 ATHEROSCLEROSIS OF NATIVE CORONARY ARTERY OF NATIVE HEART WITHOUT ANGINA PECTORIS: ICD-10-CM

## 2023-11-08 DIAGNOSIS — N18.31 STAGE 3A CHRONIC KIDNEY DISEASE (HCC): ICD-10-CM

## 2023-11-08 DIAGNOSIS — Z23 ENCOUNTER FOR IMMUNIZATION: ICD-10-CM

## 2023-11-08 DIAGNOSIS — I10 ESSENTIAL HYPERTENSION, BENIGN: Primary | ICD-10-CM

## 2023-11-08 DIAGNOSIS — I50.22 CHRONIC SYSTOLIC (CONGESTIVE) HEART FAILURE (HCC): ICD-10-CM

## 2023-11-08 DIAGNOSIS — E11.21 TYPE 2 DIABETES MELLITUS WITH DIABETIC NEPHROPATHY, WITHOUT LONG-TERM CURRENT USE OF INSULIN (HCC): ICD-10-CM

## 2023-11-08 PROCEDURE — 3074F SYST BP LT 130 MM HG: CPT | Performed by: FAMILY MEDICINE

## 2023-11-08 PROCEDURE — G0008 ADMIN INFLUENZA VIRUS VAC: HCPCS | Performed by: FAMILY MEDICINE

## 2023-11-08 PROCEDURE — 3052F HG A1C>EQUAL 8.0%<EQUAL 9.0%: CPT | Performed by: FAMILY MEDICINE

## 2023-11-08 PROCEDURE — 1036F TOBACCO NON-USER: CPT | Performed by: FAMILY MEDICINE

## 2023-11-08 PROCEDURE — 3078F DIAST BP <80 MM HG: CPT | Performed by: FAMILY MEDICINE

## 2023-11-08 PROCEDURE — G8484 FLU IMMUNIZE NO ADMIN: HCPCS | Performed by: FAMILY MEDICINE

## 2023-11-08 PROCEDURE — G8427 DOCREV CUR MEDS BY ELIG CLIN: HCPCS | Performed by: FAMILY MEDICINE

## 2023-11-08 PROCEDURE — PBSHW INFLUENZA, FLUAD, (AGE 65 Y+), IM, PF, 0.5 ML: Performed by: FAMILY MEDICINE

## 2023-11-08 PROCEDURE — 99215 OFFICE O/P EST HI 40 MIN: CPT | Performed by: FAMILY MEDICINE

## 2023-11-08 PROCEDURE — 1123F ACP DISCUSS/DSCN MKR DOCD: CPT | Performed by: FAMILY MEDICINE

## 2023-11-08 PROCEDURE — G8417 CALC BMI ABV UP PARAM F/U: HCPCS | Performed by: FAMILY MEDICINE

## 2023-11-08 SDOH — ECONOMIC STABILITY: FOOD INSECURITY: WITHIN THE PAST 12 MONTHS, THE FOOD YOU BOUGHT JUST DIDN'T LAST AND YOU DIDN'T HAVE MONEY TO GET MORE.: NEVER TRUE

## 2023-11-08 SDOH — ECONOMIC STABILITY: FOOD INSECURITY: WITHIN THE PAST 12 MONTHS, YOU WORRIED THAT YOUR FOOD WOULD RUN OUT BEFORE YOU GOT MONEY TO BUY MORE.: NEVER TRUE

## 2023-11-08 SDOH — ECONOMIC STABILITY: INCOME INSECURITY: HOW HARD IS IT FOR YOU TO PAY FOR THE VERY BASICS LIKE FOOD, HOUSING, MEDICAL CARE, AND HEATING?: NOT HARD AT ALL

## 2023-11-08 ASSESSMENT — ENCOUNTER SYMPTOMS
COUGH: 0
EYE PAIN: 0
WHEEZING: 0
EYE REDNESS: 0
SHORTNESS OF BREATH: 0

## 2023-11-08 ASSESSMENT — PATIENT HEALTH QUESTIONNAIRE - PHQ9
SUM OF ALL RESPONSES TO PHQ9 QUESTIONS 1 & 2: 0
SUM OF ALL RESPONSES TO PHQ QUESTIONS 1-9: 0
2. FEELING DOWN, DEPRESSED OR HOPELESS: 0
1. LITTLE INTEREST OR PLEASURE IN DOING THINGS: 0

## 2023-11-08 NOTE — PROGRESS NOTES
Subjective     HPI    Giorgio Brown is a 81 y.o. male who comes in with HTN, CAD, A-fib, CHF with preserved ejection fraction, reflux esophagitis, DM-II with nephropathy, BCC, cerebral infarction, JANINE, fatigue, anemia and hyperlipidemia with LDL goal <70, who presents to the office today with family for f/u of these health problems. Pt is mainly following up on his cholesterol, DM-II, and HTN. Pt reports that he stopped taking Rybelsus 7 mg after he began vomiting for 3 consecutive days about a month ago. He had a ct scan, blood test, and urine test done in the ER, and there were no abnormal findings in the stomach. He is unsure how long he was on Rybelsus prior to his vomiting episodes. Pt denies unusual SOB, chest pain, and any recent hospitalizations.      Past Medical History:   Diagnosis Date    Arrhythmia     a fib    Atrial fibrillation ([address]) 8/16/2013    Basal cell carcinoma of anterior chest 2/25/2010    EARS AND NOSE    CAD (coronary artery disease) 2/25/2010    Chronic anticoagulation 9/4/2019    CVA (cerebral infarction) 2/25/2010    patient denies 12/13/13    Diverticula of colon 2/25/2010    Dyslipidemia 12/20/2010    Essential hypertension, benign 2/25/2010    Hypertension     JANINE (obstructive sleep apnea) 1/23/2015    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 2/25/2010    Other and unspecified hyperlipidemia 2/25/2010    Positive PPD 2/25/2010    Reflux esophagitis 2/25/2010    NO LONGER A PROBLEM    Stroke ([address])     in 1958 at age of 26    Type II or unspecified type diabetes mellitus without mention of complication, not stated as uncontrolled 2/25/2010     Past Surgical History:   Procedure Laterality Date    CAPSULE ENDOSCOPE M2A  04/04/2014         CARDIAC SURGERY Left 09/24/2019    Watchman    CARDIOVERSION ELECTIVE ARRHYTHMIA EXTERNAL N/A 09/24/2019    Ep Cardioversion performed by Jacqeus Stone MD at [address] [place] [address] CATH LAB    COLONOSCOPY N/A 11/11/2020    COLONOSCOPY/EGD

## 2023-11-09 LAB
ALBUMIN SERPL-MCNC: 3.6 G/DL (ref 3.5–5)
ALBUMIN/GLOB SERPL: 1.1 (ref 1.1–2.2)
ALP SERPL-CCNC: 113 U/L (ref 45–117)
ALT SERPL-CCNC: 23 U/L (ref 12–78)
ANION GAP SERPL CALC-SCNC: 5 MMOL/L (ref 5–15)
AST SERPL-CCNC: 12 U/L (ref 15–37)
BILIRUB SERPL-MCNC: 0.6 MG/DL (ref 0.2–1)
BUN SERPL-MCNC: 25 MG/DL (ref 6–20)
BUN/CREAT SERPL: 23 (ref 12–20)
CALCIUM SERPL-MCNC: 8.8 MG/DL (ref 8.5–10.1)
CHLORIDE SERPL-SCNC: 109 MMOL/L (ref 97–108)
CHOLEST SERPL-MCNC: 119 MG/DL
CO2 SERPL-SCNC: 28 MMOL/L (ref 21–32)
CREAT SERPL-MCNC: 1.07 MG/DL (ref 0.7–1.3)
CREAT UR-MCNC: 188 MG/DL
EST. AVERAGE GLUCOSE BLD GHB EST-MCNC: 146 MG/DL
GLOBULIN SER CALC-MCNC: 3.4 G/DL (ref 2–4)
GLUCOSE SERPL-MCNC: 158 MG/DL (ref 65–100)
HBA1C MFR BLD: 6.7 % (ref 4–5.6)
HDLC SERPL-MCNC: 49 MG/DL
HDLC SERPL: 2.4 (ref 0–5)
LDLC SERPL CALC-MCNC: 49.8 MG/DL (ref 0–100)
MICROALBUMIN UR-MCNC: 3.16 MG/DL
MICROALBUMIN/CREAT UR-RTO: 17 MG/G (ref 0–30)
POTASSIUM SERPL-SCNC: 4.2 MMOL/L (ref 3.5–5.1)
PROT SERPL-MCNC: 7 G/DL (ref 6.4–8.2)
SODIUM SERPL-SCNC: 142 MMOL/L (ref 136–145)
TRIGL SERPL-MCNC: 101 MG/DL
VLDLC SERPL CALC-MCNC: 20.2 MG/DL

## 2023-11-22 RX ORDER — ORAL SEMAGLUTIDE 3 MG/1
1 TABLET ORAL DAILY
Qty: 30 TABLET | Refills: 5 | Status: SHIPPED | OUTPATIENT
Start: 2023-11-22

## 2024-01-05 RX ORDER — ROSUVASTATIN CALCIUM 40 MG/1
40 TABLET, COATED ORAL NIGHTLY
Qty: 90 TABLET | Refills: 3 | Status: SHIPPED | OUTPATIENT
Start: 2024-01-05

## 2024-01-08 RX ORDER — METFORMIN HYDROCHLORIDE 500 MG/1
TABLET, EXTENDED RELEASE ORAL
Qty: 360 TABLET | Refills: 1 | Status: SHIPPED | OUTPATIENT
Start: 2024-01-08

## 2024-03-13 RX ORDER — CARVEDILOL 6.25 MG/1
TABLET ORAL
Qty: 180 TABLET | Refills: 3 | Status: SHIPPED | OUTPATIENT
Start: 2024-03-13

## 2024-03-13 NOTE — TELEPHONE ENCOUNTER
Refill per VO of Dr. Phan  Last appt: 12/19/2023    Future Appointments   Date Time Provider Department Center   5/7/2024  8:30 AM Arabella Barahona MD [facility] AMB   6/20/2024  9:20 AM Arabella Phan MD [facility] AMB       Requested Prescriptions     Signed Prescriptions Disp Refills    carvedilol (COREG) 6.25 MG tablet 180 tablet 3     Sig: TAKE 1 TABLET BY MOUTH TWICE DAILY WITH MEALS     Authorizing Provider: ARABELLA PHAN     Ordering User: XIOMARA UMANA

## 2024-05-07 ENCOUNTER — OFFICE VISIT (OUTPATIENT)
Age: 84
End: 2024-05-07
Payer: MEDICARE

## 2024-05-07 VITALS
WEIGHT: 233 LBS | BODY MASS INDEX: 29.12 KG/M2 | OXYGEN SATURATION: 99 % | TEMPERATURE: 97.8 F | RESPIRATION RATE: 16 BRPM | HEART RATE: 78 BPM | SYSTOLIC BLOOD PRESSURE: 122 MMHG | DIASTOLIC BLOOD PRESSURE: 60 MMHG

## 2024-05-07 DIAGNOSIS — I50.22 CHRONIC SYSTOLIC (CONGESTIVE) HEART FAILURE (HCC): ICD-10-CM

## 2024-05-07 DIAGNOSIS — K21.00 GASTRO-ESOPHAGEAL REFLUX DISEASE WITH ESOPHAGITIS, WITHOUT BLEEDING: ICD-10-CM

## 2024-05-07 DIAGNOSIS — E78.5 DYSLIPIDEMIA, GOAL LDL BELOW 70: ICD-10-CM

## 2024-05-07 DIAGNOSIS — I10 ESSENTIAL HYPERTENSION, BENIGN: Primary | ICD-10-CM

## 2024-05-07 DIAGNOSIS — E11.21 TYPE 2 DIABETES MELLITUS WITH DIABETIC NEPHROPATHY, WITHOUT LONG-TERM CURRENT USE OF INSULIN (HCC): ICD-10-CM

## 2024-05-07 DIAGNOSIS — Z95.818 PRESENCE OF WATCHMAN LEFT ATRIAL APPENDAGE CLOSURE DEVICE: ICD-10-CM

## 2024-05-07 DIAGNOSIS — N18.31 STAGE 3A CHRONIC KIDNEY DISEASE (HCC): ICD-10-CM

## 2024-05-07 DIAGNOSIS — I48.0 PAROXYSMAL ATRIAL FIBRILLATION (HCC): ICD-10-CM

## 2024-05-07 DIAGNOSIS — I25.10 CORONARY ARTERY DISEASE INVOLVING NATIVE CORONARY ARTERY OF NATIVE HEART WITHOUT ANGINA PECTORIS: ICD-10-CM

## 2024-05-07 DIAGNOSIS — G47.33 OSA (OBSTRUCTIVE SLEEP APNEA): ICD-10-CM

## 2024-05-07 DIAGNOSIS — Z86.73 HISTORY OF STROKE: ICD-10-CM

## 2024-05-07 DIAGNOSIS — N18.30 STAGE 3 CHRONIC KIDNEY DISEASE, UNSPECIFIED WHETHER STAGE 3A OR 3B CKD (HCC): ICD-10-CM

## 2024-05-07 PROCEDURE — G8417 CALC BMI ABV UP PARAM F/U: HCPCS | Performed by: FAMILY MEDICINE

## 2024-05-07 PROCEDURE — 3078F DIAST BP <80 MM HG: CPT | Performed by: FAMILY MEDICINE

## 2024-05-07 PROCEDURE — 3044F HG A1C LEVEL LT 7.0%: CPT | Performed by: FAMILY MEDICINE

## 2024-05-07 PROCEDURE — 99214 OFFICE O/P EST MOD 30 MIN: CPT | Performed by: FAMILY MEDICINE

## 2024-05-07 PROCEDURE — 1123F ACP DISCUSS/DSCN MKR DOCD: CPT | Performed by: FAMILY MEDICINE

## 2024-05-07 PROCEDURE — 3074F SYST BP LT 130 MM HG: CPT | Performed by: FAMILY MEDICINE

## 2024-05-07 PROCEDURE — 1036F TOBACCO NON-USER: CPT | Performed by: FAMILY MEDICINE

## 2024-05-07 PROCEDURE — G8427 DOCREV CUR MEDS BY ELIG CLIN: HCPCS | Performed by: FAMILY MEDICINE

## 2024-05-07 ASSESSMENT — PATIENT HEALTH QUESTIONNAIRE - PHQ9
1. LITTLE INTEREST OR PLEASURE IN DOING THINGS: NOT AT ALL
SUM OF ALL RESPONSES TO PHQ QUESTIONS 1-9: 0
SUM OF ALL RESPONSES TO PHQ QUESTIONS 1-9: 0
SUM OF ALL RESPONSES TO PHQ9 QUESTIONS 1 & 2: 0
2. FEELING DOWN, DEPRESSED OR HOPELESS: NOT AT ALL
SUM OF ALL RESPONSES TO PHQ QUESTIONS 1-9: 0
SUM OF ALL RESPONSES TO PHQ QUESTIONS 1-9: 0

## 2024-05-07 NOTE — PROGRESS NOTES
Chief Complaint   Patient presents with    Hypertension    Diabetes    Cholesterol Problem      \"Have you been to the ER, urgent care clinic since your last visit?  Hospitalized since your last visit?\"    NO    “Have you seen or consulted any other health care providers outside of [facility] since your last visit?”    Dr Heriberto barragan            Click Here for Release of Records Request   
benefits to your health even though your \"numbers\" are the same in the lab. See if you can add this into your daily regimen and after a few months it will become a regular habit-\"just something you do,\" like brushing your teeth. A combination of aerobic exercise and strengthening and stretching is felt to be the best for you, so this should be your ultimate goal. This can be done in the privacy of your home or in a group setting as at the gym  Some prefer having a , others prefer to do exercise in groups or individually.  Do what \"works\" for you. You need to make it simple and \"fun,\" or you most likely will not continue it.     Also, discussed symptoms of concern that were noted today in the note above, treatment options( including doing nothing), when to follow up before recommended time frame. Also, answered all questions.    Congratulated pt on his continued weight loss. He is the lightest he has been in 20 years, except for occasionally being light after being hospitalized for significant heart failure, etc. Encouraged him to continue working on his weight, and talked about mindful eating, so if he stops the Rybelsus, he'll hopefully be able to control the weight.     His sleep apnea, heart failure, GFR, and renal failure has also improved over the past 2 years. Of note, he is off the blood thinners due to his Watchman procedure. Prior to that, he was having slow GI bleeds that led to multiple hospitalizations, and so far, he hasn't had that problem.     Encouraged him to continue to monitor his BP, but add a standing BP to his regime at least once per week, and let us know if there is a 15 point drop.     Encouraged patient to get the latest shingles vaccine.Informed the shingles vaccine is covered by Medicare and most commercial insurances.    Discussion re: Covid 19 infection, prevention, treatment limitations, importance of masks and distancing, and the available vaccines and now boosters recommended

## 2024-05-08 LAB
ALBUMIN SERPL-MCNC: 3.4 G/DL (ref 3.5–5)
ALBUMIN/GLOB SERPL: 1.1 (ref 1.1–2.2)
ALP SERPL-CCNC: 87 U/L (ref 45–117)
ALT SERPL-CCNC: 23 U/L (ref 12–78)
ANION GAP SERPL CALC-SCNC: 6 MMOL/L (ref 5–15)
AST SERPL-CCNC: 14 U/L (ref 15–37)
BILIRUB SERPL-MCNC: 0.5 MG/DL (ref 0.2–1)
BUN SERPL-MCNC: 26 MG/DL (ref 6–20)
BUN/CREAT SERPL: 21 (ref 12–20)
CALCIUM SERPL-MCNC: 9 MG/DL (ref 8.5–10.1)
CHLORIDE SERPL-SCNC: 109 MMOL/L (ref 97–108)
CHOLEST SERPL-MCNC: 99 MG/DL
CO2 SERPL-SCNC: 25 MMOL/L (ref 21–32)
CREAT SERPL-MCNC: 1.22 MG/DL (ref 0.7–1.3)
EST. AVERAGE GLUCOSE BLD GHB EST-MCNC: 148 MG/DL
GLOBULIN SER CALC-MCNC: 3.2 G/DL (ref 2–4)
GLUCOSE SERPL-MCNC: 157 MG/DL (ref 65–100)
HBA1C MFR BLD: 6.8 % (ref 4–5.6)
HDLC SERPL-MCNC: 46 MG/DL
HDLC SERPL: 2.2 (ref 0–5)
LDLC SERPL CALC-MCNC: 39.6 MG/DL (ref 0–100)
POTASSIUM SERPL-SCNC: 4.4 MMOL/L (ref 3.5–5.1)
PROT SERPL-MCNC: 6.6 G/DL (ref 6.4–8.2)
SODIUM SERPL-SCNC: 140 MMOL/L (ref 136–145)
TRIGL SERPL-MCNC: 67 MG/DL
VLDLC SERPL CALC-MCNC: 13.4 MG/DL

## 2024-05-10 NOTE — TELEPHONE ENCOUNTER
"@ 0815: SW attempted to call pt's son, left a VM to return call. SW met with pt at bedside to discuss DCP; during this time, pt demonstrated A&Ox3, disoriented to time. SW asked if pt was agreeable to SNF placement as recommended by PT/OT. Per pt, she is agreeable. [facility] is the only accepting at this time. Per pt, she would like to see if she can return to the one she was last at. Per pt, does not recall the name of it as it was months ago.     @ 0830: Discussed pt during morning rounds. Per MD, pt is medically cleared. Pending SNF. SW notified medical team of pt's pending choice.    @ 0940: RADHA met with the pt at bedside again after chart reviewing to see which SNF pt has been to. Per chart reviews, pt last went to [facility]. SW asked if pt recalls the name. Per pt, she is ok with dc'ing to [facility]. SW re-asked orientation questions. Pt demonstrated A&O2, disoriented to time and place (knew she was in [place], could not specify where in [place]). RADHA asked again if pt is ok with dc to [facility] was ok. Per pt, she is agreeable but asked for the address; SW provided address and asked pt if she had been there prior. Per prior notes, pt was sent there before in Jan 2024. Pt could not recall. RADHA attempted to call pt's son again, left a second VM. SW reached out to pt's sister regarding DCP as son cannot be reached. Pt's sister stated she did not know pt was in the hospital. Asked if pt was there \"for what she usually is in there for... alcohol?\" SW stated that pt is being recommended  post-acute placement. Pt's sister was in agreement with [facility] SNF. Pt's sister reported she will call pt's son to inform him of DCP. Pending transport.    @ 1146: SW attempted to discuss transport details to pt to dc to [facility]. Per pt, she did not want to leave the hospital today. Pt demonstrated A&Ox2 again. SW informed pt that a call will be placed to pt's son/sister to discuss potential appeal. SW attempted to call pt's son. Left " ----- Message from Santa Jackson MD sent at 12/13/2018  1:35 AM EST -----  Please call him- is he feling any better off the double dose of amlodipine/Norvasc( was taking both for a total of 20 mg thinking they were different medications)  Lab work is actually better with A1C down from 7.3 to 7.0 Normal liver function tests and kidney tests, as well a potassium. I want him to continue to recheck every 3 months for now and the best thing he can do is lose 2-3 lbs every time he comes in for follow up. a third VM. RADHA reached out to pt's sister. Per pt's sister, she would like pt's sister to be discharged to [facility] today. Gave consent to sign IMM over the phone. RADHA informed MD of discussion and of pending dc summary. Pt's sister aware of pending transport time and will reach out to pt's son ASAP.    @ 9117: RADHA received VM from pt's son stating he is aware of pt's DC to [facility]. Has talked to his Aunt Kenyatta. Is aware of address and contact number.

## 2024-05-28 NOTE — PROCEDURES
FEV1 - 2.01, 65%  FVC - 3.04, 62%  FEV1/FVC - 66%  DLCO - 62%  DL/VA - 98%    Spirometry reveals moderate airflow obstruction and moderate reduction in forced vital capacity which may be due to a restrictive ventilatory defect or air trapping. Lung volume measurements are recommended. Diffusing capacity is mildly reduced but corrects to normal when considering alveolar volume. Medication: Inhaler passed protocol.     fluticasone-salmeterol 250-50 MCG/ACT inhaler     Last office visit date: 4/3/2024  Next appointment scheduled?: Yes 6/5/2024  Number of refills given: 1

## 2024-06-17 RX ORDER — ORAL SEMAGLUTIDE 7 MG/1
1 TABLET ORAL DAILY
Qty: 90 TABLET | Refills: 0 | Status: SHIPPED | OUTPATIENT
Start: 2024-06-17

## 2024-06-19 NOTE — PATIENT INSTRUCTIONS

## 2024-06-20 ENCOUNTER — OFFICE VISIT (OUTPATIENT)
Age: 84
End: 2024-06-20
Payer: MEDICARE

## 2024-06-20 VITALS
SYSTOLIC BLOOD PRESSURE: 112 MMHG | BODY MASS INDEX: 29.12 KG/M2 | HEART RATE: 76 BPM | HEIGHT: 75 IN | DIASTOLIC BLOOD PRESSURE: 60 MMHG | OXYGEN SATURATION: 97 %

## 2024-06-20 DIAGNOSIS — G47.33 OBSTRUCTIVE SLEEP APNEA (ADULT) (PEDIATRIC): ICD-10-CM

## 2024-06-20 DIAGNOSIS — Z86.73 PERSONAL HISTORY OF TRANSIENT ISCHEMIC ATTACK (TIA), AND CEREBRAL INFARCTION WITHOUT RESIDUAL DEFICITS: ICD-10-CM

## 2024-06-20 DIAGNOSIS — I25.10 ATHEROSCLEROSIS OF NATIVE CORONARY ARTERY OF NATIVE HEART WITHOUT ANGINA PECTORIS: Primary | ICD-10-CM

## 2024-06-20 DIAGNOSIS — N18.30 STAGE 3 CHRONIC KIDNEY DISEASE, UNSPECIFIED WHETHER STAGE 3A OR 3B CKD (HCC): ICD-10-CM

## 2024-06-20 DIAGNOSIS — R06.02 SHORTNESS OF BREATH: ICD-10-CM

## 2024-06-20 DIAGNOSIS — E78.5 HYPERLIPIDEMIA, UNSPECIFIED HYPERLIPIDEMIA TYPE: ICD-10-CM

## 2024-06-20 DIAGNOSIS — E11.21 TYPE 2 DIABETES MELLITUS WITH DIABETIC NEPHROPATHY, UNSPECIFIED WHETHER LONG TERM INSULIN USE (HCC): ICD-10-CM

## 2024-06-20 DIAGNOSIS — I10 ESSENTIAL (PRIMARY) HYPERTENSION: ICD-10-CM

## 2024-06-20 PROCEDURE — 3074F SYST BP LT 130 MM HG: CPT | Performed by: SPECIALIST

## 2024-06-20 PROCEDURE — 1036F TOBACCO NON-USER: CPT | Performed by: SPECIALIST

## 2024-06-20 PROCEDURE — G8427 DOCREV CUR MEDS BY ELIG CLIN: HCPCS | Performed by: SPECIALIST

## 2024-06-20 PROCEDURE — 99214 OFFICE O/P EST MOD 30 MIN: CPT | Performed by: SPECIALIST

## 2024-06-20 PROCEDURE — G8417 CALC BMI ABV UP PARAM F/U: HCPCS | Performed by: SPECIALIST

## 2024-06-20 PROCEDURE — 1123F ACP DISCUSS/DSCN MKR DOCD: CPT | Performed by: SPECIALIST

## 2024-06-20 PROCEDURE — 3044F HG A1C LEVEL LT 7.0%: CPT | Performed by: SPECIALIST

## 2024-06-20 PROCEDURE — 3078F DIAST BP <80 MM HG: CPT | Performed by: SPECIALIST

## 2024-06-20 NOTE — PROGRESS NOTES
had concerns including Coronary Artery Disease, Hypertension, and Shortness of Breath.    Vitals:    24 0933   BP: 112/60   Site: Left Upper Arm   Position: Sitting   Pulse: 76   SpO2: 97%   Height: 1.905 m (6' 3\")        Chest pain No    Refills No        1. Have you been to the ER, urgent care clinic since your last visit? No       Hospitalized since your last visit? No       Where?        Date?    NAME Bill Mcnamara         1940      MRN    [id number]      LAST OFFICE APPOINTMENT: 2023     DIAGNOSIS    ICD-10-CM    1. Atherosclerosis of native coronary artery of native heart without angina pectoris  I25.10       2. Essential (primary) hypertension  I10       3. Obstructive sleep apnea (adult) (pediatric)  G47.33       4. Type 2 diabetes mellitus with diabetic nephropathy, unspecified whether long term insulin use (HCC)  E11.21       5. Shortness of breath  R06.02       6. Hyperlipidemia, unspecified hyperlipidemia type  E78.5       7. Stage 3 chronic kidney disease, unspecified whether stage 3a or 3b CKD (HCC)  N18.30       8. Personal history of transient ischemic attack (TIA), and cerebral infarction without residual deficits  Z86.73           HOME MEDICATION  Current Outpatient Medications   Medication Sig    RYBELSUS 7 MG TABS Take 1 tablet by mouth once daily    carvedilol (COREG) 6.25 MG tablet TAKE 1 TABLET BY MOUTH TWICE DAILY WITH MEALS    metFORMIN (GLUCOPHAGE-XR) 500 MG extended release tablet TAKE 4 TABLETS BY MOUTH ONCE DAILY WITH BREAKFAST    rosuvastatin (CRESTOR) 40 MG tablet Take 1 tablet by mouth nightly    amLODIPine (NORVASC) 5 MG tablet Take 1 tablet by mouth daily    losartan (COZAAR) 100 MG tablet TAKE 1 TABLET BY MOUTH ONCE DAILY FOR HIGH BLOOD PRESSURE    spironolactone (ALDACTONE) 25 MG tablet Take 1 tablet by mouth once daily    aspirin 81 MG EC tablet Take by mouth daily    tamsulosin (FLOMAX) 0.4 MG capsule tamsulosin 0.4 mg capsule   TAKE 2 CAPSULES BY MOUTH 
discussed the diagnosis with  Bill Mcnamara and the intended plan as seen in the above orders.  Questions were answered concerning future plans.  I have discussed medication side effects and warnings with the patient as well.    Thank you,  Mohan Barahona MD for involving me in the care of  Bill Mcnamara. Please do not hesitate to contact me for further questions/concerns.         Mohan Jackson MD, [facility]  [facility]      [address], [address]     [place], [place]  [address] [phone] / [phone] Fax

## 2024-07-16 RX ORDER — METFORMIN HYDROCHLORIDE 500 MG/1
TABLET, EXTENDED RELEASE ORAL
Qty: 360 TABLET | Refills: 1 | Status: SHIPPED | OUTPATIENT
Start: 2024-07-16

## 2024-09-24 RX ORDER — ORAL SEMAGLUTIDE 7 MG/1
1 TABLET ORAL DAILY
Qty: 90 TABLET | Refills: 2 | Status: SHIPPED | OUTPATIENT
Start: 2024-09-24

## 2024-10-21 ENCOUNTER — CLINICAL DOCUMENTATION (OUTPATIENT)
Age: 84
End: 2024-10-21

## 2024-10-21 RX ORDER — LOSARTAN POTASSIUM 100 MG/1
TABLET ORAL
Qty: 90 TABLET | Refills: 3 | Status: SHIPPED | OUTPATIENT
Start: 2024-10-21

## 2024-10-21 NOTE — PROGRESS NOTES
1.Cath   (8/27/8): LAD p90, m70. OM1 70, OM2 90 (small). RCA m40. EF 55%. No AVG/MR. Patent L SC.   PCI (11/13/8): mLAD 2.75x18 Xience, pLAD 2.75x23 Xience. OM1 3.0x15 Xience.   Cath (8/29/13): LAD patent prox and mid stents. LCx m40; patent OM1 stent. RCA p80. EF 55%. No AVG/MR.   PCI ost/proxRCA (8/29/13): 3.0x18 Xience.   4/28/17:Cath (4/28/17): LAD patent with patent prox and mid stent. LCx patent with patent OM1 stent. RCA p90 (ISR) and p90 distal to stent. EF 65%. No AVG/MR.   PCI osteal/prox RCA (4/28/17): 3.0x28 Xience.   4/24/18: RCA - prior stents present; Prox ISR 60%; Mid to Distal - 60%   Difficulty engaging guide secondary to stent at ostium   JR4/AR1 guide would not engage ; 3DRC guide used to engage RCA  FFR - not siginificant 0.89   12/7/20-Multivessel CAD. Moderate diffuse LAD disease with iFR of 0.92. Distal Lcx with severe disease not amendable to PCI. RCA with diffuse moderate ISR. Long term durable result of RCA will be difficult due to caliber of vessel, furthermore with his  hx of GI bleeding I do not think it is worth the risk to expose patient to DAPT again. Recent Hgb of 8 due to GI bleed.   Mildly elevated lvedp     2. Stress Test   Lexiscan Cardiolite (8/11/9): Mild fixed inf defect. No reversible defects. EF 51% with mild inf HK.   Lexiscan Cardiolite 4/15/14 - normal perfusion imaging, EF 52%    3. Echo   (8/22/13): EF 55%. Mod dil LA. Mild dil RA. Mild MR/TR/DE. PASP 24.   6/18/18 - EF 55%. No WMA. Mild asymmetric hypertrophy of the septum. MIld to mod LAE. AoV calcification and sclerosis. No significant change c/t study of 22-Aug-2013.   4/11/19 - EF 55-60%. Mod LAE. AoV sclerosis. Mild MR. Mild TR. Severely elevated CVP (>15 mmHg)   CRICKET 11/9/19 - 30mm Watchman device present. Well seated. No evidence of deandre-device leak. No overlying thrombus noted on the device. Very small right to left shunting likely related to recent trans-septal puncture for Watchman implantation. LVEF 56-60%.

## 2024-10-21 NOTE — TELEPHONE ENCOUNTER
PCP: Mohan Barahona MD    Last appt: 5/7/2024   Future Appointments   Date Time Provider Department Center   11/12/2024 10:00 AM Mohan Barahona MD [facility] DEP   12/20/2024 10:20 [facility]   1/31/2025 11:00 AM Ruben Cortez MD [facility] DEP       Requested Prescriptions     Pending Prescriptions Disp Refills    spironolactone (ALDACTONE) 25 MG tablet [Pharmacy Med Name: Spironolactone 25 MG Oral Tablet] 90 tablet 0     Sig: Take 1 tablet by mouth once daily         Prior labs and Blood pressures:  BP Readings from Last 3 Encounters:   06/20/24 112/60   05/07/24 122/60   12/19/23 130/70     Lab Results   Component Value Date/Time     05/07/2024 09:04 AM    K 4.4 05/07/2024 09:04 AM     05/07/2024 09:04 AM    CO2 25 05/07/2024 09:04 AM    BUN 26 05/07/2024 09:04 AM    GFRAA >60 07/27/2022 11:55 AM     No results found for: \"HBA1C\", \"[id number]\"  Lab Results   Component Value Date/Time    CHOL 99 05/07/2024 09:04 AM    HDL 46 05/07/2024 09:04 AM    LDL 39.6 05/07/2024 09:04 AM    LDL 49.8 11/08/2023 11:29 AM    VLDL 13.4 05/07/2024 09:04 AM     No results found for: \"VITD3\"    No results found for: \"TSH\", \"TSH2\", \"TSH3\"

## 2024-10-21 NOTE — TELEPHONE ENCOUNTER
Refill per VO of Dr. Jenkins  Last appt: 6/20/2024    Future Appointments   Date Time Provider Department Center   11/12/2024 10:00 AM Mohan Barahona MD [facility] DEP   12/20/2024 10:20 AM Violette Jenkins MD [facility]   1/31/2025 11:00 AM Ruben Cortez MD [facility] DEP       Requested Prescriptions     Signed Prescriptions Disp Refills    losartan (COZAAR) 100 MG tablet 90 tablet 3     Sig: TAKE 1 TABLET BY MOUTH ONCE DAILY FOR HIGH BLOOD PRESSURE     Authorizing Provider: VIOLETTE JENKINS     Ordering User: XIOMARA UMANA

## 2024-10-22 RX ORDER — SPIRONOLACTONE 25 MG/1
TABLET ORAL
Qty: 90 TABLET | Refills: 1 | Status: SHIPPED | OUTPATIENT
Start: 2024-10-22

## 2024-11-12 ENCOUNTER — OFFICE VISIT (OUTPATIENT)
Age: 84
End: 2024-11-12
Payer: MEDICARE

## 2024-11-12 VITALS
DIASTOLIC BLOOD PRESSURE: 70 MMHG | HEART RATE: 71 BPM | TEMPERATURE: 97.8 F | SYSTOLIC BLOOD PRESSURE: 122 MMHG | RESPIRATION RATE: 16 BRPM | OXYGEN SATURATION: 98 %

## 2024-11-12 DIAGNOSIS — Z23 ENCOUNTER FOR IMMUNIZATION: ICD-10-CM

## 2024-11-12 DIAGNOSIS — G47.33 OSA (OBSTRUCTIVE SLEEP APNEA): ICD-10-CM

## 2024-11-12 DIAGNOSIS — Z91.81 AT HIGH RISK FOR FALLS: ICD-10-CM

## 2024-11-12 DIAGNOSIS — I50.22 CHRONIC SYSTOLIC (CONGESTIVE) HEART FAILURE (HCC): ICD-10-CM

## 2024-11-12 DIAGNOSIS — N40.1 BPH WITH LOWER URINARY TRACT SYMPTOMS WITHOUT URINARY OBSTRUCTION: ICD-10-CM

## 2024-11-12 DIAGNOSIS — N18.31 STAGE 3A CHRONIC KIDNEY DISEASE (HCC): ICD-10-CM

## 2024-11-12 DIAGNOSIS — I10 ESSENTIAL HYPERTENSION, BENIGN: Primary | ICD-10-CM

## 2024-11-12 DIAGNOSIS — E78.5 DYSLIPIDEMIA, GOAL LDL BELOW 70: ICD-10-CM

## 2024-11-12 DIAGNOSIS — M79.602 LEFT ARM PAIN: ICD-10-CM

## 2024-11-12 DIAGNOSIS — E11.21 TYPE 2 DIABETES MELLITUS WITH DIABETIC NEPHROPATHY, WITHOUT LONG-TERM CURRENT USE OF INSULIN (HCC): ICD-10-CM

## 2024-11-12 LAB
ALBUMIN SERPL-MCNC: 3.5 G/DL (ref 3.5–5)
ALBUMIN/GLOB SERPL: 0.9 (ref 1.1–2.2)
ALP SERPL-CCNC: 102 U/L (ref 45–117)
ALT SERPL-CCNC: 18 U/L (ref 12–78)
ANION GAP SERPL CALC-SCNC: 3 MMOL/L (ref 2–12)
AST SERPL-CCNC: 9 U/L (ref 15–37)
BILIRUB SERPL-MCNC: 0.7 MG/DL (ref 0.2–1)
BUN SERPL-MCNC: 20 MG/DL (ref 6–20)
BUN/CREAT SERPL: 18 (ref 12–20)
CALCIUM SERPL-MCNC: 9.3 MG/DL (ref 8.5–10.1)
CHLORIDE SERPL-SCNC: 106 MMOL/L (ref 97–108)
CHOLEST SERPL-MCNC: 114 MG/DL
CO2 SERPL-SCNC: 29 MMOL/L (ref 21–32)
CREAT SERPL-MCNC: 1.12 MG/DL (ref 0.7–1.3)
CREAT UR-MCNC: 54.9 MG/DL
EST. AVERAGE GLUCOSE BLD GHB EST-MCNC: 160 MG/DL
GLOBULIN SER CALC-MCNC: 3.7 G/DL (ref 2–4)
GLUCOSE SERPL-MCNC: 128 MG/DL (ref 65–100)
HBA1C MFR BLD: 7.2 % (ref 4–5.6)
HDLC SERPL-MCNC: 44 MG/DL
HDLC SERPL: 2.6 (ref 0–5)
LDLC SERPL CALC-MCNC: 50.8 MG/DL (ref 0–100)
MICROALBUMIN UR-MCNC: 1.13 MG/DL
MICROALBUMIN/CREAT UR-RTO: 21 MG/G (ref 0–30)
POTASSIUM SERPL-SCNC: 4.4 MMOL/L (ref 3.5–5.1)
PROT SERPL-MCNC: 7.2 G/DL (ref 6.4–8.2)
SODIUM SERPL-SCNC: 138 MMOL/L (ref 136–145)
TRIGL SERPL-MCNC: 96 MG/DL
VLDLC SERPL CALC-MCNC: 19.2 MG/DL

## 2024-11-12 PROCEDURE — 99215 OFFICE O/P EST HI 40 MIN: CPT | Performed by: FAMILY MEDICINE

## 2024-11-12 PROCEDURE — 90653 IIV ADJUVANT VACCINE IM: CPT | Performed by: FAMILY MEDICINE

## 2024-11-12 RX ORDER — AMLODIPINE BESYLATE 5 MG/1
5 TABLET ORAL DAILY
Qty: 90 TABLET | Refills: 4 | Status: SHIPPED | OUTPATIENT
Start: 2024-11-12

## 2024-11-12 RX ORDER — METFORMIN HYDROCHLORIDE 500 MG/1
2000 TABLET, EXTENDED RELEASE ORAL
Qty: 360 TABLET | Refills: 1 | Status: SHIPPED | OUTPATIENT
Start: 2024-11-12

## 2024-11-12 RX ORDER — ROSUVASTATIN CALCIUM 40 MG/1
40 TABLET, COATED ORAL NIGHTLY
Qty: 90 TABLET | Refills: 3 | Status: SHIPPED | OUTPATIENT
Start: 2024-11-12

## 2024-11-12 SDOH — ECONOMIC STABILITY: FOOD INSECURITY: WITHIN THE PAST 12 MONTHS, YOU WORRIED THAT YOUR FOOD WOULD RUN OUT BEFORE YOU GOT MONEY TO BUY MORE.: NEVER TRUE

## 2024-11-12 SDOH — ECONOMIC STABILITY: INCOME INSECURITY: HOW HARD IS IT FOR YOU TO PAY FOR THE VERY BASICS LIKE FOOD, HOUSING, MEDICAL CARE, AND HEATING?: NOT HARD AT ALL

## 2024-11-12 SDOH — ECONOMIC STABILITY: FOOD INSECURITY: WITHIN THE PAST 12 MONTHS, THE FOOD YOU BOUGHT JUST DIDN'T LAST AND YOU DIDN'T HAVE MONEY TO GET MORE.: NEVER TRUE

## 2024-11-12 ASSESSMENT — PATIENT HEALTH QUESTIONNAIRE - PHQ9
1. LITTLE INTEREST OR PLEASURE IN DOING THINGS: NOT AT ALL
SUM OF ALL RESPONSES TO PHQ QUESTIONS 1-9: 0
SUM OF ALL RESPONSES TO PHQ9 QUESTIONS 1 & 2: 0
2. FEELING DOWN, DEPRESSED OR HOPELESS: NOT AT ALL

## 2024-11-12 ASSESSMENT — ENCOUNTER SYMPTOMS
EYE PAIN: 0
EYE REDNESS: 0
COUGH: 0
SHORTNESS OF BREATH: 0
WHEEZING: 0

## 2024-11-12 NOTE — PROGRESS NOTES
Subjective     HPI    Bill Mcnamara is a 84 y.o. male who comes in with HTN, CAD, A-fib, CHF with preserved ejection fraction, reflux esophagitis, DM-II with nephropathy, BCC, cerebral infarction, JANINE, fatigue, anemia and hyperlipidemia with LDL goal <70, who presents to the office today with wife c/o arm pain and for f/u of these health problems.     Pt endorses R upper arm discomfort for the past couple months.     Pt denies unusual SOB, chest pain, and any recent ER visits or hospitalizations.     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 2/25/2010    Other and unspecified hyperlipidemia 2/25/2010    Positive PPD 2/25/2010    Reflux esophagitis 2/25/2010    NO LONGER A PROBLEM    Stroke (HCC)     in 1959 at age of 19    Type II or unspecified type diabetes mellitus without mention of complication, not stated as uncontrolled 2/25/2010     Past Surgical History:   Procedure Laterality Date    CAPSULE ENDOSCOPE M2A  04/04/2014         CARDIAC SURGERY Left 09/24/2019    Watchman    CARDIOVERSION ELECTIVE ARRHYTHMIA EXTERNAL N/A 09/24/2019    Ep Cardioversion performed by Eamon Membreno MD at [facility] CARDIAC CATH LAB    COLONOSCOPY N/A 11/11/2020    COLONOSCOPY/EGD performed by Jewel Spann MD at [facility] ENDOSCOPY    COLONOSCOPY,REMV LESN,SNARE  12/13/2013         ORTHOPEDIC SURGERY Right     FUSION IN RIGHT WRIST    OTHER SURGICAL HISTORY      shoulder surgery.     OK UNLISTED PROCEDURE CARDIAC SURGERY

## 2024-11-12 NOTE — PROGRESS NOTES
On the basis of positive falls risk screening, assessment and plan is as follows:  Pt has motorized chair and has had PT for tthat and has had no recent falls. .

## 2024-11-12 NOTE — PROGRESS NOTES
Chief Complaint   Patient presents with    Hypertension    Diabetes    Cholesterol Problem    Arm Pain     left      \"Have you been to the ER, urgent care clinic since your last visit?  Hospitalized since your last visit?\"    NO    “Have you seen or consulted any other health care providers outside our system since your last visit?”    Kidney dr Dr Aguilar      “Have you had a diabetic eye exam?”    NO     Date of last diabetic eye exam: 5/12/2015

## 2025-01-28 ENCOUNTER — TELEPHONE (OUTPATIENT)
Age: 85
End: 2025-01-28

## 2025-01-28 NOTE — TELEPHONE ENCOUNTER
Writer informed patient Re[employer] Mobility Forms need to be completed by new PCP per [employer] forms faxed to [facility] for Dr. Ruben Cortez & Lavinia Mancera  with confirmation

## 2025-01-31 ENCOUNTER — OFFICE VISIT (OUTPATIENT)
Facility: CLINIC | Age: 85
End: 2025-01-31

## 2025-01-31 VITALS
BODY MASS INDEX: 28.6 KG/M2 | DIASTOLIC BLOOD PRESSURE: 65 MMHG | OXYGEN SATURATION: 97 % | HEART RATE: 83 BPM | RESPIRATION RATE: 18 BRPM | HEIGHT: 75 IN | SYSTOLIC BLOOD PRESSURE: 112 MMHG | WEIGHT: 230 LBS | TEMPERATURE: 97.9 F

## 2025-01-31 DIAGNOSIS — I10 ESSENTIAL HYPERTENSION, BENIGN: ICD-10-CM

## 2025-01-31 DIAGNOSIS — I50.22 CHRONIC SYSTOLIC (CONGESTIVE) HEART FAILURE (HCC): ICD-10-CM

## 2025-01-31 DIAGNOSIS — N18.31 STAGE 3A CHRONIC KIDNEY DISEASE (HCC): ICD-10-CM

## 2025-01-31 DIAGNOSIS — E78.5 DYSLIPIDEMIA, GOAL LDL BELOW 70: ICD-10-CM

## 2025-01-31 DIAGNOSIS — R53.81 PHYSICAL DEBILITY: ICD-10-CM

## 2025-01-31 DIAGNOSIS — Z86.73 HISTORY OF STROKE: Primary | ICD-10-CM

## 2025-01-31 DIAGNOSIS — E11.21 TYPE 2 DIABETES MELLITUS WITH DIABETIC NEPHROPATHY, WITHOUT LONG-TERM CURRENT USE OF INSULIN (HCC): ICD-10-CM

## 2025-01-31 DIAGNOSIS — R53.82 CHRONIC FATIGUE: ICD-10-CM

## 2025-01-31 DIAGNOSIS — I50.32 CHRONIC HEART FAILURE WITH PRESERVED EJECTION FRACTION (HCC): ICD-10-CM

## 2025-01-31 DIAGNOSIS — I48.0 PAROXYSMAL ATRIAL FIBRILLATION (HCC): ICD-10-CM

## 2025-01-31 ASSESSMENT — PATIENT HEALTH QUESTIONNAIRE - PHQ9
SUM OF ALL RESPONSES TO PHQ QUESTIONS 1-9: 0
SUM OF ALL RESPONSES TO PHQ9 QUESTIONS 1 & 2: 0
SUM OF ALL RESPONSES TO PHQ QUESTIONS 1-9: 0
1. LITTLE INTEREST OR PLEASURE IN DOING THINGS: NOT AT ALL
2. FEELING DOWN, DEPRESSED OR HOPELESS: NOT AT ALL
SUM OF ALL RESPONSES TO PHQ QUESTIONS 1-9: 0
SUM OF ALL RESPONSES TO PHQ QUESTIONS 1-9: 0

## 2025-01-31 NOTE — PROGRESS NOTES
Assessment & Plan  1. Physical debility.  His physical debility is attributed to right-sided paralysis from a previous stroke and progressive chronic congestive heart failure with exertional deconditioning. A prescription for an upgraded power wheelchair will be provided due to the necessity for mobility and the inability to use a mechanical wheelchair because of arm weakness and cardiac issues.    2. Chronic congestive heart failure.  He has a history of chronic congestive heart failure and is currently under the care of a cardiologist, Dr. Stephie Jenkins. He experiences fatigue easily and can not walk short distances, which has contributed to the need for a power wheelchair.    3. Chronic kidney disease stage 3.  His kidney function has remained stable for some time. He is under the care of a nephrologist.    4. Type 2 diabetes mellitus.  His A1c level was 7.2 in November 2024. He is currently taking metformin 2000 mg daily and Rybelsus 7 mg. He does not regularly check his blood sugar levels. He will continue his current medication regimen. If his A1c levels are not within the desired range, the dosage of Rybelsus may be increased to 14 mg. He is advised to have his A1c levels checked every 6 months.    5. Health maintenance.  He has received his flu shot and the latest COVID-19 vaccine this year. The RSV vaccine is recommended, and he is advised to send a message via [employer] to order it from the pharmacy. He is also advised to have his cholesterol levels checked annually.    Follow-up  The patient will follow up in 4 months.    It was a pleasure seeing Mr. Bill Mcnamara today.  Return in about 4 months (around 5/31/2025) for chronic disease management.    History of Present Illness  The patient is an 84-year-old male who presents as a new patient to [facility]. His primary concern today is obtaining an upgraded power wheelchair.    He has been using a power wheelchair for over 5 years, which is now worn out

## 2025-01-31 NOTE — PROGRESS NOTES
Chief Complaint   Patient presents with    Establish Care     Would like to discuss getting a power chair.     1. Have you been to the ER, urgent care clinic since your last visit?  Hospitalized since your last visit?No    2. Have you seen or consulted any other health care providers outside of the [facility] since your last visit?  Include any pap smears or colon screening. No

## 2025-02-03 ENCOUNTER — TELEPHONE (OUTPATIENT)
Facility: CLINIC | Age: 85
End: 2025-02-03

## 2025-02-03 NOTE — TELEPHONE ENCOUNTER
[employer] paperwork has been received and is awaiting signature.     Forms have been placed in your inbox.

## 2025-02-13 ENCOUNTER — OFFICE VISIT (OUTPATIENT)
Age: 85
End: 2025-02-13
Payer: MEDICARE

## 2025-02-13 VITALS
OXYGEN SATURATION: 97 % | RESPIRATION RATE: 18 BRPM | HEIGHT: 75 IN | BODY MASS INDEX: 28.75 KG/M2 | HEART RATE: 84 BPM | DIASTOLIC BLOOD PRESSURE: 64 MMHG | SYSTOLIC BLOOD PRESSURE: 118 MMHG

## 2025-02-13 DIAGNOSIS — I48.91 ATRIAL FIBRILLATION, UNSPECIFIED TYPE (HCC): ICD-10-CM

## 2025-02-13 DIAGNOSIS — Z95.818 PRESENCE OF WATCHMAN LEFT ATRIAL APPENDAGE CLOSURE DEVICE: ICD-10-CM

## 2025-02-13 DIAGNOSIS — I25.10 ATHEROSCLEROSIS OF NATIVE CORONARY ARTERY OF NATIVE HEART WITHOUT ANGINA PECTORIS: Primary | ICD-10-CM

## 2025-02-13 DIAGNOSIS — R06.09 DOE (DYSPNEA ON EXERTION): ICD-10-CM

## 2025-02-13 DIAGNOSIS — I10 BENIGN HYPERTENSION: ICD-10-CM

## 2025-02-13 DIAGNOSIS — E78.2 MIXED HYPERLIPIDEMIA: ICD-10-CM

## 2025-02-13 DIAGNOSIS — N18.30 STAGE 3 CHRONIC KIDNEY DISEASE, UNSPECIFIED WHETHER STAGE 3A OR 3B CKD (HCC): ICD-10-CM

## 2025-02-13 PROCEDURE — 1036F TOBACCO NON-USER: CPT

## 2025-02-13 PROCEDURE — 99214 OFFICE O/P EST MOD 30 MIN: CPT

## 2025-02-13 PROCEDURE — 1160F RVW MEDS BY RX/DR IN RCRD: CPT

## 2025-02-13 PROCEDURE — G8427 DOCREV CUR MEDS BY ELIG CLIN: HCPCS

## 2025-02-13 PROCEDURE — G8417 CALC BMI ABV UP PARAM F/U: HCPCS

## 2025-02-13 PROCEDURE — 1123F ACP DISCUSS/DSCN MKR DOCD: CPT

## 2025-02-13 PROCEDURE — 3074F SYST BP LT 130 MM HG: CPT

## 2025-02-13 PROCEDURE — 1126F AMNT PAIN NOTED NONE PRSNT: CPT

## 2025-02-13 PROCEDURE — 93010 ELECTROCARDIOGRAM REPORT: CPT

## 2025-02-13 PROCEDURE — 93005 ELECTROCARDIOGRAM TRACING: CPT

## 2025-02-13 PROCEDURE — 3078F DIAST BP <80 MM HG: CPT

## 2025-02-13 PROCEDURE — 1159F MED LIST DOCD IN RCRD: CPT

## 2025-02-13 RX ORDER — CARVEDILOL 6.25 MG/1
6.25 TABLET ORAL 2 TIMES DAILY WITH MEALS
Qty: 180 TABLET | Refills: 3 | Status: SHIPPED | OUTPATIENT
Start: 2025-02-13

## 2025-02-13 NOTE — PROGRESS NOTES
had concerns including Coronary Artery Disease and Shortness of Breath.    Vitals:    02/13/25 1059   BP: 118/64   Site: Left Upper Arm   Position: Sitting   Cuff Size: Medium Adult   Pulse: 84   Resp: 18   SpO2: 97%   Height: 1.905 m (6' 3\")        Chest pain No    Refills No        1. Have you been to the ER, urgent care clinic since your last visit? No       Hospitalized since your last visit? No       Where?        Date?

## 2025-02-13 NOTE — PROGRESS NOTES
CARDIOLOGY OFFICE NOTE    Primary Cardiologist:  Mohan Phan MD, [facility]    [address], [place] [address]  Phone [phone]; Fax [phone]  Mobile [phone]   Voice Mail [phone]    Primary care: Ruben Cortez MD       ATTENTION:   This medical record was transcribed using an electronic medical records/speech recognition system.  Although proofread, it may and can contain electronic, spelling and other errors.  Corrections may be executed at a later time.  Please feel free to contact us for any clarifications as needed.             Bill Mcnamara is a 84 y.o. male with  referred for  dyspnea.         Cardiac risk factors: diabetes mellitus, sedentary life style, male gender, hypertension   I have personally obtained the history from the patient.              HISTORY OF PRESENTING ILLNESS    Ms./Mr. Bill Mcnamara  84 y.o. is  being seen for shortness of breath, CAD, dyslipidemia, atrial fibrillation status post watchman.  Doing well with no interval chest pain or shortness of breath.  He does have history of CAD with multiple PCI's.  He was deemed not to be a surgical or PCI candidate.  It will be solely medical management.  He has history of paroxysmal atrial fibrillation and today he sounds as though he is in a normal sinus rhythm.  He does have class III-IV dyspnea but not very active and uses a electric wheelchair.  ---------------------  Denies chest pain or increase in LOBATO. Pt in motorized WC    EKG today AF w controlled rate     ACTIVE PROBLEM LIST     Patient Active Problem List    Diagnosis Date Noted    Presence of Watchman left atrial appendage closure device 09/24/2019    Physical debility 01/31/2025    History of stroke; residual right hemiparesis 05/07/2024    Dysarthria 11/07/2019    CKD (chronic kidney disease) stage 3, GFR 30-59 ml/min ([facility]) 07/31/2019    Chronic heart failure with preserved ejection fraction (HCC)

## 2025-04-18 RX ORDER — SPIRONOLACTONE 25 MG/1
25 TABLET ORAL DAILY
Qty: 90 TABLET | Refills: 1 | Status: SHIPPED | OUTPATIENT
Start: 2025-04-18

## 2025-05-01 DIAGNOSIS — I50.32 CHRONIC HEART FAILURE WITH PRESERVED EJECTION FRACTION (HCC): ICD-10-CM

## 2025-05-01 DIAGNOSIS — N18.31 STAGE 3A CHRONIC KIDNEY DISEASE (HCC): ICD-10-CM

## 2025-05-01 DIAGNOSIS — E11.21 TYPE 2 DIABETES MELLITUS WITH DIABETIC NEPHROPATHY, WITHOUT LONG-TERM CURRENT USE OF INSULIN (HCC): ICD-10-CM

## 2025-05-01 LAB
ALBUMIN SERPL-MCNC: 3.4 G/DL (ref 3.5–5)
ALBUMIN/GLOB SERPL: 0.9 (ref 1.1–2.2)
ALP SERPL-CCNC: 116 U/L (ref 45–117)
ALT SERPL-CCNC: 23 U/L (ref 12–78)
ANION GAP SERPL CALC-SCNC: 7 MMOL/L (ref 2–12)
AST SERPL-CCNC: 13 U/L (ref 15–37)
BILIRUB SERPL-MCNC: 0.5 MG/DL (ref 0.2–1)
BUN SERPL-MCNC: 22 MG/DL (ref 6–20)
BUN/CREAT SERPL: 18 (ref 12–20)
CALCIUM SERPL-MCNC: 9.1 MG/DL (ref 8.5–10.1)
CHLORIDE SERPL-SCNC: 106 MMOL/L (ref 97–108)
CO2 SERPL-SCNC: 28 MMOL/L (ref 21–32)
CREAT SERPL-MCNC: 1.19 MG/DL (ref 0.7–1.3)
CREAT UR-MCNC: 109 MG/DL
ERYTHROCYTE [DISTWIDTH] IN BLOOD BY AUTOMATED COUNT: 17.8 % (ref 11.5–14.5)
EST. AVERAGE GLUCOSE BLD GHB EST-MCNC: 169 MG/DL
GLOBULIN SER CALC-MCNC: 3.6 G/DL (ref 2–4)
GLUCOSE SERPL-MCNC: 132 MG/DL (ref 65–100)
HBA1C MFR BLD: 7.5 % (ref 4–5.6)
HCT VFR BLD AUTO: 36.1 % (ref 36.6–50.3)
HGB BLD-MCNC: 11.3 G/DL (ref 12.1–17)
MCH RBC QN AUTO: 29.6 PG (ref 26–34)
MCHC RBC AUTO-ENTMCNC: 31.3 G/DL (ref 30–36.5)
MCV RBC AUTO: 94.5 FL (ref 80–99)
MICROALBUMIN UR-MCNC: 1.35 MG/DL
MICROALBUMIN/CREAT UR-RTO: 12 MG/G (ref 0–30)
NRBC # BLD: 0 K/UL (ref 0–0.01)
NRBC BLD-RTO: 0 PER 100 WBC
PLATELET # BLD AUTO: 208 K/UL (ref 150–400)
PMV BLD AUTO: 12.8 FL (ref 8.9–12.9)
POTASSIUM SERPL-SCNC: 4.2 MMOL/L (ref 3.5–5.1)
PROT SERPL-MCNC: 7 G/DL (ref 6.4–8.2)
RBC # BLD AUTO: 3.82 M/UL (ref 4.1–5.7)
SODIUM SERPL-SCNC: 141 MMOL/L (ref 136–145)
SPECIMEN HOLD: NORMAL
WBC # BLD AUTO: 6.6 K/UL (ref 4.1–11.1)

## 2025-05-06 ENCOUNTER — RESULTS FOLLOW-UP (OUTPATIENT)
Facility: CLINIC | Age: 85
End: 2025-05-06

## 2025-05-06 DIAGNOSIS — D64.9 ANEMIA, UNSPECIFIED TYPE: Primary | ICD-10-CM

## 2025-05-13 ENCOUNTER — HOSPITAL ENCOUNTER (EMERGENCY)
Facility: HOSPITAL | Age: 85
Discharge: ANOTHER HOSPITAL | End: 2025-05-13
Attending: EMERGENCY MEDICINE
Payer: MEDICARE

## 2025-05-13 ENCOUNTER — APPOINTMENT (OUTPATIENT)
Facility: HOSPITAL | Age: 85
End: 2025-05-13
Payer: MEDICARE

## 2025-05-13 VITALS
SYSTOLIC BLOOD PRESSURE: 162 MMHG | DIASTOLIC BLOOD PRESSURE: 80 MMHG | WEIGHT: 230 LBS | RESPIRATION RATE: 16 BRPM | OXYGEN SATURATION: 97 % | TEMPERATURE: 97.7 F | BODY MASS INDEX: 28.6 KG/M2 | HEIGHT: 75 IN | HEART RATE: 85 BPM

## 2025-05-13 DIAGNOSIS — S12.090A OTHER CLOSED DISPLACED FRACTURE OF FIRST CERVICAL VERTEBRA, INITIAL ENCOUNTER (HCC): Primary | ICD-10-CM

## 2025-05-13 DIAGNOSIS — S12.100A CLOSED ODONTOID FRACTURE, INITIAL ENCOUNTER (HCC): ICD-10-CM

## 2025-05-13 DIAGNOSIS — S06.4XAA EPIDURAL HEMATOMA (HCC): ICD-10-CM

## 2025-05-13 LAB
ALBUMIN SERPL-MCNC: 3.6 G/DL (ref 3.5–5)
ALBUMIN/GLOB SERPL: 0.9 (ref 1.1–2.2)
ALP SERPL-CCNC: 111 U/L (ref 45–117)
ALT SERPL-CCNC: 24 U/L (ref 12–78)
ANION GAP SERPL CALC-SCNC: 5 MMOL/L (ref 2–12)
AST SERPL-CCNC: 14 U/L (ref 15–37)
BASOPHILS # BLD: 0.11 K/UL (ref 0–0.1)
BASOPHILS NFR BLD: 1.1 % (ref 0–1)
BILIRUB SERPL-MCNC: 0.5 MG/DL (ref 0.2–1)
BUN SERPL-MCNC: 22 MG/DL (ref 6–20)
BUN/CREAT SERPL: 17 (ref 12–20)
CALCIUM SERPL-MCNC: 9.3 MG/DL (ref 8.5–10.1)
CHLORIDE SERPL-SCNC: 107 MMOL/L (ref 97–108)
CO2 SERPL-SCNC: 27 MMOL/L (ref 21–32)
COMMENT:: NORMAL
CREAT SERPL-MCNC: 1.32 MG/DL (ref 0.7–1.3)
DIFFERENTIAL METHOD BLD: ABNORMAL
EOSINOPHIL # BLD: 0.17 K/UL (ref 0–0.4)
EOSINOPHIL NFR BLD: 1.7 % (ref 0–7)
ERYTHROCYTE [DISTWIDTH] IN BLOOD BY AUTOMATED COUNT: 17.9 % (ref 11.5–14.5)
GLOBULIN SER CALC-MCNC: 4.1 G/DL (ref 2–4)
GLUCOSE SERPL-MCNC: 148 MG/DL (ref 65–100)
HCT VFR BLD AUTO: 40.2 % (ref 36.6–50.3)
HGB BLD-MCNC: 12.9 G/DL (ref 12.1–17)
IMM GRANULOCYTES # BLD AUTO: 0.1 K/UL (ref 0–0.04)
IMM GRANULOCYTES NFR BLD AUTO: 1 % (ref 0–0.5)
LYMPHOCYTES # BLD: 1.86 K/UL (ref 0.8–3.5)
LYMPHOCYTES NFR BLD: 18.7 % (ref 12–49)
MCH RBC QN AUTO: 30.3 PG (ref 26–34)
MCHC RBC AUTO-ENTMCNC: 32.1 G/DL (ref 30–36.5)
MCV RBC AUTO: 94.4 FL (ref 80–99)
MONOCYTES # BLD: 0.57 K/UL (ref 0–1)
MONOCYTES NFR BLD: 5.7 % (ref 5–13)
NEUTS SEG # BLD: 7.15 K/UL (ref 1.8–8)
NEUTS SEG NFR BLD: 71.8 % (ref 32–75)
NRBC # BLD: 0.02 K/UL (ref 0–0.01)
NRBC BLD-RTO: 0.2 PER 100 WBC
PLATELET # BLD AUTO: 208 K/UL (ref 150–400)
PMV BLD AUTO: 11 FL (ref 8.9–12.9)
POTASSIUM SERPL-SCNC: 4 MMOL/L (ref 3.5–5.1)
PROT SERPL-MCNC: 7.7 G/DL (ref 6.4–8.2)
RBC # BLD AUTO: 4.26 M/UL (ref 4.1–5.7)
SODIUM SERPL-SCNC: 139 MMOL/L (ref 136–145)
SPECIMEN HOLD: NORMAL
WBC # BLD AUTO: 10 K/UL (ref 4.1–11.1)

## 2025-05-13 PROCEDURE — 6370000000 HC RX 637 (ALT 250 FOR IP): Performed by: EMERGENCY MEDICINE

## 2025-05-13 PROCEDURE — 90471 IMMUNIZATION ADMIN: CPT | Performed by: EMERGENCY MEDICINE

## 2025-05-13 PROCEDURE — 70450 CT HEAD/BRAIN W/O DYE: CPT

## 2025-05-13 PROCEDURE — 80053 COMPREHEN METABOLIC PANEL: CPT

## 2025-05-13 PROCEDURE — 36415 COLL VENOUS BLD VENIPUNCTURE: CPT

## 2025-05-13 PROCEDURE — 72125 CT NECK SPINE W/O DYE: CPT

## 2025-05-13 PROCEDURE — 6360000002 HC RX W HCPCS: Performed by: EMERGENCY MEDICINE

## 2025-05-13 PROCEDURE — 96374 THER/PROPH/DIAG INJ IV PUSH: CPT

## 2025-05-13 PROCEDURE — 85025 COMPLETE CBC W/AUTO DIFF WBC: CPT

## 2025-05-13 PROCEDURE — 90714 TD VACC NO PRESV 7 YRS+ IM: CPT | Performed by: EMERGENCY MEDICINE

## 2025-05-13 PROCEDURE — 93005 ELECTROCARDIOGRAM TRACING: CPT | Performed by: EMERGENCY MEDICINE

## 2025-05-13 PROCEDURE — 96376 TX/PRO/DX INJ SAME DRUG ADON: CPT

## 2025-05-13 PROCEDURE — 99285 EMERGENCY DEPT VISIT HI MDM: CPT

## 2025-05-13 RX ORDER — MORPHINE SULFATE 4 MG/ML
4 INJECTION, SOLUTION INTRAMUSCULAR; INTRAVENOUS
Refills: 0 | Status: COMPLETED | OUTPATIENT
Start: 2025-05-13 | End: 2025-05-13

## 2025-05-13 RX ORDER — HYDROCODONE BITARTRATE AND ACETAMINOPHEN 5; 325 MG/1; MG/1
2 TABLET ORAL
Refills: 0 | Status: COMPLETED | OUTPATIENT
Start: 2025-05-13 | End: 2025-05-13

## 2025-05-13 RX ORDER — IOPAMIDOL 755 MG/ML
100 INJECTION, SOLUTION INTRAVASCULAR
Status: DISCONTINUED | OUTPATIENT
Start: 2025-05-13 | End: 2025-05-14 | Stop reason: HOSPADM

## 2025-05-13 RX ADMIN — MORPHINE SULFATE 4 MG: 4 INJECTION INTRAVENOUS at 18:28

## 2025-05-13 RX ADMIN — MORPHINE SULFATE 4 MG: 4 INJECTION INTRAVENOUS at 22:00

## 2025-05-13 RX ADMIN — HYDROCODONE BITARTRATE AND ACETAMINOPHEN 2 TABLET: 5; 325 TABLET ORAL at 17:24

## 2025-05-13 RX ADMIN — CLOSTRIDIUM TETANI TOXOID ANTIGEN (FORMALDEHYDE INACTIVATED) AND CORYNEBACTERIUM DIPHTHERIAE TOXOID ANTIGEN (FORMALDEHYDE INACTIVATED) 0.5 ML: 5; 2 INJECTION, SUSPENSION INTRAMUSCULAR at 17:26

## 2025-05-13 ASSESSMENT — PAIN DESCRIPTION - ORIENTATION
ORIENTATION: MID

## 2025-05-13 ASSESSMENT — PAIN SCALES - GENERAL
PAINLEVEL_OUTOF10: 10

## 2025-05-13 ASSESSMENT — PAIN DESCRIPTION - LOCATION
LOCATION: NECK

## 2025-05-13 ASSESSMENT — PAIN - FUNCTIONAL ASSESSMENT: PAIN_FUNCTIONAL_ASSESSMENT: 0-10

## 2025-05-13 ASSESSMENT — PAIN DESCRIPTION - DESCRIPTORS
DESCRIPTORS: ACHING;THROBBING
DESCRIPTORS: THROBBING
DESCRIPTORS: ACHING

## 2025-05-13 NOTE — ED PROVIDER NOTES
intact.      Pupils: Pupils are equal, round, and reactive to light.   Neck:      Comments: Tenderness to palpation along the posterior cervical spine, c-collar placed by nursing team  Cardiovascular:      Rate and Rhythm: Normal rate and regular rhythm.   Pulmonary:      Effort: Pulmonary effort is normal.      Breath sounds: Normal breath sounds.   Abdominal:      General: Abdomen is flat.      Palpations: Abdomen is soft.      Tenderness: There is no abdominal tenderness.   Musculoskeletal:         General: No swelling or deformity. Normal range of motion.      Cervical back: Normal range of motion.   Skin:     General: Skin is warm and dry.      Findings: No rash.   Neurological:      General: No focal deficit present.      Mental Status: He is alert and oriented to person, place, and time.      Cranial Nerves: No cranial nerve deficit.      Comments: Mild chronic weakness of the right upper extremity and right lower extremity it has been present since 1960s, unchanged    No other acute weakness   Psychiatric:         Mood and Affect: Mood normal.             EMERGENCY DEPARTMENT COURSE and DIFFERENTIAL DIAGNOSIS/MDM:   Vitals:    Vitals:    05/13/25 2215 05/13/25 2231 05/13/25 2237 05/13/25 2245   BP: (!) 158/68   (!) 162/80   Pulse: 78 86 92 85   Resp: 23 15 17 16   Temp:    97.7 °F (36.5 °C)   TempSrc:    Oral   SpO2: (!) 88% 92% 91% 97%   Weight:       Height:             Medical Decision Making  Patient has very concerning cervical fractures on tonight's imaging.  He is in a cervical collar.  He does not have any acute neurologic deficits.  At 8:30 PM I have handed his care off to the incoming ER physician Dr. Herrera.    At this time we are waiting for consultation with [facility] spine specialist on-call.  Anticipate transfer the patient to a tertiary center for further evaluation.  Family at bedside and we have discussed plan and results with them as well.    Amount and/or Complexity of Data

## 2025-05-13 NOTE — ED TRIAGE NOTES
Pt to ED by EMS from home after GLF after tripping over his feet. Hit head on ground Hematoma to R side of head. Pt reports neck pain 10/10    Denies blood thinners and LOC    Hx stroke R side deficits

## 2025-05-14 LAB
EKG DIAGNOSIS: NORMAL
EKG Q-T INTERVAL: 382 MS
EKG QRS DURATION: 102 MS
EKG QTC CALCULATION (BAZETT): 451 MS
EKG R AXIS: -11 DEGREES
EKG T AXIS: 16 DEGREES
EKG VENTRICULAR RATE: 84 BPM

## 2025-05-14 PROCEDURE — 93010 ELECTROCARDIOGRAM REPORT: CPT | Performed by: INTERNAL MEDICINE

## 2025-05-14 NOTE — ED NOTES
9:03 PM  Change of shift. Care of patient taken over from Dr Paredes; H&P reviewed, bedside handoff complete.  Awaiting transfer to [facility] for spine surgery (ortho is on call- Van) for C1 and C2 fractures which are unstable with associated epidural hematoma.    Discussed with PA for ortho spine, they recommend transfer to [facility] for spine trauma evaluation.      Transfer center to connect me to [facility].     10:09 PM  [facility] accepted as echo trauma alert to [facility] ED. Dr Jasso is accepting.     Critical Care Time: 30 minutes  I personally performed critical care time separate of billable procedures which may include ordering and interpretation of testing, ordering of medications, direct patient assessment and reassessment, discussion with consultants or family, and documentation.          Mason Herrera MD  05/13/25 3396

## 2025-07-28 ENCOUNTER — TELEPHONE (OUTPATIENT)
Facility: CLINIC | Age: 85
End: 2025-07-28

## 2025-07-28 NOTE — TELEPHONE ENCOUNTER
Spoke to patients wife, Edith, and she wanted to let the provider know that pt fell at a  home the day after getting out of the rehab facility. He went to a handicap bathroom and was not able to get up because he did not have the strength. And after being stood up by two strong young men, he fell backwards onto the floor of the bathroom.    Patients wife asked for the records of his stay at [facility] and nothing was given to her. She is requesting to see if we can get the records.  [phone]  She is also requesting records from his stay at [facility] and neck surgery...   Dr Westbrook's office   [phone]     Also concerned about blood pressure, wanted to make sure of dosage of Losartan. [facility] lowered his meds from 100mg to 12.5 due to orthostatic hypotension. Pt has an appt coming up soon with Dr. Jenkins and wants to make sure everyone is agreeable as to what he should be taking as he does have heart failure.    Trae is coming in the next few days to start home health services.

## 2025-07-30 ENCOUNTER — OFFICE VISIT (OUTPATIENT)
Facility: CLINIC | Age: 85
End: 2025-07-30

## 2025-07-30 VITALS
BODY MASS INDEX: 26.86 KG/M2 | SYSTOLIC BLOOD PRESSURE: 127 MMHG | RESPIRATION RATE: 16 BRPM | OXYGEN SATURATION: 97 % | HEIGHT: 75 IN | DIASTOLIC BLOOD PRESSURE: 75 MMHG | TEMPERATURE: 97.9 F | HEART RATE: 98 BPM | WEIGHT: 216 LBS

## 2025-07-30 DIAGNOSIS — Z09 HOSPITAL DISCHARGE FOLLOW-UP: Primary | ICD-10-CM

## 2025-07-30 DIAGNOSIS — I10 ESSENTIAL HYPERTENSION, BENIGN: ICD-10-CM

## 2025-07-30 DIAGNOSIS — R29.6 MULTIPLE FALLS: ICD-10-CM

## 2025-07-30 DIAGNOSIS — L89.152 PRESSURE INJURY OF COCCYGEAL REGION, STAGE 2 (HCC): ICD-10-CM

## 2025-07-30 DIAGNOSIS — S12.000S CLOSED DISPLACED FRACTURE OF FIRST CERVICAL VERTEBRA, UNSPECIFIED FRACTURE MORPHOLOGY, SEQUELA: ICD-10-CM

## 2025-07-30 PROBLEM — L89.151 PRESSURE INJURY OF COCCYGEAL REGION, STAGE 1: Status: ACTIVE | Noted: 2025-07-30

## 2025-07-30 NOTE — PROGRESS NOTES
Post-Discharge Transitional Care Management Progress Note      Bill Mcnamara   YOB: 1940    Date of Office Visit:  7/30/2025  Date of Hospital Admission: 05/13/2025  Date of Hospital Discharge: 07/24/2025    Care management risk score Rising risk (score 2-5) and Complex Care (Scores >=6): No Risk Score On File     Non face to face  following discharge, date last encounter closed (first attempt may have been earlier): *No documented post hospital discharge outreach found in the last 14 days *No documented post hospital discharge outreach found in the last 14 days    Call initiated 2 business days of discharge: *No response recorded in the last 14 days  Assessment & Plan  0. Post-hospital follow up, transitions of care  1. Fractured cervical vertebrae: Post-surgical.  #multiple falls, high risk  - Ground-level fall on 05/13/2025, resulting in fractured cervical vertebrae 1 and 2. Neurosurgery on 05/19/2025. Hospitalization until 05/23/2025. Stayed at skilled nursing facility until 07/24/2025.  - Advised gentle neck movements, avoid active work on neck until surgeon clearance. Recommended gentle neck massage and heat compresses for 20 minutes.  - Encouraged good posture and use of lumbar pillow. Bone healing typically takes about 3 months post-surgery.    2. Hypertension: Stable.  - /75.  - On losartan 12.5 mg, amlodipine 5 mg, carvedilol 6.25 mg twice daily, and spironolactone 25 mg daily.  - Maintain current medication regimen.  - Discussed discontinuing losartan if dizziness persists, informed about alternative medications for dizziness.    3. Pressure sores: Stage 2.  - Two pressure sores on buttocks, stage 1-2, with thinning observed. Sores diffusely across sacral coccyx area, extending to mid-cheeks. Debris and skin buildup in affected area.  - Advised cleaning sores with soap and water, apply Vaseline. Prescribed sacroiliac bandages, apply one daily.  - Encouraged diligent work with

## 2025-07-30 NOTE — PROGRESS NOTES
Chief Complaint   Patient presents with    Follow-Up from Hospital     Hosp fu.  Would like pcp to give him something for his bed sore located on his buttocks.  Has been being treated with a cream while in rehab but was not sent home with anything.

## (undated) DEVICE — SNARE ENDOSCP M L240CM W27MM SHTH DIA2.4MM CHN 2.8MM OVL

## (undated) DEVICE — WORKING LENGTH 235CM, WORKING CHANNEL 2.8MM: Brand: RESOLUTION 360 CLIP

## (undated) DEVICE — CANNULA CUSH AD W/ 14FT TBG

## (undated) DEVICE — TORFLEX TRANSSEPTAL SHEATH; TRANSSEPTAL DILATOR; J-TIP GUIDEWIRE: Brand: TORFLEX TRANSSEPTAL GUIDING SHEATH

## (undated) DEVICE — TUBING PRSS MON L6IN PVC M FEM CONN

## (undated) DEVICE — PRESSURE MONITORING SET: Brand: TRUWAVE

## (undated) DEVICE — ACCESS SHEATH WITH DILATOR: Brand: WATCHMAN® ACCESS SYSTEM

## (undated) DEVICE — ELECTRODE,RADIOTRANSLUCENT,FOAM,3PK: Brand: MEDLINE

## (undated) DEVICE — CHECK-FLO PERFORMER INTRODUCER: Brand: PERFORMER

## (undated) DEVICE — Device: Brand: RFP-100A CONNECTOR CABLE

## (undated) DEVICE — INTRODUCER SHTH 5 FRX30 CM 7 CM W/ NIT WIRE REG MICRO-STICK

## (undated) DEVICE — CONTAINER SPEC 20 ML LID NEUT BUFF FORMALIN 10 % POLYPR STS

## (undated) DEVICE — ANGIOGRAPHIC CATHETER: Brand: IMPULSE™

## (undated) DEVICE — SET ADMIN 16ML TBNG L100IN 2 Y INJ SITE IV PIGGY BK DISP

## (undated) DEVICE — PINNACLE INTRODUCER SHEATH: Brand: PINNACLE

## (undated) DEVICE — PACK PROCEDURE SURG HRT CATH

## (undated) DEVICE — DRESSING HEMOSTATIC SFT INTVENT W/O SLT DBL WRP QUIKCLOT LF

## (undated) DEVICE — SWAN-GANZ TRUE SIZE THERMODULTION CATHETER, 5F: Brand: SWAN-GANZ TRUE SIZE

## (undated) DEVICE — Device: Brand: NRG TRANSSEPTAL NEEDLE

## (undated) DEVICE — SOLIDIFIER MEDC 1200ML -- CONVERT TO 356117

## (undated) DEVICE — POLYP TRAP: Brand: TRAPEASE®

## (undated) DEVICE — PERCLOSE PROGLIDE™ SUTURE-MEDIATED CLOSURE SYSTEM: Brand: PERCLOSE PROGLIDE™

## (undated) DEVICE — PROCEDURE KIT FLUID MGMT CUST MAINFOLD STRL

## (undated) DEVICE — REM POLYHESIVE ADULT PATIENT RETURN ELECTRODE: Brand: VALLEYLAB

## (undated) DEVICE — CATH DIAG D 6F PIG 155 5 PK -- IMPULSE 16599-42

## (undated) DEVICE — Device: Brand: PROTRACK PIGTAIL WIRE

## (undated) DEVICE — BASIN EMSIS 16OZ GRAPHITE PLAS KID SHP MOLD GRAD FOR ORAL

## (undated) DEVICE — GUIDEWIRE VASC L185CM DIA0.014IN HYDRPHLC PTFE STR TIP

## (undated) DEVICE — SYR 3ML LL TIP 1/10ML GRAD --

## (undated) DEVICE — BAG SPEC BIOHZRD 10 X 10 IN --

## (undated) DEVICE — SYRINGE MED 10ML RED POLYCARB BRL FIX M LUER CONN FLAT GRP

## (undated) DEVICE — CATH IV AUTOGRD BC BLU 22GA 25 -- INSYTE

## (undated) DEVICE — BITEBLOCK ENDOSCP 60FR MAXI WHT POLYETH STURDY W/ VELC WVN

## (undated) DEVICE — FORCEPS BX L240CM JAW DIA2.8MM L CAP W/ NDL MIC MESH TOOTH

## (undated) DEVICE — NDL PRT INJ NSAF BLNT 18GX1.5 --

## (undated) DEVICE — CATH GUID COR EB40 6FR 100CM -- LAUNCHER

## (undated) DEVICE — 1200 GUARD II KIT W/5MM TUBE W/O VAC TUBE: Brand: GUARDIAN

## (undated) DEVICE — SYR 5ML 1/5 GRAD LL NSAF LF --

## (undated) DEVICE — Device

## (undated) DEVICE — MICROPUNCTURE INTRODUCER SET SILHOUETTE TRANSITIONLESS PUSH-PLUS DESIGN - STIFFENED CANNULA WITH STAINLESS STEEL WIRE GUIDE: Brand: MICROPUNCTURE

## (undated) DEVICE — NDL FLTR TIP 5 MIC 18GX1.5IN --

## (undated) DEVICE — KIT COLON W/ 1.1OZ LUB AND 2 END

## (undated) DEVICE — BAG BELONG PT PERS CLEAR HANDL

## (undated) DEVICE — Device: Brand: PADPRO